# Patient Record
Sex: FEMALE | Race: WHITE | NOT HISPANIC OR LATINO | Employment: FULL TIME | URBAN - METROPOLITAN AREA
[De-identification: names, ages, dates, MRNs, and addresses within clinical notes are randomized per-mention and may not be internally consistent; named-entity substitution may affect disease eponyms.]

---

## 2023-08-23 PROBLEM — B18.2 CHRONIC VIRAL HEPATITIS C (MULTI): Status: ACTIVE | Noted: 2019-08-28

## 2023-08-23 PROBLEM — I10 PRIMARY HYPERTENSION: Status: ACTIVE | Noted: 2018-06-11

## 2023-08-23 PROBLEM — E66.9 OBESITY: Status: ACTIVE | Noted: 2023-08-23

## 2023-08-23 PROBLEM — F32.A DEPRESSION: Status: ACTIVE | Noted: 2018-08-22

## 2023-08-23 PROBLEM — E03.9 HYPOTHYROIDISM, UNSPECIFIED: Status: ACTIVE | Noted: 2018-06-12

## 2023-08-23 PROBLEM — E11.65 TYPE 2 DIABETES MELLITUS WITH HYPERGLYCEMIA (MULTI): Status: ACTIVE | Noted: 2023-08-23

## 2023-08-23 PROBLEM — K13.70 ORAL LESION: Status: ACTIVE | Noted: 2020-07-17

## 2023-08-23 PROBLEM — F98.8 ADD (ATTENTION DEFICIT DISORDER): Status: ACTIVE | Noted: 2022-01-26

## 2023-08-23 PROBLEM — E78.2 MIXED HYPERLIPIDEMIA: Status: ACTIVE | Noted: 2018-06-12

## 2023-08-23 RX ORDER — BUPROPION HYDROCHLORIDE 200 MG/1
TABLET, EXTENDED RELEASE ORAL
COMMUNITY
Start: 2022-01-10 | End: 2023-11-30 | Stop reason: SDUPTHER

## 2023-08-23 RX ORDER — FENOFIBRATE 160 MG/1
TABLET ORAL
COMMUNITY
Start: 2023-04-10 | End: 2023-11-30 | Stop reason: ALTCHOICE

## 2023-08-23 RX ORDER — ATOMOXETINE 40 MG/1
CAPSULE ORAL
COMMUNITY
Start: 2023-03-27 | End: 2023-10-13 | Stop reason: WASHOUT

## 2023-08-23 RX ORDER — METFORMIN HYDROCHLORIDE 750 MG/1
TABLET, EXTENDED RELEASE ORAL
COMMUNITY
Start: 2023-06-29 | End: 2023-10-08

## 2023-08-23 RX ORDER — ATORVASTATIN CALCIUM 40 MG/1
TABLET, FILM COATED ORAL
COMMUNITY
Start: 2023-04-10

## 2023-08-23 RX ORDER — CARVEDILOL 25 MG/1
TABLET ORAL
COMMUNITY
Start: 2018-08-22 | End: 2023-10-13 | Stop reason: WASHOUT

## 2023-08-23 RX ORDER — LEVOTHYROXINE SODIUM 125 UG/1
TABLET ORAL
COMMUNITY
Start: 2023-05-30 | End: 2023-11-30 | Stop reason: SDUPTHER

## 2023-08-23 RX ORDER — ASPIRIN 81 MG/1
TABLET ORAL
COMMUNITY
Start: 2018-08-22

## 2023-08-23 RX ORDER — LOSARTAN POTASSIUM 100 MG/1
TABLET ORAL
COMMUNITY
Start: 2018-08-22

## 2023-08-23 RX ORDER — TIRZEPATIDE 5 MG/.5ML
INJECTION, SOLUTION SUBCUTANEOUS
COMMUNITY
Start: 2023-07-03 | End: 2023-10-13 | Stop reason: WASHOUT

## 2023-10-07 DIAGNOSIS — E11.65 TYPE 2 DIABETES MELLITUS WITH HYPERGLYCEMIA, WITHOUT LONG-TERM CURRENT USE OF INSULIN (MULTI): Primary | ICD-10-CM

## 2023-10-08 RX ORDER — METFORMIN HYDROCHLORIDE 750 MG/1
1500 TABLET, EXTENDED RELEASE ORAL DAILY
Qty: 180 TABLET | Refills: 0 | Status: SHIPPED | OUTPATIENT
Start: 2023-10-08 | End: 2024-01-12 | Stop reason: SDUPTHER

## 2023-10-11 NOTE — PROGRESS NOTES
Subjective   Patient ID: Simona Betancourt is a 68 y.o. female who presents for Type 1 diabetes follow up and education   Last visit7/14/2023 with TROY Juarez CNP.  Pt with Dm2 (dx in her mid/late 40's), htn, dyslipidemia, hypothyroid, hep C (s/p treatment), CAD with stent (2014 Dr. Srinivasan). A1c 6.8% was 8.5% Pt checking blood sugars daily, did not bring glucometer with her. Recalls waking bs 110-120, not checking other times of the day. BS targets reviewed.  Pt taking metformin 750mg twice a day and Mounjaro 5 mg, c/o nausea/diarrhea day after injection, has improve since starting    Diet: 3 meals daily with snacks, limiting carbs at meals/snacks, looking at labels and aiming for 45 gram/meal 15 gram snack  -carbohydrate foods, portions and meal planning reviewed  -advised protein with meals and starting with protein for mixed macronutrient meals   -reinforced carb limits and protein snacks   Exercise: none currently  -benefits reviewed, advised working up to 30 minutes daily    Taking levothyroxine 125mcg daily. Pt euthyroid without obstructive symptoms.  Taking atorvastatin 40mg for lipids and tolerating.  Taking losartan 100mg, coreg 25mg bid  Creatinine 1.6 GFR 62 Urine creatine/ratio 3100.      Current Outpatient Medications:     aspirin 81 mg EC tablet, = 1 tab(s) ( 81 mg ), PO, Daily, # 90 tab(s), 0 Refill(s), Type: Maintenance, Disp: , Rfl:     atorvastatin (Lipitor) 40 mg tablet, = 1 tab(s) ( 40 mg ), Oral, daily, # 90 tab(s), 0 Refill(s), Type: Maintenance, Disp: , Rfl:     carvedilol (Coreg) 25 mg tablet, Take 1 tablet (25 mg) by mouth 2 times a day with meals., Disp: , Rfl:     levothyroxine (Synthroid, Levoxyl) 125 mcg tablet, See Instructions, Instructions: TAKE 1 TABLET DAILY, # 90 tab(s), 3 Refill(s), Type: Maintenance, Pharmacy: [facility], TAKE 1 TABLET DAILY, 61.5, in, 05/22/23 13:57:00 EDT, Height Measured, 170, lb, 05/22/23 13:57:00 EDT, Weight Measured, Disp: , Rfl:      losartan (Cozaar) 100 mg tablet, = 1 tab(s) ( 100 mg ), PO, Daily, # 90 tab(s), 0 Refill(s), Type: Maintenance, Disp: , Rfl:     metFORMIN XR (Glucophage-XR) 750 mg 24 hr tablet, TAKE TWO TABLETS BY MOUTH DAILY, Disp: 180 tablet, Rfl: 0    tirzepatide 5 mg/0.5 mL pen injector, INJECT 1 PEN-INJECTOR UNDER THE SKIN ONCE EVERY WEEK, Disp: 2 mL, Rfl: 3    buPROPion SR (Wellbutrin SR) 200 mg 12 hr tablet, = 1 tab(s), Oral, bid, # 180 tab(s), 3 Refill(s), Type: Soft Stop, Pharmacy: [facility], 1 tab(s) Oral bid, 61.5, in, 04/14/21 16:34:00 EDT, Height Measured, 183, lb, 03/12/21 11:02:00 EST, Weight Measured, Disp: , Rfl:     fenofibrate (Triglide) 160 mg tablet, = 1 tab(s) ( 160 mg ), Oral, daily, # 90 tab(s), 0 Refill(s), Type: Maintenance, Disp: , Rfl:     multivitamin (MULTIPLE VITAMINS ORAL), 1 cap(s), PO, Daily, # 90 cap(s), 0 Refill(s), Type: Maintenance, Disp: , Rfl:     /84   Pulse 63   Wt 72 kg (158 lb 12.8 oz)   BMI 29.52 kg/m²     Assessment/Plan     1. Type 2 diabetes mellitus with hyperglycemia, without long-term current use of insulin (CMS/[facility])  -A1c decreased 1.7% since last visit, at target, no increase in mounjaro today due to mild side effects currently  -continue focus carb limits (0-30 grams) and starting exercise  -check bs daily at different times, learn which meals/snacks cause hyperglycemia  -consider sglt2 at follow up    2. Primary hypertension  -above target today, advised obtaining home bp cuff to monitor  -next move would be low dose diuretic or calcium channel blocker    3. Mixed hyperlipidemia  -on statin and tolerating    4. Hypothyroidism, unspecified type  -euthyroid on current therapy     Treatment and plan discussed with Dr. Louie. Saum[facility] VALENZUELA Cert[facility] and     I, Dr Louie, have reviewed this progress note, medication list, vital signs, and any pertinent lab values, and any CGM data if present with the Certified Diabetes  Care and . This note reflects the treatment plan that was made with my input on the data that was presented above. I saw the patient face to face while reviewing the above data and formulating the treatment plan with the [facility] and . Dr. Kwan Louie.

## 2023-10-13 ENCOUNTER — CLINICAL SUPPORT (OUTPATIENT)
Dept: ENDOCRINOLOGY | Facility: CLINIC | Age: 68
End: 2023-10-13
Payer: COMMERCIAL

## 2023-10-13 VITALS
WEIGHT: 158.8 LBS | DIASTOLIC BLOOD PRESSURE: 84 MMHG | HEART RATE: 63 BPM | BODY MASS INDEX: 29.52 KG/M2 | SYSTOLIC BLOOD PRESSURE: 144 MMHG

## 2023-10-13 DIAGNOSIS — E03.9 HYPOTHYROIDISM, UNSPECIFIED TYPE: ICD-10-CM

## 2023-10-13 DIAGNOSIS — E78.2 MIXED HYPERLIPIDEMIA: ICD-10-CM

## 2023-10-13 DIAGNOSIS — I10 PRIMARY HYPERTENSION: ICD-10-CM

## 2023-10-13 DIAGNOSIS — E11.65 TYPE 2 DIABETES MELLITUS WITH HYPERGLYCEMIA, WITHOUT LONG-TERM CURRENT USE OF INSULIN (MULTI): Primary | ICD-10-CM

## 2023-10-13 LAB — POC HEMOGLOBIN A1C: 6.8 % (ref 4.2–6.5)

## 2023-10-13 PROCEDURE — 99213 OFFICE O/P EST LOW 20 MIN: CPT | Performed by: INTERNAL MEDICINE

## 2023-10-13 PROCEDURE — 83036 HEMOGLOBIN GLYCOSYLATED A1C: CPT | Performed by: INTERNAL MEDICINE

## 2023-10-13 RX ORDER — CARVEDILOL 25 MG/1
25 TABLET ORAL
COMMUNITY

## 2023-10-13 ASSESSMENT — PAIN SCALES - GENERAL: PAINLEVEL: 0-NO PAIN

## 2023-10-13 NOTE — PATIENT INSTRUCTIONS
Continue Mounjaro 5 mg weekly    Check blood sugar daily at different times  Check 1-2 hours after some meals, goal is less than 180    Check your home blood pressures, bring log to next visit    Limit carbohydrates at meals, no more than 45 grams for meals, 0-30 grams best

## 2023-10-20 DIAGNOSIS — E11.65 TYPE 2 DIABETES MELLITUS WITH HYPERGLYCEMIA, WITHOUT LONG-TERM CURRENT USE OF INSULIN (MULTI): Primary | ICD-10-CM

## 2023-10-23 ENCOUNTER — PHARMACY VISIT (OUTPATIENT)
Dept: PHARMACY | Facility: CLINIC | Age: 68
End: 2023-10-23
Payer: COMMERCIAL

## 2023-10-23 PROCEDURE — RXMED WILLOW AMBULATORY MEDICATION CHARGE

## 2023-10-26 ENCOUNTER — PHARMACY VISIT (OUTPATIENT)
Dept: PHARMACY | Facility: CLINIC | Age: 68
End: 2023-10-26
Payer: COMMERCIAL

## 2023-11-08 ENCOUNTER — TELEPHONE (OUTPATIENT)
Dept: PRIMARY CARE | Facility: CLINIC | Age: 68
End: 2023-11-08
Payer: COMMERCIAL

## 2023-11-08 DIAGNOSIS — E78.2 MIXED HYPERLIPIDEMIA: ICD-10-CM

## 2023-11-08 DIAGNOSIS — E11.65 TYPE 2 DIABETES MELLITUS WITH HYPERGLYCEMIA, WITHOUT LONG-TERM CURRENT USE OF INSULIN (MULTI): ICD-10-CM

## 2023-11-08 DIAGNOSIS — E66.09 OBESITY DUE TO EXCESS CALORIES WITHOUT SERIOUS COMORBIDITY, UNSPECIFIED CLASSIFICATION: ICD-10-CM

## 2023-11-08 DIAGNOSIS — I10 PRIMARY HYPERTENSION: ICD-10-CM

## 2023-11-08 DIAGNOSIS — E03.9 HYPOTHYROIDISM, UNSPECIFIED TYPE: ICD-10-CM

## 2023-11-22 ENCOUNTER — PHARMACY VISIT (OUTPATIENT)
Dept: PHARMACY | Facility: CLINIC | Age: 68
End: 2023-11-22
Payer: COMMERCIAL

## 2023-11-22 ENCOUNTER — DOCUMENTATION (OUTPATIENT)
Dept: PRIMARY CARE | Facility: CLINIC | Age: 68
End: 2023-11-22
Payer: COMMERCIAL

## 2023-11-22 PROCEDURE — RXMED WILLOW AMBULATORY MEDICATION CHARGE

## 2023-11-27 ENCOUNTER — LAB (OUTPATIENT)
Dept: LAB | Facility: LAB | Age: 68
End: 2023-11-27
Payer: COMMERCIAL

## 2023-11-27 DIAGNOSIS — E78.2 MIXED HYPERLIPIDEMIA: ICD-10-CM

## 2023-11-27 DIAGNOSIS — E66.09 OBESITY DUE TO EXCESS CALORIES WITHOUT SERIOUS COMORBIDITY, UNSPECIFIED CLASSIFICATION: ICD-10-CM

## 2023-11-27 DIAGNOSIS — E11.65 TYPE 2 DIABETES MELLITUS WITH HYPERGLYCEMIA, WITHOUT LONG-TERM CURRENT USE OF INSULIN (MULTI): ICD-10-CM

## 2023-11-27 DIAGNOSIS — E03.9 HYPOTHYROIDISM, UNSPECIFIED TYPE: ICD-10-CM

## 2023-11-27 DIAGNOSIS — I10 PRIMARY HYPERTENSION: ICD-10-CM

## 2023-11-27 LAB
ALBUMIN SERPL-MCNC: 4.8 G/DL (ref 3.5–5)
ALP BLD-CCNC: 86 U/L (ref 35–125)
ALT SERPL-CCNC: 18 U/L (ref 5–40)
ANION GAP SERPL CALC-SCNC: 17 MMOL/L
APPEARANCE UR: CLEAR
AST SERPL-CCNC: 21 U/L (ref 5–40)
BASOPHILS # BLD AUTO: 0.08 X10*3/UL (ref 0–0.1)
BASOPHILS NFR BLD AUTO: 0.8 %
BILIRUB SERPL-MCNC: 0.6 MG/DL (ref 0.1–1.2)
BILIRUB UR STRIP.AUTO-MCNC: NEGATIVE MG/DL
BUN SERPL-MCNC: 19 MG/DL (ref 8–25)
CALCIUM SERPL-MCNC: 10.7 MG/DL (ref 8.5–10.4)
CHLORIDE SERPL-SCNC: 104 MMOL/L (ref 97–107)
CHOLEST SERPL-MCNC: 143 MG/DL (ref 133–200)
CHOLEST/HDLC SERPL: 3.3 {RATIO}
CO2 SERPL-SCNC: 24 MMOL/L (ref 24–31)
COLOR UR: YELLOW
CREAT SERPL-MCNC: 1.1 MG/DL (ref 0.4–1.6)
CREAT UR-MCNC: 149.5 MG/DL
EOSINOPHIL # BLD AUTO: 0.26 X10*3/UL (ref 0–0.7)
EOSINOPHIL NFR BLD AUTO: 2.5 %
ERYTHROCYTE [DISTWIDTH] IN BLOOD BY AUTOMATED COUNT: 12.2 % (ref 11.5–14.5)
EST. AVERAGE GLUCOSE BLD GHB EST-MCNC: 128 MG/DL
GFR SERPL CREATININE-BSD FRML MDRD: 56 ML/MIN/1.73M*2
GLUCOSE SERPL-MCNC: 104 MG/DL (ref 65–99)
GLUCOSE UR STRIP.AUTO-MCNC: NORMAL MG/DL
HBA1C MFR BLD: 6.1 %
HCT VFR BLD AUTO: 48.3 % (ref 36–46)
HDLC SERPL-MCNC: 43 MG/DL
HGB BLD-MCNC: 16.1 G/DL (ref 12–16)
HYALINE CASTS #/AREA URNS AUTO: ABNORMAL /LPF
IMM GRANULOCYTES # BLD AUTO: 0.03 X10*3/UL (ref 0–0.7)
IMM GRANULOCYTES NFR BLD AUTO: 0.3 % (ref 0–0.9)
KETONES UR STRIP.AUTO-MCNC: NEGATIVE MG/DL
LDLC SERPL CALC-MCNC: 68 MG/DL (ref 65–130)
LEUKOCYTE ESTERASE UR QL STRIP.AUTO: ABNORMAL
LYMPHOCYTES # BLD AUTO: 2.83 X10*3/UL (ref 1.2–4.8)
LYMPHOCYTES NFR BLD AUTO: 27.7 %
MCH RBC QN AUTO: 31.2 PG (ref 26–34)
MCHC RBC AUTO-ENTMCNC: 33.3 G/DL (ref 32–36)
MCV RBC AUTO: 94 FL (ref 80–100)
MICROALBUMIN UR-MCNC: 76 MG/L (ref 0–23)
MICROALBUMIN/CREAT UR: 50.8 UG/MG CREAT
MONOCYTES # BLD AUTO: 0.8 X10*3/UL (ref 0.1–1)
MONOCYTES NFR BLD AUTO: 7.8 %
NEUTROPHILS # BLD AUTO: 6.2 X10*3/UL (ref 1.2–7.7)
NEUTROPHILS NFR BLD AUTO: 60.9 %
NITRITE UR QL STRIP.AUTO: NEGATIVE
NRBC BLD-RTO: 0 /100 WBCS (ref 0–0)
PH UR STRIP.AUTO: 5.5 [PH]
PLATELET # BLD AUTO: 291 X10*3/UL (ref 150–450)
POTASSIUM SERPL-SCNC: 4.8 MMOL/L (ref 3.4–5.1)
PROT SERPL-MCNC: 7.3 G/DL (ref 5.9–7.9)
PROT UR STRIP.AUTO-MCNC: ABNORMAL MG/DL
RBC # BLD AUTO: 5.16 X10*6/UL (ref 4–5.2)
RBC # UR STRIP.AUTO: NEGATIVE /UL
RBC #/AREA URNS AUTO: ABNORMAL /HPF
SODIUM SERPL-SCNC: 145 MMOL/L (ref 133–145)
SP GR UR STRIP.AUTO: 1.02
SQUAMOUS #/AREA URNS AUTO: ABNORMAL /HPF
TRIGL SERPL-MCNC: 161 MG/DL (ref 40–150)
TSH SERPL DL<=0.05 MIU/L-ACNC: 0.86 MIU/L (ref 0.27–4.2)
UROBILINOGEN UR STRIP.AUTO-MCNC: NORMAL MG/DL
WBC # BLD AUTO: 10.2 X10*3/UL (ref 4.4–11.3)
WBC #/AREA URNS AUTO: ABNORMAL /HPF

## 2023-11-27 PROCEDURE — 81001 URINALYSIS AUTO W/SCOPE: CPT

## 2023-11-27 PROCEDURE — 84443 ASSAY THYROID STIM HORMONE: CPT

## 2023-11-27 PROCEDURE — 80053 COMPREHEN METABOLIC PANEL: CPT

## 2023-11-27 PROCEDURE — 83036 HEMOGLOBIN GLYCOSYLATED A1C: CPT

## 2023-11-27 PROCEDURE — 82043 UR ALBUMIN QUANTITATIVE: CPT

## 2023-11-27 PROCEDURE — 85025 COMPLETE CBC W/AUTO DIFF WBC: CPT

## 2023-11-27 PROCEDURE — 36415 COLL VENOUS BLD VENIPUNCTURE: CPT

## 2023-11-27 PROCEDURE — 82570 ASSAY OF URINE CREATININE: CPT

## 2023-11-27 PROCEDURE — 80061 LIPID PANEL: CPT

## 2023-11-30 ENCOUNTER — OFFICE VISIT (OUTPATIENT)
Dept: PRIMARY CARE | Facility: CLINIC | Age: 68
End: 2023-11-30
Payer: COMMERCIAL

## 2023-11-30 VITALS
SYSTOLIC BLOOD PRESSURE: 118 MMHG | HEIGHT: 60 IN | OXYGEN SATURATION: 99 % | HEART RATE: 70 BPM | DIASTOLIC BLOOD PRESSURE: 66 MMHG | WEIGHT: 155 LBS | BODY MASS INDEX: 30.43 KG/M2

## 2023-11-30 DIAGNOSIS — Z00.00 WELL ADULT EXAM: Primary | ICD-10-CM

## 2023-11-30 DIAGNOSIS — Z78.0 POST-MENOPAUSAL: ICD-10-CM

## 2023-11-30 DIAGNOSIS — E78.2 MIXED HYPERLIPIDEMIA: ICD-10-CM

## 2023-11-30 DIAGNOSIS — I10 PRIMARY HYPERTENSION: ICD-10-CM

## 2023-11-30 DIAGNOSIS — F17.210 HEAVY SMOKER (MORE THAN 20 CIGARETTES PER DAY): ICD-10-CM

## 2023-11-30 DIAGNOSIS — E11.65 TYPE 2 DIABETES MELLITUS WITH HYPERGLYCEMIA, WITHOUT LONG-TERM CURRENT USE OF INSULIN (MULTI): ICD-10-CM

## 2023-11-30 DIAGNOSIS — E03.9 HYPOTHYROIDISM, UNSPECIFIED TYPE: ICD-10-CM

## 2023-11-30 DIAGNOSIS — Z12.31 ENCOUNTER FOR SCREENING MAMMOGRAM FOR BREAST CANCER: ICD-10-CM

## 2023-11-30 DIAGNOSIS — B18.2 CHRONIC HEPATITIS C WITHOUT HEPATIC COMA (MULTI): ICD-10-CM

## 2023-11-30 DIAGNOSIS — F32.A DEPRESSION, UNSPECIFIED DEPRESSION TYPE: ICD-10-CM

## 2023-11-30 PROBLEM — K13.70 ORAL LESION: Status: RESOLVED | Noted: 2020-07-17 | Resolved: 2023-11-30

## 2023-11-30 PROCEDURE — 4010F ACE/ARB THERAPY RXD/TAKEN: CPT | Performed by: FAMILY MEDICINE

## 2023-11-30 PROCEDURE — 3044F HG A1C LEVEL LT 7.0%: CPT | Performed by: FAMILY MEDICINE

## 2023-11-30 PROCEDURE — G0438 PPPS, INITIAL VISIT: HCPCS | Performed by: FAMILY MEDICINE

## 2023-11-30 PROCEDURE — 3060F POS MICROALBUMINURIA REV: CPT | Performed by: FAMILY MEDICINE

## 2023-11-30 PROCEDURE — 1159F MED LIST DOCD IN RCRD: CPT | Performed by: FAMILY MEDICINE

## 2023-11-30 PROCEDURE — 1126F AMNT PAIN NOTED NONE PRSNT: CPT | Performed by: FAMILY MEDICINE

## 2023-11-30 PROCEDURE — 90677 PCV20 VACCINE IM: CPT | Performed by: FAMILY MEDICINE

## 2023-11-30 PROCEDURE — 3074F SYST BP LT 130 MM HG: CPT | Performed by: FAMILY MEDICINE

## 2023-11-30 PROCEDURE — 4004F PT TOBACCO SCREEN RCVD TLK: CPT | Performed by: FAMILY MEDICINE

## 2023-11-30 PROCEDURE — 90471 IMMUNIZATION ADMIN: CPT | Performed by: FAMILY MEDICINE

## 2023-11-30 PROCEDURE — 3078F DIAST BP <80 MM HG: CPT | Performed by: FAMILY MEDICINE

## 2023-11-30 PROCEDURE — 1160F RVW MEDS BY RX/DR IN RCRD: CPT | Performed by: FAMILY MEDICINE

## 2023-11-30 PROCEDURE — 3048F LDL-C <100 MG/DL: CPT | Performed by: FAMILY MEDICINE

## 2023-11-30 PROCEDURE — 1170F FXNL STATUS ASSESSED: CPT | Performed by: FAMILY MEDICINE

## 2023-11-30 RX ORDER — BUPROPION HYDROCHLORIDE 200 MG/1
TABLET, EXTENDED RELEASE ORAL
Qty: 90 TABLET | Refills: 4 | Status: SHIPPED | OUTPATIENT
Start: 2023-11-30

## 2023-11-30 RX ORDER — LEVOTHYROXINE SODIUM 125 UG/1
TABLET ORAL
Qty: 90 TABLET | Refills: 4 | Status: SHIPPED | OUTPATIENT
Start: 2023-11-30 | End: 2024-05-06

## 2023-11-30 ASSESSMENT — PATIENT HEALTH QUESTIONNAIRE - PHQ9
SUM OF ALL RESPONSES TO PHQ9 QUESTIONS 1 AND 2: 0
1. LITTLE INTEREST OR PLEASURE IN DOING THINGS: NOT AT ALL
2. FEELING DOWN, DEPRESSED OR HOPELESS: NOT AT ALL

## 2023-11-30 ASSESSMENT — PROMIS GLOBAL HEALTH SCALE
RATE_MENTAL_HEALTH: FAIR
RATE_GENERAL_HEALTH: FAIR
CARRYOUT_SOCIAL_ACTIVITIES: POOR
RATE_AVERAGE_PAIN: 2
RATE_PHYSICAL_HEALTH: FAIR
RATE_AVERAGE_FATIGUE: SEVERE
EMOTIONAL_PROBLEMS: OFTEN
CARRYOUT_PHYSICAL_ACTIVITIES: COMPLETELY
RATE_QUALITY_OF_LIFE: FAIR
RATE_SOCIAL_SATISFACTION: POOR

## 2023-11-30 ASSESSMENT — PAIN SCALES - GENERAL: PAINLEVEL: 0-NO PAIN

## 2023-11-30 ASSESSMENT — ACTIVITIES OF DAILY LIVING (ADL)
BATHING: INDEPENDENT
MANAGING_FINANCES: INDEPENDENT
DOING_HOUSEWORK: INDEPENDENT
GROCERY_SHOPPING: INDEPENDENT
DRESSING: INDEPENDENT
TAKING_MEDICATION: INDEPENDENT

## 2023-12-02 ASSESSMENT — ENCOUNTER SYMPTOMS
NUMBNESS: 0
UNEXPECTED WEIGHT CHANGE: 0
COUGH: 0
BLOOD IN STOOL: 0
DIZZINESS: 0
TROUBLE SWALLOWING: 0
FATIGUE: 0
FEVER: 0
ACTIVITY CHANGE: 0
NERVOUS/ANXIOUS: 0
SORE THROAT: 0
DYSPHORIC MOOD: 0
MYALGIAS: 0
RHINORRHEA: 0
HEMATURIA: 0
DIFFICULTY URINATING: 0
ARTHRALGIAS: 0
PALPITATIONS: 0
SHORTNESS OF BREATH: 0
ABDOMINAL PAIN: 0

## 2023-12-02 NOTE — ASSESSMENT & PLAN NOTE
>>ASSESSMENT AND PLAN FOR TYPE 2 DIABETES MELLITUS WITH HYPERGLYCEMIA (MULTI) WRITTEN ON 12/2/2023  4:09 PM BY LINDSAY HAGER MD    Controlled. Continue to follow up with endocrinology

## 2023-12-19 ENCOUNTER — PHARMACY VISIT (OUTPATIENT)
Dept: PHARMACY | Facility: CLINIC | Age: 68
End: 2023-12-19
Payer: COMMERCIAL

## 2023-12-19 PROCEDURE — RXMED WILLOW AMBULATORY MEDICATION CHARGE

## 2024-01-12 ENCOUNTER — OFFICE VISIT (OUTPATIENT)
Dept: ENDOCRINOLOGY | Facility: CLINIC | Age: 69
End: 2024-01-12
Payer: COMMERCIAL

## 2024-01-12 VITALS
WEIGHT: 148 LBS | DIASTOLIC BLOOD PRESSURE: 78 MMHG | BODY MASS INDEX: 28.9 KG/M2 | HEART RATE: 66 BPM | SYSTOLIC BLOOD PRESSURE: 140 MMHG

## 2024-01-12 DIAGNOSIS — I10 PRIMARY HYPERTENSION: ICD-10-CM

## 2024-01-12 DIAGNOSIS — E11.65 TYPE 2 DIABETES MELLITUS WITH HYPERGLYCEMIA, WITHOUT LONG-TERM CURRENT USE OF INSULIN (MULTI): ICD-10-CM

## 2024-01-12 DIAGNOSIS — E78.2 MIXED HYPERLIPIDEMIA: ICD-10-CM

## 2024-01-12 DIAGNOSIS — E11.65 TYPE 2 DIABETES MELLITUS WITH HYPERGLYCEMIA, UNSPECIFIED WHETHER LONG TERM INSULIN USE (MULTI): Primary | ICD-10-CM

## 2024-01-12 LAB — POC HEMOGLOBIN A1C: 6.3 % (ref 4.2–6.5)

## 2024-01-12 PROCEDURE — 4010F ACE/ARB THERAPY RXD/TAKEN: CPT | Performed by: NURSE PRACTITIONER

## 2024-01-12 PROCEDURE — 3078F DIAST BP <80 MM HG: CPT | Performed by: NURSE PRACTITIONER

## 2024-01-12 PROCEDURE — RXMED WILLOW AMBULATORY MEDICATION CHARGE

## 2024-01-12 PROCEDURE — 83036 HEMOGLOBIN GLYCOSYLATED A1C: CPT | Performed by: NURSE PRACTITIONER

## 2024-01-12 PROCEDURE — 1126F AMNT PAIN NOTED NONE PRSNT: CPT | Performed by: NURSE PRACTITIONER

## 2024-01-12 PROCEDURE — 99213 OFFICE O/P EST LOW 20 MIN: CPT | Performed by: NURSE PRACTITIONER

## 2024-01-12 PROCEDURE — 3077F SYST BP >= 140 MM HG: CPT | Performed by: NURSE PRACTITIONER

## 2024-01-12 RX ORDER — METFORMIN HYDROCHLORIDE 750 MG/1
1500 TABLET, EXTENDED RELEASE ORAL DAILY
Qty: 180 TABLET | Refills: 3 | Status: SHIPPED | OUTPATIENT
Start: 2024-01-12 | End: 2024-02-05

## 2024-01-12 ASSESSMENT — PAIN SCALES - GENERAL: PAINLEVEL: 0-NO PAIN

## 2024-01-12 NOTE — PROGRESS NOTES
Subjective   Patient ID: Isabel Betancourt is a 68 y.o. female who presents for Type 2 diabetes follow up and education  Pt with Dm2 (dx in her mid/late 40's), htn, dyslipidemia, hypothyroid, hep C (s/p treatment), CAD with stent (2014 Dr. Srinivasan). A1c  6.3% was 6.8%. Pt checking blood sugars daily, did not bring glucometer with her. Recalls waking bs 110-120, not checking other times of the day. BS targets reviewed.  Pt taking metformin 750mg twice a day and Mounjaro 5 mg, c/o nausea/diarrhea day after injection, has improve since starting  Taking levothyroxine 125mcg daily. Pt euthyroid without obstructive symptoms.  Taking atorvastatin 40mg for lipids and tolerating.  Taking losartan 100mg, coreg 25mg bid  Creatinine 1.6 GFR 62 Urine creatine/ratio 3100.      Current Outpatient Medications:     aspirin 81 mg EC tablet, = 1 tab(s) ( 81 mg ), PO, Daily, # 90 tab(s), 0 Refill(s), Type: Maintenance, Disp: , Rfl:     atorvastatin (Lipitor) 40 mg tablet, = 1 tab(s) ( 40 mg ), Oral, daily, # 90 tab(s), 0 Refill(s), Type: Maintenance, Disp: , Rfl:     buPROPion SR (Wellbutrin SR) 200 mg 12 hr tablet,  1 tab(s), Oral, bid, Disp: 90 tablet, Rfl: 4    carvedilol (Coreg) 25 mg tablet, Take 1 tablet (25 mg) by mouth 2 times a day with meals., Disp: , Rfl:     levothyroxine (Synthroid, Levoxyl) 125 mcg tablet, See Instructions, Instructions: TAKE 1 TABLET DAILY, Disp: 90 tablet, Rfl: 4    losartan (Cozaar) 100 mg tablet, = 1 tab(s) ( 100 mg ), PO, Daily, # 90 tab(s), 0 Refill(s), Type: Maintenance, Disp: , Rfl:     multivitamin (MULTIPLE VITAMINS ORAL), 1 cap(s), PO, Daily, # 90 cap(s), 0 Refill(s), Type: Maintenance, Disp: , Rfl:     metFORMIN XR (Glucophage-XR) 750 mg 24 hr tablet, Take 2 tablets (1,500 mg) by mouth once daily., Disp: 180 tablet, Rfl: 3    tirzepatide 5 mg/0.5 mL pen injector, INJECT 1 PEN-INJECTOR UNDER THE SKIN ONCE EVERY WEEK, Disp: 2 mL, Rfl: 3    /78 (BP Location: Left arm, Patient Position: Sitting)    Pulse 66   Wt 67.1 kg (148 lb)   LMP 01/01/2011 (Approximate)   BMI 28.90 kg/m²     Assessment/Plan     1. Type 2 diabetes mellitus with hyperglycemia, without long-term current use of insulin (CMS/[facility])  -excellent A1c control  -check bs daily at different times, learn which meals/snacks cause hyperglycemia  -consider sglt2 if affordable    2. Primary hypertension  -above target but improved with manual BP  -consider low dose diuretic or calcium channel blocker at follow up if above target    3. Mixed hyperlipidemia  -on statin and tolerating    4. Hypothyroidism, unspecified type  -euthyroid on current therapy    FOLLOW UP CNP 4 MONTHS

## 2024-01-17 ENCOUNTER — PHARMACY VISIT (OUTPATIENT)
Dept: PHARMACY | Facility: CLINIC | Age: 69
End: 2024-01-17
Payer: COMMERCIAL

## 2024-02-03 DIAGNOSIS — E11.65 TYPE 2 DIABETES MELLITUS WITH HYPERGLYCEMIA, WITHOUT LONG-TERM CURRENT USE OF INSULIN (MULTI): ICD-10-CM

## 2024-02-05 RX ORDER — METFORMIN HYDROCHLORIDE 750 MG/1
1500 TABLET, EXTENDED RELEASE ORAL DAILY
Qty: 180 TABLET | Refills: 0 | Status: SHIPPED | OUTPATIENT
Start: 2024-02-05

## 2024-02-14 ENCOUNTER — PHARMACY VISIT (OUTPATIENT)
Dept: PHARMACY | Facility: CLINIC | Age: 69
End: 2024-02-14
Payer: COMMERCIAL

## 2024-02-14 PROCEDURE — RXMED WILLOW AMBULATORY MEDICATION CHARGE

## 2024-03-14 ENCOUNTER — PHARMACY VISIT (OUTPATIENT)
Dept: PHARMACY | Facility: CLINIC | Age: 69
End: 2024-03-14
Payer: COMMERCIAL

## 2024-03-14 PROCEDURE — RXMED WILLOW AMBULATORY MEDICATION CHARGE

## 2024-04-10 PROCEDURE — RXMED WILLOW AMBULATORY MEDICATION CHARGE

## 2024-04-12 ENCOUNTER — PHARMACY VISIT (OUTPATIENT)
Dept: PHARMACY | Facility: CLINIC | Age: 69
End: 2024-04-12
Payer: COMMERCIAL

## 2024-05-06 DIAGNOSIS — E03.9 HYPOTHYROIDISM, UNSPECIFIED TYPE: ICD-10-CM

## 2024-05-06 RX ORDER — LEVOTHYROXINE SODIUM 125 UG/1
TABLET ORAL
Qty: 90 TABLET | Refills: 3 | Status: SHIPPED | OUTPATIENT
Start: 2024-05-06

## 2024-05-16 DIAGNOSIS — E11.65 TYPE 2 DIABETES MELLITUS WITH HYPERGLYCEMIA, WITHOUT LONG-TERM CURRENT USE OF INSULIN (MULTI): ICD-10-CM

## 2024-05-16 RX ORDER — TIRZEPATIDE 5 MG/.5ML
INJECTION, SOLUTION SUBCUTANEOUS
Qty: 2 ML | Refills: 3 | Status: SHIPPED | OUTPATIENT
Start: 2024-05-16 | End: 2025-05-16

## 2024-05-17 ENCOUNTER — TELEPHONE (OUTPATIENT)
Dept: ENDOCRINOLOGY | Facility: CLINIC | Age: 69
End: 2024-05-17

## 2024-05-17 ENCOUNTER — PHARMACY VISIT (OUTPATIENT)
Dept: PHARMACY | Facility: CLINIC | Age: 69
End: 2024-05-17
Payer: COMMERCIAL

## 2024-05-17 ENCOUNTER — APPOINTMENT (OUTPATIENT)
Dept: ENDOCRINOLOGY | Facility: CLINIC | Age: 69
End: 2024-05-17
Payer: COMMERCIAL

## 2024-05-17 DIAGNOSIS — E11.65 TYPE 2 DIABETES MELLITUS WITH HYPERGLYCEMIA, UNSPECIFIED WHETHER LONG TERM INSULIN USE (MULTI): Primary | ICD-10-CM

## 2024-05-17 PROCEDURE — RXMED WILLOW AMBULATORY MEDICATION CHARGE

## 2024-05-17 RX ORDER — TIRZEPATIDE 7.5 MG/.5ML
7.5 INJECTION, SOLUTION SUBCUTANEOUS
Qty: 2 ML | Refills: 5 | Status: SHIPPED | OUTPATIENT
Start: 2024-05-19

## 2024-05-17 NOTE — TELEPHONE ENCOUNTER
[facility] has 7.5mg mounjaro and patient is requesting to bump up to that dose. Currently on 5mg dose since September 2023

## 2024-06-14 ENCOUNTER — APPOINTMENT (OUTPATIENT)
Dept: ENDOCRINOLOGY | Facility: CLINIC | Age: 69
End: 2024-06-14
Payer: COMMERCIAL

## 2024-06-14 VITALS
HEART RATE: 61 BPM | WEIGHT: 140 LBS | BODY MASS INDEX: 27.34 KG/M2 | SYSTOLIC BLOOD PRESSURE: 136 MMHG | DIASTOLIC BLOOD PRESSURE: 80 MMHG

## 2024-06-14 DIAGNOSIS — E11.65 TYPE 2 DIABETES MELLITUS WITH HYPERGLYCEMIA, UNSPECIFIED WHETHER LONG TERM INSULIN USE (MULTI): Primary | ICD-10-CM

## 2024-06-14 DIAGNOSIS — E03.9 HYPOTHYROIDISM, UNSPECIFIED TYPE: ICD-10-CM

## 2024-06-14 DIAGNOSIS — I10 PRIMARY HYPERTENSION: ICD-10-CM

## 2024-06-14 DIAGNOSIS — E78.2 MIXED HYPERLIPIDEMIA: ICD-10-CM

## 2024-06-14 LAB — POC HEMOGLOBIN A1C: 6.3 % (ref 4.2–6.5)

## 2024-06-14 PROCEDURE — 3075F SYST BP GE 130 - 139MM HG: CPT | Performed by: NURSE PRACTITIONER

## 2024-06-14 PROCEDURE — 99213 OFFICE O/P EST LOW 20 MIN: CPT | Performed by: NURSE PRACTITIONER

## 2024-06-14 PROCEDURE — 1126F AMNT PAIN NOTED NONE PRSNT: CPT | Performed by: NURSE PRACTITIONER

## 2024-06-14 PROCEDURE — 1159F MED LIST DOCD IN RCRD: CPT | Performed by: NURSE PRACTITIONER

## 2024-06-14 PROCEDURE — 1160F RVW MEDS BY RX/DR IN RCRD: CPT | Performed by: NURSE PRACTITIONER

## 2024-06-14 PROCEDURE — 4010F ACE/ARB THERAPY RXD/TAKEN: CPT | Performed by: NURSE PRACTITIONER

## 2024-06-14 PROCEDURE — 83036 HEMOGLOBIN GLYCOSYLATED A1C: CPT | Performed by: NURSE PRACTITIONER

## 2024-06-14 PROCEDURE — RXMED WILLOW AMBULATORY MEDICATION CHARGE

## 2024-06-14 PROCEDURE — 3079F DIAST BP 80-89 MM HG: CPT | Performed by: NURSE PRACTITIONER

## 2024-06-14 ASSESSMENT — LIFESTYLE VARIABLES
HOW OFTEN DO YOU HAVE A DRINK CONTAINING ALCOHOL: NEVER
HOW MANY STANDARD DRINKS CONTAINING ALCOHOL DO YOU HAVE ON A TYPICAL DAY: PATIENT DOES NOT DRINK
AUDIT-C TOTAL SCORE: 0
SKIP TO QUESTIONS 9-10: 1
HOW OFTEN DO YOU HAVE SIX OR MORE DRINKS ON ONE OCCASION: NEVER

## 2024-06-14 ASSESSMENT — PAIN SCALES - GENERAL: PAINLEVEL: 0-NO PAIN

## 2024-06-14 ASSESSMENT — ENCOUNTER SYMPTOMS
LOSS OF SENSATION IN FEET: 0
OCCASIONAL FEELINGS OF UNSTEADINESS: 0
DEPRESSION: 0

## 2024-06-14 ASSESSMENT — PATIENT HEALTH QUESTIONNAIRE - PHQ9
SUM OF ALL RESPONSES TO PHQ9 QUESTIONS 1 & 2: 0
1. LITTLE INTEREST OR PLEASURE IN DOING THINGS: NOT AT ALL
2. FEELING DOWN, DEPRESSED OR HOPELESS: NOT AT ALL

## 2024-06-14 NOTE — PROGRESS NOTES
HPI   Presents for follow up. 68 y.o. female who presents for Type 2 diabetes follow up. Diagnosed with DM 2 in mid/late 40's. History htn, dyslipidemia, hypothyroid, hep C (s/p treatment), CAD with stent (2014 Dr. Srinivasan). A1c 6.3% was 6.8%. Pt checking blood sugars daily, did not bring glucometer with her. Recalls waking bs 110-120, not checking other times of the day. BS targets reviewed.  Pt taking metformin 750mg twice a day and Mounjaro 7.5 mg, c/o nausea intermit   Taking levothyroxine 125mcg daily. Pt euthyroid without obstructive symptoms.  Taking atorvastatin 40mg for lipids and tolerating.  Taking losartan 100mg, coreg 25mg bid        Current Outpatient Medications:     aspirin 81 mg EC tablet, = 1 tab(s) ( 81 mg ), PO, Daily, # 90 tab(s), 0 Refill(s), Type: Maintenance, Disp: , Rfl:     atorvastatin (Lipitor) 40 mg tablet, = 1 tab(s) ( 40 mg ), Oral, daily, # 90 tab(s), 0 Refill(s), Type: Maintenance, Disp: , Rfl:     buPROPion SR (Wellbutrin SR) 200 mg 12 hr tablet,  1 tab(s), Oral, bid, Disp: 90 tablet, Rfl: 4    carvedilol (Coreg) 25 mg tablet, Take 1 tablet (25 mg) by mouth 2 times daily (morning and late afternoon)., Disp: , Rfl:     levothyroxine (Synthroid, Levoxyl) 125 mcg tablet, TAKE 1 TABLET DAILY, Disp: 90 tablet, Rfl: 3    losartan (Cozaar) 100 mg tablet, = 1 tab(s) ( 100 mg ), PO, Daily, # 90 tab(s), 0 Refill(s), Type: Maintenance, Disp: , Rfl:     metFORMIN XR (Glucophage-XR) 750 mg 24 hr tablet, TAKE TWO TABLETS BY MOUTH EVERY DAY, Disp: 180 tablet, Rfl: 0    multivitamin (MULTIPLE VITAMINS ORAL), 1 cap(s), PO, Daily, # 90 cap(s), 0 Refill(s), Type: Maintenance, Disp: , Rfl:     tirzepatide (Mounjaro) 7.5 mg/0.5 mL pen injector, Inject 7.5 mg under the skin 1 (one) time per week., Disp: 2 mL, Rfl: 5      Allergies as of 06/14/2024 - Reviewed 06/14/2024   Allergen Reaction Noted    Ace inhibitors Cough 08/23/2023         Review of Systems   Cardiology: Lightheadedness-denies.  Chest  pain-denies.  Leg edema-denies.  Palpitations-denies.  Respiratory: Cough-denies. Shortness of breath-denies.  Wheezing-denies.  Gastroenterology: Constipation-denies.  Diarrhea-denies.  Heartburn-denies.  Endocrinology: Cold intolerance-denies.  Heat intolerance-denies.  Sweats-denies.  Neurology: Headache-denies.  Tremor-denies.  Neuropathy in extremities-denies.  Psychology: Low energy-denies.  Irritability-denies.  Sleep disturbances-denies.      /90   Pulse 61   Wt 63.5 kg (140 lb)   LMP 01/01/2011 (Approximate)   BMI 27.34 kg/m²       Labs:  Lab Results   Component Value Date    WBC 10.2 11/27/2023    NRBC 0.0 11/27/2023    RBC 5.16 11/27/2023    HGB 16.1 (H) 11/27/2023    HCT 48.3 (H) 11/27/2023     11/27/2023     Lab Results   Component Value Date    CALCIUM 10.7 (H) 11/27/2023    AST 21 11/27/2023    ALKPHOS 86 11/27/2023    BILITOT 0.6 11/27/2023    PROT 7.3 11/27/2023    ALBUMIN 4.8 11/27/2023    GLOB 2.7 05/19/2023    AGR 1.6 05/19/2023     11/27/2023    K 4.8 11/27/2023     11/27/2023    CO2 24 11/27/2023    ANIONGAP 17 11/27/2023    BUN 19 11/27/2023    CREATININE 1.10 11/27/2023    UREACREAUR 22.0 (H) 05/19/2023    GLUCOSE 104 (H) 11/27/2023    ALT 18 11/27/2023    EGFR 56 (L) 11/27/2023     Lab Results   Component Value Date    CHOL 143 11/27/2023    TRIG 161 (H) 11/27/2023    HDL 43.0 (L) 11/27/2023    LDLCALC 68 11/27/2023     Lab Results   Component Value Date    MICROALBCREA 50.8 11/27/2023     Lab Results   Component Value Date    TSH 0.86 11/27/2023       Lab Results   Component Value Date    HGBA1C 6.3 06/14/2024         Physical Exam   General Appearance: pleasant, cooperative, no acute distress  HEENT: no chemosis, no proptosis, no lid lag, no lid retraction  Neck: no lymphadenopathy, no thyromegaly, no dominant thyroid nodules  Heart: no murmurs, regular rate and rhythm, S1 and S2  Lungs: no wheezes, no rhonci, no rales  Extremities: no lower extremity  swelling      Assessment/Plan   1. Type 2 diabetes mellitus with hyperglycemia, unspecified whether long term insulin use (Multi)  -excellent A1c control  -can decrease dose Mounjaro if having constant nausea    - POCT glycosylated hemoglobin (Hb A1C) manually resulted    2. Mixed hyperlipidemia  -tolerates statin  -due for labs before next visit (PCP well visit 11/2024)    3. Primary hypertension  -stable    4. Hypothyroidism, unspecified type  -euthyroid  -no compressive/obstructive neck complaints     Follow Up:    -labs/tests/notes reviewed  -reviewed and counseled patient on medication monitoring and side effects

## 2024-06-19 ENCOUNTER — PHARMACY VISIT (OUTPATIENT)
Dept: PHARMACY | Facility: CLINIC | Age: 69
End: 2024-06-19
Payer: COMMERCIAL

## 2024-07-30 PROCEDURE — RXMED WILLOW AMBULATORY MEDICATION CHARGE

## 2024-08-01 ENCOUNTER — PHARMACY VISIT (OUTPATIENT)
Dept: PHARMACY | Facility: CLINIC | Age: 69
End: 2024-08-01
Payer: COMMERCIAL

## 2024-09-12 PROCEDURE — RXMED WILLOW AMBULATORY MEDICATION CHARGE

## 2024-09-13 ENCOUNTER — PHARMACY VISIT (OUTPATIENT)
Dept: PHARMACY | Facility: CLINIC | Age: 69
End: 2024-09-13
Payer: COMMERCIAL

## 2024-10-10 ENCOUNTER — TELEPHONE (OUTPATIENT)
Dept: ENDOCRINOLOGY | Facility: CLINIC | Age: 69
End: 2024-10-10
Payer: COMMERCIAL

## 2024-10-10 NOTE — TELEPHONE ENCOUNTER
Simona Betancourt   1955   [id number]   [phone]       Called and spoke to patient in regards to canceling 12/20/24 appt with CNP and needs to be rescheduled with MD due to provider is leaving the practice.

## 2024-10-21 PROCEDURE — RXMED WILLOW AMBULATORY MEDICATION CHARGE

## 2024-10-23 ENCOUNTER — PHARMACY VISIT (OUTPATIENT)
Dept: PHARMACY | Facility: CLINIC | Age: 69
End: 2024-10-23
Payer: COMMERCIAL

## 2024-11-06 ENCOUNTER — TELEPHONE (OUTPATIENT)
Dept: ENDOCRINOLOGY | Facility: CLINIC | Age: 69
End: 2024-11-06
Payer: COMMERCIAL

## 2024-12-03 ENCOUNTER — TELEPHONE (OUTPATIENT)
Dept: PRIMARY CARE | Facility: CLINIC | Age: 69
End: 2024-12-03
Payer: COMMERCIAL

## 2024-12-03 DIAGNOSIS — E11.65 TYPE 2 DIABETES MELLITUS WITH HYPERGLYCEMIA, WITHOUT LONG-TERM CURRENT USE OF INSULIN: ICD-10-CM

## 2024-12-03 DIAGNOSIS — I10 PRIMARY HYPERTENSION: ICD-10-CM

## 2024-12-03 DIAGNOSIS — E03.9 HYPOTHYROIDISM, UNSPECIFIED TYPE: ICD-10-CM

## 2024-12-03 DIAGNOSIS — E78.2 MIXED HYPERLIPIDEMIA: ICD-10-CM

## 2024-12-03 PROCEDURE — RXMED WILLOW AMBULATORY MEDICATION CHARGE

## 2024-12-09 ENCOUNTER — PHARMACY VISIT (OUTPATIENT)
Dept: PHARMACY | Facility: CLINIC | Age: 69
End: 2024-12-09
Payer: COMMERCIAL

## 2024-12-20 ENCOUNTER — APPOINTMENT (OUTPATIENT)
Dept: ENDOCRINOLOGY | Facility: CLINIC | Age: 69
End: 2024-12-20
Payer: COMMERCIAL

## 2024-12-26 ENCOUNTER — APPOINTMENT (OUTPATIENT)
Dept: PRIMARY CARE | Facility: CLINIC | Age: 69
End: 2024-12-26
Payer: COMMERCIAL

## 2024-12-26 VITALS
HEIGHT: 60 IN | OXYGEN SATURATION: 98 % | BODY MASS INDEX: 26.5 KG/M2 | HEART RATE: 60 BPM | WEIGHT: 135 LBS | SYSTOLIC BLOOD PRESSURE: 140 MMHG | DIASTOLIC BLOOD PRESSURE: 80 MMHG

## 2024-12-26 DIAGNOSIS — M25.511 ACUTE PAIN OF RIGHT SHOULDER: Primary | ICD-10-CM

## 2024-12-26 PROBLEM — R11.2 NAUSEA AND VOMITING: Status: RESOLVED | Noted: 2023-04-10 | Resolved: 2024-12-26

## 2024-12-26 PROBLEM — E78.00 PURE HYPERCHOLESTEROLEMIA: Status: ACTIVE | Noted: 2021-03-12

## 2024-12-26 PROBLEM — E11.9 TYPE 2 DIABETES MELLITUS: Status: ACTIVE | Noted: 2020-07-17

## 2024-12-26 PROCEDURE — 4010F ACE/ARB THERAPY RXD/TAKEN: CPT

## 2024-12-26 PROCEDURE — 3077F SYST BP >= 140 MM HG: CPT

## 2024-12-26 PROCEDURE — 3079F DIAST BP 80-89 MM HG: CPT

## 2024-12-26 PROCEDURE — 1125F AMNT PAIN NOTED PAIN PRSNT: CPT

## 2024-12-26 PROCEDURE — 99213 OFFICE O/P EST LOW 20 MIN: CPT

## 2024-12-26 PROCEDURE — 1160F RVW MEDS BY RX/DR IN RCRD: CPT

## 2024-12-26 PROCEDURE — 3008F BODY MASS INDEX DOCD: CPT

## 2024-12-26 PROCEDURE — 1159F MED LIST DOCD IN RCRD: CPT

## 2024-12-26 RX ORDER — NAPROXEN 500 MG/1
500 TABLET ORAL 2 TIMES DAILY
Qty: 20 TABLET | Refills: 0 | Status: SHIPPED | OUTPATIENT
Start: 2024-12-26 | End: 2025-01-05

## 2024-12-26 ASSESSMENT — COLUMBIA-SUICIDE SEVERITY RATING SCALE - C-SSRS: 1. IN THE PAST MONTH, HAVE YOU WISHED YOU WERE DEAD OR WISHED YOU COULD GO TO SLEEP AND NOT WAKE UP?: NO

## 2024-12-26 ASSESSMENT — PATIENT HEALTH QUESTIONNAIRE - PHQ9
2. FEELING DOWN, DEPRESSED OR HOPELESS: SEVERAL DAYS
SUM OF ALL RESPONSES TO PHQ9 QUESTIONS 1 AND 2: 0
1. LITTLE INTEREST OR PLEASURE IN DOING THINGS: NOT AT ALL
2. FEELING DOWN, DEPRESSED OR HOPELESS: NOT AT ALL
1. LITTLE INTEREST OR PLEASURE IN DOING THINGS: NOT AT ALL
10. IF YOU CHECKED OFF ANY PROBLEMS, HOW DIFFICULT HAVE THESE PROBLEMS MADE IT FOR YOU TO DO YOUR WORK, TAKE CARE OF THINGS AT HOME, OR GET ALONG WITH OTHER PEOPLE: SOMEWHAT DIFFICULT
SUM OF ALL RESPONSES TO PHQ9 QUESTIONS 1 AND 2: 1

## 2024-12-26 ASSESSMENT — PAIN SCALES - GENERAL: PAINLEVEL_OUTOF10: 5

## 2024-12-26 NOTE — PROGRESS NOTES
"Subjective     Patient ID: Simona Betancourt \"Isabel\" is a 69 y.o. female who presents for Shoulder Pain (Patient states she has constat pain in her left shoulder for the past 6 weeks and no injury .).      VIDAL Jarvis presents with concerns of right shoulder pain which began about 6 weeks ago. Pain does radiate down her right arm on occasion.   She admits the pain has begun limiting her mobility. She denies any past injuries of the right.  She denies any known injury, no heavy lifting. She has tried taking tylenol, motrin, aleve with no improvement.       Patient's recent visit notes, medication and allergy lists, past medical surgical social hx, immunization, vitals, problem list, recent tests were reviewed by me for pertinence to this visit.    Current Outpatient Medications:     aspirin 81 mg EC tablet, = 1 tab(s) ( 81 mg ), PO, Daily, # 90 tab(s), 0 Refill(s), Type: Maintenance, Disp: , Rfl:     atorvastatin (Lipitor) 40 mg tablet, = 1 tab(s) ( 40 mg ), Oral, daily, # 90 tab(s), 0 Refill(s), Type: Maintenance, Disp: , Rfl:     buPROPion SR (Wellbutrin SR) 200 mg 12 hr tablet,  1 tab(s), Oral, bid, Disp: 90 tablet, Rfl: 4    carvedilol (Coreg) 25 mg tablet, Take 1 tablet (25 mg) by mouth 2 times daily (morning and late afternoon)., Disp: , Rfl:     levothyroxine (Synthroid, Levoxyl) 125 mcg tablet, TAKE 1 TABLET DAILY, Disp: 90 tablet, Rfl: 3    losartan (Cozaar) 100 mg tablet, = 1 tab(s) ( 100 mg ), PO, Daily, # 90 tab(s), 0 Refill(s), Type: Maintenance, Disp: , Rfl:     metFORMIN XR (Glucophage-XR) 750 mg 24 hr tablet, TAKE TWO TABLETS BY MOUTH EVERY DAY, Disp: 180 tablet, Rfl: 0    multivitamin (MULTIPLE VITAMINS ORAL), 1 cap(s), PO, Daily, # 90 cap(s), 0 Refill(s), Type: Maintenance, Disp: , Rfl:     tirzepatide (Mounjaro) 7.5 mg/0.5 mL pen injector, Inject 7.5 mg under the skin 1 (one) time per week., Disp: 2 mL, Rfl: 5      Review of Systems  All other systems have been reviewed and are negative except " as noted in the HPI.         Objective   /80   Pulse 60   Ht 1.524 m (5')   Wt 61.2 kg (135 lb)   LMP 01/01/2011 (Approximate)   SpO2 98%   BMI 26.37 kg/m²       Physical Exam  Vitals and nursing note reviewed.   Constitutional:       General: She is not in acute distress.     Appearance: Normal appearance.   Musculoskeletal:      Right shoulder: Tenderness present. No swelling, deformity or bony tenderness. Decreased range of motion. Decreased strength. Normal pulse.      Left shoulder: Normal.      Comments: Shoulder exam: Decreased ROM in flexion, extension, abduction and internal and external rotation.   No clavicle or AC joint TTP   No visible deformity, step off or sulcus sign.  Decreased strength in the supraspinatus (negative empty can test)   Decreased strength with external rotation, abduction and flexion.  No tenderness over the biceps tendon.   Normal scapulothoracic motion without crepitus.   No tenderness of the scapula. No tender points in the trapezius or medial to the scapula.     Neurological:      Mental Status: She is alert.   Psychiatric:         Behavior: Behavior is cooperative.             Assessment & Plan  Acute pain of right shoulder  Acute  Begin naproxen 500 mg twice daily x 7 days for inflammation  Explained intended effects, potential side effects, and schedule of dosages of the medication.  Discussed nonpharmacological interventions for pain relief including massage, heat, and stretching.  May use topical analgesics such as Salonpas patches, Blue emu, IcyHot, or capsaicin cream.  Referral to orthopedics for further evaluation.       Orders:    naproxen (Naprosyn) 500 mg tablet; Take 1 tablet (500 mg) by mouth 2 times a day for 10 days.    Referral to Orthopaedic Surgery; Future          Patient understands and agrees with treatment plan.    Dia Kasper, APRN-CNP

## 2025-01-28 ENCOUNTER — APPOINTMENT (OUTPATIENT)
Dept: PRIMARY CARE | Facility: CLINIC | Age: 70
End: 2025-01-28
Payer: COMMERCIAL

## 2025-01-30 DIAGNOSIS — E11.65 TYPE 2 DIABETES MELLITUS WITH HYPERGLYCEMIA, WITHOUT LONG-TERM CURRENT USE OF INSULIN: ICD-10-CM

## 2025-01-30 LAB
ALBUMIN SERPL-MCNC: 4.2 G/DL (ref 3.6–5.1)
ALBUMIN/CREAT UR: 48 MG/G CREAT
ALP SERPL-CCNC: 80 U/L (ref 37–153)
ALT SERPL-CCNC: 10 U/L (ref 6–29)
ANION GAP SERPL CALCULATED.4IONS-SCNC: 10 MMOL/L (CALC) (ref 7–17)
APPEARANCE UR: CLEAR
AST SERPL-CCNC: 14 U/L (ref 10–35)
BACTERIA #/AREA URNS HPF: ABNORMAL /HPF
BASOPHILS # BLD AUTO: 35 CELLS/UL (ref 0–200)
BASOPHILS NFR BLD AUTO: 0.3 %
BILIRUB SERPL-MCNC: 0.6 MG/DL (ref 0.2–1.2)
BILIRUB UR QL STRIP: NEGATIVE
BUN SERPL-MCNC: 20 MG/DL (ref 7–25)
CALCIUM SERPL-MCNC: 9.4 MG/DL (ref 8.6–10.4)
CHLORIDE SERPL-SCNC: 106 MMOL/L (ref 98–110)
CHOLEST SERPL-MCNC: 159 MG/DL
CHOLEST/HDLC SERPL: 3.5 (CALC)
CO2 SERPL-SCNC: 27 MMOL/L (ref 20–32)
COLOR UR: YELLOW
CREAT SERPL-MCNC: 0.91 MG/DL (ref 0.5–1.05)
CREAT UR-MCNC: 105 MG/DL (ref 20–275)
EGFRCR SERPLBLD CKD-EPI 2021: 69 ML/MIN/1.73M2
EOSINOPHIL # BLD AUTO: 118 CELLS/UL (ref 15–500)
EOSINOPHIL NFR BLD AUTO: 1 %
ERYTHROCYTE [DISTWIDTH] IN BLOOD BY AUTOMATED COUNT: 13 % (ref 11–15)
EST. AVERAGE GLUCOSE BLD GHB EST-MCNC: 131 MG/DL
EST. AVERAGE GLUCOSE BLD GHB EST-SCNC: 7.3 MMOL/L
GLUCOSE SERPL-MCNC: 110 MG/DL (ref 65–99)
GLUCOSE UR QL STRIP: NEGATIVE
HBA1C MFR BLD: 6.2 % OF TOTAL HGB
HCT VFR BLD AUTO: 44.1 % (ref 35–45)
HDLC SERPL-MCNC: 46 MG/DL
HGB BLD-MCNC: 14.2 G/DL (ref 11.7–15.5)
HGB UR QL STRIP: NEGATIVE
HYALINE CASTS #/AREA URNS LPF: ABNORMAL /LPF
KETONES UR QL STRIP: NEGATIVE
LDLC SERPL CALC-MCNC: 89 MG/DL (CALC)
LEUKOCYTE ESTERASE UR QL STRIP: NEGATIVE
LYMPHOCYTES # BLD AUTO: 1782 CELLS/UL (ref 850–3900)
LYMPHOCYTES NFR BLD AUTO: 15.1 %
MCH RBC QN AUTO: 31.5 PG (ref 27–33)
MCHC RBC AUTO-ENTMCNC: 32.2 G/DL (ref 32–36)
MCV RBC AUTO: 97.8 FL (ref 80–100)
MICROALBUMIN UR-MCNC: 5 MG/DL
MONOCYTES # BLD AUTO: 684 CELLS/UL (ref 200–950)
MONOCYTES NFR BLD AUTO: 5.8 %
NEUTROPHILS # BLD AUTO: 9180 CELLS/UL (ref 1500–7800)
NEUTROPHILS NFR BLD AUTO: 77.8 %
NITRITE UR QL STRIP: NEGATIVE
NONHDLC SERPL-MCNC: 113 MG/DL (CALC)
PH UR STRIP: 6 [PH] (ref 5–8)
PLATELET # BLD AUTO: 253 THOUSAND/UL (ref 140–400)
PMV BLD REES-ECKER: 10.8 FL (ref 7.5–12.5)
POTASSIUM SERPL-SCNC: 4.5 MMOL/L (ref 3.5–5.3)
PROT SERPL-MCNC: 6.6 G/DL (ref 6.1–8.1)
PROT UR QL STRIP: ABNORMAL
RBC # BLD AUTO: 4.51 MILLION/UL (ref 3.8–5.1)
RBC #/AREA URNS HPF: ABNORMAL /HPF
SERVICE CMNT-IMP: ABNORMAL
SODIUM SERPL-SCNC: 143 MMOL/L (ref 135–146)
SP GR UR STRIP: 1.02 (ref 1–1.03)
SQUAMOUS #/AREA URNS HPF: ABNORMAL /HPF
TRIGL SERPL-MCNC: 137 MG/DL
TSH SERPL-ACNC: 1.78 MIU/L (ref 0.4–4.5)
WBC # BLD AUTO: 11.8 THOUSAND/UL (ref 3.8–10.8)
WBC #/AREA URNS HPF: ABNORMAL /HPF

## 2025-01-30 RX ORDER — METFORMIN HYDROCHLORIDE 750 MG/1
1500 TABLET, EXTENDED RELEASE ORAL DAILY
Qty: 180 TABLET | Refills: 0 | Status: SHIPPED | OUTPATIENT
Start: 2025-01-30

## 2025-01-31 ENCOUNTER — TELEPHONE (OUTPATIENT)
Dept: PRIMARY CARE | Facility: CLINIC | Age: 70
End: 2025-01-31

## 2025-01-31 ENCOUNTER — APPOINTMENT (OUTPATIENT)
Dept: PRIMARY CARE | Facility: CLINIC | Age: 70
End: 2025-01-31
Payer: COMMERCIAL

## 2025-01-31 VITALS
BODY MASS INDEX: 26.43 KG/M2 | RESPIRATION RATE: 16 BRPM | SYSTOLIC BLOOD PRESSURE: 118 MMHG | OXYGEN SATURATION: 97 % | WEIGHT: 140 LBS | HEART RATE: 67 BPM | DIASTOLIC BLOOD PRESSURE: 82 MMHG | HEIGHT: 61 IN

## 2025-01-31 DIAGNOSIS — Z12.2 SCREENING FOR LUNG CANCER: ICD-10-CM

## 2025-01-31 DIAGNOSIS — E11.65 TYPE 2 DIABETES MELLITUS WITH HYPERGLYCEMIA, UNSPECIFIED WHETHER LONG TERM INSULIN USE (MULTI): ICD-10-CM

## 2025-01-31 DIAGNOSIS — Z13.5 SCREENING FOR DIABETIC RETINOPATHY: ICD-10-CM

## 2025-01-31 DIAGNOSIS — B18.2 CHRONIC HEPATITIS C WITHOUT HEPATIC COMA (MULTI): ICD-10-CM

## 2025-01-31 DIAGNOSIS — F33.41 RECURRENT MAJOR DEPRESSIVE DISORDER, IN PARTIAL REMISSION (CMS-HCC): ICD-10-CM

## 2025-01-31 DIAGNOSIS — I10 PRIMARY HYPERTENSION: ICD-10-CM

## 2025-01-31 DIAGNOSIS — Z12.11 SCREEN FOR COLON CANCER: ICD-10-CM

## 2025-01-31 DIAGNOSIS — E03.9 ACQUIRED HYPOTHYROIDISM: ICD-10-CM

## 2025-01-31 DIAGNOSIS — Z00.00 WELL ADULT EXAM: Primary | ICD-10-CM

## 2025-01-31 DIAGNOSIS — F17.210 CIGARETTE SMOKER: ICD-10-CM

## 2025-01-31 DIAGNOSIS — Z12.31 ENCOUNTER FOR SCREENING MAMMOGRAM FOR BREAST CANCER: ICD-10-CM

## 2025-01-31 DIAGNOSIS — E78.2 MIXED HYPERLIPIDEMIA: ICD-10-CM

## 2025-01-31 DIAGNOSIS — E11.9 TYPE 2 DIABETES MELLITUS WITHOUT COMPLICATION, WITHOUT LONG-TERM CURRENT USE OF INSULIN (MULTI): ICD-10-CM

## 2025-01-31 PROBLEM — F98.8 ADD (ATTENTION DEFICIT DISORDER): Status: RESOLVED | Noted: 2022-01-26 | Resolved: 2025-01-31

## 2025-01-31 RX ORDER — ESCITALOPRAM OXALATE 10 MG/1
10 TABLET ORAL DAILY
Qty: 90 TABLET | Refills: 1 | Status: SHIPPED | OUTPATIENT
Start: 2025-01-31 | End: 2025-07-30

## 2025-01-31 RX ORDER — TIRZEPATIDE 7.5 MG/.5ML
7.5 INJECTION, SOLUTION SUBCUTANEOUS
Qty: 2 ML | Refills: 5 | Status: SHIPPED | OUTPATIENT
Start: 2025-02-02

## 2025-01-31 ASSESSMENT — ENCOUNTER SYMPTOMS
CHILLS: 0
NUMBNESS: 0
ARTHRALGIAS: 0
LOSS OF SENSATION IN FEET: 0
HEMATURIA: 0
DEPRESSION: 0
NERVOUS/ANXIOUS: 0
UNEXPECTED WEIGHT CHANGE: 0
MYALGIAS: 0
RHINORRHEA: 0
COUGH: 0
TROUBLE SWALLOWING: 0
SORE THROAT: 0
OCCASIONAL FEELINGS OF UNSTEADINESS: 0
SHORTNESS OF BREATH: 0
ABDOMINAL PAIN: 0
BLOOD IN STOOL: 0
VOMITING: 0
DYSURIA: 0
WEAKNESS: 0
DYSPHORIC MOOD: 0
FEVER: 0
DIZZINESS: 0
NAUSEA: 0

## 2025-01-31 ASSESSMENT — ACTIVITIES OF DAILY LIVING (ADL)
MANAGING_FINANCES: INDEPENDENT
DOING_HOUSEWORK: INDEPENDENT
TAKING_MEDICATION: INDEPENDENT
BATHING: INDEPENDENT
GROCERY_SHOPPING: INDEPENDENT
DRESSING: INDEPENDENT

## 2025-01-31 ASSESSMENT — PAIN SCALES - GENERAL: PAINLEVEL_OUTOF10: 0-NO PAIN

## 2025-01-31 NOTE — ASSESSMENT & PLAN NOTE
Controlled.  Continue current carvedilol and losartan as prescribed.  DASH diet. Exercise at least 4 times per week.

## 2025-01-31 NOTE — ASSESSMENT & PLAN NOTE
Lab Results   Component Value Date    LDLCALC 89 01/29/2025    LDLCALC 68 11/27/2023    LDLCALC 71 05/19/2023   Well-controlled on atorvastatin 40 mg nightly.  Diet and exercise discussed.

## 2025-01-31 NOTE — ASSESSMENT & PLAN NOTE
Continue current Wellbutrin as prescribed.  Journaling and focus on positive attributes discussed.   Orders:    escitalopram (Lexapro) 10 mg tablet; Take 1 tablet (10 mg) by mouth once daily.

## 2025-01-31 NOTE — ASSESSMENT & PLAN NOTE
Lab Results   Component Value Date    HGBA1C 6.2 (H) 01/29/2025    HGBA1C 6.3 06/14/2024    HGBA1C 6.3 01/12/2024   Controlled using Mounjaro 7.5 mg weekly and metformin XR 1500 mg daily

## 2025-01-31 NOTE — PROGRESS NOTES
"Subjective   Patient ID: Simona Betancourt \"Mian" is a 69 y.o. female who presents for Medicare Annual Wellness Visit Subsequent (Patient is here for her medicare annual wellness exam, patient refused the flu shot).    HPI  Patient Care Team:  Antonette Pereira MD as PCP - General (Family Medicine)  Kwan Louie MD as Consulting Physician (Endocrinology)  Mejia Way MD as Surgeon (Gastroenterology)  Craig Watts MD as Consulting Physician (Cardiology)    Isabel Betancourt is seen for comprehensive physical exam.  PMH, PSH, family history and social history were reviewed and updated.  GYN - Pap 2018 negative with negative HPV  DM - seeing Dr. Louie on regular basis, monitoring diet,  HTN - no chest pain or palpitations, taking medication without side effects  Hypercholesterolemia - taking statin without side effects  Depression - taking medication as directed.  living with it but doing ok,      Review of Systems   Constitutional:  Negative for chills, fever and unexpected weight change.   HENT:  Negative for congestion, ear pain, hearing loss, rhinorrhea, sore throat and trouble swallowing.    Eyes:  Negative for visual disturbance.   Respiratory:  Negative for cough and shortness of breath.    Cardiovascular:  Negative for chest pain and leg swelling.   Gastrointestinal:  Negative for abdominal pain, blood in stool, nausea and vomiting.   Genitourinary:  Negative for dysuria, hematuria, vaginal bleeding and vaginal discharge.   Musculoskeletal:  Negative for arthralgias and myalgias.   Skin:  Negative for rash.   Neurological:  Negative for dizziness, weakness and numbness.   Psychiatric/Behavioral:  Negative for dysphoric mood. The patient is not nervous/anxious.        Objective   /82 (BP Location: Left arm, Patient Position: Sitting, BP Cuff Size: Large adult)   Pulse 67   Resp 16   Ht 1.549 m (5' 1\")   Wt 63.5 kg (140 lb)   LMP 01/01/2011 (Approximate)   SpO2 97%   BMI 26.45 " kg/m²   Wt Readings from Last 3 Encounters:   02/07/25 63.5 kg (140 lb)   01/31/25 63.5 kg (140 lb)   12/26/24 61.2 kg (135 lb)       Physical Exam  Vitals and nursing note reviewed.   Constitutional:       General: She is not in acute distress.  HENT:      Right Ear: Tympanic membrane and ear canal normal.      Left Ear: Tympanic membrane and ear canal normal.      Nose: Nose normal.      Mouth/Throat:      Mouth: Mucous membranes are moist.   Eyes:      Extraocular Movements: Extraocular movements intact.      Pupils: Pupils are equal, round, and reactive to light.   Neck:      Vascular: No carotid bruit.   Cardiovascular:      Rate and Rhythm: Normal rate and regular rhythm.   Pulmonary:      Effort: Pulmonary effort is normal.      Breath sounds: Normal breath sounds.   Abdominal:      General: Abdomen is flat.      Palpations: Abdomen is soft.      Tenderness: There is no abdominal tenderness.   Musculoskeletal:         General: Normal range of motion.   Lymphadenopathy:      Cervical: No cervical adenopathy.   Skin:     General: Skin is warm.      Findings: No rash.   Neurological:      General: No focal deficit present.      Mental Status: She is alert.   Psychiatric:         Mood and Affect: Mood normal.       Assessment/Plan   Assessment & Plan  Well adult exam  Preventative measures discussed in detail. Immunizations reviewed and discussed.  Reviewed labs with patient.         Encounter for screening mammogram for breast cancer    Orders:  •  BI mammo bilateral screening tomosynthesis; Future    Primary hypertension  Controlled.  Continue current carvedilol and losartan as prescribed.  DASH diet. Exercise at least 4 times per week.          Mixed hyperlipidemia  Lab Results   Component Value Date    LDLCALC 89 01/29/2025    LDLCALC 68 11/27/2023    LDLCALC 71 05/19/2023   Well-controlled on atorvastatin 40 mg nightly.  Diet and exercise discussed.       Acquired hypothyroidism  Lab Results   Component  Value Date    TSH 1.78 01/29/2025    TSH 0.86 11/27/2023   Euthyroid on current dose of levothyroxine. Continue to take on empty stomach.          Type 2 diabetes mellitus without complication, without long-term current use of insulin (Multi)  Lab Results   Component Value Date    HGBA1C 6.2 (H) 01/29/2025    HGBA1C 6.3 06/14/2024    HGBA1C 6.3 01/12/2024   Controlled using Mounjaro 7.5 mg weekly and metformin XR 1500 mg daily         Chronic hepatitis C without hepatic coma (Multi)  LFTs normal so will monitor       Recurrent major depressive disorder, in partial remission (CMS-HCC)  Continue current Wellbutrin as prescribed.  Journaling and focus on positive attributes discussed.   Orders:  •  escitalopram (Lexapro) 10 mg tablet; Take 1 tablet (10 mg) by mouth once daily.    Cigarette smoker  Discussed current recommendations, NLST trial, indications, benefits, and potential risks of screening for lung cancer with low dose lung CT.     Orders:  •  CT lung screening low dose; Future    Screen for colon cancer    Orders:  •  Colonoscopy Screening; High Risk Patient; adenoma 2018; Future    Screening for lung cancer    Orders:  •  CT lung screening low dose; Future    Screening for diabetic retinopathy    Orders:  •  Diabetic Retinopathy Luminetics        Follow up 6 Months, sooner with any problems or concerns.

## 2025-01-31 NOTE — ASSESSMENT & PLAN NOTE
Lab Results   Component Value Date    TSH 1.78 01/29/2025    TSH 0.86 11/27/2023   Euthyroid on current dose of levothyroxine. Continue to take on empty stomach.

## 2025-02-07 ENCOUNTER — HOSPITAL ENCOUNTER (OUTPATIENT)
Dept: RADIOLOGY | Facility: CLINIC | Age: 70
Discharge: HOME | End: 2025-02-07
Payer: COMMERCIAL

## 2025-02-07 VITALS — WEIGHT: 140 LBS | BODY MASS INDEX: 26.45 KG/M2

## 2025-02-07 DIAGNOSIS — Z12.31 ENCOUNTER FOR SCREENING MAMMOGRAM FOR BREAST CANCER: ICD-10-CM

## 2025-02-07 PROCEDURE — 77067 SCR MAMMO BI INCL CAD: CPT

## 2025-03-07 PROCEDURE — RXMED WILLOW AMBULATORY MEDICATION CHARGE

## 2025-03-08 ENCOUNTER — PHARMACY VISIT (OUTPATIENT)
Dept: PHARMACY | Facility: CLINIC | Age: 70
End: 2025-03-08
Payer: COMMERCIAL

## 2025-03-13 NOTE — PROGRESS NOTES
HPI   69 y.o. female who presents for Type 2 diabetes follow up. Diagnosed with DM 2 in mid/late 40's. History htn, dyslipidemia, hypothyroid, hep C (s/p treatment), CAD with stent (2014 Dr. Srinivasan), highest wt 210 lbs.   A1c was 6.3%, today 6.2%.    Pt checking blood sugars <1 day. Does not recall recent blood sugars.  No low sugars.  Pt watching carbs in diet, trying to stay active.    Pt taking metformin 750mg twice a day and Mounjaro 7.5 mg, c/o nausea intermit     Taking levothyroxine 125mcg daily. Pt euthyroid without obstructive symptoms.    Taking atorvastatin 40mg for lipids and tolerating.    Taking losartan 100mg, coreg 25mg bid         Current Outpatient Medications:     aspirin 81 mg EC tablet, = 1 tab(s) ( 81 mg ), PO, Daily, # 90 tab(s), 0 Refill(s), Type: Maintenance, Disp: , Rfl:     atorvastatin (Lipitor) 40 mg tablet, = 1 tab(s) ( 40 mg ), Oral, daily, # 90 tab(s), 0 Refill(s), Type: Maintenance, Disp: , Rfl:     buPROPion SR (Wellbutrin SR) 200 mg 12 hr tablet,  1 tab(s), Oral, bid, Disp: 90 tablet, Rfl: 4    carvedilol (Coreg) 25 mg tablet, Take 1 tablet (25 mg) by mouth 2 times daily (morning and late afternoon)., Disp: , Rfl:     escitalopram (Lexapro) 10 mg tablet, Take 1 tablet (10 mg) by mouth once daily., Disp: 90 tablet, Rfl: 1    levothyroxine (Synthroid, Levoxyl) 125 mcg tablet, TAKE 1 TABLET DAILY, Disp: 90 tablet, Rfl: 3    losartan (Cozaar) 100 mg tablet, = 1 tab(s) ( 100 mg ), PO, Daily, # 90 tab(s), 0 Refill(s), Type: Maintenance, Disp: , Rfl:     metFORMIN XR (Glucophage-XR) 750 mg 24 hr tablet, Take 2 tablets (1,500 mg) by mouth once daily., Disp: 180 tablet, Rfl: 0    multivitamin (MULTIPLE VITAMINS ORAL), 1 cap(s), PO, Daily, # 90 cap(s), 0 Refill(s), Type: Maintenance, Disp: , Rfl:     tirzepatide (Mounjaro) 7.5 mg/0.5 mL pen injector, Inject 7.5 mg under the skin 1 (one) time per week., Disp: 2 mL, Rfl: 5      Allergies as of 03/14/2025 - Reviewed 03/14/2025   Allergen Reaction  "Noted    Ace inhibitors Cough 08/23/2023         Review of Systems   Cardiology: Lightheadedness-denies.  Chest pain-denies.  Leg edema-denies.  Palpitations-denies.  Respiratory: Cough-denies. Shortness of breath-denies.  Wheezing-denies.  Gastroenterology: Constipation-denies.  Diarrhea-denies.  Heartburn-denies.  Endocrinology: Cold intolerance-denies.  Heat intolerance-denies.  Sweats-denies.  Neurology: Headache-denies.  Tremor-denies.  Neuropathy in extremities-denies.  Psychology: Low energy-denies.  Irritability-denies.  Sleep disturbances-denies.      /76 (BP Location: Left arm, Patient Position: Sitting)   Pulse 57   Ht 1.549 m (5' 0.98\")   Wt 62.1 kg (137 lb)   LMP 01/01/2011 (Approximate)   BMI 25.90 kg/m²       Labs:  Lab Results   Component Value Date    WBC 11.8 (H) 01/29/2025    NRBC 0.0 11/27/2023    RBC 4.51 01/29/2025    HGB 14.2 01/29/2025    HCT 44.1 01/29/2025     01/29/2025     Lab Results   Component Value Date    CALCIUM 9.4 01/29/2025    AST 14 01/29/2025    ALKPHOS 80 01/29/2025    BILITOT 0.6 01/29/2025    PROT 6.6 01/29/2025    ALBUMIN 4.2 01/29/2025    GLOB 2.7 05/19/2023    AGR 1.6 05/19/2023     01/29/2025    K 4.5 01/29/2025     01/29/2025    CO2 27 01/29/2025    ANIONGAP 10 01/29/2025    BUN 20 01/29/2025    CREATININE 0.91 01/29/2025    UREACREAUR 22.0 (H) 05/19/2023    GLUCOSE 110 (H) 01/29/2025    ALT 10 01/29/2025    EGFR 69 01/29/2025     Lab Results   Component Value Date    CHOL 159 01/29/2025    TRIG 137 01/29/2025    HDL 46 (L) 01/29/2025    LDLCALC 89 01/29/2025     Lab Results   Component Value Date    MICROALBCREA 48 (H) 01/29/2025     Lab Results   Component Value Date    TSH 1.78 01/29/2025     No results found for: \"VSBVTBHS22\"  Lab Results   Component Value Date    HGBA1C 6.2 (H) 01/29/2025         Physical Exam   General Appearance: pleasant, cooperative, no acute distress  HEENT: no chemosis, no proptosis, no lid lag, no lid " retraction  Neck: no lymphadenopathy, no thyromegaly, no dominant thyroid nodules  Heart: no murmurs, regular rate and rhythm, S1 and S2  Lungs: no wheezes, no rhonci, no rales  Extremities: no lower extremity swelling      Assessment/Plan   1. Type 2 diabetes mellitus with hyperglycemia, unspecified whether long term insulin use (Multi) (Primary)  -A1c ordered and reviewed  -labs reviewed    -overall doing well, wt stable/A1c good-no change in mounjaro dosage for now  -please test sugars weekly at different times of day    2. Mixed hyperlipidemia  -on statin, please work on compliance  -labs reviewed    3. Primary hypertension  -at target on therapy, will follow    4. Hypothyroidism, unspecified type  -euthyroid on therapy, labs reviewed         Follow Up:  pharmD 6 months    -labs/tests/notes reviewed  -reviewed and counseled patient on medication monitoring and side effects

## 2025-03-14 ENCOUNTER — APPOINTMENT (OUTPATIENT)
Dept: ENDOCRINOLOGY | Facility: CLINIC | Age: 70
End: 2025-03-14
Payer: COMMERCIAL

## 2025-03-14 VITALS
BODY MASS INDEX: 25.86 KG/M2 | HEIGHT: 61 IN | HEART RATE: 57 BPM | DIASTOLIC BLOOD PRESSURE: 76 MMHG | WEIGHT: 137 LBS | SYSTOLIC BLOOD PRESSURE: 134 MMHG

## 2025-03-14 DIAGNOSIS — E78.2 MIXED HYPERLIPIDEMIA: ICD-10-CM

## 2025-03-14 DIAGNOSIS — E11.65 TYPE 2 DIABETES MELLITUS WITH HYPERGLYCEMIA, UNSPECIFIED WHETHER LONG TERM INSULIN USE (MULTI): Primary | ICD-10-CM

## 2025-03-14 DIAGNOSIS — E03.9 HYPOTHYROIDISM, UNSPECIFIED TYPE: ICD-10-CM

## 2025-03-14 DIAGNOSIS — I10 PRIMARY HYPERTENSION: ICD-10-CM

## 2025-03-14 PROCEDURE — 3075F SYST BP GE 130 - 139MM HG: CPT | Performed by: INTERNAL MEDICINE

## 2025-03-14 PROCEDURE — 99214 OFFICE O/P EST MOD 30 MIN: CPT | Performed by: INTERNAL MEDICINE

## 2025-03-14 PROCEDURE — 1126F AMNT PAIN NOTED NONE PRSNT: CPT | Performed by: INTERNAL MEDICINE

## 2025-03-14 PROCEDURE — 3008F BODY MASS INDEX DOCD: CPT | Performed by: INTERNAL MEDICINE

## 2025-03-14 PROCEDURE — 1123F ACP DISCUSS/DSCN MKR DOCD: CPT | Performed by: INTERNAL MEDICINE

## 2025-03-14 PROCEDURE — 4010F ACE/ARB THERAPY RXD/TAKEN: CPT | Performed by: INTERNAL MEDICINE

## 2025-03-14 PROCEDURE — 4004F PT TOBACCO SCREEN RCVD TLK: CPT | Performed by: INTERNAL MEDICINE

## 2025-03-14 PROCEDURE — 3078F DIAST BP <80 MM HG: CPT | Performed by: INTERNAL MEDICINE

## 2025-03-14 ASSESSMENT — PATIENT HEALTH QUESTIONNAIRE - PHQ9
2. FEELING DOWN, DEPRESSED OR HOPELESS: NOT AT ALL
2. FEELING DOWN, DEPRESSED OR HOPELESS: SEVERAL DAYS
SUM OF ALL RESPONSES TO PHQ9 QUESTIONS 1 & 2: 0
1. LITTLE INTEREST OR PLEASURE IN DOING THINGS: NOT AT ALL

## 2025-03-14 ASSESSMENT — PAIN SCALES - GENERAL: PAINLEVEL_OUTOF10: 0-NO PAIN

## 2025-04-21 PROCEDURE — RXMED WILLOW AMBULATORY MEDICATION CHARGE

## 2025-04-22 ENCOUNTER — PHARMACY VISIT (OUTPATIENT)
Dept: PHARMACY | Facility: CLINIC | Age: 70
End: 2025-04-22
Payer: COMMERCIAL

## 2025-04-30 DIAGNOSIS — E11.65 TYPE 2 DIABETES MELLITUS WITH HYPERGLYCEMIA, WITHOUT LONG-TERM CURRENT USE OF INSULIN: ICD-10-CM

## 2025-04-30 DIAGNOSIS — E03.9 HYPOTHYROIDISM, UNSPECIFIED TYPE: ICD-10-CM

## 2025-04-30 RX ORDER — LEVOTHYROXINE SODIUM 125 UG/1
125 TABLET ORAL DAILY
Qty: 90 TABLET | Refills: 2 | Status: SHIPPED | OUTPATIENT
Start: 2025-04-30

## 2025-04-30 RX ORDER — METFORMIN HYDROCHLORIDE 750 MG/1
1500 TABLET, EXTENDED RELEASE ORAL DAILY
Qty: 180 TABLET | Refills: 3 | Status: SHIPPED | OUTPATIENT
Start: 2025-04-30

## 2025-05-11 ENCOUNTER — APPOINTMENT (OUTPATIENT)
Dept: CARDIOLOGY | Facility: HOSPITAL | Age: 70
DRG: 281 | End: 2025-05-11
Payer: COMMERCIAL

## 2025-05-11 ENCOUNTER — APPOINTMENT (OUTPATIENT)
Dept: RADIOLOGY | Facility: HOSPITAL | Age: 70
DRG: 281 | End: 2025-05-11
Payer: COMMERCIAL

## 2025-05-11 ENCOUNTER — HOSPITAL ENCOUNTER (INPATIENT)
Facility: HOSPITAL | Age: 70
DRG: 281 | End: 2025-05-11
Attending: STUDENT IN AN ORGANIZED HEALTH CARE EDUCATION/TRAINING PROGRAM | Admitting: INTERNAL MEDICINE
Payer: COMMERCIAL

## 2025-05-11 VITALS
WEIGHT: 137.6 LBS | HEIGHT: 60 IN | HEART RATE: 60 BPM | OXYGEN SATURATION: 95 % | BODY MASS INDEX: 27.01 KG/M2 | RESPIRATION RATE: 18 BRPM | SYSTOLIC BLOOD PRESSURE: 109 MMHG | DIASTOLIC BLOOD PRESSURE: 51 MMHG | TEMPERATURE: 97.9 F

## 2025-05-11 DIAGNOSIS — E78.2 MIXED HYPERLIPIDEMIA: ICD-10-CM

## 2025-05-11 DIAGNOSIS — I21.4 NSTEMI (NON-ST ELEVATED MYOCARDIAL INFARCTION) (MULTI): Primary | ICD-10-CM

## 2025-05-11 DIAGNOSIS — I10 PRIMARY HYPERTENSION: ICD-10-CM

## 2025-05-11 DIAGNOSIS — E11.9 TYPE 2 DIABETES MELLITUS WITHOUT COMPLICATION, WITHOUT LONG-TERM CURRENT USE OF INSULIN: ICD-10-CM

## 2025-05-11 PROBLEM — E86.0 DEHYDRATION: Status: ACTIVE | Noted: 2025-05-11

## 2025-05-11 PROBLEM — R19.7 NAUSEA VOMITING AND DIARRHEA: Status: ACTIVE | Noted: 2023-04-10

## 2025-05-11 PROBLEM — R23.2 FACIAL FLUSHING: Status: ACTIVE | Noted: 2025-05-11

## 2025-05-11 PROBLEM — N20.0 NEPHROLITHIASIS: Status: ACTIVE | Noted: 2025-05-11

## 2025-05-11 PROBLEM — R10.10 PAIN OF UPPER ABDOMEN: Status: ACTIVE | Noted: 2025-05-11

## 2025-05-11 PROBLEM — K80.20 CALCULUS OF GALLBLADDER WITHOUT CHOLECYSTITIS WITHOUT OBSTRUCTION: Status: ACTIVE | Noted: 2025-05-11

## 2025-05-11 PROBLEM — R11.2 NAUSEA VOMITING AND DIARRHEA: Status: ACTIVE | Noted: 2023-04-10

## 2025-05-11 LAB
ABO GROUP (TYPE) IN BLOOD: NORMAL
ALBUMIN SERPL BCP-MCNC: 4.8 G/DL (ref 3.4–5)
ALP SERPL-CCNC: 78 U/L (ref 33–136)
ALT SERPL W P-5'-P-CCNC: 10 U/L (ref 7–45)
ANION GAP SERPL CALCULATED.3IONS-SCNC: 18 MMOL/L (ref 10–20)
ANTIBODY SCREEN: NORMAL
APPEARANCE UR: CLEAR
APTT PPP: 27.5 SECONDS (ref 22–32.5)
AST SERPL W P-5'-P-CCNC: 19 U/L (ref 9–39)
BASOPHILS # BLD MANUAL: 0 X10*3/UL (ref 0–0.1)
BASOPHILS NFR BLD MANUAL: 0 %
BILIRUB SERPL-MCNC: 1 MG/DL (ref 0–1.2)
BILIRUB UR STRIP.AUTO-MCNC: NEGATIVE MG/DL
BUN SERPL-MCNC: 18 MG/DL (ref 6–23)
CALCIUM SERPL-MCNC: 9.8 MG/DL (ref 8.6–10.3)
CARDIAC TROPONIN I PNL SERPL HS: 388 NG/L (ref 0–13)
CARDIAC TROPONIN I PNL SERPL HS: 494 NG/L (ref 0–13)
CHLORIDE SERPL-SCNC: 100 MMOL/L (ref 98–107)
CHOLEST SERPL-MCNC: 209 MG/DL (ref 0–199)
CHOLESTEROL/HDL RATIO: 4.2
CO2 SERPL-SCNC: 25 MMOL/L (ref 21–32)
COLOR UR: ABNORMAL
CREAT SERPL-MCNC: 0.93 MG/DL (ref 0.5–1.05)
EGFRCR SERPLBLD CKD-EPI 2021: 68 ML/MIN/1.73M*2
EOSINOPHIL # BLD MANUAL: 0 X10*3/UL (ref 0–0.7)
EOSINOPHIL NFR BLD MANUAL: 0 %
ERYTHROCYTE [DISTWIDTH] IN BLOOD BY AUTOMATED COUNT: 12.1 % (ref 11.5–14.5)
ERYTHROCYTE [DISTWIDTH] IN BLOOD BY AUTOMATED COUNT: 12.3 % (ref 11.5–14.5)
FLUAV RNA RESP QL NAA+PROBE: NOT DETECTED
FLUBV RNA RESP QL NAA+PROBE: NOT DETECTED
GLUCOSE BLD MANUAL STRIP-MCNC: 148 MG/DL (ref 74–99)
GLUCOSE SERPL-MCNC: 150 MG/DL (ref 74–99)
GLUCOSE UR STRIP.AUTO-MCNC: NORMAL MG/DL
HCT VFR BLD AUTO: 47.7 % (ref 36–46)
HCT VFR BLD AUTO: 48.4 % (ref 36–46)
HDLC SERPL-MCNC: 49.2 MG/DL
HGB BLD-MCNC: 16.4 G/DL (ref 12–16)
HGB BLD-MCNC: 16.7 G/DL (ref 12–16)
HYALINE CASTS #/AREA URNS AUTO: ABNORMAL /LPF
IMM GRANULOCYTES # BLD AUTO: 0.07 X10*3/UL (ref 0–0.7)
IMM GRANULOCYTES NFR BLD AUTO: 0.4 % (ref 0–0.9)
INR PPP: 1.1 (ref 0.9–1.2)
KETONES UR STRIP.AUTO-MCNC: ABNORMAL MG/DL
LDLC SERPL CALC-MCNC: 146 MG/DL
LEUKOCYTE ESTERASE UR QL STRIP.AUTO: NEGATIVE
LIPASE SERPL-CCNC: 82 U/L (ref 9–82)
LYMPHOCYTES # BLD MANUAL: 1.63 X10*3/UL (ref 1.2–4.8)
LYMPHOCYTES NFR BLD MANUAL: 10 %
MAGNESIUM SERPL-MCNC: 1.81 MG/DL (ref 1.6–2.4)
MAGNESIUM SERPL-MCNC: 1.85 MG/DL (ref 1.6–2.4)
MCH RBC QN AUTO: 30.9 PG (ref 26–34)
MCH RBC QN AUTO: 30.9 PG (ref 26–34)
MCHC RBC AUTO-ENTMCNC: 34.4 G/DL (ref 32–36)
MCHC RBC AUTO-ENTMCNC: 34.5 G/DL (ref 32–36)
MCV RBC AUTO: 90 FL (ref 80–100)
MCV RBC AUTO: 90 FL (ref 80–100)
MONOCYTES # BLD MANUAL: 1.14 X10*3/UL (ref 0.1–1)
MONOCYTES NFR BLD MANUAL: 7 %
MUCOUS THREADS #/AREA URNS AUTO: ABNORMAL /LPF
NEUTS SEG # BLD MANUAL: 13.37 X10*3/UL (ref 1.2–7)
NEUTS SEG NFR BLD MANUAL: 82 %
NITRITE UR QL STRIP.AUTO: NEGATIVE
NON HDL CHOLESTEROL: 160 MG/DL (ref 0–149)
NRBC BLD-RTO: 0 /100 WBCS (ref 0–0)
NRBC BLD-RTO: 0 /100 WBCS (ref 0–0)
PH UR STRIP.AUTO: 6.5 [PH]
PLATELET # BLD AUTO: 254 X10*3/UL (ref 150–450)
PLATELET # BLD AUTO: 274 X10*3/UL (ref 150–450)
POTASSIUM SERPL-SCNC: 3.6 MMOL/L (ref 3.5–5.3)
PROT SERPL-MCNC: 7.8 G/DL (ref 6.4–8.2)
PROT UR STRIP.AUTO-MCNC: ABNORMAL MG/DL
PROTHROMBIN TIME: 11.7 SECONDS (ref 9.3–12.7)
RBC # BLD AUTO: 5.31 X10*6/UL (ref 4–5.2)
RBC # BLD AUTO: 5.4 X10*6/UL (ref 4–5.2)
RBC # UR STRIP.AUTO: ABNORMAL MG/DL
RBC #/AREA URNS AUTO: >20 /HPF
RBC MORPH BLD: ABNORMAL
RH FACTOR (ANTIGEN D): NORMAL
SARS-COV-2 RNA RESP QL NAA+PROBE: NOT DETECTED
SODIUM SERPL-SCNC: 139 MMOL/L (ref 136–145)
SP GR UR STRIP.AUTO: 1.03
SQUAMOUS #/AREA URNS AUTO: ABNORMAL /HPF
TOTAL CELLS COUNTED BLD: 100
TRIGL SERPL-MCNC: 68 MG/DL (ref 0–149)
TSH SERPL-ACNC: 1.88 MIU/L (ref 0.44–3.98)
UROBILINOGEN UR STRIP.AUTO-MCNC: NORMAL MG/DL
VARIANT LYMPHS # BLD MANUAL: 0.16 X10*3/UL (ref 0–0.5)
VARIANT LYMPHS NFR BLD: 1 %
VLDL: 14 MG/DL (ref 0–40)
WBC # BLD AUTO: 14.8 X10*3/UL (ref 4.4–11.3)
WBC # BLD AUTO: 16.3 X10*3/UL (ref 4.4–11.3)
WBC #/AREA URNS AUTO: ABNORMAL /HPF

## 2025-05-11 PROCEDURE — 85610 PROTHROMBIN TIME: CPT | Performed by: INTERNAL MEDICINE

## 2025-05-11 PROCEDURE — 87636 SARSCOV2 & INF A&B AMP PRB: CPT

## 2025-05-11 PROCEDURE — 99291 CRITICAL CARE FIRST HOUR: CPT

## 2025-05-11 PROCEDURE — 87040 BLOOD CULTURE FOR BACTERIA: CPT | Mod: TRILAB | Performed by: INTERNAL MEDICINE

## 2025-05-11 PROCEDURE — 85730 THROMBOPLASTIN TIME PARTIAL: CPT

## 2025-05-11 PROCEDURE — 2500000004 HC RX 250 GENERAL PHARMACY W/ HCPCS (ALT 636 FOR OP/ED): Mod: JZ

## 2025-05-11 PROCEDURE — 93010 ELECTROCARDIOGRAM REPORT: CPT | Performed by: INTERNAL MEDICINE

## 2025-05-11 PROCEDURE — 80053 COMPREHEN METABOLIC PANEL: CPT

## 2025-05-11 PROCEDURE — 2500000001 HC RX 250 WO HCPCS SELF ADMINISTERED DRUGS (ALT 637 FOR MEDICARE OP): Performed by: INTERNAL MEDICINE

## 2025-05-11 PROCEDURE — 2550000001 HC RX 255 CONTRASTS

## 2025-05-11 PROCEDURE — 2500000002 HC RX 250 W HCPCS SELF ADMINISTERED DRUGS (ALT 637 FOR MEDICARE OP, ALT 636 FOR OP/ED): Performed by: INTERNAL MEDICINE

## 2025-05-11 PROCEDURE — 84484 ASSAY OF TROPONIN QUANT: CPT

## 2025-05-11 PROCEDURE — 96375 TX/PRO/DX INJ NEW DRUG ADDON: CPT

## 2025-05-11 PROCEDURE — 84443 ASSAY THYROID STIM HORMONE: CPT | Performed by: INTERNAL MEDICINE

## 2025-05-11 PROCEDURE — 80061 LIPID PANEL: CPT | Performed by: INTERNAL MEDICINE

## 2025-05-11 PROCEDURE — 85730 THROMBOPLASTIN TIME PARTIAL: CPT | Performed by: INTERNAL MEDICINE

## 2025-05-11 PROCEDURE — 71260 CT THORAX DX C+: CPT | Mod: FOREIGN READ | Performed by: RADIOLOGY

## 2025-05-11 PROCEDURE — 82947 ASSAY GLUCOSE BLOOD QUANT: CPT

## 2025-05-11 PROCEDURE — 99223 1ST HOSP IP/OBS HIGH 75: CPT | Performed by: INTERNAL MEDICINE

## 2025-05-11 PROCEDURE — 93005 ELECTROCARDIOGRAM TRACING: CPT

## 2025-05-11 PROCEDURE — 83690 ASSAY OF LIPASE: CPT

## 2025-05-11 PROCEDURE — 36415 COLL VENOUS BLD VENIPUNCTURE: CPT

## 2025-05-11 PROCEDURE — 2500000004 HC RX 250 GENERAL PHARMACY W/ HCPCS (ALT 636 FOR OP/ED): Performed by: INTERNAL MEDICINE

## 2025-05-11 PROCEDURE — 86901 BLOOD TYPING SEROLOGIC RH(D): CPT | Performed by: INTERNAL MEDICINE

## 2025-05-11 PROCEDURE — 96374 THER/PROPH/DIAG INJ IV PUSH: CPT

## 2025-05-11 PROCEDURE — 76705 ECHO EXAM OF ABDOMEN: CPT | Performed by: RADIOLOGY

## 2025-05-11 PROCEDURE — 83036 HEMOGLOBIN GLYCOSYLATED A1C: CPT | Mod: TRILAB | Performed by: INTERNAL MEDICINE

## 2025-05-11 PROCEDURE — 71260 CT THORAX DX C+: CPT

## 2025-05-11 PROCEDURE — 85027 COMPLETE CBC AUTOMATED: CPT

## 2025-05-11 PROCEDURE — 85007 BL SMEAR W/DIFF WBC COUNT: CPT

## 2025-05-11 PROCEDURE — 83735 ASSAY OF MAGNESIUM: CPT | Performed by: INTERNAL MEDICINE

## 2025-05-11 PROCEDURE — 2500000001 HC RX 250 WO HCPCS SELF ADMINISTERED DRUGS (ALT 637 FOR MEDICARE OP)

## 2025-05-11 PROCEDURE — 96361 HYDRATE IV INFUSION ADD-ON: CPT

## 2025-05-11 PROCEDURE — 76705 ECHO EXAM OF ABDOMEN: CPT

## 2025-05-11 PROCEDURE — 83735 ASSAY OF MAGNESIUM: CPT

## 2025-05-11 PROCEDURE — 2060000001 HC INTERMEDIATE ICU ROOM DAILY

## 2025-05-11 PROCEDURE — 74177 CT ABD & PELVIS W/CONTRAST: CPT | Mod: FOREIGN READ | Performed by: RADIOLOGY

## 2025-05-11 PROCEDURE — 81001 URINALYSIS AUTO W/SCOPE: CPT

## 2025-05-11 RX ORDER — LEVOTHYROXINE SODIUM 125 UG/1
125 TABLET ORAL DAILY
Status: DISCONTINUED | OUTPATIENT
Start: 2025-05-11 | End: 2025-05-13 | Stop reason: HOSPADM

## 2025-05-11 RX ORDER — HYDRALAZINE HYDROCHLORIDE 20 MG/ML
10 INJECTION INTRAMUSCULAR; INTRAVENOUS ONCE
Status: COMPLETED | OUTPATIENT
Start: 2025-05-11 | End: 2025-05-11

## 2025-05-11 RX ORDER — INSULIN LISPRO 100 [IU]/ML
0-10 INJECTION, SOLUTION INTRAVENOUS; SUBCUTANEOUS
Status: DISCONTINUED | OUTPATIENT
Start: 2025-05-12 | End: 2025-05-13 | Stop reason: HOSPADM

## 2025-05-11 RX ORDER — ESCITALOPRAM OXALATE 10 MG/1
10 TABLET ORAL DAILY
Status: DISCONTINUED | OUTPATIENT
Start: 2025-05-11 | End: 2025-05-13 | Stop reason: HOSPADM

## 2025-05-11 RX ORDER — PANTOPRAZOLE SODIUM 40 MG/10ML
40 INJECTION, POWDER, LYOPHILIZED, FOR SOLUTION INTRAVENOUS EVERY 12 HOURS
Status: DISCONTINUED | OUTPATIENT
Start: 2025-05-11 | End: 2025-05-13 | Stop reason: HOSPADM

## 2025-05-11 RX ORDER — PROCHLORPERAZINE EDISYLATE 5 MG/ML
5 INJECTION INTRAMUSCULAR; INTRAVENOUS ONCE AS NEEDED
Status: COMPLETED | OUTPATIENT
Start: 2025-05-11 | End: 2025-05-11

## 2025-05-11 RX ORDER — ACETAMINOPHEN 325 MG/1
650 TABLET ORAL EVERY 4 HOURS PRN
Status: DISCONTINUED | OUTPATIENT
Start: 2025-05-11 | End: 2025-05-13 | Stop reason: HOSPADM

## 2025-05-11 RX ORDER — SODIUM CHLORIDE, SODIUM LACTATE, POTASSIUM CHLORIDE, CALCIUM CHLORIDE 600; 310; 30; 20 MG/100ML; MG/100ML; MG/100ML; MG/100ML
100 INJECTION, SOLUTION INTRAVENOUS CONTINUOUS
Status: DISCONTINUED | OUTPATIENT
Start: 2025-05-11 | End: 2025-05-12

## 2025-05-11 RX ORDER — ASPIRIN 81 MG/1
81 TABLET ORAL ONCE
Status: DISCONTINUED | OUTPATIENT
Start: 2025-05-12 | End: 2025-05-12

## 2025-05-11 RX ORDER — ONDANSETRON HYDROCHLORIDE 2 MG/ML
4 INJECTION, SOLUTION INTRAVENOUS ONCE
Status: COMPLETED | OUTPATIENT
Start: 2025-05-11 | End: 2025-05-11

## 2025-05-11 RX ORDER — PANTOPRAZOLE SODIUM 40 MG/1
40 TABLET, DELAYED RELEASE ORAL EVERY 12 HOURS
Status: DISCONTINUED | OUTPATIENT
Start: 2025-05-11 | End: 2025-05-13 | Stop reason: HOSPADM

## 2025-05-11 RX ORDER — ACETAMINOPHEN 650 MG/1
650 SUPPOSITORY RECTAL EVERY 4 HOURS PRN
Status: DISCONTINUED | OUTPATIENT
Start: 2025-05-11 | End: 2025-05-13 | Stop reason: HOSPADM

## 2025-05-11 RX ORDER — CARVEDILOL 25 MG/1
25 TABLET ORAL
Status: DISCONTINUED | OUTPATIENT
Start: 2025-05-12 | End: 2025-05-13 | Stop reason: HOSPADM

## 2025-05-11 RX ORDER — ACETAMINOPHEN 160 MG/5ML
650 SOLUTION ORAL EVERY 4 HOURS PRN
Status: DISCONTINUED | OUTPATIENT
Start: 2025-05-11 | End: 2025-05-13 | Stop reason: HOSPADM

## 2025-05-11 RX ORDER — HEPARIN SODIUM 5000 [USP'U]/ML
INJECTION, SOLUTION INTRAVENOUS; SUBCUTANEOUS AS NEEDED
Status: DISCONTINUED | OUTPATIENT
Start: 2025-05-11 | End: 2025-05-13 | Stop reason: HOSPADM

## 2025-05-11 RX ORDER — ATORVASTATIN CALCIUM 40 MG/1
40 TABLET, FILM COATED ORAL NIGHTLY
Status: DISCONTINUED | OUTPATIENT
Start: 2025-05-11 | End: 2025-05-12

## 2025-05-11 RX ORDER — ONDANSETRON HYDROCHLORIDE 2 MG/ML
4 INJECTION, SOLUTION INTRAVENOUS EVERY 6 HOURS PRN
Status: DISCONTINUED | OUTPATIENT
Start: 2025-05-11 | End: 2025-05-13 | Stop reason: HOSPADM

## 2025-05-11 RX ORDER — HEPARIN SODIUM 5000 [USP'U]/ML
60 INJECTION, SOLUTION INTRAVENOUS; SUBCUTANEOUS ONCE
Status: COMPLETED | OUTPATIENT
Start: 2025-05-11 | End: 2025-05-11

## 2025-05-11 RX ORDER — BUPROPION HYDROCHLORIDE 100 MG/1
200 TABLET, EXTENDED RELEASE ORAL DAILY
Status: DISCONTINUED | OUTPATIENT
Start: 2025-05-11 | End: 2025-05-13 | Stop reason: HOSPADM

## 2025-05-11 RX ORDER — ASPIRIN 325 MG
325 TABLET ORAL ONCE
Status: COMPLETED | OUTPATIENT
Start: 2025-05-11 | End: 2025-05-11

## 2025-05-11 RX ORDER — HEPARIN SODIUM 10000 [USP'U]/100ML
0-4000 INJECTION, SOLUTION INTRAVENOUS CONTINUOUS
Status: DISCONTINUED | OUTPATIENT
Start: 2025-05-11 | End: 2025-05-13 | Stop reason: HOSPADM

## 2025-05-11 RX ORDER — LOSARTAN POTASSIUM 100 MG/1
100 TABLET ORAL DAILY
Status: DISCONTINUED | OUTPATIENT
Start: 2025-05-11 | End: 2025-05-13 | Stop reason: HOSPADM

## 2025-05-11 RX ADMIN — ONDANSETRON 4 MG: 2 INJECTION, SOLUTION INTRAMUSCULAR; INTRAVENOUS at 12:15

## 2025-05-11 RX ADMIN — ATORVASTATIN CALCIUM 40 MG: 40 TABLET, FILM COATED ORAL at 20:55

## 2025-05-11 RX ADMIN — DEXAMETHASONE SODIUM PHOSPHATE 4 MG: 4 INJECTION, SOLUTION INTRAMUSCULAR; INTRAVENOUS at 12:14

## 2025-05-11 RX ADMIN — PROCHLORPERAZINE EDISYLATE 5 MG: 5 INJECTION INTRAMUSCULAR; INTRAVENOUS at 12:14

## 2025-05-11 RX ADMIN — SODIUM CHLORIDE, SODIUM LACTATE, POTASSIUM CHLORIDE, AND CALCIUM CHLORIDE 100 ML/HR: 600; 310; 30; 20 INJECTION, SOLUTION INTRAVENOUS at 22:20

## 2025-05-11 RX ADMIN — HYDRALAZINE HYDROCHLORIDE 10 MG: 20 INJECTION INTRAMUSCULAR; INTRAVENOUS at 12:14

## 2025-05-11 RX ADMIN — HEPARIN SODIUM 3750 UNITS: 5000 INJECTION, SOLUTION INTRAVENOUS; SUBCUTANEOUS at 15:51

## 2025-05-11 RX ADMIN — LEVOTHYROXINE SODIUM 125 MCG: 0.12 TABLET ORAL at 19:13

## 2025-05-11 RX ADMIN — LOSARTAN POTASSIUM 100 MG: 100 TABLET, FILM COATED ORAL at 19:12

## 2025-05-11 RX ADMIN — ASPIRIN 325 MG: 325 TABLET ORAL at 14:20

## 2025-05-11 RX ADMIN — SODIUM CHLORIDE 1000 ML: 9 INJECTION, SOLUTION INTRAVENOUS at 12:14

## 2025-05-11 RX ADMIN — ONDANSETRON 4 MG: 2 INJECTION, SOLUTION INTRAMUSCULAR; INTRAVENOUS at 19:46

## 2025-05-11 RX ADMIN — IOHEXOL 75 ML: 350 INJECTION, SOLUTION INTRAVENOUS at 13:12

## 2025-05-11 RX ADMIN — PANTOPRAZOLE SODIUM 40 MG: 40 INJECTION, POWDER, FOR SOLUTION INTRAVENOUS at 19:13

## 2025-05-11 RX ADMIN — HEPARIN SODIUM 700 UNITS/HR: 10000 INJECTION, SOLUTION INTRAVENOUS at 15:52

## 2025-05-11 RX ADMIN — ESCITALOPRAM OXALATE 10 MG: 10 TABLET ORAL at 19:12

## 2025-05-11 RX ADMIN — BUPROPION HYDROCHLORIDE 200 MG: 100 TABLET, EXTENDED RELEASE ORAL at 19:14

## 2025-05-11 RX ADMIN — SODIUM CHLORIDE 1000 ML: 900 INJECTION, SOLUTION INTRAVENOUS at 13:06

## 2025-05-11 SDOH — SOCIAL STABILITY: SOCIAL INSECURITY: WITHIN THE LAST YEAR, HAVE YOU BEEN AFRAID OF YOUR PARTNER OR EX-PARTNER?: NO

## 2025-05-11 SDOH — ECONOMIC STABILITY: FOOD INSECURITY: WITHIN THE PAST 12 MONTHS, YOU WORRIED THAT YOUR FOOD WOULD RUN OUT BEFORE YOU GOT THE MONEY TO BUY MORE.: NEVER TRUE

## 2025-05-11 SDOH — ECONOMIC STABILITY: FOOD INSECURITY: HOW HARD IS IT FOR YOU TO PAY FOR THE VERY BASICS LIKE FOOD, HOUSING, MEDICAL CARE, AND HEATING?: NOT VERY HARD

## 2025-05-11 SDOH — SOCIAL STABILITY: SOCIAL INSECURITY: WITHIN THE LAST YEAR, HAVE YOU BEEN HUMILIATED OR EMOTIONALLY ABUSED IN OTHER WAYS BY YOUR PARTNER OR EX-PARTNER?: NO

## 2025-05-11 SDOH — ECONOMIC STABILITY: HOUSING INSECURITY: IN THE PAST 12 MONTHS, HOW MANY TIMES HAVE YOU MOVED WHERE YOU WERE LIVING?: 1

## 2025-05-11 SDOH — SOCIAL STABILITY: SOCIAL INSECURITY: DOES ANYONE TRY TO KEEP YOU FROM HAVING/CONTACTING OTHER FRIENDS OR DOING THINGS OUTSIDE YOUR HOME?: NO

## 2025-05-11 SDOH — ECONOMIC STABILITY: INCOME INSECURITY: IN THE PAST 12 MONTHS HAS THE ELECTRIC, GAS, OIL, OR WATER COMPANY THREATENED TO SHUT OFF SERVICES IN YOUR HOME?: NO

## 2025-05-11 SDOH — ECONOMIC STABILITY: FOOD INSECURITY: WITHIN THE PAST 12 MONTHS, THE FOOD YOU BOUGHT JUST DIDN'T LAST AND YOU DIDN'T HAVE MONEY TO GET MORE.: NEVER TRUE

## 2025-05-11 SDOH — SOCIAL STABILITY: SOCIAL INSECURITY
WITHIN THE LAST YEAR, HAVE YOU BEEN RAPED OR FORCED TO HAVE ANY KIND OF SEXUAL ACTIVITY BY YOUR PARTNER OR EX-PARTNER?: NO

## 2025-05-11 SDOH — SOCIAL STABILITY: SOCIAL INSECURITY
WITHIN THE LAST YEAR, HAVE YOU BEEN KICKED, HIT, SLAPPED, OR OTHERWISE PHYSICALLY HURT BY YOUR PARTNER OR EX-PARTNER?: NO

## 2025-05-11 SDOH — ECONOMIC STABILITY: HOUSING INSECURITY: AT ANY TIME IN THE PAST 12 MONTHS, WERE YOU HOMELESS OR LIVING IN A SHELTER (INCLUDING NOW)?: NO

## 2025-05-11 SDOH — ECONOMIC STABILITY: HOUSING INSECURITY: IN THE LAST 12 MONTHS, WAS THERE A TIME WHEN YOU WERE NOT ABLE TO PAY THE MORTGAGE OR RENT ON TIME?: NO

## 2025-05-11 SDOH — SOCIAL STABILITY: SOCIAL INSECURITY: HAVE YOU HAD ANY THOUGHTS OF HARMING ANYONE ELSE?: NO

## 2025-05-11 SDOH — SOCIAL STABILITY: SOCIAL INSECURITY: HAVE YOU HAD THOUGHTS OF HARMING ANYONE ELSE?: NO

## 2025-05-11 SDOH — SOCIAL STABILITY: SOCIAL INSECURITY: ARE THERE ANY APPARENT SIGNS OF INJURIES/BEHAVIORS THAT COULD BE RELATED TO ABUSE/NEGLECT?: NO

## 2025-05-11 SDOH — SOCIAL STABILITY: SOCIAL INSECURITY: HAS ANYONE EVER THREATENED TO HURT YOUR FAMILY OR YOUR PETS?: NO

## 2025-05-11 SDOH — SOCIAL STABILITY: SOCIAL INSECURITY: ARE YOU OR HAVE YOU BEEN THREATENED OR ABUSED PHYSICALLY, EMOTIONALLY, OR SEXUALLY BY ANYONE?: NO

## 2025-05-11 SDOH — SOCIAL STABILITY: SOCIAL INSECURITY: ABUSE: ADULT

## 2025-05-11 SDOH — SOCIAL STABILITY: SOCIAL INSECURITY: DO YOU FEEL UNSAFE GOING BACK TO THE PLACE WHERE YOU ARE LIVING?: NO

## 2025-05-11 SDOH — SOCIAL STABILITY: SOCIAL INSECURITY: DO YOU FEEL ANYONE HAS EXPLOITED OR TAKEN ADVANTAGE OF YOU FINANCIALLY OR OF YOUR PERSONAL PROPERTY?: NO

## 2025-05-11 SDOH — SOCIAL STABILITY: SOCIAL INSECURITY: WERE YOU ABLE TO COMPLETE ALL THE BEHAVIORAL HEALTH SCREENINGS?: YES

## 2025-05-11 SDOH — ECONOMIC STABILITY: TRANSPORTATION INSECURITY: IN THE PAST 12 MONTHS, HAS LACK OF TRANSPORTATION KEPT YOU FROM MEDICAL APPOINTMENTS OR FROM GETTING MEDICATIONS?: NO

## 2025-05-11 ASSESSMENT — LIFESTYLE VARIABLES
HOW MANY STANDARD DRINKS CONTAINING ALCOHOL DO YOU HAVE ON A TYPICAL DAY: PATIENT DOES NOT DRINK
HOW OFTEN DO YOU HAVE A DRINK CONTAINING ALCOHOL: NEVER
AUDIT-C TOTAL SCORE: 0
HOW OFTEN DO YOU HAVE 6 OR MORE DRINKS ON ONE OCCASION: NEVER
SKIP TO QUESTIONS 9-10: 1
AUDIT-C TOTAL SCORE: 0

## 2025-05-11 ASSESSMENT — COGNITIVE AND FUNCTIONAL STATUS - GENERAL
MOBILITY SCORE: 24
DAILY ACTIVITIY SCORE: 24
MOBILITY SCORE: 24
DAILY ACTIVITIY SCORE: 24
PATIENT BASELINE BEDBOUND: NO

## 2025-05-11 ASSESSMENT — ACTIVITIES OF DAILY LIVING (ADL)
JUDGMENT_ADEQUATE_SAFELY_COMPLETE_DAILY_ACTIVITIES: YES
GROOMING: INDEPENDENT
FEEDING YOURSELF: INDEPENDENT
PATIENT'S MEMORY ADEQUATE TO SAFELY COMPLETE DAILY ACTIVITIES?: YES
HEARING - LEFT EAR: FUNCTIONAL
HEARING - RIGHT EAR: FUNCTIONAL
WALKS IN HOME: INDEPENDENT
LACK_OF_TRANSPORTATION: NO
LACK_OF_TRANSPORTATION: NO
BATHING: INDEPENDENT
ADEQUATE_TO_COMPLETE_ADL: YES
DRESSING YOURSELF: INDEPENDENT
LACK_OF_TRANSPORTATION: NO
TOILETING: INDEPENDENT

## 2025-05-11 ASSESSMENT — PATIENT HEALTH QUESTIONNAIRE - PHQ9
1. LITTLE INTEREST OR PLEASURE IN DOING THINGS: SEVERAL DAYS
SUM OF ALL RESPONSES TO PHQ9 QUESTIONS 1 & 2: 2
2. FEELING DOWN, DEPRESSED OR HOPELESS: SEVERAL DAYS

## 2025-05-11 ASSESSMENT — ENCOUNTER SYMPTOMS
NEUROLOGICAL NEGATIVE: 1
CARDIOVASCULAR NEGATIVE: 1
VOMITING: 1
RESPIRATORY NEGATIVE: 1
ENDOCRINE NEGATIVE: 1
ABDOMINAL PAIN: 1
MUSCULOSKELETAL NEGATIVE: 1
CONSTITUTIONAL NEGATIVE: 1
EYES NEGATIVE: 1
PSYCHIATRIC NEGATIVE: 1
ALLERGIC/IMMUNOLOGIC NEGATIVE: 1
HEMATOLOGIC/LYMPHATIC NEGATIVE: 1
DIARRHEA: 1
PALPITATIONS: 0

## 2025-05-11 ASSESSMENT — PAIN SCALES - GENERAL
PAINLEVEL_OUTOF10: 7
PAINLEVEL_OUTOF10: 0 - NO PAIN

## 2025-05-11 ASSESSMENT — PAIN - FUNCTIONAL ASSESSMENT: PAIN_FUNCTIONAL_ASSESSMENT: 0-10

## 2025-05-11 NOTE — ED PROVIDER NOTES
Supervisory note:  Patient seen in conjunction with NIECY Bruner.    Patient presents with nausea, vomiting, and abdominal pain.  Symptoms started yesterday.  The pain is located in the right upper quadrant.  She denies any shortness of breath or pain in the chest.  Patient does have a history of CAD requiring stents, smoking, type 2 diabetes, hypertension, and hyperlipidemia.  Whenever she tries to eat or drink anything, she has vomiting.  On examination, the abdomen is soft and nontender to palpation.  Cardiac and respiratory rates are unremarkable.  There is no pretibial edema.    Laboratory studies reveal significantly elevated troponin which is climbing on recheck.  EKG however is without acute ST or T wave abnormalities.  Liver function studies are unremarkable.  There is some blood in the urine.  Abdominal CT is without acute findings.  Right upper quadrant ultrasound does reveal cholelithiasis.  While biliary colic is felt to be possible as etiology of symptoms, rising troponin is also possible etiology of symptoms.  Patient treated for NSTEMI with aspirin, heparin, and cardiology was consulted.  Blood pressure was significantly elevated on arrival and patient was given IV hydralazine with improvement in blood pressure.  Patient accepted to medicine service for further management.    I personally saw the patient and made/approved the management plan and take responsiblity for the patient management.  Parts of this chart were completed with dictation software, please excuse any errors in transcription.     Juna Antonio Carr MD  05/11/25 [id number]

## 2025-05-11 NOTE — ASSESSMENT & PLAN NOTE
Symptoms seem to have improved.  Etiology remains not entirely clear and I wonder if this is mostly related to a viral gastrointestinal illness versus food poisoning versus other although I feel this is less likely related to cholelithiasis and complications from it.  At this point will order stool pathogen clear liquid diet and will monitor.  Have a very low suspicion for early DKA with normal labs

## 2025-05-11 NOTE — CARE PLAN
Problem: Diabetes  Goal: Achieve decreasing blood glucose levels by end of shift  Outcome: Progressing  Flowsheets (Taken 5/11/2025 1853)  Achieve decreasing blood glucose levels by end of shift: Med administration/monitoring of effect  Goal: Increase stability of blood glucose readings by end of shift  Outcome: Progressing  Flowsheets (Taken 5/11/2025 1853)  Increase stability of blood glucose readings by end of shift: Med administration/monitoring of effect  Goal: Decrease in ketones present in urine by end of shift  Outcome: Progressing  Flowsheets (Taken 5/11/2025 1853)  Decrease in ketones present in urine by end of shift: Med administration/monitoring of effect  Goal: Maintain electrolyte levels within acceptable range throughout shift  Outcome: Progressing  Flowsheets (Taken 5/11/2025 1853)  Maintain electrolyte levels within acceptable range throughout shift: Med administration/monitoring of effect  Goal: Maintain glucose levels >70mg/dl to <250mg/dl throughout shift  Outcome: Progressing  Flowsheets (Taken 5/11/2025 1853)  Maintain glucose levels >70mg/dl to <250mg/dl throughout shift: Med administration/monitoring of effect  Goal: No changes in neurological exam by end of shift  Outcome: Progressing  Flowsheets (Taken 5/11/2025 1853)  No changes in neurological exam by end of shift: Complete frequent neurological assessments  Goal: Learn about and adhere to nutrition recommendations by end of shift  Outcome: Progressing  Flowsheets (Taken 5/11/2025 1853)  Learn about and adhere to nutrition recommendations by end of shift: Ensure/encourage compliance with appropriate diet  Goal: Vital signs within normal range for age by end of shift  Outcome: Progressing  Flowsheets (Taken 5/11/2025 1853)  Vital signs within normal range for age by end of shift: Med administration/monitoring of effect  Goal: Increase self care and/or family involovement by end of shift  Outcome: Progressing  Flowsheets (Taken 5/11/2025  1853)  Increase self care and/or family involovement by end of shift: Self monitor blood glucose with staff oversight  Goal: Receive DSME education by end of shift  Outcome: Progressing  Flowsheets (Taken 5/11/2025 1853)  Receive DSME education by end of shift: Provide patient centered education on Diabetic Self Management Education     Problem: Pain - Adult  Goal: Verbalizes/displays adequate comfort level or baseline comfort level  Outcome: Progressing  Flowsheets (Taken 5/11/2025 1853)  Verbalizes/displays adequate comfort level or baseline comfort level:   Encourage patient to monitor pain and request assistance   Assess pain using appropriate pain scale   Administer analgesics based on type and severity of pain and evaluate response   Implement non-pharmacological measures as appropriate and evaluate response     Problem: Safety - Adult  Goal: Free from fall injury  Outcome: Progressing  Flowsheets (Taken 5/11/2025 1853)  Free from fall injury: Instruct family/caregiver on patient safety     Problem: Discharge Planning  Goal: Discharge to home or other facility with appropriate resources  Outcome: Progressing  Flowsheets (Taken 5/11/2025 1853)  Discharge to home or other facility with appropriate resources:   Identify barriers to discharge with patient and caregiver   Arrange for needed discharge resources and transportation as appropriate   Identify discharge learning needs (meds, wound care, etc)   Arrange for interpreters to assist at discharge as needed     Problem: Chronic Conditions and Co-morbidities  Goal: Patient's chronic conditions and co-morbidity symptoms are monitored and maintained or improved  Outcome: Progressing  Flowsheets (Taken 5/11/2025 1853)  Care Plan - Patient's Chronic Conditions and Co-Morbidity Symptoms are Monitored and Maintained or Improved:   Monitor and assess patient's chronic conditions and comorbid symptoms for stability, deterioration, or improvement   Collaborate with  multidisciplinary team to address chronic and comorbid conditions and prevent exacerbation or deterioration     Problem: Nutrition  Goal: Nutrient intake appropriate for maintaining nutritional needs  Outcome: Progressing     Problem: Pain  Goal: Takes deep breaths with improved pain control throughout the shift  Outcome: Progressing  Goal: Turns in bed with improved pain control throughout the shift  Outcome: Progressing  Goal: Walks with improved pain control throughout the shift  Outcome: Progressing  Goal: Performs ADL's with improved pain control throughout shift  Outcome: Progressing  Goal: Participates in PT with improved pain control throughout the shift  Outcome: Progressing  Goal: Free from opioid side effects throughout the shift  Outcome: Progressing  Goal: Free from acute confusion related to pain meds throughout the shift  Outcome: Progressing   The patient's goals for the shift include reduce nausea     The clinical goals for the shift include Get ptt into therapeutic range    Over the shift, the patient did not make progress toward the following goals. Barriers to progression include nausea. Recommendations to address these barriers include zofran.

## 2025-05-11 NOTE — ASSESSMENT & PLAN NOTE
See above, she did have evidence for some red blood cells in her urine which may have been related to the stone and eventually may need urology referral for microscopic hematuria

## 2025-05-11 NOTE — ASSESSMENT & PLAN NOTE
She had no chest pain and EKG is nonacute.  Cardiology recommended heparin drip for now and to trend troponins.  I agree with that.  Will attempt to continue her Coreg and aspirin and Lipitor

## 2025-05-11 NOTE — H&P
"History Of Present Illness  Simona Betancourt \"Isabel\" is a 69 y.o. female presenting with abdominal pain and vomiting.  This patient became ill yesterday after she ate at a [employer] restaurant.  Her  ate the same meal he had no symptoms she however felt that he had mostly upper abdominal pain with nausea and vomiting multiple times she also had some diarrhea.  Symptoms were somewhat better today but she was not getting better overall she felt weak and there was some right upper quadrant pain.  For that reason she came emergency room for evaluation.  CT chest abdomen as well as ultrasound right upper quadrant did not demonstrate an acute finding they did find some right kidney stone nonobstructing and some gallbladder stones.  Although patient had no chest pain troponins were checked by ER provider found to be elevated and uptrending from 400-500.  This was discussed with cardiology covering Dr. Barrientos who recommended heparin drip and okay for patient stay here.  Of note, patient did not have any shortness of breath chest pain palpitations any point of time.  Past Medical History  She has a past medical history of ADHD (attention deficit hyperactivity disorder), Anxiety, Chronic viral hepatitis C (Multi), Depression, Diabetes mellitus with hyperglycemia, Heart disease, Hypercholesterolemia, Hypertension, Hypothyroidism, Myocardial infarction (Multi), Nausea and vomiting (04/10/2023), Obesity, and Visual impairment.  Reviewed  Surgical History  She has a past surgical history that includes Coronary angioplasty with stent () and  section, low transverse.  Reviewed  Social History  She reports that she has been smoking cigarettes. She started smoking about 54 years ago. She has a 27.2 pack-year smoking history. She has been exposed to tobacco smoke. She uses smokeless tobacco. She reports that she does not currently use alcohol. She reports current drug use. Frequency: 4.00 times per week. Drug: " Marijuana.  Reviewed  Family History  Family History[1]     Allergies  Ace inhibitors    ROS  Review of Systems   Constitutional: Negative.    HENT: Negative.     Eyes: Negative.    Respiratory: Negative.     Cardiovascular: Negative.  Negative for chest pain and palpitations.   Gastrointestinal:  Positive for abdominal pain, diarrhea and vomiting.   Endocrine: Negative.    Genitourinary: Negative.    Musculoskeletal: Negative.    Skin: Negative.    Allergic/Immunologic: Negative.    Neurological: Negative.    Hematological: Negative.    Psychiatric/Behavioral: Negative.     All other systems reviewed and are negative.       Last Recorded Vitals  /74   Pulse 76   Temp 36.8 °C (98.3 °F)   Resp 12   Wt 62.4 kg (137 lb 9.6 oz)   SpO2 98%     Physical Exam  Assessed patient in emergency room bed 5 in the presence of her .  The patient is a  female who is alert oriented x 3 cooperative  She is normocephalic atraumatic EOMI PERRLA.  Her face is flushed and her oral mucosa is dry  Neck supple  Chest clear to auscultation  Heart regular rate rhythm S1-S2 clear  Abdomen is soft and nontender somewhat decreased bowel sounds but there is no rebound tenderness no guarding no CVA tenderness very benign exam nonsurgical  Extremities nonperforating good pedal pulses  Neurologic examination gross motor sensor nonfocal  Skin there is some flushing in her face but no other rash  Psych no psychosis    Relevant Results  Reviewed EKG troponins urinalysis basic electrolytes    ASSESSMENT/PLAN  Assessment/Plan   Assessment & Plan  NSTEMI (non-ST elevated myocardial infarction) (Multi)  She had no chest pain and EKG is nonacute.  Cardiology recommended heparin drip for now and to trend troponins.  I agree with that.  Will attempt to continue her Coreg and aspirin and Lipitor  Primary hypertension  Continue medications with hold parameters  Nausea vomiting and diarrhea  Symptoms seem to have improved.  Etiology  remains not entirely clear and I wonder if this is mostly related to a viral gastrointestinal illness versus food poisoning versus other although I feel this is less likely related to cholelithiasis and complications from it.  At this point will order stool pathogen clear liquid diet and will monitor.  Have a very low suspicion for early DKA with normal labs  Type 2 diabetes mellitus without complication, without long-term current use of insulin  See above sliding scale for now  Dehydration  She has received 2 L of normal saline and will continue hydration with LR  Pain of upper abdomen  This has improved as she was really nontender for me  Calculus of gallbladder without cholecystitis without obstruction  See above  Nephrolithiasis  See above, she did have evidence for some red blood cells in her urine which may have been related to the stone and eventually may need urology referral for microscopic hematuria  Facial flushing  Will monitor  5/11, see above       Jacinda Richey MD         [1]   Family History  Problem Relation Name Age of Onset    Hypertension Mother Mom     Stroke Mother Mom     Hypertension Father Dad     Cancer Father Dad     Alcohol abuse Father Dad     Drug abuse Father Dad

## 2025-05-11 NOTE — ED PROVIDER NOTES
HPI   Chief Complaint   Patient presents with    Abdominal Pain     Pt complaining of abd pain and vomiting x2 days.       HPI  Patient is a 69-year-old female who presents to ED for chief complaint of right upper quadrant pain and nausea and vomiting for 2 days.  Patient is a history of coronary artery disease, stent placed in 2014, dyslipidemia, hypertension, heavy smoker.  Currently denying any sternal chest pain or shortness of breath.  She is not diaphoretic or in distress.  She denies any recent illness, fever chills, recent travel or hospitalizations.  She is not on blood thinners.  Denies history of PE or DVT.  No other acute complaints today.      Patient History   Medical History[1]  Surgical History[2]  Family History[3]  Social History[4]    Physical Exam   ED Triage Vitals [05/11/25 1156]   Temperature Heart Rate Respirations BP   36.8 °C (98.3 °F) 75 15 (!) 216/102      Pulse Ox Temp src Heart Rate Source Patient Position   98 % -- -- --      BP Location FiO2 (%)     -- --       Physical Exam  Vitals reviewed.   Constitutional:       General: She is not in acute distress.     Appearance: Normal appearance. She is not ill-appearing.   HENT:      Head: Normocephalic and atraumatic.   Eyes:      Extraocular Movements: Extraocular movements intact.   Cardiovascular:      Rate and Rhythm: Normal rate and regular rhythm.      Heart sounds: Normal heart sounds.   Pulmonary:      Effort: Pulmonary effort is normal.      Breath sounds: Normal breath sounds.   Abdominal:      Palpations: Abdomen is soft.      Tenderness: There is no abdominal tenderness.   Musculoskeletal:         General: Normal range of motion.      Cervical back: Normal range of motion and neck supple.   Skin:     General: Skin is warm and dry.   Neurological:      General: No focal deficit present.      Mental Status: She is alert and oriented to person, place, and time.   Psychiatric:         Mood and Affect: Mood normal.         Behavior:  Behavior normal.    ED Course & MDM   ED Course as of 05/11/25 1652   Sun May 11, 2025   1203 EKG on my independent interpretation: Normal sinus rhythm 69 bpm, left axis deviation, normal intervals, voltage criteria for LVH, nonspecific repolarization abnormality no clear acute ST or T wave abnormality, no prior EKG for comparison [LEATHA]      ED Course User Index  [LEATHA] Carmen Daniels MD         Diagnoses as of 05/11/25 1652   NSTEMI (non-ST elevated myocardial infarction) (Multi)                 No data recorded     [place] Coma Scale Score: 15 (05/11/25 1156 : Yesica Walton RN)                           Medical Decision Making  Parts of this chart have been completed using voice recognition software. Please excuse any errors of transcription.  My thought process and reason for plan has been formulated from the time that I saw the patient until the time of disposition and is not specific to one specific moment during their visit and furthermore my MDM encompasses this entire chart and not only this text box.    HPI:   A medically appropriate HPI was obtained, outlined above.    Simona Betancourt is a  69 y.o. female    Chief Complaint   Patient presents with    Abdominal Pain     Pt complaining of abd pain and vomiting x2 days.       Medical History[5]    Surgical History[6]    Social History[7]    Family History[8]    Allergies[9]    Current Outpatient Medications   Medication Instructions    aspirin 81 mg EC tablet = 1 tab(s) ( 81 mg ), PO, Daily, # 90 tab(s), 0 Refill(s), Type: Maintenance    atorvastatin (Lipitor) 40 mg tablet = 1 tab(s) ( 40 mg ), Oral, daily, # 90 tab(s), 0 Refill(s), Type: Maintenance    buPROPion SR (Wellbutrin SR) 200 mg 12 hr tablet = 1 tab(s), Oral, bid    carvedilol (COREG) 25 mg, 2 times daily (morning and late afternoon)    escitalopram (LEXAPRO) 10 mg, oral, Daily    levothyroxine (SYNTHROID, LEVOXYL) 125 mcg, oral, Daily    losartan (Cozaar) 100 mg tablet = 1 tab(s) ( 100 mg ),  PO, Daily, # 90 tab(s), 0 Refill(s), Type: Maintenance    metFORMIN XR (GLUCOPHAGE-XR) 1,500 mg, oral, Daily    Mounjaro 7.5 mg, subcutaneous, Once Weekly    multivitamin (MULTIPLE VITAMINS ORAL) 1 cap(s), PO, Daily, # 90 cap(s), 0 Refill(s), Type: Maintenance   for details    Exam:   Patient Vitals for the past 24 hrs:   BP Temp Pulse Resp SpO2 Height Weight   05/11/25 1630 (!) 183/79 -- 81 19 97 % -- --   05/11/25 1615 (!) 190/81 -- 70 16 99 % -- --   05/11/25 1600 (!) 191/86 -- 75 17 98 % -- --   05/11/25 1545 178/90 -- 71 16 -- -- --   05/11/25 1530 132/80 -- 82 18 98 % -- --   05/11/25 1515 124/77 -- 76 17 98 % -- --   05/11/25 1500 172/79 -- 80 19 98 % -- --   05/11/25 1445 180/81 -- 80 19 99 % -- --   05/11/25 1430 (!) 190/81 -- 82 17 98 % -- --   05/11/25 1415 172/90 -- 89 18 -- -- --   05/11/25 1400 (!) 209/171 -- 97 (!) 21 -- -- --   05/11/25 1345 -- -- (!) 101 16 -- -- --   05/11/25 1330 -- -- 95 20 -- -- --   05/11/25 1300 102/57 -- 64 17 97 % -- --   05/11/25 1245 105/54 -- 66 13 97 % -- --   05/11/25 1230 172/70 -- 60 19 97 % -- --   05/11/25 1215 (!) 193/117 -- 62 13 96 % -- --   05/11/25 1200 (!) 210/85 -- 71 14 98 % -- --   05/11/25 1156 (!) 216/102 36.8 °C (98.3 °F) 75 15 98 % 1.524 m (5') 62.4 kg (137 lb 9.6 oz)       A medically appropriate exam performed, outlined above, given the known history and presentation.    EKG/Cardiac monitor:   If EKG was done and, it was interpreted by attending physician, see their note for ED course for more detail.    Medications given during visit:  Medications   heparin 25,000 Units in dextrose 5% 250 mL (100 Units/mL) infusion (premix) (700 Units/hr intravenous New Bag 5/11/25 9802)   heparin (porcine) injection 1,500-3,000 Units (has no administration in time range)   ondansetron (Zofran) injection 4 mg (4 mg intravenous Given 5/11/25 1215)   sodium chloride 0.9 % bolus 1,000 mL (0 mL intravenous Stopped 5/11/25 1415)   prochlorperazine (Compazine) injection 5  mg (5 mg intravenous Given 5/11/25 1214)   dexAMETHasone (Decadron) injection 4 mg (4 mg intravenous Given 5/11/25 1214)   hydrALAZINE (Apresoline) injection 10 mg (10 mg intravenous Given 5/11/25 1214)   sodium chloride 0.9 % bolus 1,000 mL (0 mL intravenous Stopped 5/11/25 1403)   iohexol (OMNIPaque) 350 mg iodine/mL solution 75 mL (75 mL intravenous Given 5/11/25 1312)   aspirin tablet 325 mg (325 mg oral Given 5/11/25 1420)   heparin (porcine) injection 3,750 Units (3,750 Units intravenous Given 5/11/25 1551)        Diagnostic/tests:  Labs Reviewed   CBC WITH AUTO DIFFERENTIAL - Abnormal       Result Value    WBC 16.3 (*)     nRBC 0.0      RBC 5.40 (*)     Hemoglobin 16.7 (*)     Hematocrit 48.4 (*)     MCV 90      MCH 30.9      MCHC 34.5      RDW 12.3      Platelets 274      Immature Granulocytes %, Automated 0.4      Immature Granulocytes Absolute, Automated 0.07      Narrative:     The previously reported component Neutrophils % is no longer being reported.  The previously reported component Lymphocytes % is no longer being reported.  The previously reported component Monocytes % is no longer being reported.  The previously   reported component Eosinophils % is no longer being reported.  The previously reported component Basophils % is no longer being reported.  The previously reported component Absolute Neutrophils is no longer being reported.  The previously reported   component Absolute Lymphocytes is no longer being reported.  The previously reported component Absolute Monocytes is no longer being reported.  The previously reported component Absolute Eosinophils is no longer being reported.  The previously reported   component Absolute Basophils is no longer being reported.   COMPREHENSIVE METABOLIC PANEL - Abnormal    Glucose 150 (*)     Sodium 139      Potassium 3.6      Chloride 100      Bicarbonate 25      Anion Gap 18      Urea Nitrogen 18      Creatinine 0.93      eGFR 68      Calcium 9.8       Albumin 4.8      Alkaline Phosphatase 78      Total Protein 7.8      AST 19      Bilirubin, Total 1.0      ALT 10     URINALYSIS WITH REFLEX CULTURE AND MICROSCOPIC - Abnormal    Color, Urine Light-Yellow      Appearance, Urine Clear      Specific Gravity, Urine 1.028      pH, Urine 6.5      Protein, Urine 70 (1+) (*)     Glucose, Urine Normal      Blood, Urine 1.0 (3+) (*)     Ketones, Urine TRACE (*)     Bilirubin, Urine NEGATIVE      Urobilinogen, Urine Normal      Nitrite, Urine NEGATIVE      Leukocyte Esterase, Urine NEGATIVE     SERIAL TROPONIN-INITIAL - Abnormal    Troponin I, High Sensitivity 388 (*)     Narrative:     Less than 99th percentile of normal range cutoff-  Female and children under 18 years old <14 ng/L; Male <21 ng/L: Negative  Repeat testing should be performed if clinically indicated.     Female and children under 18 years old 14-50 ng/L; Male 21-50 ng/L:  Consistent with possible cardiac damage and possible increased clinical   risk. Serial measurements may help to assess extent of myocardial damage.     >50 ng/L: Consistent with cardiac damage, increased clinical risk and  myocardial infarction. Serial measurements may help assess extent of   myocardial damage.      NOTE: Children less than 1 year old may have higher baseline troponin   levels and results should be interpreted in conjunction with the overall   clinical context.     NOTE: Troponin I testing is performed using a different   testing methodology at [facility] than at other   [facility] hospitals. Direct result comparisons should only   be made within the same method.   SERIAL TROPONIN, 1 HOUR - Abnormal    Troponin I, High Sensitivity 494 (*)     Narrative:     Less than 99th percentile of normal range cutoff-  Female and children under 18 years old <14 ng/L; Male <21 ng/L: Negative  Repeat testing should be performed if clinically indicated.     Female and children under 18 years old 14-50 ng/L; Male 21-50  ng/L:  Consistent with possible cardiac damage and possible increased clinical   risk. Serial measurements may help to assess extent of myocardial damage.     >50 ng/L: Consistent with cardiac damage, increased clinical risk and  myocardial infarction. Serial measurements may help assess extent of   myocardial damage.      NOTE: Children less than 1 year old may have higher baseline troponin   levels and results should be interpreted in conjunction with the overall   clinical context.     NOTE: Troponin I testing is performed using a different   testing methodology at [facility] than at other   [facility]. Direct result comparisons should only   be made within the same method.   CBC - Abnormal    WBC 14.8 (*)     nRBC 0.0      RBC 5.31 (*)     Hemoglobin 16.4 (*)     Hematocrit 47.7 (*)     MCV 90      MCH 30.9      MCHC 34.4      RDW 12.1      Platelets 254     MANUAL DIFFERENTIAL - Abnormal    Neutrophils %, Manual 82.0      Lymphocytes %, Manual 10.0      Monocytes %, Manual 7.0      Eosinophils %, Manual 0.0      Basophils %, Manual 0.0      Atypical Lymphocytes %, Manual 1.0      Seg Neutrophils Absolute, Manual 13.37 (*)     Lymphocytes Absolute, Manual 1.63      Monocytes Absolute, Manual 1.14 (*)     Eosinophils Absolute, Manual 0.00      Basophils Absolute, Manual 0.00      Atypical Lymphs Absolute, Manual 0.16      Total Cells Counted 100      RBC Morphology No significant RBC morphology present     URINALYSIS MICROSCOPIC WITH REFLEX CULTURE - Abnormal    WBC, Urine 1-5      RBC, Urine >20 (*)     Squamous Epithelial Cells, Urine 1-9 (SPARSE)      Mucus, Urine FEW      Hyaline Casts, Urine 1+ (*)    MAGNESIUM - Normal    Magnesium 1.81     LIPASE - Normal    Lipase 82      Narrative:     Venipuncture immediately after or during the administration of Metamizole may lead to falsely low results. Testing should be performed immediately prior to Metamizole dosing.   SARS-COV-2 AND INFLUENZA  A/B PCR - Normal    Flu A Result Not Detected      Flu B Result Not Detected      Coronavirus 2019, PCR Not Detected      Narrative:     This assay is an FDA-cleared, in vitro diagnostic nucleic acid amplification test for the qualitative detection and differentiation of SARS CoV-2/ Influenza A/B from nasopharyngeal specimens collected from individuals with signs and symptoms of respiratory tract infections, and has been validated for use at [facility]. Negative results do not preclude COVID-19/ Influenza A/B infections and should not be used as the sole basis for diagnosis, treatment, or other management decisions. Testing for SARS CoV-2 is recommended only for patients who meet current clinical and/or epidemiological criteria defined by federal, state, or local public health directives.   APTT - Normal    aPTT 27.5     TROPONIN SERIES- (INITIAL, 1 HR)    Narrative:     The following orders were created for panel order Troponin I Series, High Sensitivity (0, 1 HR).  Procedure                               Abnormality         Status                     ---------                               -----------         ------                     Troponin I, High Sensiti...[[id number]]  Abnormal            Final result               Troponin, High Sensitivi...[[id number]]  Abnormal            Final result                 Please view results for these tests on the individual orders.   URINALYSIS WITH REFLEX CULTURE AND MICROSCOPIC    Narrative:     The following orders were created for panel order Urinalysis with Reflex Culture and Microscopic.  Procedure                               Abnormality         Status                     ---------                               -----------         ------                     Urinalysis with Reflex C...[[id number]]  Abnormal            Final result               Extra Urine Gray Tube[[id number]]                                                         Please view  results for these tests on the individual orders.   EXTRA URINE GRAY TUBE        US right upper quadrant   Final Result   Cholelithiasis.        Echogenic structure in the right kidney suggestive of a   nonobstructive calculus.        Signed by: Rochelle Barba 5/11/2025 3:18 PM   Dictation workstation:   [id number]      CT chest abdomen pelvis w IV contrast   Final Result   No acute cardiopulmonary abnormality. No acute abdominal or pelvic   abnormality. Bilateral nephrolithiasis.   Signed by Nima Martinez MD             [facility] Summary:  Serial troponins are elevated, positive delta.  Heparin protocol initiated.  Imaging is unremarkable.  Patient admitted to internal medicine for further management.      Procedure  Critical Care    Performed by: Clinton Christensen PA-C  Authorized by: Juan Antonio Carr MD    Critical care provider statement:     Critical care time (minutes):  45    Critical care time was exclusive of:  Separately billable procedures and treating other patients    Critical care was necessary to treat or prevent imminent or life-threatening deterioration of the following conditions:  Cardiac failure    Critical care was time spent personally by me on the following activities:  Development of treatment plan with patient or surrogate, discussions with consultants, evaluation of patient's response to treatment, examination of patient, obtaining history from patient or surrogate, ordering and performing treatments and interventions, ordering and review of laboratory studies, ordering and review of radiographic studies, re-evaluation of patient's condition, review of old charts and pulse oximetry    Care discussed with: admitting provider             [1]   Past Medical History:  Diagnosis Date    ADHD (attention deficit hyperactivity disorder)     Anxiety     Chronic viral hepatitis C (Multi)     Depression     Diabetes mellitus with hyperglycemia     Heart disease     Hypercholesterolemia     Hypertension      Hypothyroidism     Myocardial infarction (Multi)     Nausea and vomiting 04/10/2023    Obesity     Visual impairment    [2]   Past Surgical History:  Procedure Laterality Date     SECTION, LOW TRANSVERSE      CORONARY ANGIOPLASTY WITH STENT PLACEMENT     [3]   Family History  Problem Relation Name Age of Onset    Hypertension Mother Mom     Stroke Mother Mom     Hypertension Father Dad     Cancer Father Dad     Alcohol abuse Father Dad     Drug abuse Father Dad    [4]   Social History  Tobacco Use    Smoking status: Some Days     Current packs/day: 0.50     Average packs/day: 0.5 packs/day for 54.4 years (27.2 ttl pk-yrs)     Types: Cigarettes     Start date: 1971     Passive exposure: Current    Smokeless tobacco: Current   Vaping Use    Vaping status: Never Used   Substance Use Topics    Alcohol use: Not Currently    Drug use: Yes     Frequency: 4.0 times per week     Types: Marijuana   [5]   Past Medical History:  Diagnosis Date    ADHD (attention deficit hyperactivity disorder)     Anxiety     Chronic viral hepatitis C (Multi)     Depression     Diabetes mellitus with hyperglycemia     Heart disease     Hypercholesterolemia     Hypertension     Hypothyroidism     Myocardial infarction (Multi)     Nausea and vomiting 04/10/2023    Obesity     Visual impairment    [6]   Past Surgical History:  Procedure Laterality Date     SECTION, LOW TRANSVERSE      CORONARY ANGIOPLASTY WITH STENT PLACEMENT     [7]   Social History  Tobacco Use    Smoking status: Some Days     Current packs/day: 0.50     Average packs/day: 0.5 packs/day for 54.4 years (27.2 ttl pk-yrs)     Types: Cigarettes     Start date: 1971     Passive exposure: Current    Smokeless tobacco: Current   Vaping Use    Vaping status: Never Used   Substance Use Topics    Alcohol use: Not Currently    Drug use: Yes     Frequency: 4.0 times per week     Types: Marijuana   [8]   Family History  Problem Relation Name Age of Onset     Hypertension Mother Mom     Stroke Mother Mom     Hypertension Father Dad     Cancer Father Dad     Alcohol abuse Father Dad     Drug abuse Father Dad    [9]   Allergies  Allergen Reactions    Ace Inhibitors Cough        Clinton Christensen PA-C  05/11/25 2045

## 2025-05-12 ENCOUNTER — APPOINTMENT (OUTPATIENT)
Dept: CARDIOLOGY | Facility: HOSPITAL | Age: 70
DRG: 281 | End: 2025-05-12
Payer: COMMERCIAL

## 2025-05-12 PROBLEM — N17.9 ACUTE RENAL FAILURE: Status: ACTIVE | Noted: 2025-05-12

## 2025-05-12 LAB
ABO GROUP (TYPE) IN BLOOD: NORMAL
ALBUMIN SERPL BCP-MCNC: 4.4 G/DL (ref 3.4–5)
ALP SERPL-CCNC: 74 U/L (ref 33–136)
ALT SERPL W P-5'-P-CCNC: 9 U/L (ref 7–45)
ANION GAP SERPL CALCULATED.3IONS-SCNC: 14 MMOL/L (ref 10–20)
APTT PPP: 41.8 SECONDS (ref 22–32.5)
AST SERPL W P-5'-P-CCNC: 20 U/L (ref 9–39)
ATRIAL RATE: 69 BPM
BASOPHILS # BLD AUTO: 0.03 X10*3/UL (ref 0–0.1)
BASOPHILS NFR BLD AUTO: 0.2 %
BILIRUB SERPL-MCNC: 1.1 MG/DL (ref 0–1.2)
BUN SERPL-MCNC: 28 MG/DL (ref 6–23)
CALCIUM SERPL-MCNC: 9.5 MG/DL (ref 8.6–10.3)
CARDIAC TROPONIN I PNL SERPL HS: 638 NG/L (ref 0–13)
CARDIAC TROPONIN I PNL SERPL HS: 647 NG/L (ref 0–13)
CHLORIDE SERPL-SCNC: 100 MMOL/L (ref 98–107)
CO2 SERPL-SCNC: 27 MMOL/L (ref 21–32)
CREAT SERPL-MCNC: 1.51 MG/DL (ref 0.5–1.05)
EGFRCR SERPLBLD CKD-EPI 2021: 38 ML/MIN/1.73M*2
EOSINOPHIL # BLD AUTO: 0.01 X10*3/UL (ref 0–0.7)
EOSINOPHIL NFR BLD AUTO: 0.1 %
ERYTHROCYTE [DISTWIDTH] IN BLOOD BY AUTOMATED COUNT: 12.4 % (ref 11.5–14.5)
EST. AVERAGE GLUCOSE BLD GHB EST-MCNC: 111 MG/DL
GLUCOSE BLD MANUAL STRIP-MCNC: 106 MG/DL (ref 74–99)
GLUCOSE BLD MANUAL STRIP-MCNC: 132 MG/DL (ref 74–99)
GLUCOSE BLD MANUAL STRIP-MCNC: 142 MG/DL (ref 74–99)
GLUCOSE BLD MANUAL STRIP-MCNC: 159 MG/DL (ref 74–99)
GLUCOSE SERPL-MCNC: 83 MG/DL (ref 74–99)
HBA1C MFR BLD: 5.5 % (ref ?–5.7)
HCT VFR BLD AUTO: 44.9 % (ref 36–46)
HGB BLD-MCNC: 15.2 G/DL (ref 12–16)
IMM GRANULOCYTES # BLD AUTO: 0.11 X10*3/UL (ref 0–0.7)
IMM GRANULOCYTES NFR BLD AUTO: 0.7 % (ref 0–0.9)
LYMPHOCYTES # BLD AUTO: 2.44 X10*3/UL (ref 1.2–4.8)
LYMPHOCYTES NFR BLD AUTO: 15.6 %
MCH RBC QN AUTO: 31 PG (ref 26–34)
MCHC RBC AUTO-ENTMCNC: 33.9 G/DL (ref 32–36)
MCV RBC AUTO: 92 FL (ref 80–100)
MONOCYTES # BLD AUTO: 1.53 X10*3/UL (ref 0.1–1)
MONOCYTES NFR BLD AUTO: 9.8 %
NEUTROPHILS # BLD AUTO: 11.53 X10*3/UL (ref 1.2–7.7)
NEUTROPHILS NFR BLD AUTO: 73.6 %
NRBC BLD-RTO: 0 /100 WBCS (ref 0–0)
P AXIS: 55 DEGREES
P OFFSET: 208 MS
P ONSET: 149 MS
PLATELET # BLD AUTO: 272 X10*3/UL (ref 150–450)
POTASSIUM SERPL-SCNC: 3.1 MMOL/L (ref 3.5–5.3)
PR INTERVAL: 134 MS
PROT SERPL-MCNC: 6.9 G/DL (ref 6.4–8.2)
Q ONSET: 216 MS
QRS COUNT: 11 BEATS
QRS DURATION: 82 MS
QT INTERVAL: 432 MS
QTC CALCULATION(BAZETT): 462 MS
QTC FREDERICIA: 452 MS
R AXIS: -34 DEGREES
RBC # BLD AUTO: 4.9 X10*6/UL (ref 4–5.2)
RH FACTOR (ANTIGEN D): NORMAL
SODIUM SERPL-SCNC: 138 MMOL/L (ref 136–145)
T AXIS: 7 DEGREES
T OFFSET: 432 MS
VENTRICULAR RATE: 69 BPM
WBC # BLD AUTO: 15.7 X10*3/UL (ref 4.4–11.3)

## 2025-05-12 PROCEDURE — 2500000002 HC RX 250 W HCPCS SELF ADMINISTERED DRUGS (ALT 637 FOR MEDICARE OP, ALT 636 FOR OP/ED): Performed by: INTERNAL MEDICINE

## 2025-05-12 PROCEDURE — 99232 SBSQ HOSP IP/OBS MODERATE 35: CPT | Performed by: INTERNAL MEDICINE

## 2025-05-12 PROCEDURE — 36415 COLL VENOUS BLD VENIPUNCTURE: CPT | Performed by: INTERNAL MEDICINE

## 2025-05-12 PROCEDURE — 99407 BEHAV CHNG SMOKING > 10 MIN: CPT | Performed by: INTERNAL MEDICINE

## 2025-05-12 PROCEDURE — 84484 ASSAY OF TROPONIN QUANT: CPT | Performed by: INTERNAL MEDICINE

## 2025-05-12 PROCEDURE — 2500000001 HC RX 250 WO HCPCS SELF ADMINISTERED DRUGS (ALT 637 FOR MEDICARE OP): Performed by: INTERNAL MEDICINE

## 2025-05-12 PROCEDURE — 85025 COMPLETE CBC W/AUTO DIFF WBC: CPT | Performed by: INTERNAL MEDICINE

## 2025-05-12 PROCEDURE — 99222 1ST HOSP IP/OBS MODERATE 55: CPT | Performed by: INTERNAL MEDICINE

## 2025-05-12 PROCEDURE — 82947 ASSAY GLUCOSE BLOOD QUANT: CPT

## 2025-05-12 PROCEDURE — 93005 ELECTROCARDIOGRAM TRACING: CPT

## 2025-05-12 PROCEDURE — 2060000001 HC INTERMEDIATE ICU ROOM DAILY

## 2025-05-12 PROCEDURE — 80053 COMPREHEN METABOLIC PANEL: CPT | Performed by: INTERNAL MEDICINE

## 2025-05-12 PROCEDURE — 2500000004 HC RX 250 GENERAL PHARMACY W/ HCPCS (ALT 636 FOR OP/ED): Mod: JZ | Performed by: INTERNAL MEDICINE

## 2025-05-12 PROCEDURE — 93010 ELECTROCARDIOGRAM REPORT: CPT | Performed by: INTERNAL MEDICINE

## 2025-05-12 RX ORDER — ATORVASTATIN CALCIUM 80 MG/1
80 TABLET, FILM COATED ORAL NIGHTLY
Status: DISCONTINUED | OUTPATIENT
Start: 2025-05-12 | End: 2025-05-13 | Stop reason: HOSPADM

## 2025-05-12 RX ORDER — NAPROXEN SODIUM 220 MG/1
81 TABLET, FILM COATED ORAL DAILY
Status: DISCONTINUED | OUTPATIENT
Start: 2025-05-13 | End: 2025-05-13 | Stop reason: HOSPADM

## 2025-05-12 RX ORDER — POTASSIUM CHLORIDE 20 MEQ/1
40 TABLET, EXTENDED RELEASE ORAL ONCE
Status: COMPLETED | OUTPATIENT
Start: 2025-05-12 | End: 2025-05-12

## 2025-05-12 RX ORDER — NITROGLYCERIN 20 MG/1
1 PATCH TRANSDERMAL DAILY
Status: DISCONTINUED | OUTPATIENT
Start: 2025-05-12 | End: 2025-05-13 | Stop reason: HOSPADM

## 2025-05-12 RX ORDER — WATER
500 LIQUID (ML) MISCELLANEOUS
Status: DISCONTINUED | OUTPATIENT
Start: 2025-05-12 | End: 2025-05-12

## 2025-05-12 RX ORDER — SODIUM CHLORIDE, SODIUM LACTATE, POTASSIUM CHLORIDE, CALCIUM CHLORIDE 600; 310; 30; 20 MG/100ML; MG/100ML; MG/100ML; MG/100ML
100 INJECTION, SOLUTION INTRAVENOUS CONTINUOUS
Status: DISCONTINUED | OUTPATIENT
Start: 2025-05-12 | End: 2025-05-13 | Stop reason: HOSPADM

## 2025-05-12 RX ADMIN — CARVEDILOL 25 MG: 25 TABLET, FILM COATED ORAL at 08:03

## 2025-05-12 RX ADMIN — BUPROPION HYDROCHLORIDE 200 MG: 100 TABLET, EXTENDED RELEASE ORAL at 08:36

## 2025-05-12 RX ADMIN — POTASSIUM CHLORIDE 40 MEQ: 1500 TABLET, EXTENDED RELEASE ORAL at 07:51

## 2025-05-12 RX ADMIN — PANTOPRAZOLE SODIUM 40 MG: 40 TABLET, DELAYED RELEASE ORAL at 06:08

## 2025-05-12 RX ADMIN — PANTOPRAZOLE SODIUM 40 MG: 40 TABLET, DELAYED RELEASE ORAL at 21:57

## 2025-05-12 RX ADMIN — NITROGLYCERIN 1 PATCH: 0.1 PATCH TRANSDERMAL at 10:54

## 2025-05-12 RX ADMIN — ATORVASTATIN CALCIUM 80 MG: 80 TABLET, FILM COATED ORAL at 21:57

## 2025-05-12 RX ADMIN — LEVOTHYROXINE SODIUM 125 MCG: 0.12 TABLET ORAL at 08:03

## 2025-05-12 RX ADMIN — SODIUM CHLORIDE, SODIUM LACTATE, POTASSIUM CHLORIDE, AND CALCIUM CHLORIDE 100 ML/HR: 600; 310; 30; 20 INJECTION, SOLUTION INTRAVENOUS at 18:07

## 2025-05-12 RX ADMIN — SODIUM CHLORIDE, SODIUM LACTATE, POTASSIUM CHLORIDE, AND CALCIUM CHLORIDE 100 ML/HR: 600; 310; 30; 20 INJECTION, SOLUTION INTRAVENOUS at 08:30

## 2025-05-12 RX ADMIN — ESCITALOPRAM OXALATE 10 MG: 10 TABLET ORAL at 08:03

## 2025-05-12 ASSESSMENT — ENCOUNTER SYMPTOMS
FREQUENCY: 0
VOMITING: 1
NECK PAIN: 0
CONSTIPATION: 0
COUGH: 0
HEADACHES: 0
ARTHRALGIAS: 0
NAUSEA: 1
BLOOD IN STOOL: 0
SHORTNESS OF BREATH: 0
APNEA: 0
FEVER: 0
POLYPHAGIA: 0
HEMATURIA: 0
NUMBNESS: 0
HALLUCINATIONS: 0
RECTAL PAIN: 0
TREMORS: 0
BRUISES/BLEEDS EASILY: 0
JOINT SWELLING: 0
WEAKNESS: 1
ADENOPATHY: 0
DYSURIA: 0
SORE THROAT: 0
ABDOMINAL PAIN: 0
PHOTOPHOBIA: 0
FATIGUE: 1
MYALGIAS: 0
BACK PAIN: 0
WOUND: 0
CONFUSION: 0
SPEECH DIFFICULTY: 0
SEIZURES: 0
SINUS PRESSURE: 0
CHILLS: 0
DIARRHEA: 1
SLEEP DISTURBANCE: 0
POLYDIPSIA: 0
COLOR CHANGE: 0
WHEEZING: 0
LIGHT-HEADEDNESS: 0
DIZZINESS: 0
PALPITATIONS: 0

## 2025-05-12 ASSESSMENT — COGNITIVE AND FUNCTIONAL STATUS - GENERAL
DAILY ACTIVITIY SCORE: 24
MOBILITY SCORE: 24

## 2025-05-12 ASSESSMENT — PAIN SCALES - GENERAL: PAINLEVEL_OUTOF10: 0 - NO PAIN

## 2025-05-12 NOTE — CONSULTS
".Reason For Consult  Acute kidney injury    History Of Present Illness  Simona Betancourt \"Mian" is a 69 y.o. female who is known to have a history of diabetes mellitus type 2, hyperlipidemia, hypertension, hypothyroidism, history of coronary disease with remote myocardial infarction who basically was doing okay until Saturday she went with her  to [place] and she had some [place] food after that she started feeling extremely weak and tired she had nausea vomiting and diarrhea she laid down however because of her symptoms her  brought her to the emergency room for further evaluation and treatment patient was found to have extremely high troponins and she ruled in for non-STEMI cardiology was consulted and she started on anticoagulant therapy with heparin and apparently they are planning heart catheterization tomorrow morning, I was asked to see the patient in consultation because of a new onset of acute kidney injury with elevated serum creatinine level the patient throughout this illness did not have any chest pain did not have any shortness of breath no abdominal pain today her diarrhea and nausea vomiting subsided.  Imaging studies were reviewed including CAT scan of the abdomen pelvis with IV contrast did not show any acute process except for nonobstructing nephrolithiasis right upper quadrant ultrasound showed cholelithiasis.  Review of Systems  Review of Systems   Constitutional:  Positive for fatigue. Negative for chills and fever.   HENT:  Negative for sinus pressure, sore throat and tinnitus.    Eyes:  Negative for photophobia and visual disturbance.   Respiratory:  Negative for apnea, cough, shortness of breath and wheezing.    Cardiovascular:  Negative for chest pain, palpitations and leg swelling.   Gastrointestinal:  Positive for diarrhea, nausea and vomiting. Negative for abdominal pain, blood in stool, constipation and rectal pain.   Endocrine: Negative for cold intolerance, " heat intolerance, polydipsia, polyphagia and polyuria.   Genitourinary:  Negative for decreased urine volume, dysuria, frequency, hematuria and urgency.   Musculoskeletal:  Negative for arthralgias, back pain, joint swelling, myalgias and neck pain.   Skin:  Negative for color change, pallor, rash and wound.   Neurological:  Positive for weakness. Negative for dizziness, tremors, seizures, syncope, speech difficulty, light-headedness, numbness and headaches.   Hematological:  Negative for adenopathy. Does not bruise/bleed easily.   Psychiatric/Behavioral:  Negative for confusion, hallucinations, sleep disturbance and suicidal ideas.         Past Medical History  She has a past medical history of ADHD (attention deficit hyperactivity disorder), Anxiety, Chronic viral hepatitis C (Multi), Depression, Diabetes mellitus with hyperglycemia, Heart disease, Hypercholesterolemia, Hypertension, Hypothyroidism, Myocardial infarction (Multi), Nausea and vomiting (04/10/2023), Obesity, and Visual impairment.    Surgical History  She has a past surgical history that includes Coronary angioplasty with stent () and  section, low transverse.     Social History  She reports that she has been smoking cigarettes. She started smoking about 54 years ago. She has a 27.2 pack-year smoking history. She has been exposed to tobacco smoke. She uses smokeless tobacco. She reports that she does not currently use alcohol. She reports current drug use. Frequency: 4.00 times per week. Drug: Marijuana.    Family History  Family History[1]   Current Medications[2]   Allergies  Ace inhibitors         Physical Exam  Physical Exam  Constitutional:       General: She is not in acute distress.     Appearance: She is not toxic-appearing.   HENT:      Head: Normocephalic and atraumatic.   Eyes:      Extraocular Movements: Extraocular movements intact.      Pupils: Pupils are equal, round, and reactive to light.   Neck:      Vascular: No carotid  bruit.   Cardiovascular:      Rate and Rhythm: Normal rate and regular rhythm.   Pulmonary:      Effort: No respiratory distress.      Breath sounds: No stridor. No wheezing, rhonchi or rales.   Chest:      Chest wall: No tenderness.   Abdominal:      General: There is no distension.      Palpations: There is no mass.      Tenderness: There is no abdominal tenderness. There is no right CVA tenderness, left CVA tenderness or guarding.      Hernia: No hernia is present.   Musculoskeletal:         General: No swelling or tenderness.      Cervical back: No rigidity.      Right lower leg: No edema.      Left lower leg: No edema.   Lymphadenopathy:      Cervical: No cervical adenopathy.   Skin:     General: Skin is warm and dry.      Coloration: Skin is not jaundiced or pale.      Findings: No bruising or erythema.   Neurological:      General: No focal deficit present.      Mental Status: She is alert and oriented to person, place, and time.   Psychiatric:         Mood and Affect: Mood normal.         Behavior: Behavior normal.              I&O 24HR    Intake/Output Summary (Last 24 hours) at 5/12/2025 1107  Last data filed at 5/12/2025 1011  Gross per 24 hour   Intake 3206.35 ml   Output --   Net 3206.35 ml       Vitals 24HR  Heart Rate:  []   Temp:  [36.5 °C (97.7 °F)-36.9 °C (98.4 °F)]   Resp:  [10-21]   BP: (102-216)/()   Height:  [152.4 cm (5')]   Weight:  [60.3 kg (132 lb 15 oz)-62.4 kg (137 lb 9.6 oz)]   SpO2:  [95 %-99 %]     Relevant Results        Results for orders placed or performed during the hospital encounter of 05/11/25 (from the past 96 hours)   ECG 12 lead   Result Value Ref Range    Ventricular Rate 69 BPM    Atrial Rate 69 BPM    ID Interval 134 ms    QRS Duration 82 ms    QT Interval 432 ms    QTC Calculation(Bazett) 462 ms    P Axis 55 degrees    R Axis -34 degrees    T Axis 7 degrees    QRS Count 11 beats    Q Onset 216 ms    P Onset 149 ms    P Offset 208 ms    T Offset 432 ms    [facility]   CBC and Auto Differential   Result Value Ref Range    WBC 16.3 (H) 4.4 - 11.3 x10*3/uL    nRBC 0.0 0.0 - 0.0 /100 WBCs    RBC 5.40 (H) 4.00 - 5.20 x10*6/uL    Hemoglobin 16.7 (H) 12.0 - 16.0 g/dL    Hematocrit 48.4 (H) 36.0 - 46.0 %    MCV 90 80 - 100 fL    MCH 30.9 26.0 - 34.0 pg    MCHC 34.5 32.0 - 36.0 g/dL    RDW 12.3 11.5 - 14.5 %    Platelets 274 150 - 450 x10*3/uL    Immature Granulocytes %, Automated 0.4 0.0 - 0.9 %    Immature Granulocytes Absolute, Automated 0.07 0.00 - 0.70 x10*3/uL   Comprehensive metabolic panel   Result Value Ref Range    Glucose 150 (H) 74 - 99 mg/dL    Sodium 139 136 - 145 mmol/L    Potassium 3.6 3.5 - 5.3 mmol/L    Chloride 100 98 - 107 mmol/L    Bicarbonate 25 21 - 32 mmol/L    Anion Gap 18 10 - 20 mmol/L    Urea Nitrogen 18 6 - 23 mg/dL    Creatinine 0.93 0.50 - 1.05 mg/dL    eGFR 68 >60 mL/min/1.73m*2    Calcium 9.8 8.6 - 10.3 mg/dL    Albumin 4.8 3.4 - 5.0 g/dL    Alkaline Phosphatase 78 33 - 136 U/L    Total Protein 7.8 6.4 - 8.2 g/dL    AST 19 9 - 39 U/L    Bilirubin, Total 1.0 0.0 - 1.2 mg/dL    ALT 10 7 - 45 U/L   Magnesium   Result Value Ref Range    Magnesium 1.81 1.60 - 2.40 mg/dL   Lipase   Result Value Ref Range    Lipase 82 9 - 82 U/L   Sars-CoV-2 and Influenza A/B PCR   Result Value Ref Range    Flu A Result Not Detected Not Detected    Flu B Result Not Detected Not Detected    Coronavirus 2019, PCR Not Detected Not Detected   Troponin I, High Sensitivity, Initial   Result Value Ref Range    Troponin I, High Sensitivity 388 (HH) 0 - 13 ng/L   Manual Differential   Result Value Ref Range    Neutrophils %, Manual 82.0 40.0 - 80.0 %    Lymphocytes %, Manual 10.0 13.0 - 44.0 %    Monocytes %, Manual 7.0 2.0 - 10.0 %    Eosinophils %, Manual 0.0 0.0 - 6.0 %    Basophils %, Manual 0.0 0.0 - 2.0 %    Atypical Lymphocytes %, Manual 1.0 0.0 - 2.0 %    Seg Neutrophils Absolute, Manual 13.37 (H) 1.20 - 7.00 x10*3/uL    Lymphocytes Absolute, Manual 1.63 1.20 -  4.80 x10*3/uL    Monocytes Absolute, Manual 1.14 (H) 0.10 - 1.00 x10*3/uL    Eosinophils Absolute, Manual 0.00 0.00 - 0.70 x10*3/uL    Basophils Absolute, Manual 0.00 0.00 - 0.10 x10*3/uL    Atypical Lymphs Absolute, Manual 0.16 0.00 - 0.50 x10*3/uL    Total Cells Counted 100     RBC Morphology No significant RBC morphology present    Troponin, High Sensitivity, 1 Hour   Result Value Ref Range    Troponin I, High Sensitivity 494 (HH) 0 - 13 ng/L   Magnesium   Result Value Ref Range    Magnesium 1.85 1.60 - 2.40 mg/dL   Urinalysis with Reflex Culture and Microscopic   Result Value Ref Range    Color, Urine Light-Yellow Light-Yellow, Yellow, Dark-Yellow    Appearance, Urine Clear Clear    Specific Gravity, Urine 1.028 1.005 - 1.035    pH, Urine 6.5 5.0, 5.5, 6.0, 6.5, 7.0, 7.5, 8.0    Protein, Urine 70 (1+) (A) NEGATIVE, 10 (TRACE), 20 (TRACE) mg/dL    Glucose, Urine Normal Normal mg/dL    Blood, Urine 1.0 (3+) (A) NEGATIVE mg/dL    Ketones, Urine TRACE (A) NEGATIVE mg/dL    Bilirubin, Urine NEGATIVE NEGATIVE mg/dL    Urobilinogen, Urine Normal Normal mg/dL    Nitrite, Urine NEGATIVE NEGATIVE    Leukocyte Esterase, Urine NEGATIVE NEGATIVE   Urinalysis Microscopic   Result Value Ref Range    WBC, Urine 1-5 1-5, NONE /HPF    RBC, Urine >20 (A) NONE, 1-2, 3-5 /HPF    Squamous Epithelial Cells, Urine 1-9 (SPARSE) Reference range not established. /HPF    Mucus, Urine FEW Reference range not established. /LPF    Hyaline Casts, Urine 1+ (A) NONE /LPF   aPTT   Result Value Ref Range    aPTT 27.5 22.0 - 32.5 seconds   CBC   Result Value Ref Range    WBC 14.8 (H) 4.4 - 11.3 x10*3/uL    nRBC 0.0 0.0 - 0.0 /100 WBCs    RBC 5.31 (H) 4.00 - 5.20 x10*6/uL    Hemoglobin 16.4 (H) 12.0 - 16.0 g/dL    Hematocrit 47.7 (H) 36.0 - 46.0 %    MCV 90 80 - 100 fL    MCH 30.9 26.0 - 34.0 pg    MCHC 34.4 32.0 - 36.0 g/dL    RDW 12.1 11.5 - 14.5 %    Platelets 254 150 - 450 x10*3/uL   POCT GLUCOSE   Result Value Ref Range    POCT Glucose 148 (H)  74 - 99 mg/dL   Blood Culture    Specimen: Peripheral Venipuncture; Blood culture   Result Value Ref Range    Blood Culture Loaded on Instrument - Culture in progress    Blood Culture    Specimen: Peripheral Venipuncture; Blood culture   Result Value Ref Range    Blood Culture Loaded on Instrument - Culture in progress    Type And Screen   Result Value Ref Range    ABO TYPE O     Rh TYPE POS     ANTIBODY SCREEN NEG    Protime-INR   Result Value Ref Range    Protime 11.7 9.3 - 12.7 seconds    INR 1.1 0.9 - 1.2   TSH with reflex to Free T4 if abnormal   Result Value Ref Range    Thyroid Stimulating Hormone 1.88 0.44 - 3.98 mIU/L   Lipid Panel   Result Value Ref Range    Cholesterol 209 (H) 0 - 199 mg/dL    HDL-Cholesterol 49.2 mg/dL    Cholesterol/HDL Ratio 4.2     LDL Calculated 146 (H) <=99 mg/dL    VLDL 14 0 - 40 mg/dL    Triglycerides 68 0 - 149 mg/dL    Non HDL Cholesterol 160 (H) 0 - 149 mg/dL   APTT   Result Value Ref Range    aPTT 41.8 (H) 22.0 - 32.5 seconds   VERIFY ABO/Rh Group Test   Result Value Ref Range    ABO TYPE O     Rh TYPE POS    Comprehensive metabolic panel   Result Value Ref Range    Glucose 83 74 - 99 mg/dL    Sodium 138 136 - 145 mmol/L    Potassium 3.1 (L) 3.5 - 5.3 mmol/L    Chloride 100 98 - 107 mmol/L    Bicarbonate 27 21 - 32 mmol/L    Anion Gap 14 10 - 20 mmol/L    Urea Nitrogen 28 (H) 6 - 23 mg/dL    Creatinine 1.51 (H) 0.50 - 1.05 mg/dL    eGFR 38 (L) >60 mL/min/1.73m*2    Calcium 9.5 8.6 - 10.3 mg/dL    Albumin 4.4 3.4 - 5.0 g/dL    Alkaline Phosphatase 74 33 - 136 U/L    Total Protein 6.9 6.4 - 8.2 g/dL    AST 20 9 - 39 U/L    Bilirubin, Total 1.1 0.0 - 1.2 mg/dL    ALT 9 7 - 45 U/L   CBC and Auto Differential   Result Value Ref Range    WBC 15.7 (H) 4.4 - 11.3 x10*3/uL    nRBC 0.0 0.0 - 0.0 /100 WBCs    RBC 4.90 4.00 - 5.20 x10*6/uL    Hemoglobin 15.2 12.0 - 16.0 g/dL    Hematocrit 44.9 36.0 - 46.0 %    MCV 92 80 - 100 fL    MCH 31.0 26.0 - 34.0 pg    MCHC 33.9 32.0 - 36.0 g/dL     RDW 12.4 11.5 - 14.5 %    Platelets 272 150 - 450 x10*3/uL    Neutrophils % 73.6 40.0 - 80.0 %    Immature Granulocytes %, Automated 0.7 0.0 - 0.9 %    Lymphocytes % 15.6 13.0 - 44.0 %    Monocytes % 9.8 2.0 - 10.0 %    Eosinophils % 0.1 0.0 - 6.0 %    Basophils % 0.2 0.0 - 2.0 %    Neutrophils Absolute 11.53 (H) 1.20 - 7.70 x10*3/uL    Immature Granulocytes Absolute, Automated 0.11 0.00 - 0.70 x10*3/uL    Lymphocytes Absolute 2.44 1.20 - 4.80 x10*3/uL    Monocytes Absolute 1.53 (H) 0.10 - 1.00 x10*3/uL    Eosinophils Absolute 0.01 0.00 - 0.70 x10*3/uL    Basophils Absolute 0.03 0.00 - 0.10 x10*3/uL   Troponin I, High Sensitivity, Initial   Result Value Ref Range    Troponin I, High Sensitivity 638 (HH) 0 - 13 ng/L   POCT GLUCOSE   Result Value Ref Range    POCT Glucose 106 (H) 74 - 99 mg/dL   Troponin, High Sensitivity, 1 Hour   Result Value Ref Range    Troponin I, High Sensitivity 647 (HH) 0 - 13 ng/L          Assessment/Plan     Imaging  CT chest abdomen pelvis w IV contrast  Result Date: 5/11/2025  No acute cardiopulmonary abnormality. No acute abdominal or pelvic abnormality. Bilateral nephrolithiasis. Signed by Nima Martinez MD     right upper quadrant  Result Date: 5/11/2025  Cholelithiasis.   Echogenic structure in the right kidney suggestive of a nonobstructive calculus.   Signed by: Rochelle Barba 5/11/2025 3:18 PM Dictation workstation:   [id number]      Assessment:  Acute kidney injury this is most likely secondary to prerenal etiology with dehydration secondary to GI loss however being diabetic and dehydrated patient may very well have IV contrast induced ANSELMO  Non-STEMI  Nausea vomiting diarrhea resolved  Diabetes mellitus type 2  Nephrolithiasis  Hyperlipidemia  Coronary artery disease with remote myocardial infarction    Recommendations :  Gentle hydration with normal saline at 100 mL/h  Hold Cozaar and metformin  Monitor renal function very closely if creatinine level continue to increase by  tomorrow morning I would strongly suggest holding the cardiac cath until renal function is better  Monitor renal function electrolytes very closely    Thank you very much for consultation    Eric Sherman MDInpatient consult to Renal Care  Consult performed by: Eric Sherman MD  Consult ordered by: Jacinda Richey MD             [1]   Family History  Problem Relation Name Age of Onset    Hypertension Mother Mom     Stroke Mother Mom     Hypertension Father Dad     Cancer Father Dad     Alcohol abuse Father Dad     Drug abuse Father Dad    [2]   Current Facility-Administered Medications:     acetaminophen (Tylenol) tablet 650 mg, 650 mg, oral, q4h PRN **OR** acetaminophen (Tylenol) oral liquid 650 mg, 650 mg, oral, q4h PRN **OR** acetaminophen (Tylenol) suppository 650 mg, 650 mg, rectal, q4h PRN, Jacinda Richey MD    [START ON 5/13/2025] aspirin chewable tablet 81 mg, 81 mg, oral, Daily, Jacinda Richey MD    atorvastatin (Lipitor) tablet 80 mg, 80 mg, oral, Nightly, Sudheer Barrientos MD    buPROPion SR (Wellbutrin SR) 12 hr tablet 200 mg, 200 mg, oral, Daily, Jacinda Richey MD, 200 mg at 05/12/25 0836    carvedilol (Coreg) tablet 25 mg, 25 mg, oral, BID, Jacinda Richey MD, 25 mg at 05/12/25 0803    escitalopram (Lexapro) tablet 10 mg, 10 mg, oral, Daily, Jacinda Richey MD, 10 mg at 05/12/25 0803    heparin (porcine) injection 1,500-3,000 Units, 1,500-3,000 Units, intravenous, PRN, Jacinda Richey MD    heparin 25,000 Units in dextrose 5% 250 mL (100 Units/mL) infusion (premix), 0-4,000 Units/hr, intravenous, Continuous, Jacinda Richey MD, Last Rate: 7 mL/hr at 05/12/25 0215, 700 Units/hr at 05/12/25 0215    insulin lispro injection 0-10 Units, 0-10 Units, subcutaneous, TID AC, Jacinda Richey MD    lactated Ringer's infusion, 100 mL/hr, intravenous, Continuous, Jacinda Richey MD, Last Rate: 100 mL/hr at 05/12/25 1011, 100 mL/hr at 05/12/25  1011    levothyroxine (Synthroid, Levoxyl) tablet 125 mcg, 125 mcg, oral, Daily, Jacinda Richey MD, 125 mcg at 05/12/25 0803    [Held by provider] losartan (Cozaar) tablet 100 mg, 100 mg, oral, Daily, Jacinda Richey MD, 100 mg at 05/11/25 1912    nitroglycerin (Nitrodur) 0.1 mg/hr patch 1 patch, 1 patch, transdermal, Daily, Sudheer Barrientos MD, 1 patch at 05/12/25 1054    ondansetron (Zofran) injection 4 mg, 4 mg, intravenous, q6h PRN, Jacinda Richey MD, 4 mg at 05/11/25 1946    pantoprazole (ProtoNix) EC tablet 40 mg, 40 mg, oral, q12h, 40 mg at 05/12/25 0608 **OR** pantoprazole (Protonix) injection 40 mg, 40 mg, intravenous, q12h, Jacinda Richey MD, 40 mg at 05/11/25 1913

## 2025-05-12 NOTE — PROGRESS NOTES
"Simona Betancourt \"Mian" is a 69 y.o. female on day 1 of admission presenting with NSTEMI (non-ST elevated myocardial infarction) (Multi).      Subjective   No chest pain no diarrhea no abdominal pain  She wants to have normal food   She overall feels much improved    Objective     Last Recorded Vitals  /60 (BP Location: Right arm)   Pulse 54   Temp 36.5 °C (97.7 °F) (Temporal)   Resp 20   Wt 60.3 kg (132 lb 15 oz)   SpO2 97%   Intake/Output last 3 Shifts:    Intake/Output Summary (Last 24 hours) at 5/12/2025 1115  Last data filed at 5/12/2025 1011  Gross per 24 hour   Intake 3206.35 ml   Output --   Net 3206.35 ml       Physical Exam  Alert oriented x 3 cooperative  Normocephalic/atraumatic EOMI PERRLA  Neck supple chest clear  Heart regular S1-S2 distant  Abdomen soft nontender very benign exam  Extremities no edema  Neurologic examination gross motor sensor nonfocal    Relevant Results  Reviewed  Assessment/Plan     Assessment & Plan  NSTEMI (non-ST elevated myocardial infarction) (Multi)  She had no chest pain and EKG is nonacute.  Cardiology recommended heparin drip for now and to trend troponins.  I agree with that.  Will attempt to continue her Coreg and aspirin and Lipitor  Primary hypertension  Continue medications with hold parameters  Nausea vomiting and diarrhea  Symptoms seem to have improved.  Etiology remains not entirely clear and I wonder if this is mostly related to a viral gastrointestinal illness versus food poisoning versus other although I feel this is less likely related to cholelithiasis and complications from it.  At this point will order stool pathogen clear liquid diet and will monitor.  Have a very low suspicion for early DKA with normal labs  Type 2 diabetes mellitus without complication, without long-term current use of insulin  See above sliding scale for now  Dehydration  She has received 2 L of normal saline and will continue hydration with LR  Pain of upper abdomen  This " has improved as she was really nontender for me  Calculus of gallbladder without cholecystitis without obstruction  See above  Nephrolithiasis  See above, she did have evidence for some red blood cells in her urine which may have been related to the stone and eventually may need urology referral for microscopic hematuria  Facial flushing  Will monitor  Acute renal failure  See below    5/12,     Much improved acute renal failure most likely result of contrast administration in association with dehydration and diabetes.  Cardiology with plans for heart cath if kidney function permits.  Will advance that we will continue hydration continue to monitor.  She has no cramping and she has not had any stools for over 48 hours so we will cancel stool pathogen for now             Jacinda Richey MD

## 2025-05-12 NOTE — CONSULTS
"  Inpatient consult to Cardiology  Consult performed by: Sudheer Barrientos MD  Consult ordered by: Jacinda Richey MD  Reason for consult: Consulted for NSTEMI as well as right upper quadrant pain  Assessment/Recommendations: 69-year patient with history of smoking, history of diabetes, hyperlipidemia as well as a multiple PCI in the past.  Patient had 3 stents placed in the past.  Now came with right upper quadrant pain with nausea and shortness of breath.  Patient found having troponin elevated around 600.  Now pain-free on the GDMT.  Upcoming diagnostic catheterization.  Creatinine is 1.5.  P.o. hydration.        History Of Present Illness:    Simona Betancourt \"Isabel\" is a 69 y.o. female patient with history of abdominal pain as well as of vomiting diarrhea recently.  Patient also history of smoking, hypertension, diabetes as well as multiple stents placed in the past.  Came to [facility] emergency room with episode of substernal short of breath.  Patient felt nausea and now with elevated troponin to almost 600.  Patient on parenteral IV heparin protocol as well as GDMT.  Appears to be comfortable going.  Patient does work as a desk job.  Somewhat active.  No active chest pain tightness moderate activity.  Last Recorded Vitals:  Vitals:    05/11/25 2300 05/12/25 0300 05/12/25 0857 05/12/25 1233   BP: 109/51 137/60 136/60 116/62   BP Location: Left arm Left arm Right arm Left arm   Patient Position: Lying Lying  Lying   Pulse: 60 53 54 53   Resp: 18 18 20 18   Temp: 36.6 °C (97.9 °F) 36.9 °C (98.4 °F) 36.5 °C (97.7 °F) 36.7 °C (98.1 °F)   TempSrc: Temporal Temporal Temporal Temporal   SpO2: 95% 96% 97% 95%   Weight:  60.3 kg (132 lb 15 oz)     Height:           Past Medical History:  She has a past medical history of ADHD (attention deficit hyperactivity disorder), Anxiety, Chronic viral hepatitis C (Multi), Depression, Diabetes mellitus with hyperglycemia, Heart disease, Hypercholesterolemia, Hypertension, " Hypothyroidism, Myocardial infarction (Multi), Nausea and vomiting (04/10/2023), Obesity, and Visual impairment.    Past Surgical History:  She has a past surgical history that includes Coronary angioplasty with stent () and  section, low transverse.      Social History:  She reports that she has been smoking cigarettes. She started smoking about 54 years ago. She has a 27.2 pack-year smoking history. She has been exposed to tobacco smoke. She uses smokeless tobacco. She reports that she does not currently use alcohol. She reports current drug use. Frequency: 4.00 times per week. Drug: Marijuana.    Family History:  Family History[1]     Allergies:  Ace inhibitors    ROS: See HPI  CONSTITUTIONAL: Chills- none. Fever- none. Weight change appropriate for age.  HEENT: Headache- Negative.  Change in vision- none.  Ear pain- none. Nasal congestion- none. Post-nasal drip-none.  Sore throat-none.  CARDIOLOGY: Chest pain-episodes midsternally.  Leg edema- none.  Murmurs-none.  Palpitation- none.  RESPIRATORY: Denies any shortness of breath.  GI: Abdominal pain-right upper quadrant.  Change in bowel habits- none.  Constipation- none.  Diarrhea- none.  Nausea-few episodes.  Vomiting-few episodes.  MUSCULOSKELETAL: Joint pain- none.  Muscle aches- none.  DERMATOLOGY: Rash- none.  NEUROLOGY: Dizziness- none.   Headache- none.  PSYCHIATRY: Denies any depression or anxiety.    Inpatient Medications:  Scheduled Medications[2]  Outpatient Medications:  Current Outpatient Medications   Medication Instructions    aspirin 81 mg EC tablet = 1 tab(s) ( 81 mg ), PO, Daily, # 90 tab(s), 0 Refill(s), Type: Maintenance    atorvastatin (Lipitor) 40 mg tablet = 1 tab(s) ( 40 mg ), Oral, daily, # 90 tab(s), 0 Refill(s), Type: Maintenance    buPROPion SR (Wellbutrin SR) 200 mg 12 hr tablet = 1 tab(s), Oral, bid    carvedilol (COREG) 25 mg, 2 times daily (morning and late afternoon)    escitalopram (LEXAPRO) 10 mg, oral, Daily     levothyroxine (SYNTHROID, LEVOXYL) 125 mcg, oral, Daily    losartan (Cozaar) 100 mg tablet = 1 tab(s) ( 100 mg ), PO, Daily, # 90 tab(s), 0 Refill(s), Type: Maintenance    metFORMIN XR (GLUCOPHAGE-XR) 1,500 mg, oral, Daily    Mounjaro 7.5 mg, subcutaneous, Once Weekly    multivitamin (MULTIPLE VITAMINS ORAL) 1 cap(s), PO, Daily, # 90 cap(s), 0 Refill(s), Type: Maintenance       Last Recorded Vitals  /62 (BP Location: Left arm, Patient Position: Lying)   Pulse 53   Temp 36.7 °C (98.1 °F) (Temporal)   Resp 18   Wt 60.3 kg (132 lb 15 oz)   SpO2 95%     Intake/Output Summary (Last 24 hours) at 5/12/2025 1303  Last data filed at 5/12/2025 1247  Gross per 24 hour   Intake 3466.35 ml   Output --   Net 3466.35 ml       Physical Exam:  HEENT: Normocephalic/atraumatic pupils equal react light  Neck exam mild JVD, no bruit  Lung exam clear to auscultation  Cardiac exam is regular rhythm S1-S2, soft systolic murmur heard.   Abdomen soft nontender, nondistended  Extremities no clubbing, cyanosis but trace edema  Neuro exam grossly intact.  Last Labs:  CBC - 5/12/2025:  4:13 AM  15.7 15.2 272    44.9      CMP - 5/12/2025:  4:12 AM  9.5 6.9 20 --- 1.1   _ 4.4 9 74      PTT - 5/11/2025: 10:15 PM  1.1   11.7 41.8     Troponin I, High Sensitivity   Date/Time Value Ref Range Status   05/12/2025 08:34  (HH) 0 - 13 ng/L Final     Comment:     Previous result verified on 5/11/2025 1301 on specimen/case [id number] called with component [facility] for procedure Troponin I, High Sensitivity, Initial with value 388 ng/L.   05/12/2025 07:41  (HH) 0 - 13 ng/L Final     Comment:     Previous result verified on 5/11/2025 1301 on specimen/case [id number] called with component [facility] for procedure Troponin I, High Sensitivity, Initial with value 388 ng/L.   05/11/2025 01:05  (HH) 0 - 13 ng/L Final     Comment:     Previous result verified on 5/11/2025 1301 on specimen/case [id number] called with component  [facility] for procedure Troponin I, High Sensitivity, Initial with value 388 ng/L.     LDL-CHOLESTEROL   Date/Time Value Ref Range Status   01/29/2025 09:25 AM 89 mg/dL (calc) Final     Comment:     Reference range: <100     Desirable range <100 mg/dL for primary prevention;    <70 mg/dL for patients with CHD or diabetic patients   with > or = 2 CHD risk factors.     LDL-C is now calculated using the Gregorio   calculation, which is a validated novel method providing   better accuracy than the Friedewald equation in the   estimation of LDL-C.   Buzz GARRIDO et al. LILLIAN. 2013;310(19): 1195-5393   (http://SeMeAntoja.com.Coupon Wallet/faq/ZUS034)       LDL Calculated   Date/Time Value Ref Range Status   05/11/2025 06:49  (H) <=99 mg/dL Final     Comment:                                 Near   Borderline      AGE      Desirable  Optimal    High     High     Very High     0-19 Y     0 - 109     ---    110-129   >/= 130     ----    20-24 Y     0 - 119     ---    120-159   >/= 160     ----      >24 Y     0 -  99   100-129  130-159   160-189     >/=190     11/27/2023 05:17 PM 70 65 - 130 mg/dL Final   05/19/2023 11:04 AM 71 65 - 130 MG/DL Final   01/08/2022 09:49 AM 81 65 - 130 MG/DL Final   09/02/2020 08:13 AM 69 65 - 130 MG/DL Final     VLDL   Date/Time Value Ref Range Status   05/11/2025 06:49 PM 14 0 - 40 mg/dL Final          ECG 12 lead   Poor data quality, interpretation may be adversely affected  Normal sinus rhythm  Left axis deviation  Voltage criteria for left ventricular hypertrophy  Cannot rule out Septal infarct , age undetermined  Abnormal ECG  No previous ECGs available  Confirmed by Robles Villaseñor ([id number]) on 5/12/2025 8:49:59 AM      Assessment & Plan  NSTEMI (non-ST elevated myocardial infarction) (Multi)  Patient with underlying NSTEMI continue GDMT including nitroglycerin, aspirin, statin, beta-blocker as well as IV heparin protocol.  Primary hypertension  Optimize blood pressures and plan with a  nitroglycerin patch.  Blood pressure remained 116/62  Nausea vomiting and diarrhea  Ongoing GI workup.  Small gallstones with right upper quadrant ultrasound.  Optimal glycemic control.  Type 2 diabetes mellitus without complication, without long-term current use of insulin  Optimize glycemic control.  Dehydration  P.o. hydration  Pain of upper abdomen    Calculus of gallbladder without cholecystitis without obstruction    Nephrolithiasis    Facial flushing    Acute renal failure  P.o. hydration, ongoing diagnostic catheterization  Assessment/Plan   69-year-old female patient with a multiple risk factors including hypertension, hyperlipidemia, diabetes, multiple stents placed in the past patient had 3 stents placed in the past.  Also history of smoking.  Patient with right upper quadrant pain.  Now with elevated troponin almost 5-600.  Would likely NSTEMI.  Continue current GDMT.  Pending catheterization tomorrow.    Smoking cessation counseling was performed.  The patient was counseled on the harms of smoking, including increased risk for lung disease, cardiovascular disease and cancer. In  the context of the disease, smoking is associated with a greater pain, worse joint damage, and worse response to biological therapy.  About 11 to 15 minute on smoking cessation and counseling to patient and family.    Code Status:  Full Code  I spent 60 minutes in the professional and overall care of this patient.  Sudheer Barrientos MD         [1]   Family History  Problem Relation Name Age of Onset    Hypertension Mother Mom     Stroke Mother Mom     Hypertension Father Dad     Cancer Father Dad     Alcohol abuse Father Dad     Drug abuse Father Dad    [2]   Scheduled medications   Medication Dose Route Frequency    [START ON 5/13/2025] aspirin  81 mg oral Daily    atorvastatin  80 mg oral Nightly    buPROPion SR  200 mg oral Daily    carvedilol  25 mg oral BID    escitalopram  10 mg oral Daily    insulin lispro  0-10 Units  subcutaneous TID AC    levothyroxine  125 mcg oral Daily    [Held by provider] losartan  100 mg oral Daily    nitroglycerin  1 patch transdermal Daily    oral hydration  500 mL oral q1h while awake    pantoprazole  40 mg oral q12h    Or    pantoprazole  40 mg intravenous q12h

## 2025-05-12 NOTE — ASSESSMENT & PLAN NOTE
Ongoing GI workup.  Small gallstones with right upper quadrant ultrasound.  Optimal glycemic control.

## 2025-05-12 NOTE — CARE PLAN
The patient's goals for the shift include  rest    The clinical goals for the shift include monitor PTT, and treat patient symptoms, heart cath tuesday

## 2025-05-12 NOTE — CARE PLAN
The patient's goals for the shift include  rest and feeling better    The clinical goals for the shift include monitor PTT, and treat patient symptoms

## 2025-05-12 NOTE — PROGRESS NOTES
05/12/25 1442   Discharge Planning   Living Arrangements Spouse/significant other   Support Systems Spouse/significant other   Assistance Needed None   Type of Residence Private residence   Number of Stairs Within Residence   (Multi-level, patient has main floor set up.)   Home or Post Acute Services None  (Patient denies needs at d/c.)   Expected Discharge Disposition Home   Does the patient need discharge transport arranged? No

## 2025-05-12 NOTE — ASSESSMENT & PLAN NOTE
Patient with underlying NSTEMI continue GDMT including nitroglycerin, aspirin, statin, beta-blocker as well as IV heparin protocol.

## 2025-05-13 ENCOUNTER — APPOINTMENT (OUTPATIENT)
Dept: RADIOLOGY | Facility: HOSPITAL | Age: 70
DRG: 281 | End: 2025-05-13
Payer: COMMERCIAL

## 2025-05-13 ENCOUNTER — APPOINTMENT (OUTPATIENT)
Dept: CARDIOLOGY | Facility: HOSPITAL | Age: 70
DRG: 281 | End: 2025-05-13
Payer: COMMERCIAL

## 2025-05-13 ENCOUNTER — HOSPITAL ENCOUNTER (INPATIENT)
Facility: HOSPITAL | Age: 70
LOS: 1 days | Discharge: HOME | DRG: 281 | End: 2025-05-14
Attending: INTERNAL MEDICINE | Admitting: INTERNAL MEDICINE
Payer: COMMERCIAL

## 2025-05-13 VITALS
HEIGHT: 60 IN | DIASTOLIC BLOOD PRESSURE: 72 MMHG | BODY MASS INDEX: 26.1 KG/M2 | SYSTOLIC BLOOD PRESSURE: 159 MMHG | TEMPERATURE: 97.5 F | RESPIRATION RATE: 17 BRPM | WEIGHT: 132.94 LBS | HEART RATE: 52 BPM | OXYGEN SATURATION: 97 %

## 2025-05-13 DIAGNOSIS — R09.89 OTHER SPECIFIED SYMPTOMS AND SIGNS INVOLVING THE CIRCULATORY AND RESPIRATORY SYSTEMS: ICD-10-CM

## 2025-05-13 DIAGNOSIS — E11.9 TYPE 2 DIABETES MELLITUS WITHOUT COMPLICATION, WITHOUT LONG-TERM CURRENT USE OF INSULIN: ICD-10-CM

## 2025-05-13 DIAGNOSIS — I21.4 NSTEMI (NON-ST ELEVATED MYOCARDIAL INFARCTION) (MULTI): Primary | ICD-10-CM

## 2025-05-13 DIAGNOSIS — E78.2 MIXED HYPERLIPIDEMIA: ICD-10-CM

## 2025-05-13 DIAGNOSIS — I10 PRIMARY HYPERTENSION: ICD-10-CM

## 2025-05-13 LAB
ALBUMIN SERPL BCP-MCNC: 3.6 G/DL (ref 3.4–5)
ALP SERPL-CCNC: 57 U/L (ref 33–136)
ALT SERPL W P-5'-P-CCNC: 10 U/L (ref 7–45)
ANION GAP SERPL CALCULATED.3IONS-SCNC: 13 MMOL/L (ref 10–20)
AORTIC VALVE PEAK VELOCITY: 1.12 M/S
APTT PPP: 25.4 SECONDS (ref 22–32.5)
APTT PPP: 50.2 SECONDS (ref 22–32.5)
AST SERPL W P-5'-P-CCNC: 19 U/L (ref 9–39)
AV PEAK GRADIENT: 5 MMHG
AVA (PEAK VEL): 2.23 CM2
BASOPHILS # BLD AUTO: 0.04 X10*3/UL (ref 0–0.1)
BASOPHILS NFR BLD AUTO: 0.4 %
BILIRUB SERPL-MCNC: 0.8 MG/DL (ref 0–1.2)
BUN SERPL-MCNC: 28 MG/DL (ref 6–23)
CALCIUM SERPL-MCNC: 8.7 MG/DL (ref 8.6–10.3)
CHLORIDE SERPL-SCNC: 106 MMOL/L (ref 98–107)
CO2 SERPL-SCNC: 25 MMOL/L (ref 21–32)
CREAT SERPL-MCNC: 1.07 MG/DL (ref 0.5–1.05)
EGFRCR SERPLBLD CKD-EPI 2021: 57 ML/MIN/1.73M*2
EJECTION FRACTION APICAL 4 CHAMBER: 37.9
EJECTION FRACTION: 43 %
EOSINOPHIL # BLD AUTO: 0.04 X10*3/UL (ref 0–0.7)
EOSINOPHIL NFR BLD AUTO: 0.4 %
ERYTHROCYTE [DISTWIDTH] IN BLOOD BY AUTOMATED COUNT: 12.2 % (ref 11.5–14.5)
ERYTHROCYTE [DISTWIDTH] IN BLOOD BY AUTOMATED COUNT: 12.3 % (ref 11.5–14.5)
GLUCOSE BLD MANUAL STRIP-MCNC: 118 MG/DL (ref 74–99)
GLUCOSE BLD MANUAL STRIP-MCNC: 85 MG/DL (ref 74–99)
GLUCOSE BLD MANUAL STRIP-MCNC: 98 MG/DL (ref 74–99)
GLUCOSE SERPL-MCNC: 86 MG/DL (ref 74–99)
HCT VFR BLD AUTO: 40.8 % (ref 36–46)
HCT VFR BLD AUTO: 40.9 % (ref 36–46)
HGB BLD-MCNC: 13.7 G/DL (ref 12–16)
HGB BLD-MCNC: 13.7 G/DL (ref 12–16)
IMM GRANULOCYTES # BLD AUTO: 0.02 X10*3/UL (ref 0–0.7)
IMM GRANULOCYTES NFR BLD AUTO: 0.2 % (ref 0–0.9)
LEFT ATRIUM VOLUME AREA LENGTH INDEX BSA: 32.8 ML/M2
LEFT VENTRICLE INTERNAL DIMENSION DIASTOLE: 4.83 CM (ref 3.5–6)
LEFT VENTRICULAR OUTFLOW TRACT DIAMETER: 1.88 CM
LV EJECTION FRACTION BIPLANE: 46 %
LYMPHOCYTES # BLD AUTO: 2.37 X10*3/UL (ref 1.2–4.8)
LYMPHOCYTES NFR BLD AUTO: 25.4 %
MCH RBC QN AUTO: 30.7 PG (ref 26–34)
MCH RBC QN AUTO: 30.8 PG (ref 26–34)
MCHC RBC AUTO-ENTMCNC: 33.5 G/DL (ref 32–36)
MCHC RBC AUTO-ENTMCNC: 33.6 G/DL (ref 32–36)
MCV RBC AUTO: 92 FL (ref 80–100)
MCV RBC AUTO: 92 FL (ref 80–100)
MITRAL VALVE E/A RATIO: 1.18
MITRAL VALVE E/E' RATIO: 11.24
MONOCYTES # BLD AUTO: 0.99 X10*3/UL (ref 0.1–1)
MONOCYTES NFR BLD AUTO: 10.6 %
NEUTROPHILS # BLD AUTO: 5.87 X10*3/UL (ref 1.2–7.7)
NEUTROPHILS NFR BLD AUTO: 63 %
NRBC BLD-RTO: 0 /100 WBCS (ref 0–0)
NRBC BLD-RTO: 0 /100 WBCS (ref 0–0)
PLATELET # BLD AUTO: 205 X10*3/UL (ref 150–450)
PLATELET # BLD AUTO: 211 X10*3/UL (ref 150–450)
POTASSIUM SERPL-SCNC: 3.7 MMOL/L (ref 3.5–5.3)
PROT SERPL-MCNC: 5.7 G/DL (ref 6.4–8.2)
RBC # BLD AUTO: 4.45 X10*6/UL (ref 4–5.2)
RBC # BLD AUTO: 4.46 X10*6/UL (ref 4–5.2)
RIGHT VENTRICLE FREE WALL PEAK S': 12.17 CM/S
RIGHT VENTRICLE PEAK SYSTOLIC PRESSURE: 32.6 MMHG
SODIUM SERPL-SCNC: 140 MMOL/L (ref 136–145)
TRICUSPID ANNULAR PLANE SYSTOLIC EXCURSION: 2 CM
WBC # BLD AUTO: 9.2 X10*3/UL (ref 4.4–11.3)
WBC # BLD AUTO: 9.3 X10*3/UL (ref 4.4–11.3)

## 2025-05-13 PROCEDURE — 85027 COMPLETE CBC AUTOMATED: CPT | Performed by: INTERNAL MEDICINE

## 2025-05-13 PROCEDURE — 99152 MOD SED SAME PHYS/QHP 5/>YRS: CPT | Performed by: INTERNAL MEDICINE

## 2025-05-13 PROCEDURE — 82947 ASSAY GLUCOSE BLOOD QUANT: CPT

## 2025-05-13 PROCEDURE — 99153 MOD SED SAME PHYS/QHP EA: CPT | Performed by: INTERNAL MEDICINE

## 2025-05-13 PROCEDURE — 2060000001 HC INTERMEDIATE ICU ROOM DAILY

## 2025-05-13 PROCEDURE — 71250 CT THORAX DX C-: CPT | Performed by: RADIOLOGY

## 2025-05-13 PROCEDURE — 93458 L HRT ARTERY/VENTRICLE ANGIO: CPT | Performed by: INTERNAL MEDICINE

## 2025-05-13 PROCEDURE — 71250 CT THORAX DX C-: CPT

## 2025-05-13 PROCEDURE — 7100000010 HC PHASE TWO TIME - EACH INCREMENTAL 1 MINUTE: Performed by: INTERNAL MEDICINE

## 2025-05-13 PROCEDURE — 2780000003 HC OR 278 NO HCPCS: Performed by: INTERNAL MEDICINE

## 2025-05-13 PROCEDURE — 85730 THROMBOPLASTIN TIME PARTIAL: CPT | Performed by: INTERNAL MEDICINE

## 2025-05-13 PROCEDURE — 2500000001 HC RX 250 WO HCPCS SELF ADMINISTERED DRUGS (ALT 637 FOR MEDICARE OP): Performed by: NURSE PRACTITIONER

## 2025-05-13 PROCEDURE — 93306 TTE W/DOPPLER COMPLETE: CPT

## 2025-05-13 PROCEDURE — 80053 COMPREHEN METABOLIC PANEL: CPT | Performed by: INTERNAL MEDICINE

## 2025-05-13 PROCEDURE — 2500000004 HC RX 250 GENERAL PHARMACY W/ HCPCS (ALT 636 FOR OP/ED): Mod: JZ | Performed by: INTERNAL MEDICINE

## 2025-05-13 PROCEDURE — G0269 OCCLUSIVE DEVICE IN VEIN ART: HCPCS | Performed by: INTERNAL MEDICINE

## 2025-05-13 PROCEDURE — B2111ZZ FLUOROSCOPY OF MULTIPLE CORONARY ARTERIES USING LOW OSMOLAR CONTRAST: ICD-10-PCS | Performed by: INTERNAL MEDICINE

## 2025-05-13 PROCEDURE — 2500000005 HC RX 250 GENERAL PHARMACY W/O HCPCS: Performed by: INTERNAL MEDICINE

## 2025-05-13 PROCEDURE — 83036 HEMOGLOBIN GLYCOSYLATED A1C: CPT | Mod: WESLAB | Performed by: INTERNAL MEDICINE

## 2025-05-13 PROCEDURE — 36415 COLL VENOUS BLD VENIPUNCTURE: CPT | Performed by: INTERNAL MEDICINE

## 2025-05-13 PROCEDURE — C1894 INTRO/SHEATH, NON-LASER: HCPCS | Performed by: INTERNAL MEDICINE

## 2025-05-13 PROCEDURE — 7100000009 HC PHASE TWO TIME - INITIAL BASE CHARGE: Performed by: INTERNAL MEDICINE

## 2025-05-13 PROCEDURE — 85025 COMPLETE CBC W/AUTO DIFF WBC: CPT | Performed by: INTERNAL MEDICINE

## 2025-05-13 PROCEDURE — 93306 TTE W/DOPPLER COMPLETE: CPT | Performed by: INTERNAL MEDICINE

## 2025-05-13 PROCEDURE — 99223 1ST HOSP IP/OBS HIGH 75: CPT | Performed by: INTERNAL MEDICINE

## 2025-05-13 PROCEDURE — C1760 CLOSURE DEV, VASC: HCPCS | Performed by: INTERNAL MEDICINE

## 2025-05-13 PROCEDURE — 2720000007 HC OR 272 NO HCPCS: Performed by: INTERNAL MEDICINE

## 2025-05-13 PROCEDURE — 2500000001 HC RX 250 WO HCPCS SELF ADMINISTERED DRUGS (ALT 637 FOR MEDICARE OP): Performed by: INTERNAL MEDICINE

## 2025-05-13 PROCEDURE — 2550000001 HC RX 255 CONTRASTS: Performed by: INTERNAL MEDICINE

## 2025-05-13 PROCEDURE — 4A023N7 MEASUREMENT OF CARDIAC SAMPLING AND PRESSURE, LEFT HEART, PERCUTANEOUS APPROACH: ICD-10-PCS | Performed by: INTERNAL MEDICINE

## 2025-05-13 PROCEDURE — 2500000002 HC RX 250 W HCPCS SELF ADMINISTERED DRUGS (ALT 637 FOR MEDICARE OP, ALT 636 FOR OP/ED): Performed by: INTERNAL MEDICINE

## 2025-05-13 RX ORDER — ONDANSETRON HYDROCHLORIDE 2 MG/ML
4 INJECTION, SOLUTION INTRAVENOUS EVERY 8 HOURS PRN
Status: DISCONTINUED | OUTPATIENT
Start: 2025-05-13 | End: 2025-05-14 | Stop reason: HOSPADM

## 2025-05-13 RX ORDER — DEXTROSE 50 % IN WATER (D50W) INTRAVENOUS SYRINGE
25
Status: DISCONTINUED | OUTPATIENT
Start: 2025-05-13 | End: 2025-05-14 | Stop reason: HOSPADM

## 2025-05-13 RX ORDER — ESCITALOPRAM OXALATE 10 MG/1
10 TABLET ORAL DAILY
Status: DISCONTINUED | OUTPATIENT
Start: 2025-05-14 | End: 2025-05-14 | Stop reason: HOSPADM

## 2025-05-13 RX ORDER — POLYETHYLENE GLYCOL 3350 17 G/17G
17 POWDER, FOR SOLUTION ORAL DAILY
Status: DISCONTINUED | OUTPATIENT
Start: 2025-05-13 | End: 2025-05-14 | Stop reason: HOSPADM

## 2025-05-13 RX ORDER — METFORMIN HYDROCHLORIDE 750 MG/1
750 TABLET, EXTENDED RELEASE ORAL 2 TIMES DAILY
COMMUNITY

## 2025-05-13 RX ORDER — ACETAMINOPHEN 325 MG/1
650 TABLET ORAL EVERY 4 HOURS PRN
Status: DISCONTINUED | OUTPATIENT
Start: 2025-05-13 | End: 2025-05-14 | Stop reason: HOSPADM

## 2025-05-13 RX ORDER — TALC
3 POWDER (GRAM) TOPICAL NIGHTLY PRN
Status: DISCONTINUED | OUTPATIENT
Start: 2025-05-13 | End: 2025-05-14 | Stop reason: HOSPADM

## 2025-05-13 RX ORDER — NITROGLYCERIN 20 MG/1
1 PATCH TRANSDERMAL DAILY
Status: DISCONTINUED | OUTPATIENT
Start: 2025-05-13 | End: 2025-05-14 | Stop reason: HOSPADM

## 2025-05-13 RX ORDER — FAMOTIDINE 20 MG/1
20 TABLET, FILM COATED ORAL DAILY
Status: DISCONTINUED | OUTPATIENT
Start: 2025-05-13 | End: 2025-05-14 | Stop reason: HOSPADM

## 2025-05-13 RX ORDER — ATORVASTATIN CALCIUM 80 MG/1
80 TABLET, FILM COATED ORAL NIGHTLY
Status: DISCONTINUED | OUTPATIENT
Start: 2025-05-13 | End: 2025-05-14 | Stop reason: HOSPADM

## 2025-05-13 RX ORDER — LEVOTHYROXINE SODIUM 125 UG/1
125 TABLET ORAL DAILY
Status: DISCONTINUED | OUTPATIENT
Start: 2025-05-14 | End: 2025-05-14 | Stop reason: HOSPADM

## 2025-05-13 RX ORDER — ACETAMINOPHEN 160 MG/5ML
650 SOLUTION ORAL EVERY 4 HOURS PRN
Status: DISCONTINUED | OUTPATIENT
Start: 2025-05-13 | End: 2025-05-14 | Stop reason: HOSPADM

## 2025-05-13 RX ORDER — NAPROXEN SODIUM 220 MG/1
81 TABLET, FILM COATED ORAL ONCE
Status: COMPLETED | OUTPATIENT
Start: 2025-05-13 | End: 2025-05-13

## 2025-05-13 RX ORDER — ACETAMINOPHEN 325 MG/1
650 TABLET ORAL EVERY 6 HOURS PRN
Status: DISCONTINUED | OUTPATIENT
Start: 2025-05-13 | End: 2025-05-13 | Stop reason: SDUPTHER

## 2025-05-13 RX ORDER — ATORVASTATIN CALCIUM 40 MG/1
40 TABLET, FILM COATED ORAL NIGHTLY
Status: DISCONTINUED | OUTPATIENT
Start: 2025-05-13 | End: 2025-05-13

## 2025-05-13 RX ORDER — HEPARIN SODIUM 5000 [USP'U]/ML
INJECTION, SOLUTION INTRAVENOUS; SUBCUTANEOUS AS NEEDED
Status: DISCONTINUED | OUTPATIENT
Start: 2025-05-13 | End: 2025-05-14 | Stop reason: HOSPADM

## 2025-05-13 RX ORDER — HEPARIN SODIUM 10000 [USP'U]/100ML
0-4000 INJECTION, SOLUTION INTRAVENOUS CONTINUOUS
Status: DISCONTINUED | OUTPATIENT
Start: 2025-05-13 | End: 2025-05-14 | Stop reason: HOSPADM

## 2025-05-13 RX ORDER — DEXTROSE 50 % IN WATER (D50W) INTRAVENOUS SYRINGE
12.5
Status: DISCONTINUED | OUTPATIENT
Start: 2025-05-13 | End: 2025-05-14 | Stop reason: HOSPADM

## 2025-05-13 RX ORDER — ASPIRIN 81 MG/1
81 TABLET ORAL DAILY
Status: DISCONTINUED | OUTPATIENT
Start: 2025-05-13 | End: 2025-05-14 | Stop reason: HOSPADM

## 2025-05-13 RX ORDER — HEPARIN SODIUM 5000 [USP'U]/ML
60 INJECTION, SOLUTION INTRAVENOUS; SUBCUTANEOUS ONCE
Status: COMPLETED | OUTPATIENT
Start: 2025-05-13 | End: 2025-05-13

## 2025-05-13 RX ORDER — FAMOTIDINE 10 MG/ML
20 INJECTION, SOLUTION INTRAVENOUS DAILY
Status: DISCONTINUED | OUTPATIENT
Start: 2025-05-13 | End: 2025-05-14 | Stop reason: HOSPADM

## 2025-05-13 RX ORDER — ACETAMINOPHEN 650 MG/1
650 SUPPOSITORY RECTAL EVERY 4 HOURS PRN
Status: DISCONTINUED | OUTPATIENT
Start: 2025-05-13 | End: 2025-05-14 | Stop reason: HOSPADM

## 2025-05-13 RX ORDER — LOSARTAN POTASSIUM 100 MG/1
100 TABLET ORAL DAILY
Status: DISCONTINUED | OUTPATIENT
Start: 2025-05-13 | End: 2025-05-13

## 2025-05-13 RX ORDER — LIDOCAINE HYDROCHLORIDE 10 MG/ML
INJECTION, SOLUTION EPIDURAL; INFILTRATION; INTRACAUDAL; PERINEURAL AS NEEDED
Status: DISCONTINUED | OUTPATIENT
Start: 2025-05-13 | End: 2025-05-13 | Stop reason: HOSPADM

## 2025-05-13 RX ORDER — ONDANSETRON 4 MG/1
4 TABLET, FILM COATED ORAL EVERY 8 HOURS PRN
Status: DISCONTINUED | OUTPATIENT
Start: 2025-05-13 | End: 2025-05-14 | Stop reason: HOSPADM

## 2025-05-13 RX ORDER — ASPIRIN 81 MG/1
81 TABLET ORAL DAILY
Status: CANCELLED | OUTPATIENT
Start: 2025-05-14

## 2025-05-13 RX ORDER — FENTANYL CITRATE 50 UG/ML
INJECTION, SOLUTION INTRAMUSCULAR; INTRAVENOUS AS NEEDED
Status: DISCONTINUED | OUTPATIENT
Start: 2025-05-13 | End: 2025-05-13 | Stop reason: HOSPADM

## 2025-05-13 RX ORDER — CARVEDILOL 25 MG/1
25 TABLET ORAL
Status: DISCONTINUED | OUTPATIENT
Start: 2025-05-13 | End: 2025-05-13

## 2025-05-13 RX ORDER — BUPROPION HYDROCHLORIDE 100 MG/1
200 TABLET, EXTENDED RELEASE ORAL DAILY
Status: DISCONTINUED | OUTPATIENT
Start: 2025-05-13 | End: 2025-05-14 | Stop reason: HOSPADM

## 2025-05-13 RX ORDER — MIDAZOLAM HYDROCHLORIDE 1 MG/ML
INJECTION, SOLUTION INTRAMUSCULAR; INTRAVENOUS AS NEEDED
Status: DISCONTINUED | OUTPATIENT
Start: 2025-05-13 | End: 2025-05-13 | Stop reason: HOSPADM

## 2025-05-13 RX ORDER — INSULIN LISPRO 100 [IU]/ML
0-5 INJECTION, SOLUTION INTRAVENOUS; SUBCUTANEOUS
Status: DISCONTINUED | OUTPATIENT
Start: 2025-05-13 | End: 2025-05-14 | Stop reason: HOSPADM

## 2025-05-13 RX ADMIN — HEPARIN SODIUM 700 UNITS/HR: 10000 INJECTION, SOLUTION INTRAVENOUS at 02:06

## 2025-05-13 RX ADMIN — LEVOTHYROXINE SODIUM 125 MCG: 0.12 TABLET ORAL at 08:22

## 2025-05-13 RX ADMIN — Medication 21 PERCENT: at 16:05

## 2025-05-13 RX ADMIN — BUPROPION HYDROCHLORIDE 200 MG: 100 TABLET, EXTENDED RELEASE ORAL at 08:23

## 2025-05-13 RX ADMIN — ATORVASTATIN CALCIUM 80 MG: 80 TABLET, FILM COATED ORAL at 21:11

## 2025-05-13 RX ADMIN — NITROGLYCERIN 1 PATCH: 0.1 PATCH TRANSDERMAL at 08:23

## 2025-05-13 RX ADMIN — ESCITALOPRAM OXALATE 10 MG: 10 TABLET ORAL at 08:22

## 2025-05-13 RX ADMIN — CARVEDILOL 25 MG: 25 TABLET, FILM COATED ORAL at 08:22

## 2025-05-13 RX ADMIN — ASPIRIN 81 MG: 81 TABLET, CHEWABLE ORAL at 09:54

## 2025-05-13 RX ADMIN — HEPARIN SODIUM 3600 UNITS: 5000 INJECTION INTRAVENOUS; SUBCUTANEOUS at 18:11

## 2025-05-13 RX ADMIN — HEPARIN SODIUM 700 UNITS/HR: 10000 INJECTION, SOLUTION INTRAVENOUS at 18:13

## 2025-05-13 SDOH — SOCIAL STABILITY: SOCIAL INSECURITY: WITHIN THE LAST YEAR, HAVE YOU BEEN HUMILIATED OR EMOTIONALLY ABUSED IN OTHER WAYS BY YOUR PARTNER OR EX-PARTNER?: NO

## 2025-05-13 SDOH — ECONOMIC STABILITY: FOOD INSECURITY: WITHIN THE PAST 12 MONTHS, THE FOOD YOU BOUGHT JUST DIDN'T LAST AND YOU DIDN'T HAVE MONEY TO GET MORE.: NEVER TRUE

## 2025-05-13 SDOH — SOCIAL STABILITY: SOCIAL INSECURITY: WITHIN THE LAST YEAR, HAVE YOU BEEN AFRAID OF YOUR PARTNER OR EX-PARTNER?: NO

## 2025-05-13 SDOH — SOCIAL STABILITY: SOCIAL INSECURITY: DO YOU FEEL UNSAFE GOING BACK TO THE PLACE WHERE YOU ARE LIVING?: NO

## 2025-05-13 SDOH — ECONOMIC STABILITY: FOOD INSECURITY: WITHIN THE PAST 12 MONTHS, YOU WORRIED THAT YOUR FOOD WOULD RUN OUT BEFORE YOU GOT THE MONEY TO BUY MORE.: NEVER TRUE

## 2025-05-13 SDOH — ECONOMIC STABILITY: INCOME INSECURITY: IN THE PAST 12 MONTHS HAS THE ELECTRIC, GAS, OIL, OR WATER COMPANY THREATENED TO SHUT OFF SERVICES IN YOUR HOME?: NO

## 2025-05-13 SDOH — SOCIAL STABILITY: SOCIAL INSECURITY: ARE THERE ANY APPARENT SIGNS OF INJURIES/BEHAVIORS THAT COULD BE RELATED TO ABUSE/NEGLECT?: NO

## 2025-05-13 SDOH — SOCIAL STABILITY: SOCIAL INSECURITY: WERE YOU ABLE TO COMPLETE ALL THE BEHAVIORAL HEALTH SCREENINGS?: YES

## 2025-05-13 SDOH — SOCIAL STABILITY: SOCIAL INSECURITY: ARE YOU OR HAVE YOU BEEN THREATENED OR ABUSED PHYSICALLY, EMOTIONALLY, OR SEXUALLY BY ANYONE?: NO

## 2025-05-13 SDOH — SOCIAL STABILITY: SOCIAL INSECURITY: DO YOU FEEL ANYONE HAS EXPLOITED OR TAKEN ADVANTAGE OF YOU FINANCIALLY OR OF YOUR PERSONAL PROPERTY?: NO

## 2025-05-13 SDOH — SOCIAL STABILITY: SOCIAL INSECURITY: HAVE YOU HAD ANY THOUGHTS OF HARMING ANYONE ELSE?: NO

## 2025-05-13 SDOH — SOCIAL STABILITY: SOCIAL INSECURITY: HAS ANYONE EVER THREATENED TO HURT YOUR FAMILY OR YOUR PETS?: NO

## 2025-05-13 SDOH — SOCIAL STABILITY: SOCIAL INSECURITY: ABUSE: ADULT

## 2025-05-13 SDOH — SOCIAL STABILITY: SOCIAL INSECURITY: HAVE YOU HAD THOUGHTS OF HARMING ANYONE ELSE?: NO

## 2025-05-13 SDOH — SOCIAL STABILITY: SOCIAL INSECURITY: DOES ANYONE TRY TO KEEP YOU FROM HAVING/CONTACTING OTHER FRIENDS OR DOING THINGS OUTSIDE YOUR HOME?: NO

## 2025-05-13 ASSESSMENT — COGNITIVE AND FUNCTIONAL STATUS - GENERAL
MOBILITY SCORE: 24
DAILY ACTIVITIY SCORE: 24
PATIENT BASELINE BEDBOUND: NO
MOBILITY SCORE: 24
DAILY ACTIVITIY SCORE: 24

## 2025-05-13 ASSESSMENT — ACTIVITIES OF DAILY LIVING (ADL)
LACK_OF_TRANSPORTATION: NO
TOILETING: INDEPENDENT
HEARING - RIGHT EAR: FUNCTIONAL
LACK_OF_TRANSPORTATION: NO
GROOMING: INDEPENDENT
FEEDING YOURSELF: INDEPENDENT
BATHING: INDEPENDENT
ADEQUATE_TO_COMPLETE_ADL: YES
WALKS IN HOME: INDEPENDENT
DRESSING YOURSELF: INDEPENDENT
JUDGMENT_ADEQUATE_SAFELY_COMPLETE_DAILY_ACTIVITIES: YES
HEARING - LEFT EAR: FUNCTIONAL
PATIENT'S MEMORY ADEQUATE TO SAFELY COMPLETE DAILY ACTIVITIES?: YES

## 2025-05-13 ASSESSMENT — PATIENT HEALTH QUESTIONNAIRE - PHQ9
1. LITTLE INTEREST OR PLEASURE IN DOING THINGS: NOT AT ALL
SUM OF ALL RESPONSES TO PHQ9 QUESTIONS 1 & 2: 0
2. FEELING DOWN, DEPRESSED OR HOPELESS: NOT AT ALL

## 2025-05-13 ASSESSMENT — COLUMBIA-SUICIDE SEVERITY RATING SCALE - C-SSRS
6. HAVE YOU EVER DONE ANYTHING, STARTED TO DO ANYTHING, OR PREPARED TO DO ANYTHING TO END YOUR LIFE?: NO
2. HAVE YOU ACTUALLY HAD ANY THOUGHTS OF KILLING YOURSELF?: NO
1. IN THE PAST MONTH, HAVE YOU WISHED YOU WERE DEAD OR WISHED YOU COULD GO TO SLEEP AND NOT WAKE UP?: NO

## 2025-05-13 ASSESSMENT — PAIN SCALES - GENERAL
PAINLEVEL_OUTOF10: 0 - NO PAIN

## 2025-05-13 ASSESSMENT — PAIN - FUNCTIONAL ASSESSMENT: PAIN_FUNCTIONAL_ASSESSMENT: 0-10

## 2025-05-13 ASSESSMENT — LIFESTYLE VARIABLES
HOW MANY STANDARD DRINKS CONTAINING ALCOHOL DO YOU HAVE ON A TYPICAL DAY: PATIENT DOES NOT DRINK
AUDIT-C TOTAL SCORE: 0
SKIP TO QUESTIONS 9-10: 1
AUDIT-C TOTAL SCORE: 0
HOW OFTEN DO YOU HAVE 6 OR MORE DRINKS ON ONE OCCASION: NEVER
HOW OFTEN DO YOU HAVE A DRINK CONTAINING ALCOHOL: NEVER

## 2025-05-13 NOTE — DISCHARGE INSTRUCTIONS
CARDIAC CATHETERIZATION DISCHARGE INSTRUCTIONS     FOR SUDDEN AND SEVERE CHEST PAIN, SHORTNESS OF BREATH, EXCESSIVE BLEEDING, SIGNS OF STROKE, OR CHANGES IN MENTAL STATUS YOU SHOULD CALL 911 IMMEDIATELY.     If your provider has prescribed aspirin and/or clopidogrel (Plavix), or prasugrel (Effient), or ticagrelor (Brilinta), DO NOT STOP THESE MEDICATIONS for any reason without talking to your cardiologist first. If any of these were prescribed, you must take them every day without missing a single dose. If you are getting low on these medications, contact your provider immediately for a refill.     You received sedation today, please follow these guidelines:  FOR NEXT 24 HOURS  - Upon discharge, you should return home and rest for the remainder of the day and evening. You do not have to stay on bed rest but should not be very active.  It is recommended a responsible adult be with you for the first 24 hours after the procedure.    - No driving for 24 hours after procedure. Please arrange for someone to drive you home from the hospital today.     - Do not drive, operate machinery, or use power tools for 24 hours after your procedure.     - Do not make any legal decisions for 24 hours after your procedure.     - Do not drink alcoholic beverages for 24 hours after your procedure.    WOUND CARE   *FOR FEMORAL (LEG) ACCESS*  ·      Avoid heavy lifting (over 10 pounds) for 7 days, squatting or excessive bending for 2 days, and strenuous exercise for 7 days.  ·      No submerged bathing, swimming, or hot tubs for the next 7 days, or until fully healed.  ·      Avoid sexual activity for 3-4 days until any groin discomfort has ceased.    - The transparent dressing should be removed from the site 24 hours after the procedure.  Wash the site gently with soap and water. Rinse well and pat dry. Keep the area clean and dry. You may apply a Band-Aid to the site. Avoid lotions, ointments, or powders until fully healed.     -  You may shower the day after your procedure.      - It is normal to notice a small bruise around the puncture site and/or a small grape sized or smaller lump. Any large bruising or large lump warrants a call to the office.     - If bleeding should occur, lay down and apply pressure to the affected area for 10 minutes.  If the bleeding stops notify your physician.  If there is a large amount of bleeding or spurting of blood CALL 911 immediately.  DO NOT drive yourself to the hospital.    - You may experience some tenderness, bruising or minimal inflammation.  If you have any concerns, you may contact the Cath Lab or if any of these symptoms become excessive, contact your cardiologist or go to the emergency room.     OTHER INSTRUCTIONS  - You may take acetaminophen (Tylenol) as directed for discomfort.  If pain is not relieved with acetaminophen (Tylenol), contact your doctor.    - If you notice or experience any of the following, you should notify your doctor or seek medical attention  Chest pain or discomfort  Change in mental status or weakness in extremities.  Dizziness, light headedness, or feeling faint.  Change in the site where the procedure was performed, such as bleeding or an increased area of bruising or swelling.  Tingling, numbness, pain, or coolness in the leg/arm beyond the site where the procedure was performed.  Signs of infection (i.e. shaking chills, temperature > 100 degrees Fahrenheit, warmth, redness) in the leg/arm area where the procedure was performed.  Changes in urination   Bloody or black stools  Vomiting blood  Severe nose bleeds  Any excessive bleeding    - If you DO NOT have an appointment with your cardiologist within 2-3 weeks following your procedure, please contact their office.

## 2025-05-13 NOTE — CARE PLAN
The patient's goals for the shift include  catheterization this morning at [facility]    The clinical goals for the shift include Maintain hemodynamic stability/ remain pain free monitor labs, vitals and any changes    Over the shift, the patient did not make progress toward the following goals. Barriers to progression include complete diagnostic heart cath. Recommendations to address these barriers include continue with plan of care.

## 2025-05-13 NOTE — DOCUMENTATION CLARIFICATION NOTE
PATIENT:               ESTELLA JAIN  ACCT #:                  [id number]  MRN:                       [id number]  :                       1955  ADMIT DATE:       2025 11:53 AM  DISCH DATE:  RESPONDING PROVIDER #:        [id number]          PROVIDER RESPONSE TEXT:    Hypertensive Crisis    CDI QUERY TEXT:    Clarification    Instruction:    Based on your assessment of the patient and the clinical information, please provide the requested documentation by clicking on the appropriate radio button and enter any additional information if prompted.    Question: Based on the clinical criteria, can the patient diagnosis of HTN be further specified as    When answering this query, please exercise your independent professional judgment. The fact that a question is being asked, does not imply that any particular answer is desired or expected.    The patient's clinical indicators include:  Clinical Information: 69y.o. F admitted with N/V/D, dehydration, NSTEMI, Calculus of gallbladder, Nephrolithiasis & facial flushing.     H&P: NSTEMI, she had no chest pain and EKG is nonacute. Cardiology recommended heparin drip for now and to trend troponins. Primary hypertension, Nausea vomiting and diarrhea, Dehydration. Etiology remains not entirely clear and I wonder if this is mostly related to a viral gastrointestinal illness versus food poisoning versus other although I feel this is less likely related to cholelithiasis. Calculus of gallbladder without cholecystitis or obstruction, Nephrolithiasis, facial flushing.     Nephro: Acute kidney injury this is most likely secondary to prerenal etiology with dehydration secondary to GI loss however being diabetic and dehydrated patient may very well have IV contrast induced ANSELMO. Non-STEMI     Cards: Patient with underlying NSTEMI continue GDMT including nitroglycerin, aspirin, statin, beta-blocker as well as IV heparin protocol. Optimize blood pressures and plan  with a nitroglycerin patch.    Clinical Indicators:  5/11 BP: 216/102, 210/85, 193/117, 172/70, 209/171, 190/81, 191/86    5/11 at 1213 Troponin I: 388  5/11 at 1305 Troponin I: 494  5/12 at 0741 Troponin I: 638  5/12 at 0834 Troponin I: 647    5/11 RBC 5.40, WBC 16.3, Plts 274, aPTT 27.5  5/12 RBC 4.90, WBC 15.7, Plts 272, aPTT 41.8  5/13 RBC 4.45, WBC   9.3, Plts 205, aPTT 50.2    Treatment:  5/11-5/13 Heparin gtt  5/11 Aspirin 325mg po x1 dose  5/11 Hydralazine 10mg IV x1 dose  5/11 Losartan 100mg po x1 dose  5/12-5/13 Carvedilol 25mg po x2 doses  5/12-5/13 Nitroglycerin patch applied x2    Risk Factors: HTN, CAD s/p stent, Anxiety, old MI, smoker, BMI: 25.9  Options provided:  -- Hypertensive Crisis  -- Hypertensive Urgency  -- Hypertensive Emergency  -- Other - I will add my own diagnosis  -- Refer to Clinical Documentation Reviewer    Query created by: Deyanira Watts on 5/13/2025 11:19 AM      Electronically signed by:  CAITLYN SANON MD 5/13/2025 11:35 AM

## 2025-05-13 NOTE — Clinical Note
Closure device placed in the right femoral artery. Site closed by Angio-Seal. Failed to deploy because of body habitus

## 2025-05-13 NOTE — DISCHARGE SUMMARY
Discharge Diagnosis  Problem List[1]  Gastroenteritis  Dehydration  Acute renal failure likely contrast related improved  Coronary artery disease  NSTEMI    Issues Requiring Follow-Up  To be determined    Discharge Meds     Your medication list        ASK your doctor about these medications        Instructions Last Dose Given Next Dose Due   aspirin 81 mg EC tablet           atorvastatin 40 mg tablet  Commonly known as: Lipitor           buPROPion  mg 12 hr tablet  Commonly known as: Wellbutrin SR      1 tab(s), Oral, bid       carvedilol 25 mg tablet  Commonly known as: Coreg           escitalopram 10 mg tablet  Commonly known as: Lexapro      Take 1 tablet (10 mg) by mouth once daily.       levothyroxine 125 mcg tablet  Commonly known as: Synthroid, Levoxyl      TAKE 1 TABLET DAILY       losartan 100 mg tablet  Commonly known as: Cozaar           metFORMIN  mg 24 hr tablet  Commonly known as: Glucophage-XR      TAKE 2 TABLETS ONCE DAILY       Mounjaro 7.5 mg/0.5 mL pen injector  Generic drug: tirzepatide      Inject 7.5 mg under the skin 1 (one) time per week.       MULTIPLE VITAMINS ORAL                    Test Results Pending At Discharge  Pending Labs       Order Current Status    Extra Urine Gray Tube Collected (05/11/25 1404)    Urinalysis with Reflex Culture and Microscopic In process    Blood Culture Preliminary result    Blood Culture Preliminary result            Hospital Course  Patient mated with gastroenteritis type presentation with multiple bouts of diarrhea and vomiting and dehydration.  CT angio chest and abdomen were fairly unremarkable however troponins were detectable with somewhat upward fashion from 300-600.  Despite lack of chest pain cardiology recommended heparin drip and eventually sent patient for heart cath results of which recommended consideration of CABG.  Patient will stay at [facility] for that    Pertinent Physical Exam At Time of Discharge  Not applicable  patient not seen today    Outpatient Follow-Up  To be determined    Time spent on discharge arrangement:  10 minutes    Jacinda Richey MD         [1]   Patient Active Problem List  Diagnosis    Chronic viral hepatitis C (Multi)    Depression    Hypothyroidism, unspecified    Mixed hyperlipidemia    Obesity    Primary hypertension    Nausea vomiting and diarrhea    Type 2 diabetes mellitus without complication, without long-term current use of insulin    NSTEMI (non-ST elevated myocardial infarction) (Multi)    Dehydration    Pain of upper abdomen    Calculus of gallbladder without cholecystitis without obstruction    Nephrolithiasis    Facial flushing    Acute renal failure

## 2025-05-13 NOTE — PROGRESS NOTES
Patient was seen yesterday for ANSELMO admitted with GI loss nausea vomiting diarrhea she was diagnosed with non-STEMI IV fluids her renal function is better today creatinine down to 1.07 she was taken to Cath Lab for heart catheterization this morning continue to monitor renal function very closely

## 2025-05-13 NOTE — CARE PLAN
Problem: Pain - Adult  Goal: Verbalizes/displays adequate comfort level or baseline comfort level  Outcome: Met

## 2025-05-13 NOTE — POST-PROCEDURE NOTE
Physician Transition of Care Summary  Invasive Cardiovascular Lab    Procedure Date: 5/13/2025  Attending:    * Jordi Simpson - Primary  Resident/Fellow/Other Assistant: Surgeons and Role:  * No surgeons found with a matching role *    Indications:   Pre-op Diagnosis      * NSTEMI (non-ST elevated myocardial infarction) (Multi) [I21.4]     * Type 2 diabetes mellitus without complication, without long-term current use of insulin [E11.9]     * Primary hypertension [I10]     * Mixed hyperlipidemia [E78.2]    Post-procedure diagnosis:   Post-op Diagnosis     * NSTEMI (non-ST elevated myocardial infarction) (Multi) [I21.4]     * Type 2 diabetes mellitus without complication, without long-term current use of insulin [E11.9]     * Primary hypertension [I10]     * Mixed hyperlipidemia [E78.2]    Procedure(s):   [facility], With LV  [address] - TN CATH PLMT L HRT & ARTS W/NJX & ANGIO IMG S&I        Procedure Findings:   See the dictated cardiac catheterization report    Description of the Procedure:   The patient was brought to the catheterization lab and informed consent for the procedure was obtained and both groins were prepped and draped in a sterile fashion.  Remainder procedure performed under sterile technique.  10 cc 1% lidocaine use of right groin for local anesthesia and Versed 2 mg and fentanyl 100 mcg used for sedation during the procedure we used a 6 Wolof JR4 JL 4 catheters and crossed the aortic valve with the right coronary catheter, and left ventriculogram was deferred because she had recent recovery from contrast nephropathy and our goal is to limit contrast volume.  Echocardiography is pending    At the end of the procedure Angio-Seal device was attempted but I experience resistance to inserting the Angio-Seal closure device and changed to a manual groin compression for 20 minutes without hematoma formation and she was returned to the heart and vascular Center in stable condition, angina free.    Coronary  angiography:  1.  The left main is a large vessel that bifurcates into the LAD and circumflex and had only mild disease.  2.  The LAD was a moderate-sized vessel that extends to the apex.  The very proximal LAD hasa smooth  60% stenosis followed by the patent stent in the mid LAD.  Beyond this the mid LAD had an eccentric 80% stenosis followed by a long 90% stenosis in a small caliber portion of the mid vessel to modest diagonal branch has 60% stenosis proximally  3.  Circumflex is a moderate nondominant vessel that has moderate proximal/mid stenosis, with a moderate-sized first posterolateral branch that has 50% proximal narrowing, and then a second posterolateral branch has a 60% bifurcation stenosis in its proximal segment and a patent stent in its mid segment.  4.  The right coronary is a large and dominant vessel with widely patent stents in the proximal right coronary artery, diffuse moderate mid right coronary disease a Denovo lesion in the mid right coronary artery that is a 50% stenosis.  Prior to the PDA bifurcation there is a mid vessel right coronary stent that has moderate in-stent restenosis before a moderate-sized PDA and distal right coronary artery, which  have nonobstructive disease.    Left ventriculogram was not performed to conserve contrast and reduce the risk of contrast nephropathy with her history of acute kidney injury.    Echocardiography has been ordered to assess left ventricular systolic function and exclude any other structural heart disease.    Complications:   None    Stents/Implants:   Implants       No implant documentation for this case.            Anticoagulation/Antiplatelet Plan:   Patient remains on low-dose aspirin 81 mg daily and subcutaneous heparin    Estimated Blood Loss:   5 mL    Anesthesia: Moderate Sedation Anesthesia Staff: No anesthesia staff entered.    Any Specimen(s) Removed:   No specimens collected during this procedure.    Disposition:   Will remain in [facility] and  transfer to [facility] for cardiac surgical evaluation      Electronically signed by: Jordi Simpson DO, 5/13/2025 2:04 PM

## 2025-05-13 NOTE — H&P
"History Of Present Illness  Simona Betancourt \"Isabel\" is a 69 y.o. female presenting with nausea abdominal pain    Patient is a 69 years old  female with past medical history of hypothyroidism, diabetes type 2, depression.  Patient initially was admitted at [facility] for abdominal pain nausea and vomiting.  Patient was found to have acute kidney injury.  Patient was found to have elevated troponin.  Initially high-sensitivity troponin was 594, second set at 647.Patient did not have chest pain.  At [facility] patient was evaluated by Dr. Barrientos from cardiology services.  Patient was transferred to [facility] for cardiac catheterization.  Catheterization was done by Dr. Simpson.  Patient underwent cardiac cath today at [facility].  Patient was found to have triple-vessel disease.  Patient will stay at [facility].  I was called to admit the patient.  Patient was evaluated after cardiac catheterization.  Patient denies chest pain or shortness of breath.  She denied pain in the right inguinal area.  Patient denied abdominal pain, nausea or vomiting.  She was complaining of feeling cold.  She denied numbness or tingling upper or lower extremity.  She denied to have nausea or vomiting.  Patient was admitted at [facility] to be evaluated by cardiothoracic surgeon for possible CABG.  Past Medical History  Medical History[1]    Surgical History  Surgical History[2]     Social History  She reports that she has been smoking cigarettes. She started smoking about 54 years ago. She has a 27.2 pack-year smoking history. She has been exposed to tobacco smoke. She uses smokeless tobacco. She reports that she does not currently use alcohol. She reports current drug use. Frequency: 4.00 times per week. Drug: Marijuana.    Family History  Family History[3]     Allergies  Ace inhibitors    Review of Systems  General: Negative for fever,  chills, positive for fatigue.    HEENT: Negative " for headache, blurring of vision or double vision.    Cardiovascular: Negative for chest pain, palpitations or orthopnea.    Respiratory: Negative for cough, shortness of breath or wheezing.    Gastrointestinal: Negative for nausea, vomiting, hematemesis, abdominal pain or diarrhea.   Genitourinary: Negative for dysuria, hematuria, frequency or nocturia.   Musculoskeletal: Negative for joint pain, joint swelling or deformity.   Skin: Negative for rash, itching, or jaundice.   Hematologic: Negative for bleeding or bruising.   Neurologic: Negative for headache, loss of consciousness. syncope or seizures   Psychological: Negative for anxiety, hallucinations or depression.      Physical Exam     General: Pleasant, cooperating during physical exam.  HEENT: Pupils are equal and reactive to light and commendation , oral mucosa moist, no JVD.  Cardiovascular:  PMI nondisplaced, no MRG.  Lungs: Clear to auscultation bilaterally, no wheezing, no crackles, no dullness to percussion.  Abdomen: No hepatosplenomegaly appreciated, soft , not tender, positive bowel sounds, positive bowel movement.  Right inguinal area dressing in place, no hematoma.  Neuro: Alert and oriented x3, strength in upper and lower extremities , sensation intact.  Psych: Patient had great insight was going on  Musculoskeletal: No swelling in lower extremities, no limitation in range of motion.  Vascular: Pulses are intact in upper and lower extremities  Skin: No petechiae, ecchymosis or other stigmata for dermatology disease.   Last Recorded Vitals  Blood pressure 152/72, pulse (!) 44, resp. rate 12, last menstrual period 01/01/2011, SpO2 96%.    Relevant Results    Assessment and plan    NSTEMI  Patient had elevated troponin.  She was evaluated by Dr. Barrientos at [facility]  Patient was transfer to [facility] to get cardiac catheterization  Cardiac cath revealed triple-vessel disease.  Consult cardiothoracic surgeon to evaluate the patient for  possible CABG  2D echo today  Discussed with Dr. Simpson  Consult cardiology  Plan to start heparin drip at 6 PM  Continue with aspirin, atorvastatin    Coronary artery disease  Cardiac cath revealed triple-vessel disease  Waiting for cardiothoracic surgeon to evaluate her.    Acute kidney injury  Creatinine improved.  Monitor RFP in AM.  Patient had cardiac cath today   consult Dr. Sherman, nephrology  Hold losartan, metformin  Avoid nephrotoxic drug    Bradycardia  Hold Coreg    Diabetes mellitus type 2  Cover with insulin sliding scale  Hemoglobin A1c    Hypertension  Fairly controlled  Monitor close    Hypothyroidism  Resume her home medication.  Check TSH    Depression  Patient is on bupropion and escitalopram.    Check CBC and RFP in a.m.    I spent 61 minutes in the professional and overall care of this patient.      Barbara Brambila MD         [1]   Past Medical History:  Diagnosis Date    ADHD (attention deficit hyperactivity disorder)     Anxiety     Chronic viral hepatitis C (Multi)     Depression     Diabetes mellitus with hyperglycemia     Heart disease     Hypercholesterolemia     Hypertension     Hypothyroidism     Myocardial infarction (Multi)     Nausea and vomiting 04/10/2023    Obesity     Visual impairment    [2]   Past Surgical History:  Procedure Laterality Date     SECTION, LOW TRANSVERSE      CORONARY ANGIOPLASTY WITH STENT PLACEMENT     [3]   Family History  Problem Relation Name Age of Onset    Hypertension Mother Mom     Stroke Mother Mom     Hypertension Father Dad     Cancer Father Dad     Alcohol abuse Father Dad     Drug abuse Father Dad

## 2025-05-13 NOTE — PROGRESS NOTES
05/13/25 0748   Discharge Planning   Home or Post Acute Services None   Expected Discharge Disposition Home   Does the patient need discharge transport arranged? No

## 2025-05-14 ENCOUNTER — DOCUMENTATION (OUTPATIENT)
Dept: CARDIAC REHAB | Facility: HOSPITAL | Age: 70
End: 2025-05-14
Payer: COMMERCIAL

## 2025-05-14 ENCOUNTER — APPOINTMENT (OUTPATIENT)
Dept: CARDIOLOGY | Facility: HOSPITAL | Age: 70
DRG: 281 | End: 2025-05-14
Payer: COMMERCIAL

## 2025-05-14 ENCOUNTER — PHARMACY VISIT (OUTPATIENT)
Dept: PHARMACY | Facility: CLINIC | Age: 70
End: 2025-05-14
Payer: COMMERCIAL

## 2025-05-14 VITALS
RESPIRATION RATE: 17 BRPM | OXYGEN SATURATION: 96 % | HEIGHT: 60 IN | BODY MASS INDEX: 27.22 KG/M2 | HEART RATE: 56 BPM | WEIGHT: 138.67 LBS | TEMPERATURE: 97.2 F | SYSTOLIC BLOOD PRESSURE: 121 MMHG | DIASTOLIC BLOOD PRESSURE: 82 MMHG

## 2025-05-14 LAB
ALBUMIN SERPL BCP-MCNC: 3.7 G/DL (ref 3.4–5)
ANION GAP SERPL CALCULATED.3IONS-SCNC: 9 MMOL/L (ref 10–20)
APTT PPP: 47.4 SECONDS (ref 22–32.5)
APTT PPP: 73.8 SECONDS (ref 22–32.5)
BUN SERPL-MCNC: 22 MG/DL (ref 6–23)
CALCIUM SERPL-MCNC: 9 MG/DL (ref 8.6–10.3)
CHLORIDE SERPL-SCNC: 107 MMOL/L (ref 98–107)
CO2 SERPL-SCNC: 30 MMOL/L (ref 21–32)
CREAT SERPL-MCNC: 0.89 MG/DL (ref 0.5–1.05)
EGFRCR SERPLBLD CKD-EPI 2021: 71 ML/MIN/1.73M*2
ERYTHROCYTE [DISTWIDTH] IN BLOOD BY AUTOMATED COUNT: 12.3 % (ref 11.5–14.5)
EST. AVERAGE GLUCOSE BLD GHB EST-MCNC: 111 MG/DL
GLUCOSE BLD MANUAL STRIP-MCNC: 110 MG/DL (ref 74–99)
GLUCOSE BLD MANUAL STRIP-MCNC: 164 MG/DL (ref 74–99)
GLUCOSE SERPL-MCNC: 87 MG/DL (ref 74–99)
HBA1C MFR BLD: 5.5 % (ref ?–5.7)
HCT VFR BLD AUTO: 40.4 % (ref 36–46)
HGB BLD-MCNC: 13.9 G/DL (ref 12–16)
MCH RBC QN AUTO: 30.8 PG (ref 26–34)
MCHC RBC AUTO-ENTMCNC: 34.4 G/DL (ref 32–36)
MCV RBC AUTO: 89 FL (ref 80–100)
NRBC BLD-RTO: 0 /100 WBCS (ref 0–0)
PHOSPHATE SERPL-MCNC: 2.6 MG/DL (ref 2.5–4.9)
PLATELET # BLD AUTO: 212 X10*3/UL (ref 150–450)
POTASSIUM SERPL-SCNC: 3.5 MMOL/L (ref 3.5–5.3)
RBC # BLD AUTO: 4.52 X10*6/UL (ref 4–5.2)
SODIUM SERPL-SCNC: 142 MMOL/L (ref 136–145)
TSH SERPL-ACNC: 1.67 MIU/L (ref 0.44–3.98)
WBC # BLD AUTO: 11.5 X10*3/UL (ref 4.4–11.3)

## 2025-05-14 PROCEDURE — 36415 COLL VENOUS BLD VENIPUNCTURE: CPT | Performed by: INTERNAL MEDICINE

## 2025-05-14 PROCEDURE — 2500000002 HC RX 250 W HCPCS SELF ADMINISTERED DRUGS (ALT 637 FOR MEDICARE OP, ALT 636 FOR OP/ED): Performed by: INTERNAL MEDICINE

## 2025-05-14 PROCEDURE — 85730 THROMBOPLASTIN TIME PARTIAL: CPT | Performed by: INTERNAL MEDICINE

## 2025-05-14 PROCEDURE — RXMED WILLOW AMBULATORY MEDICATION CHARGE

## 2025-05-14 PROCEDURE — 2500000001 HC RX 250 WO HCPCS SELF ADMINISTERED DRUGS (ALT 637 FOR MEDICARE OP): Performed by: INTERNAL MEDICINE

## 2025-05-14 PROCEDURE — 93880 EXTRACRANIAL BILAT STUDY: CPT | Performed by: SURGERY

## 2025-05-14 PROCEDURE — 99239 HOSP IP/OBS DSCHRG MGMT >30: CPT | Performed by: INTERNAL MEDICINE

## 2025-05-14 PROCEDURE — 85027 COMPLETE CBC AUTOMATED: CPT | Performed by: INTERNAL MEDICINE

## 2025-05-14 PROCEDURE — 82947 ASSAY GLUCOSE BLOOD QUANT: CPT

## 2025-05-14 PROCEDURE — 84443 ASSAY THYROID STIM HORMONE: CPT | Performed by: INTERNAL MEDICINE

## 2025-05-14 PROCEDURE — 93880 EXTRACRANIAL BILAT STUDY: CPT

## 2025-05-14 PROCEDURE — 99232 SBSQ HOSP IP/OBS MODERATE 35: CPT | Performed by: INTERNAL MEDICINE

## 2025-05-14 PROCEDURE — 80069 RENAL FUNCTION PANEL: CPT | Performed by: INTERNAL MEDICINE

## 2025-05-14 RX ORDER — ATORVASTATIN CALCIUM 80 MG/1
80 TABLET, FILM COATED ORAL NIGHTLY
Qty: 30 TABLET | Refills: 0 | Status: SHIPPED | OUTPATIENT
Start: 2025-05-14 | End: 2025-06-13

## 2025-05-14 RX ORDER — LOSARTAN POTASSIUM 50 MG/1
50 TABLET ORAL DAILY
Qty: 30 TABLET | Refills: 0 | Status: SHIPPED | OUTPATIENT
Start: 2025-05-14 | End: 2025-06-13

## 2025-05-14 RX ORDER — NITROGLYCERIN 0.4 MG/1
0.4 TABLET SUBLINGUAL EVERY 5 MIN PRN
Qty: 90 TABLET | Refills: 12 | Status: SHIPPED | OUTPATIENT
Start: 2025-05-14 | End: 2025-06-13

## 2025-05-14 RX ORDER — LOSARTAN POTASSIUM 50 MG/1
50 TABLET ORAL DAILY
Status: DISCONTINUED | OUTPATIENT
Start: 2025-05-14 | End: 2025-05-14 | Stop reason: HOSPADM

## 2025-05-14 RX ORDER — LOSARTAN POTASSIUM 25 MG/1
25 TABLET ORAL DAILY
Qty: 30 TABLET | Refills: 0 | Status: SHIPPED | OUTPATIENT
Start: 2025-05-14 | End: 2025-05-14 | Stop reason: HOSPADM

## 2025-05-14 RX ADMIN — LEVOTHYROXINE SODIUM 125 MCG: 0.12 TABLET ORAL at 06:29

## 2025-05-14 RX ADMIN — NITROGLYCERIN 1 PATCH: 0.1 PATCH TRANSDERMAL at 09:31

## 2025-05-14 RX ADMIN — LOSARTAN POTASSIUM 50 MG: 50 TABLET, FILM COATED ORAL at 11:50

## 2025-05-14 RX ADMIN — BUPROPION HYDROCHLORIDE 200 MG: 100 TABLET, FILM COATED, EXTENDED RELEASE ORAL at 09:31

## 2025-05-14 RX ADMIN — ESCITALOPRAM OXALATE 10 MG: 10 TABLET ORAL at 09:31

## 2025-05-14 RX ADMIN — ASPIRIN 81 MG: 81 TABLET ORAL at 09:31

## 2025-05-14 RX ADMIN — FAMOTIDINE 20 MG: 20 TABLET, FILM COATED ORAL at 09:31

## 2025-05-14 ASSESSMENT — COGNITIVE AND FUNCTIONAL STATUS - GENERAL
MOBILITY SCORE: 24
DAILY ACTIVITIY SCORE: 24

## 2025-05-14 ASSESSMENT — PAIN SCALES - GENERAL: PAINLEVEL_OUTOF10: 0 - NO PAIN

## 2025-05-14 ASSESSMENT — PAIN - FUNCTIONAL ASSESSMENT: PAIN_FUNCTIONAL_ASSESSMENT: 0-10

## 2025-05-14 NOTE — PROGRESS NOTES
05/14/25 1123   Discharge Planning   Living Arrangements Spouse/significant other   Support Systems Spouse/significant other   Expected Discharge Disposition Home   Does the patient need discharge transport arranged? No     S/P cardiac cath.  Cardiothoracic surgery consulted. Will discharge home today and follow up with Dr. Beltran Javed outpatient. No home going skilled needs identified.

## 2025-05-14 NOTE — PROGRESS NOTES
NSTEMI education/handout given to patient/. Discussed importance of taking medication as prescribed/following up with physician appointments.  Discussed benefits of heart healthy diet/importance of maintaining a healthy weight. Smoking cessation education/handout given to patient. Pre op cardiac surgery education reviewed with patient. Discussed cardiac rehab.

## 2025-05-14 NOTE — PROGRESS NOTES
Patient was seen for acute kidney injury which is secondary to dehydration she was admitted with non-STEMI she underwent cardiac catheterization apparently she will be needing a CABG her renal function has recovered very nicely creatinine down to 0.89 I will sign off please call me if needed

## 2025-05-14 NOTE — PROGRESS NOTES
Subjective Data:  Stable post cath without angina    Overnight Events:    none     Objective Data:  Last Recorded Vitals:  Vitals:    05/14/25 0400 05/14/25 0412 05/14/25 0747 05/14/25 0800   BP: 128/66  (!) 153/91 121/82   BP Location:  Right arm Right arm    Patient Position:  Lying Sitting    Pulse: (!) 44 (!) 45  56   Resp: 11 10 16 17   Temp:  36.2 °C (97.2 °F) 36.2 °C (97.2 °F)    TempSrc:  Temporal Temporal    SpO2: 95% 94% 97% 96%   Weight:  62.9 kg (138 lb 10.7 oz)     Height:           Last Labs:  CBC - 5/14/2025:  4:51 AM  11.5 13.9 212    40.4      CMP - 5/14/2025:  4:51 AM  9.0 5.7 19 --- 0.8   2.6 3.7 10 57      PTT - 5/14/2025:  7:32 AM  1.1   11.7 47.4     TROPHS   Date/Time Value Ref Range Status   05/12/2025 08:34  0 - 13 ng/L Final     Comment:     Previous result verified on 5/11/2025 1301 on specimen/case [id number] called with component riskmethodsHS for procedure Troponin I, High Sensitivity, Initial with value 388 ng/L.   05/12/2025 07:41  0 - 13 ng/L Final     Comment:     Previous result verified on 5/11/2025 1301 on specimen/case [id number] called with component riskmethodsHS for procedure Troponin I, High Sensitivity, Initial with value 388 ng/L.   05/11/2025 01:05  0 - 13 ng/L Final     Comment:     Previous result verified on 5/11/2025 1301 on specimen/case [id number] called with component riskmethodsHS for procedure Troponin I, High Sensitivity, Initial with value 388 ng/L.     HGBA1C   Date/Time Value Ref Range Status   05/11/2025 06:48 PM 5.5 See comment % Final   01/29/2025 09:25 AM 6.2 <5.7 % of total Hgb Final     Comment:     For someone without known diabetes, a hemoglobin   A1c value between 5.7% and 6.4% is consistent with  prediabetes and should be confirmed with a   follow-up test.     For someone with known diabetes, a value <7%  indicates that their diabetes is well controlled. A1c  targets should be individualized based on duration of  diabetes, age, comorbid  conditions, and other  considerations.     This assay result is consistent with an increased risk  of diabetes.     Currently, no consensus exists regarding use of  hemoglobin A1c for diagnosis of diabetes for children.        06/14/2024 02:09 PM 6.3 4.2 - 6.5 % Final   01/12/2024 09:12 AM 6.3 4.2 - 6.5 % Final     LDLCALC   Date/Time Value Ref Range Status   05/11/2025 06:49  <=99 mg/dL Final     Comment:                                 Near   Borderline      AGE      Desirable  Optimal    High     High     Very High     0-19 Y     0 - 109     ---    110-129   >/= 130     ----    20-24 Y     0 - 119     ---    120-159   >/= 160     ----      >24 Y     0 -  99   100-129  130-159   160-189     >/=190     01/29/2025 09:25 AM 89 mg/dL (calc) Final     Comment:     Reference range: <100     Desirable range <100 mg/dL for primary prevention;    <70 mg/dL for patients with CHD or diabetic patients   with > or = 2 CHD risk factors.     LDL-C is now calculated using the Buzz-Magdalena   calculation, which is a validated novel method providing   better accuracy than the Friedewald equation in the   estimation of LDL-C.   Buzz SS et al. LILLIAN. 2013;310(19): 6147-2461   (http://education.[employer].Gewara/faq/LCU451)     11/27/2023 05:17 PM 68 65 - 130 mg/dL Final   05/19/2023 11:04 AM 71 65 - 130 MG/DL Final   01/08/2022 09:49 AM 81 65 - 130 MG/DL Final   09/02/2020 08:13 AM 69 65 - 130 MG/DL Final     VLDL   Date/Time Value Ref Range Status   05/11/2025 06:49 PM 14 0 - 40 mg/dL Final      Last I/O:  I/O last 3 completed shifts:  In: - (0 mL/kg)   Out: 605 (9.6 mL/kg) [Urine:600 (0.3 mL/kg/hr); Blood:5]  Weight: 62.9 kg     Past Cardiology Tests (Last 3 Years):  EKG:  Electrocardiogram, 12-lead PRN ACS symptoms 05/12/2025 (Preliminary)      ECG 12 lead 05/11/2025    Echo:  Transthoracic Echo Complete 05/13/2025    Ejection Fractions:  EF   Date/Time Value Ref Range Status   05/13/2025 03:12 PM 43 %       Cath:  Cardiac Catheterization Procedure 05/13/2025    Stress Test:  No results found for this or any previous visit from the past 1095 days.    Cardiac Imaging:  No results found for this or any previous visit from the past 1095 days.      Inpatient Medications:  Scheduled Medications[1]  PRN Medications[2]  Continuous Medications[3]    Physical Exam:  Heart: Regular rate rhythm without murmurs or gallops  Lungs: Clear to auscultation with no wheeze  Abdomen: Soft nontender good bowel sounds  Extremities: Right groin well-healed at her cath site with mild ecchymosis.     Assessment/Plan   NSTEMI (non-ST elevated myocardial infarction) (Multi)  Patient with underlying NSTEMI continue GDMT including nitroglycerin, aspirin, statin, beta-blocker as well as IV heparin protocol.  Primary hypertension  Optimize blood pressures and plan with a nitroglycerin patch.  Blood pressure remained 116/62  Nausea vomiting and diarrhea  Ongoing GI workup.  Small gallstones with right upper quadrant ultrasound.  Optimal glycemic control.  Type 2 diabetes mellitus without complication, without long-term current use of insulin  Optimize glycemic control.  Dehydration  P.o. hydration  Pain of upper abdomen     Calculus of gallbladder without cholecystitis without obstruction     Nephrolithiasis     Facial flushing     Acute renal failure  P.o. hydration, ongoing diagnostic catheterization    69-year-old female patient with a multiple risk factors including hypertension, hyperlipidemia, diabetes, multiple stents placed in the past patient had 3 stents placed in the past.  Also history of smoking.  Patient with right upper quadrant pain.  Now with elevated troponin almost 5-600.  Would likely NSTEMI.  Continue current GDMT.  Pending catheterization tomorrow.     Smoking cessation counseling was performed.  The patient was counseled on the harms of smoking, including increased risk for lung disease, cardiovascular disease and cancer. In   the context of the disease, smoking is associated with a greater pain, worse joint damage, and worse response to biological therapy.  About 11 to 15 minute on smoking cessation and counseling to patient and family.    5/14: She is resting comfortably, has been angina free since her admission, consistent with her stable angina pattern.  Blood pressure is 121/82, pulse is 56, respirations 17, O2 sat 96% on room air.  She has a resting bradycardia and sinus bradycardia is asymptomatic but we have avoided beta-blockers in the setting    I spoke with her about option of discharge and then her cardiac surgical team with Dr. Sherley Javed will review films and decide the best course of revascularization for her and the cardiac surgical team is comfortable with discharge home to be brought in for elective surgery in the near future.  She will remain on low-dose aspirin therapy and high-dose statin therapy.  She will follow-up with me in 3 to 4 weeks and with her cardiac surgeon in the interim.      Code Status:  Full Code          Jordi Simpson DO       [1]   Scheduled medications   Medication Dose Route Frequency    aspirin  81 mg oral Daily    atorvastatin  80 mg oral Nightly    buPROPion SR  200 mg oral Daily    escitalopram  10 mg oral Daily    famotidine  20 mg oral Daily    Or    famotidine  20 mg intravenous Daily    insulin lispro  0-5 Units subcutaneous TID AC    levothyroxine  125 mcg oral Daily    nitroglycerin  1 patch transdermal Daily    oxygen   inhalation Continuous - 02/gases    polyethylene glycol  17 g oral Daily   [2]   PRN medications   Medication    acetaminophen    Or    acetaminophen    Or    acetaminophen    acetaminophen    Or    acetaminophen    Or    acetaminophen    dextrose    dextrose    glucagon    glucagon    heparin (porcine)    melatonin    ondansetron    Or    ondansetron   [3]   Continuous Medications   Medication Dose Last Rate    heparin  0-4,000 Units/hr 600 Units/hr (05/14/25 0113)

## 2025-05-14 NOTE — DISCHARGE SUMMARY
Discharge Diagnosis  NSTEMI (non-ST elevated myocardial infarction) (Multi)       Issues Requiring Follow-Up  Coronary artery disease  Hypertension  Diabetes mellitus type 2      Discharge Meds     Medication List      START taking these medications     nitroglycerin 0.4 mg SL tablet; Commonly known as: Nitrostat; Place 1   tablet (0.4 mg) under the tongue every 5 minutes if needed for chest pain.     CHANGE how you take these medications     atorvastatin 80 mg tablet; Commonly known as: Lipitor; Take 1 tablet (80   mg) by mouth once daily at bedtime.; What changed: medication strength,   See the new instructions.   losartan 25 mg tablet; Commonly known as: Cozaar; Take 1 tablet (25 mg)   by mouth once daily.; What changed: medication strength, See the new   instructions.     CONTINUE taking these medications     aspirin 81 mg EC tablet   buPROPion  mg 12 hr tablet; Commonly known as: Wellbutrin SR; 1   tab(s), Oral, bid   escitalopram 10 mg tablet; Commonly known as: Lexapro; Take 1 tablet (10   mg) by mouth once daily.   levothyroxine 125 mcg tablet; Commonly known as: Synthroid, Levoxyl;   TAKE 1 TABLET DAILY   Mounjaro 7.5 mg/0.5 mL pen injector; Generic drug: tirzepatide; Inject   7.5 mg under the skin 1 (one) time per week.   MULTIPLE VITAMINS ORAL     STOP taking these medications     carvedilol 25 mg tablet; Commonly known as: Coreg   metFORMIN  mg 24 hr tablet; Commonly known as: Glucophage-XR       Test Results Pending At Discharge  Pending Labs       No current pending labs.            Hospital Course     Patient is a 69 years old  female with past medical history hypothyroidism, depression, hypertension diabetes mellitus type 2.  Patient presented to [facility] ER with nausea, abdominal pain  Patient was found to have elevated troponin.  Patient was evaluated by Dr. Barrientos at [facility].  She was found to have acute kidney injury.  Patient has been on losartan and metformin.   Both medication her discontinued.  Patient had hemoglobin A1c 5.5.  She has been on Mounjaro as well.  Patient was transfer to [facility] for cardiac catheterization.  Cardiac catheterization done on 5/13/2025 at [facility] revealed triple-vessel disease.  Patient was evaluated by Dr. Simpson from cardiology services.  Regarding ANSELMO she was evaluated by Dr. Sherman.  Patient is on aspirin, dose of statin increased.  She was evaluated by Dr. Beltran Javed, cardiothoracic surgery on the case.  Patient had workup for possible CABG.  CT chest no infiltrate in the chest dilation of ascending thoracic aorta 4.1 cm.  Patient will have ultrasound carotid arteries.  Discussed in detail with cardiology.  Patient was advised to follow-up with cardiothoracic surgeon in 1 week.  Patient will be discharged on aspirin, statins,, as needed nitroglycerin   patient is bradycardic no beta-blocker.  Kidney function improved.  I advised her to start Cozaar with low-dose.  Discussed with Dr. Sherman kidney specialist.  Okay to start metformin on 5/16/2025.   Patient is clinically hemodynamic stable to get discharged today.  Patient was advised to be compliant with appointment.  Pertinent Physical Exam At Time of Discharge  Physical Exam  General: In non acute distress, cooperating during physical exam.  HEENT: Pupils are equal and reactive to light and commendation , oral mucosa moist, no JVD oral mucosa is moist.  Cardiovascular: Normal sinus rhythm, no MRG.  Lungs: Clear to auscultation bilaterally, no wheezing, no crackles, no dullness to percussion.  Abdomen: No hepatosplenomegaly appreciated, soft , not tender, positive bowel sounds, positive bowel movement.  Neuro: Alert and oriented x3, strength in upper and lower extremities , sensation intact.  Psych: Patient had great insight was going on  Musculoskeletal: No swelling in lower extremities, no limitation in range of motion.  Vascular: Pulses are intact in upper and lower  extremities  Skin: No petechiae, ecchymosis or other stigmata for dermatology disease.   Outpatient Follow-Up  Future Appointments   Date Time Provider Department Center   5/20/2025 12:30 PM Oj Javed MD [id number] [facility]   8/15/2025  7:30 AM Antonette Pereira MD [id number] [facility]   9/15/2025  8:15 AM Jesusita Ulloa, PharmD [id number] [facility]     Follow-up with Dr. Beltran Javed in 1 week  Follow-up with cardiology in 4 weeks  Follow-up with PCP in 2 weeks    Time spent discharging patient 39-minutes    Barbara Brambila MD

## 2025-05-14 NOTE — CONSULTS
"Reason For Consult  CAD for CABG consideration.    History Of Present Illness  Isabel Betancourt is a 69 y.o. female presenting with N/V and abd pain. Patient states she developed N?V after eating  the day before admission. She states she woke up on  and felt \"bad\" denies chest pain or sob. Went to the ER and was found to have Troponins 388 rising to 647 with out EKG changes. +NSTEMI. She underwent a cardiac cath which shows multi vessel disease.  She has a history of MI with stents placed 10+ years ago.She was referred for CABG.     Past Medical History  She has a past medical history of ADHD (attention deficit hyperactivity disorder), Anxiety, Chronic viral hepatitis C (Multi), Depression, Diabetes mellitus with hyperglycemia, Heart disease, Hypercholesterolemia, Hypertension, Hypothyroidism, Myocardial infarction (Multi), Nausea and vomiting (04/10/2023), Obesity, and Visual impairment.    Surgical History  She has a past surgical history that includes Coronary angioplasty with stent () and  section, low transverse. Left tib fracture with plates     Social History  Smoking [id number] yrs  ETOH denies  Marijuana 4-7 x week         Allergies  Ace inhibitors      Physical Exam  Constitutional:       Normal appearance, well-developed, well-nourished.   HENT:      Head: Normocephalic and atraumatic.   Cardiovascular:      Rate and Rhythm: RRR, no murmur, nl S1/S2.      Pulses: Normal, <3 sec cap refill.   Pulmonary:      Clear bilateral breath sounds.   Abdominal:      Bowel sounds present, soft.   Musculoskeletal:         No gross abnormalities.Well healed scar lower left tibial repair  Skin:     Warm and dry, no rashes.   Neurological:      Alert, no focal deficits.        Last Recorded Vitals  Blood pressure 121/82, pulse 56, temperature 36.2 °C (97.2 °F), temperature source Temporal, resp. rate 17, height 1.524 m (5'), weight 62.9 kg (138 lb 10.7 oz), last menstrual period 2011, SpO2 " 96%.    Relevant Results  TRANSTHORACIC ECHOCARDIOGRAM REPORT     Patient Name:       ESTELLA JAIN   Reading Physician:    [id number] Jordi Simpson DO  Study Date:         5/13/2025            Ordering Provider:    [id number] GERMAIN OUMAR                                                                 [place]SANA  MRN/PID:            [id number]             Fellow:  Accession#:         [id number]         Nurse:  Date of Birth/Age:  1955 / 69 years Sonographer:          Julián Stratton RDCS  Gender Assigned at  F                    Additional Staff:  Birth:  Height:             152.40 cm            Admit Date:  Weight:             59.87 kg             Admission Status:     Inpatient -                                                                 Routine  BSA / BMI:          1.56 m2 / 25.78      Department Location:  [facility]                      kg/m2  Blood Pressure: 127 /65 mmHg     Study Type:    TRANSTHORACIC ECHO (TTE) COMPLETE  Diagnosis/ICD: Non ST elevation (NSTEMI) myocardial infarction-I21.4  Indication:    pre-CABG, CAD  CPT Codes:     Echo Complete w Full Doppler-[id number]     Patient History:  Diabetes:          Yes  Pertinent History: CAD, HTN and Hyperlipidemia.     Study Detail: The following Echo studies were performed: 2D, M-Mode, Doppler and                color flow. Technically challenging study due to body habitus,                patient lying in supine position and POST CATH.        PHYSICIAN INTERPRETATION:  Left Ventricle: Left ventricular ejection fraction is mildly decreased, by visual estimate at 40-45%. There is mild eccentric left ventricular hypertrophy. Wall motion is abnormal. The left ventricular cavity size is normal. There is mild increased septal and mildly increased posterior left ventricular wall thickness. Spectral Doppler shows a Grade I (impaired relaxation pattern) of left ventricular diastolic filling with normal left  atrial filling pressure. Global hypokinesis with severe apical hypokinesis.  Left Atrium: The left atrial size is normal.  Right Ventricle: The right ventricle is normal in size. There is normal right ventricular global systolic function.  Right Atrium: The right atrial size is normal.  Aortic Valve: The aortic valve appears structurally normal. There is no evidence of aortic valve regurgitation. The peak instantaneous gradient of the aortic valve is 5 mmHg.  Mitral Valve: The mitral valve is normal in structure. There is no evidence of mitral valve regurgitation.  Tricuspid Valve: The tricuspid valve is structurally normal. There is mild tricuspid regurgitation. The Doppler estimated RVSP is slightly elevated right ventricular systolic pressure at 32.6 mmHg.  Pulmonic Valve: The pulmonic valve is structurally normal. There is no indication of pulmonic valve regurgitation.  Pericardium: No pericardial effusion noted.  Aorta: The aortic root is normal.        CONCLUSIONS:   1. Left ventricular ejection fraction is mildly decreased, by visual estimate at 40-45%.   2. Abnormal wall motion.   3. Spectral Doppler shows a Grade I (impaired relaxation pattern) of left ventricular diastolic filling with normal left atrial filling pressure.   4. There is normal right ventricular global systolic function.   5. Slightly elevated right ventricular systolic pressure.     QUANTITATIVE DATA SUMMARY:     2D MEASUREMENTS:             Normal Ranges:  LAs:             3.36 cm     (2.7-4.0cm)  IVSd:            1.03 cm     (0.6-1.1cm)  LVPWd:           0.97 cm     (0.6-1.1cm)  LVIDd:           4.83 cm     (3.9-5.9cm)  LVIDs:           3.54 cm  LV Mass Index:   109.4 g/m2  LVEDV Index:     75.57 ml/m2  LV % FS          26.6 %        LEFT ATRIUM:                 Normal Ranges:  LA Vol A4C:       52.2 ml    (22+/-6mL/m2)  LA Vol A2C:       50.1 ml  LA Vol BP:        51.3 ml  LA Vol Index A4C: 33.4 ml/m2  LA Vol Index A2C: 32.0 ml/m2  LA  Vol Index BP:  32.8 ml/m2  LA Volume Index:  33.0 ml/m2  LA Vol A4C:       50.2 ml  LA Vol A2C:       47.1 ml  LA Vol Index BSA: 31.1 ml/m2        RIGHT ATRIUM:          Normal Ranges:  RA Area A4C:  12.0 cm2        M-MODE MEASUREMENTS:         Normal Ranges:  LAs:                 3.27 cm (2.7-4.0cm)        LV SYSTOLIC FUNCTION:                       Normal Ranges:  EF-A4C View:    38 % (>=55%)  EF-A2C View:    51 %  EF-Biplane:     46 %  EF-Visual:      43 %  LV EF Reported: 43 %        LV DIASTOLIC FUNCTION:             Normal Ranges:  MV Peak E:             0.72 m/s    (0.7-1.2 m/s)  MV Peak A:             0.61 m/s    (0.42-0.7 m/s)  E/A Ratio:             1.18        (1.0-2.2)  MV e'                  0.064 m/s   (>8.0)  MV lateral e'          0.07 m/s  MV medial e'           0.06 m/s  MV A Dur:              117.03 msec  E/e' Ratio:            11.24       (<8.0)  PulmV Sys Damien:         42.06 cm/s  PulmV Parker Damien:        37.37 cm/s  PulmV S/D Damien:         1.13  PulmV A Revs Damien:      25.98 cm/s  PulmV A Revs Dur:      145.58 msec        MITRAL VALVE:          Normal Ranges:  MV DT:        186 msec (150-240msec)        AORTIC VALVE:           Normal Ranges:  AoV Vmax:      1.12 m/s (<=1.7m/s)  AoV Peak P.0 mmHg (<20mmHg)  LVOT Max Damien:  0.90 m/s (<=1.1m/s)  LVOT VTI:      18.20 cm  LVOT Diameter: 1.88 cm  (1.8-2.4cm)  AoV Area,Vmax: 2.23 cm2 (2.5-4.5cm2)        RIGHT VENTRICLE:  TAPSE: 19.7 mm  RV s'  0.12 m/s        TRICUSPID VALVE/RVSP:          Normal Ranges:  Peak TR Velocity:     2.72 m/s  RV Syst Pressure:     33 mmHg  (< 30mmHg)        PULMONIC VALVE:          Normal Ranges:  PV Max Damien:     0.9 m/s  (0.6-0.9m/s)  PV Max P.9 mmHg  PV Mean P.4 mmHg  PV VTI:         19.88 cm        PULMONARY VEINS:  PulmV A Revs Dur: 145.58 msec  PulmV A Revs Damien: 25.98 cm/s  PulmV Parker Damien:   37.37 cm/s  PulmV S/D Damien:    1.13  PulmV Sys Damien:    42.06 cm/s        AORTA:  Asc Ao Diam 3.73 cm        [id number]  Jordi Simpson DO  Electronically signed on 5/13/2025 at 3:20:34 PM      Cardiovascular Catheterization Report     Patient Name:     ESTELLA JAIN  Performing Physician:  Linda Bacon Calvin SALGADO  Study Date:       5/13/2025           Verifying Physician:   Linda Simpson DO  MRN/PID:          [id number]            Cardiologist/Co-Scrub:  Accession#:       [id number]        Ordering Provider:     [id number] EBONIE LITTLE  Date of           1955 / 69      Cardiologist:  Birth/Age:        years  Gender:           F                   Fellow:  Encounter#:       [id number]          Surgeon:        Study: Left Heart Cath no LV        Indications:  ESTELLA JAIN is a 70 year old female who presents with coronary artery disease, dyslipidemia, hypertension, obesity, prior percutaneous coronary intervention and a chest pain assessment of typical angina. NSTE - ACS.  Stress test performed: No. CTA performed: NoRene Fields accessed: No. LVEF  Assessed: No.  Cardiac arrest: No.  Cardiac surgical consult: Completed -.  Cardiovascular Instability: No  Frailty status of patient entering lab: 5 = Mildly frail.        Coronary Angiography:  The coronary circulation is right dominant.     Coronary Angiography Comments:  The patient was brought to the catheterization lab and informed consent for the procedure was obtained and both groins were prepped and draped in a sterile fashion. Remainder procedure performed under sterile technique. 10 cc 1% lidocaine use of right groin for local anesthesia and Versed 2 mg and fentanyl 100 mcg used for sedation during the procedure we used a 6 Turkmen JR4 JL 4 catheters and crossed the aortic valve with the right coronary catheter, and left ventriculogram was deferred because she had recent recovery from  contrast nephropathy and our goal is to limit contrast volume. Echocardiography is pending        At the end of the procedure Angio-Seal device was attempted but I experience resistance to inserting the Angio-Seal closure device and changed to a manual groin compression for 20 minutes without hematoma formation and she was returned to the heart and vascular Center in stable condition, angina free.        Coronary angiography:  1. The left main is a large vessel that bifurcates into the LAD and circumflex and had only mild disease.  2. The LAD was a moderate-sized vessel that extends to the apex. The very proximal LAD hasa smooth 60% stenosis followed by the patent stent in the mid LAD. Beyond this the mid LAD had an eccentric 80% stenosis followed by a long 90% stenosis in a small caliber portion of the mid vessel to modest diagonal branch has 60% stenosis proximally  3. Circumflex is a moderate nondominant vessel that has moderate proximal/mid stenosis, with a moderate-sized first posterolateral branch that has 50% proximal narrowing, and then a second posterolateral branch has a 60% bifurcation stenosis in its proximal segment and a patent stent in its mid segment.  4. The right coronary is a large and dominant vessel with widely patent stents in the proximal right coronary artery, diffuse moderate mid right coronary disease a Denovo lesion in the mid right coronary artery that is a 50% stenosis. Prior to the PDA bifurcation there is a mid vessel right coronary stent that has moderate in-stent restenosis before a moderate-sized PDA and distal right coronary artery, which have nonobstructive disease.        Left ventriculogram was not performed to conserve contrast and reduce the risk of contrast nephropathy with her history of acute kidney injury.        Echocardiography has been ordered to assess left ventricular systolic function and exclude any other structural heart disease.           Shamaro  Personnel:  +----------------+---------+  Name            Duty       +----------------+---------+  Jordi Simpson MD 1  +----------------+---------+        Hemodynamic Pressures:     +----+-------------------+---------+------------+-------------+------+---------+  Site     Date Time       Phase    Systolic    Diastolic    ED  Mean mmHg                           Name       mmHg        mmHg      mmHg            +----+-------------------+---------+------------+-------------+------+---------+    AO  5/13/2025 1:11:58 AIR REST         166           70            102                       PM                                                   +----+-------------------+---------+------------+-------------+------+---------+    AO  5/13/2025 1:15:42 AIR REST         165           65             98                       PM                                                   +----+-------------------+---------+------------+-------------+------+---------+    LV  5/13/2025 1:23:45 AIR REST         152           -9     7                                PM                                                   +----+-------------------+---------+------------+-------------+------+---------+    LV  5/13/2025 1:23:57 AIR REST         154           -3     8                                PM                                                   +----+-------------------+---------+------------+-------------+------+---------+   LVp  5/13/2025 1:24:04 AIR REST         145          -11     9                                PM                                                   +----+-------------------+---------+------------+-------------+------+---------+   AOp  5/13/2025 1:24:14 AIR REST         149           57             88                       PM                                                    +----+-------------------+---------+------------+-------------+------+---------+        Cardiac Cath Post Procedure Notes:  Post Procedure Diagnosis: Triple vessel disease.  Blood Loss:               Estimated blood loss during the procedure was 20ml                            mls.  Specimens Removed:        Number of specimen(s) removed: none.        ICD 10 Codes:  Non ST elevation (NSTEMI) myocardial infarction-I21.4     CPT Codes:  Left Heart Cath (visualization of coronaries) and LV-24468     [id number] Jordi Simpson DO  Performing Physician  Electronically signed by [id number] Jordi Simpson DO on 5/13/2025 at 2:39:12 PM              ** Final **     Assessment/Plan   History of DM2,HTN,CAD s/p cardiac stent,Hypothyroidism, HepC. ANSELMO  Patient admitted with NSTEMI, multi vessel disease. Referred for CABG.  Will review case with Dr Beltran Javed and see patient in clinic next week. 5/20/25 @ 2420.  Pre op work up will be completed prior to discharge.  Patient agrees with plan    I spent 30 minutes in the professional and overall care of this patient.      Natalia Jamison PA-C

## 2025-05-14 NOTE — CARE PLAN
The patient's goals for the shift include MAXIMIZE COMFORT    The clinical goals for the shift include MONITOR FOR CP, POSSIBLE DISCHARGE    Problem: Cardiac catheterization  Goal: Free from dysrhythmias  Outcome: Progressing     Problem: Cardiac catheterization  Goal: Free from pain  Outcome: Progressing     Problem: Cardiac catheterization  Goal: Promote self management  Outcome: Progressing     Problem: Diabetes  Goal: Increase stability of blood glucose readings by end of shift  5/14/2025 1029 by Jeanie Chester RN  Outcome: Progressing

## 2025-05-15 ENCOUNTER — PATIENT OUTREACH (OUTPATIENT)
Dept: PRIMARY CARE | Facility: CLINIC | Age: 70
End: 2025-05-15
Payer: COMMERCIAL

## 2025-05-15 ENCOUNTER — TELEPHONE (OUTPATIENT)
Dept: PRIMARY CARE | Facility: CLINIC | Age: 70
End: 2025-05-15
Payer: COMMERCIAL

## 2025-05-15 DIAGNOSIS — F32.A DEPRESSION, UNSPECIFIED DEPRESSION TYPE: ICD-10-CM

## 2025-05-15 RX ORDER — BUPROPION HYDROCHLORIDE 200 MG/1
TABLET, EXTENDED RELEASE ORAL
Qty: 180 TABLET | Refills: 1 | Status: SHIPPED | OUTPATIENT
Start: 2025-05-15

## 2025-05-15 RX ORDER — BUPROPION HYDROCHLORIDE 200 MG/1
TABLET, EXTENDED RELEASE ORAL
Qty: 60 TABLET | Refills: 0 | Status: SHIPPED | OUTPATIENT
Start: 2025-05-15

## 2025-05-15 ASSESSMENT — PAIN SCALES - GENERAL: PAINLEVEL_OUTOF10: 0 - NO PAIN

## 2025-05-15 NOTE — TELEPHONE ENCOUNTER
Rx request  Bupropion   [facility]   30 day supply    Then a 90 day supply [facility]

## 2025-05-15 NOTE — PROGRESS NOTES
Discharge Facility: [facility]  Discharge Diagnosis: NSTEMI (non-ST elevated myocardial infarction)   Admission Date: 05/13/2025  Discharge Date: 05/14/2025    PCP Appointment Date: Will follow up with cardiology for now and does not wish to schedule with PCP at this time   Specialist Appointment Date: Cardiac Surg 05/20/2025, Endo 09/15/2025  Hospital Encounter and Summary Linked: Yes  Admission (Discharged) with Barbara Brambila MD (05/13/2025)     Two attempts were made to reach patient within two business days after discharge. Left voicemail with contact information for patient to call back with any non-emergent questions or concerns.    Patient returned call 05/15/2025 at 02:18pm.     Wrap Up  Wrap Up Additional Comments: CM spoke to patient via phone. She states that she is doing well at home. She has all needed medication at the home. She states that she will follow up with cardiology for now and does not wish to schedule with PCP at this time. She has been given this CM's contact infomation and is encouraged to call with any questions. She has no questions at this time and was very thankful for this call. (5/15/2025  2:20 PM)    Medications  Medications reviewed with patient/caregiver?: Yes (5/15/2025  2:20 PM)  Is the patient having any side effects they believe may be caused by any medication additions or changes?: No (5/15/2025  2:20 PM)  Does the patient have all medications ordered at discharge?: Yes (5/15/2025  2:20 PM)  Prescription Comments: Script given at discharge for Nitrostat (5/15/2025  2:20 PM)  Is the patient taking all medications as directed (includes completed medication regime)?: Yes (5/15/2025  2:20 PM)  Medication Comments: patient denies any issues obtaining or affording medication (5/15/2025  2:20 PM)    Appointments  Does the patient have a primary care provider?: Yes (5/15/2025  2:20 PM)  Care Management Interventions: -- (will follow up with cardiology for now and  does not wish to schedule with PCP at this time) (5/15/2025  2:20 PM)  Has the patient kept scheduled appointments due by today?: Yes (5/15/2025  2:20 PM)    Self Management  What is the home health agency?: N/A (5/15/2025  2:20 PM)  What Durable Medical Equipment (DME) was ordered?: N/A (5/15/2025  2:20 PM)    Patient Teaching  Does the patient have access to their discharge instructions?: Yes (5/15/2025  2:20 PM)  Care Management Interventions: Reviewed instructions with patient (5/15/2025  2:20 PM)  What is the patient's perception of their health status since discharge?: Improving (5/15/2025  2:20 PM)  Is the patient/caregiver able to teach back the hierarchy of who to call/visit for symptoms/problems? PCP, Specialist, Home Health nurse, Urgent Care, ED, 911: Yes (5/15/2025  2:20 PM)

## 2025-05-16 LAB
BACTERIA BLD CULT: NORMAL
BACTERIA BLD CULT: NORMAL

## 2025-05-16 RX ORDER — BUPROPION HYDROCHLORIDE 200 MG/1
200 TABLET, EXTENDED RELEASE ORAL 2 TIMES DAILY
Qty: 90 TABLET | Refills: 7 | OUTPATIENT
Start: 2025-05-16

## 2025-05-17 LAB
ATRIAL RATE: 50 BPM
P AXIS: 40 DEGREES
P OFFSET: 212 MS
P ONSET: 154 MS
PR INTERVAL: 136 MS
Q ONSET: 222 MS
QRS COUNT: 8 BEATS
QRS DURATION: 82 MS
QT INTERVAL: 506 MS
QTC CALCULATION(BAZETT): 461 MS
QTC FREDERICIA: 476 MS
R AXIS: -20 DEGREES
T AXIS: 51 DEGREES
T OFFSET: 475 MS
VENTRICULAR RATE: 50 BPM

## 2025-05-20 ENCOUNTER — PREP FOR PROCEDURE (OUTPATIENT)
Dept: CARDIAC SURGERY | Facility: CLINIC | Age: 70
End: 2025-05-20

## 2025-05-20 ENCOUNTER — TELEPHONE (OUTPATIENT)
Facility: CLINIC | Age: 70
End: 2025-05-20

## 2025-05-20 ENCOUNTER — OFFICE VISIT (OUTPATIENT)
Dept: CARDIAC SURGERY | Facility: CLINIC | Age: 70
End: 2025-05-20
Payer: COMMERCIAL

## 2025-05-20 VITALS
HEIGHT: 60 IN | RESPIRATION RATE: 18 BRPM | SYSTOLIC BLOOD PRESSURE: 158 MMHG | WEIGHT: 138 LBS | DIASTOLIC BLOOD PRESSURE: 81 MMHG | OXYGEN SATURATION: 97 % | HEART RATE: 73 BPM | BODY MASS INDEX: 27.09 KG/M2

## 2025-05-20 DIAGNOSIS — I25.10 CAD IN NATIVE ARTERY: Primary | ICD-10-CM

## 2025-05-20 DIAGNOSIS — I25.110 ATHEROSCLEROSIS OF NATIVE CORONARY ARTERY OF NATIVE HEART WITH UNSTABLE ANGINA PECTORIS: Primary | ICD-10-CM

## 2025-05-20 PROCEDURE — 1159F MED LIST DOCD IN RCRD: CPT | Performed by: THORACIC SURGERY (CARDIOTHORACIC VASCULAR SURGERY)

## 2025-05-20 PROCEDURE — 3077F SYST BP >= 140 MM HG: CPT | Performed by: THORACIC SURGERY (CARDIOTHORACIC VASCULAR SURGERY)

## 2025-05-20 PROCEDURE — 3008F BODY MASS INDEX DOCD: CPT | Performed by: THORACIC SURGERY (CARDIOTHORACIC VASCULAR SURGERY)

## 2025-05-20 PROCEDURE — 1126F AMNT PAIN NOTED NONE PRSNT: CPT | Performed by: THORACIC SURGERY (CARDIOTHORACIC VASCULAR SURGERY)

## 2025-05-20 PROCEDURE — 99213 OFFICE O/P EST LOW 20 MIN: CPT | Performed by: THORACIC SURGERY (CARDIOTHORACIC VASCULAR SURGERY)

## 2025-05-20 PROCEDURE — 3079F DIAST BP 80-89 MM HG: CPT | Performed by: THORACIC SURGERY (CARDIOTHORACIC VASCULAR SURGERY)

## 2025-05-20 PROCEDURE — RXMED WILLOW AMBULATORY MEDICATION CHARGE

## 2025-05-20 PROCEDURE — 4010F ACE/ARB THERAPY RXD/TAKEN: CPT | Performed by: THORACIC SURGERY (CARDIOTHORACIC VASCULAR SURGERY)

## 2025-05-20 PROCEDURE — 3050F LDL-C >= 130 MG/DL: CPT | Performed by: THORACIC SURGERY (CARDIOTHORACIC VASCULAR SURGERY)

## 2025-05-20 PROCEDURE — 4004F PT TOBACCO SCREEN RCVD TLK: CPT | Performed by: THORACIC SURGERY (CARDIOTHORACIC VASCULAR SURGERY)

## 2025-05-20 PROCEDURE — 3044F HG A1C LEVEL LT 7.0%: CPT | Performed by: THORACIC SURGERY (CARDIOTHORACIC VASCULAR SURGERY)

## 2025-05-20 PROCEDURE — 1111F DSCHRG MED/CURRENT MED MERGE: CPT | Performed by: THORACIC SURGERY (CARDIOTHORACIC VASCULAR SURGERY)

## 2025-05-20 ASSESSMENT — LIFESTYLE VARIABLES
AUDIT-C TOTAL SCORE: 0
HOW MANY STANDARD DRINKS CONTAINING ALCOHOL DO YOU HAVE ON A TYPICAL DAY: PATIENT DOES NOT DRINK
HOW OFTEN DO YOU HAVE SIX OR MORE DRINKS ON ONE OCCASION: NEVER
SKIP TO QUESTIONS 9-10: 1
HOW OFTEN DO YOU HAVE A DRINK CONTAINING ALCOHOL: NEVER

## 2025-05-20 ASSESSMENT — PAIN SCALES - GENERAL: PAINLEVEL_OUTOF10: 0-NO PAIN

## 2025-05-20 ASSESSMENT — ENCOUNTER SYMPTOMS
DEPRESSION: 0
LOSS OF SENSATION IN FEET: 0
OCCASIONAL FEELINGS OF UNSTEADINESS: 0

## 2025-05-20 ASSESSMENT — PATIENT HEALTH QUESTIONNAIRE - PHQ9
SUM OF ALL RESPONSES TO PHQ9 QUESTIONS 1 AND 2: 0
2. FEELING DOWN, DEPRESSED OR HOPELESS: NOT AT ALL
1. LITTLE INTEREST OR PLEASURE IN DOING THINGS: NOT AT ALL

## 2025-05-20 NOTE — TELEPHONE ENCOUNTER
Patient called the office today stating that she had heart cath with you on 5/13.  She saw Dr. Beltran Javed today and is now scheduled for open heart on 6/3.      She is asking if it is okay for her to have dental work (crown) next week, if so should she stop her ASA?    She is also asking when she should go back to work, she would prefer to stay home until after open heart, she is nervous and would rather stay home due to 99% blockage.      Please advise, thanks  Call back [phone]

## 2025-05-20 NOTE — H&P (VIEW-ONLY)
"Subjective   Isabel Betancourt is a 69 y.o. female referred by asmita for coronary bypass grafting. She reports chest pain. She denies dyspnea and irregular heart beat.      Medical History[1]  Surgical History[2]  Family History[3]    Allergies[4]    Current Medications[5]    Review of systems negative except .       Objective   Cardiac Studies  Cardiac catheterization revealed anterior ischemia.    EF: 40-50%  Vessels: Triple vessel disease: RCA, LAD, and Circumflex    Physical Exam  General: She is a pleasant female currently in no distress.  /81   Pulse 73   Resp 18   Ht 1.53 m (5' 0.25\")   Wt 62.6 kg (138 lb)   LMP 01/01/2011 (Approximate)   SpO2 97%   BMI 26.73 kg/m²    Body mass index is 26.73 kg/m².   HEENT: Normocephalic and atraumatic. PERRLA. EOMs are full. Dentition is unremarkable.  NECK: Supple without thyromegaly, masses, or carotid bruits.  CHEST: Clear.  HEART: Regular rate and rhythm.  ABDOMEN: Soft, flat, nontender without organomegaly or masses.  NEUROLOGIC: Unremarkable.  EXTREMITIES: Unremarkable. Pedal pulses are palpable.    Data Review:       Assessment/Plan   Isabel Betancourt is a 69 y.o. female presenting with N/V and abd pain. Patient states she developed N?V after eating  the day before admission. She states she woke up on Sunday and felt \"bad\" denies chest pain or sob. Went to the ER and was found to have Troponins 388 rising to 647 with out EKG changes. +NSTEMI. She underwent a cardiac cath which shows multi vessel disease.  She has a history of MI with stents placed 10+ years ago.   I think the patient have class I indication for CABG in terms of symptom relief and prognosis.  We will order the preop test and will perform the surgery in the next coming weeks.    We extensively discussed with the patient the risk and benefit and she agreed to proceed I estimate the risk around 1%.  Problem List Items Addressed This Visit    None      No orders of the defined types were " placed in this encounter.                      [1]   Past Medical History:  Diagnosis Date    ADHD (attention deficit hyperactivity disorder)     Anxiety     Chronic viral hepatitis C (Multi)     Depression     Diabetes mellitus with hyperglycemia     Heart disease     Hypercholesterolemia     Hypertension     Hypothyroidism     Myocardial infarction (Multi)     Nausea and vomiting 04/10/2023    Obesity     Visual impairment    [2]   Past Surgical History:  Procedure Laterality Date    CARDIAC CATHETERIZATION N/A 2025    Procedure: LHC, With LV;  Surgeon: Jordi Simpson DO;  Location: [facility] Cardiac Cath Lab;  Service: Cardiovascular;  Laterality: N/A;     SECTION, LOW TRANSVERSE      CORONARY ANGIOPLASTY WITH STENT PLACEMENT     [3]   Family History  Problem Relation Name Age of Onset    Hypertension Mother Mom     Stroke Mother Mom     Hypertension Father Dad     Cancer Father Dad     Alcohol abuse Father Dad     Drug abuse Father Dad    [4]   Allergies  Allergen Reactions    Ace Inhibitors Cough   [5]   Current Outpatient Medications:     aspirin 81 mg EC tablet, = 1 tab(s) ( 81 mg ), PO, Daily, # 90 tab(s), 0 Refill(s), Type: Maintenance, Disp: , Rfl:     atorvastatin (Lipitor) 80 mg tablet, Take 1 tablet (80 mg) by mouth once daily at bedtime., Disp: 30 tablet, Rfl: 0    buPROPion SR (Wellbutrin SR) 200 mg 12 hr tablet,  1 tab(s), Oral, bid, Disp: 60 tablet, Rfl: 0    buPROPion SR (Wellbutrin SR) 200 mg 12 hr tablet,  1 tab(s), Oral, bid, Disp: 180 tablet, Rfl: 1    escitalopram (Lexapro) 10 mg tablet, Take 1 tablet (10 mg) by mouth once daily., Disp: 90 tablet, Rfl: 1    levothyroxine (Synthroid, Levoxyl) 125 mcg tablet, TAKE 1 TABLET DAILY, Disp: 90 tablet, Rfl: 2    losartan (Cozaar) 50 mg tablet, Take 1 tablet (50 mg) by mouth once daily., Disp: 30 tablet, Rfl: 0    metFORMIN XR (Glucophage-XR) 750 mg 24 hr tablet, Take 1 tablet (750 mg) by mouth 2 times a day. Do not crush, chew, or split.,  Disp: , Rfl:     multivitamin (MULTIPLE VITAMINS ORAL), 1 cap(s), PO, Daily, # 90 cap(s), 0 Refill(s), Type: Maintenance, Disp: , Rfl:     nitroglycerin (Nitrostat) 0.4 mg SL tablet, Place 1 tablet (0.4 mg) under the tongue every 5 minutes if needed for chest pain. Can take every 5 minutes for 3 total doses then call 911, Disp: 90 tablet, Rfl: 12    tirzepatide (Mounjaro) 7.5 mg/0.5 mL pen injector, Inject 7.5 mg under the skin 1 (one) time per week., Disp: 2 mL, Rfl: 5

## 2025-05-20 NOTE — PROGRESS NOTES
"Subjective   Isabel Betancourt is a 69 y.o. female referred by asmita for coronary bypass grafting. She reports chest pain. She denies dyspnea and irregular heart beat.      Medical History[1]  Surgical History[2]  Family History[3]    Allergies[4]    Current Medications[5]    Review of systems negative except .       Objective   Cardiac Studies  Cardiac catheterization revealed anterior ischemia.    EF: 40-50%  Vessels: Triple vessel disease: RCA, LAD, and Circumflex    Physical Exam  General: She is a pleasant female currently in no distress.  /81   Pulse 73   Resp 18   Ht 1.53 m (5' 0.25\")   Wt 62.6 kg (138 lb)   LMP 01/01/2011 (Approximate)   SpO2 97%   BMI 26.73 kg/m²    Body mass index is 26.73 kg/m².   HEENT: Normocephalic and atraumatic. PERRLA. EOMs are full. Dentition is unremarkable.  NECK: Supple without thyromegaly, masses, or carotid bruits.  CHEST: Clear.  HEART: Regular rate and rhythm.  ABDOMEN: Soft, flat, nontender without organomegaly or masses.  NEUROLOGIC: Unremarkable.  EXTREMITIES: Unremarkable. Pedal pulses are palpable.    Data Review:       Assessment/Plan   Isabel Betancourt is a 69 y.o. female presenting with N/V and abd pain. Patient states she developed N?V after eating  the day before admission. She states she woke up on Sunday and felt \"bad\" denies chest pain or sob. Went to the ER and was found to have Troponins 388 rising to 647 with out EKG changes. +NSTEMI. She underwent a cardiac cath which shows multi vessel disease.  She has a history of MI with stents placed 10+ years ago.   I think the patient have class I indication for CABG in terms of symptom relief and prognosis.  We will order the preop test and will perform the surgery in the next coming weeks.    We extensively discussed with the patient the risk and benefit and she agreed to proceed I estimate the risk around 1%.  Problem List Items Addressed This Visit    None      No orders of the defined types were " placed in this encounter.                      [1]   Past Medical History:  Diagnosis Date    ADHD (attention deficit hyperactivity disorder)     Anxiety     Chronic viral hepatitis C (Multi)     Depression     Diabetes mellitus with hyperglycemia     Heart disease     Hypercholesterolemia     Hypertension     Hypothyroidism     Myocardial infarction (Multi)     Nausea and vomiting 04/10/2023    Obesity     Visual impairment    [2]   Past Surgical History:  Procedure Laterality Date    CARDIAC CATHETERIZATION N/A 2025    Procedure: LHC, With LV;  Surgeon: Jordi Simpson DO;  Location: [facility];  Service: Cardiovascular;  Laterality: N/A;     SECTION, LOW TRANSVERSE      CORONARY ANGIOPLASTY WITH STENT PLACEMENT     [3]   Family History  Problem Relation Name Age of Onset    Hypertension Mother Mom     Stroke Mother Mom     Hypertension Father Dad     Cancer Father Dad     Alcohol abuse Father Dad     Drug abuse Father Dad    [4]   Allergies  Allergen Reactions    Ace Inhibitors Cough   [5]   Current Outpatient Medications:     aspirin 81 mg EC tablet, = 1 tab(s) ( 81 mg ), PO, Daily, # 90 tab(s), 0 Refill(s), Type: Maintenance, Disp: , Rfl:     atorvastatin (Lipitor) 80 mg tablet, Take 1 tablet (80 mg) by mouth once daily at bedtime., Disp: 30 tablet, Rfl: 0    buPROPion SR (Wellbutrin SR) 200 mg 12 hr tablet,  1 tab(s), Oral, bid, Disp: 60 tablet, Rfl: 0    buPROPion SR (Wellbutrin SR) 200 mg 12 hr tablet,  1 tab(s), Oral, bid, Disp: 180 tablet, Rfl: 1    escitalopram (Lexapro) 10 mg tablet, Take 1 tablet (10 mg) by mouth once daily., Disp: 90 tablet, Rfl: 1    levothyroxine (Synthroid, Levoxyl) 125 mcg tablet, TAKE 1 TABLET DAILY, Disp: 90 tablet, Rfl: 2    losartan (Cozaar) 50 mg tablet, Take 1 tablet (50 mg) by mouth once daily., Disp: 30 tablet, Rfl: 0    metFORMIN XR (Glucophage-XR) 750 mg 24 hr tablet, Take 1 tablet (750 mg) by mouth 2 times a day. Do not crush, chew, or split.,  Disp: , Rfl:     multivitamin (MULTIPLE VITAMINS ORAL), 1 cap(s), PO, Daily, # 90 cap(s), 0 Refill(s), Type: Maintenance, Disp: , Rfl:     nitroglycerin (Nitrostat) 0.4 mg SL tablet, Place 1 tablet (0.4 mg) under the tongue every 5 minutes if needed for chest pain. Can take every 5 minutes for 3 total doses then call 911, Disp: 90 tablet, Rfl: 12    tirzepatide (Mounjaro) 7.5 mg/0.5 mL pen injector, Inject 7.5 mg under the skin 1 (one) time per week., Disp: 2 mL, Rfl: 5

## 2025-05-21 ENCOUNTER — APPOINTMENT (OUTPATIENT)
Dept: PRIMARY CARE | Facility: CLINIC | Age: 70
End: 2025-05-21
Payer: COMMERCIAL

## 2025-05-22 ENCOUNTER — PHARMACY VISIT (OUTPATIENT)
Dept: PHARMACY | Facility: CLINIC | Age: 70
End: 2025-05-22
Payer: COMMERCIAL

## 2025-05-29 ENCOUNTER — LAB (OUTPATIENT)
Dept: LAB | Facility: HOSPITAL | Age: 70
End: 2025-05-29
Payer: COMMERCIAL

## 2025-05-29 ENCOUNTER — PRE-ADMISSION TESTING (OUTPATIENT)
Dept: PREADMISSION TESTING | Facility: HOSPITAL | Age: 70
End: 2025-05-29
Payer: COMMERCIAL

## 2025-05-29 VITALS
OXYGEN SATURATION: 99 % | HEART RATE: 65 BPM | TEMPERATURE: 98.6 F | DIASTOLIC BLOOD PRESSURE: 91 MMHG | BODY MASS INDEX: 26.31 KG/M2 | SYSTOLIC BLOOD PRESSURE: 156 MMHG | WEIGHT: 134 LBS | HEIGHT: 60 IN

## 2025-05-29 DIAGNOSIS — Z01.818 ENCOUNTER FOR OTHER PREPROCEDURAL EXAMINATION: Primary | ICD-10-CM

## 2025-05-29 DIAGNOSIS — Z01.818 PRE-OP EXAMINATION: Primary | ICD-10-CM

## 2025-05-29 LAB
ABO GROUP (TYPE) IN BLOOD: NORMAL
ANTIBODY SCREEN: NORMAL
APPEARANCE UR: ABNORMAL
APTT PPP: 24.8 SECONDS (ref 22–32.5)
BILIRUB UR STRIP.AUTO-MCNC: NEGATIVE MG/DL
CAOX CRY #/AREA UR COMP ASSIST: ABNORMAL /HPF
COLOR UR: YELLOW
GLUCOSE UR STRIP.AUTO-MCNC: NORMAL MG/DL
HOLD SPECIMEN: NORMAL
HYALINE CASTS #/AREA URNS AUTO: ABNORMAL /LPF
INR PPP: 1 (ref 0.9–1.2)
KETONES UR STRIP.AUTO-MCNC: NEGATIVE MG/DL
LEUKOCYTE ESTERASE UR QL STRIP.AUTO: ABNORMAL
MUCOUS THREADS #/AREA URNS AUTO: ABNORMAL /LPF
NITRITE UR QL STRIP.AUTO: NEGATIVE
PH UR STRIP.AUTO: 5.5 [PH]
PROT UR STRIP.AUTO-MCNC: ABNORMAL MG/DL
PROTHROMBIN TIME: 10.5 SECONDS (ref 9.3–12.7)
RBC # UR STRIP.AUTO: ABNORMAL MG/DL
RBC #/AREA URNS AUTO: >20 /HPF
RH FACTOR (ANTIGEN D): NORMAL
SP GR UR STRIP.AUTO: 1.02
SQUAMOUS #/AREA URNS AUTO: ABNORMAL /HPF
UROBILINOGEN UR STRIP.AUTO-MCNC: NORMAL MG/DL
WBC #/AREA URNS AUTO: ABNORMAL /HPF

## 2025-05-29 PROCEDURE — 86901 BLOOD TYPING SEROLOGIC RH(D): CPT

## 2025-05-29 PROCEDURE — 81001 URINALYSIS AUTO W/SCOPE: CPT

## 2025-05-29 PROCEDURE — 99204 OFFICE O/P NEW MOD 45 MIN: CPT

## 2025-05-29 PROCEDURE — 87081 CULTURE SCREEN ONLY: CPT | Mod: WESLAB

## 2025-05-29 PROCEDURE — 86923 COMPATIBILITY TEST ELECTRIC: CPT

## 2025-05-29 PROCEDURE — 85730 THROMBOPLASTIN TIME PARTIAL: CPT

## 2025-05-29 PROCEDURE — 85610 PROTHROMBIN TIME: CPT

## 2025-05-29 PROCEDURE — 87086 URINE CULTURE/COLONY COUNT: CPT

## 2025-05-29 PROCEDURE — 86900 BLOOD TYPING SEROLOGIC ABO: CPT

## 2025-05-29 PROCEDURE — 86850 RBC ANTIBODY SCREEN: CPT

## 2025-05-29 RX ORDER — MUPIROCIN 20 MG/G
OINTMENT TOPICAL 2 TIMES DAILY
Status: ON HOLD | COMMUNITY
Start: 2025-05-21

## 2025-05-29 RX ORDER — CHLORHEXIDINE GLUCONATE ORAL RINSE 1.2 MG/ML
SOLUTION DENTAL
Qty: 473 ML | Refills: 0 | Status: ON HOLD | OUTPATIENT
Start: 2025-05-29 | End: 2025-06-03

## 2025-05-29 ASSESSMENT — ENCOUNTER SYMPTOMS
CONSTITUTIONAL NEGATIVE: 1
CARDIOVASCULAR NEGATIVE: 1
MUSCULOSKELETAL NEGATIVE: 1
NEUROLOGICAL NEGATIVE: 1
ALLERGIC/IMMUNOLOGIC NEGATIVE: 1
EYES NEGATIVE: 1
ENDOCRINE NEGATIVE: 1
PSYCHIATRIC NEGATIVE: 1
RESPIRATORY NEGATIVE: 1
GASTROINTESTINAL NEGATIVE: 1
HEMATOLOGIC/LYMPHATIC NEGATIVE: 1

## 2025-05-29 ASSESSMENT — DUKE ACTIVITY SCORE INDEX (DASI)
CAN YOU DO LIGHT WORK AROUND THE HOUSE LIKE DUSTING OR WASHING DISHES: YES
DASI METS SCORE: 8.2
CAN YOU CLIMB A FLIGHT OF STAIRS OR WALK UP A HILL: YES
CAN YOU PARTICIPATE IN MODERATE RECREATIONAL ACTIVITIES LIKE GOLF, BOWLING, DANCING, DOUBLES TENNIS OR THROWING A BASEBALL OR FOOTBALL: NO
CAN YOU WALK A BLOCK OR TWO ON LEVEL GROUND: YES
CAN YOU PARTICIPATE IN STRENOUS SPORTS LIKE SWIMMING, SINGLES TENNIS, FOOTBALL, BASKETBALL, OR SKIING: NO
CAN YOU DO MODERATE WORK AROUND THE HOUSE LIKE VACUUMING, SWEEPING FLOORS OR CARRYING GROCERIES: YES
CAN YOU TAKE CARE OF YOURSELF (EAT, DRESS, BATHE, OR USE TOILET): YES
CAN YOU DO HEAVY WORK AROUND THE HOUSE LIKE SCRUBBING FLOORS OR LIFTING AND MOVING HEAVY FURNITURE: YES
CAN YOU WALK INDOORS, SUCH AS AROUND YOUR HOUSE: YES
CAN YOU RUN A SHORT DISTANCE: YES
TOTAL_SCORE: 44.7
CAN YOU HAVE SEXUAL RELATIONS: YES
CAN YOU DO YARD WORK LIKE RAKING LEAVES, WEEDING OR PUSHING A MOWER: YES

## 2025-05-29 ASSESSMENT — PAIN - FUNCTIONAL ASSESSMENT: PAIN_FUNCTIONAL_ASSESSMENT: 0-10

## 2025-05-29 ASSESSMENT — PAIN SCALES - GENERAL: PAINLEVEL_OUTOF10: 0 - NO PAIN

## 2025-05-29 NOTE — CPM/PAT H&P
"CPM/PAT Evaluation       Name: Simona Betancourt (Simona Betancourt \"Isabel\")  /Age: 1955/69 y.o.     In-Person       Chief Complaint: CAD    HPI: Simona Betancourt is a 69 year old female with a history of CAD. She has a history of  a MI with multiple cardiac stents. She was recently seen in the ER with nausea and vomiting and admitted for a NSTEMI. She had a cardiac catheterization that revealed:  Coronary angiography:  1.  The left main is a large vessel that bifurcates into the LAD and circumflex and had only mild disease.  2.  The LAD was a moderate-sized vessel that extends to the apex.  The very proximal LAD hasa smooth  60% stenosis followed by the patent stent in the mid LAD.  Beyond this the mid LAD had an eccentric 80% stenosis followed by a long 90% stenosis in a small caliber portion of the mid vessel to modest diagonal branch has 60% stenosis proximally  3.  Circumflex is a moderate nondominant vessel that has moderate proximal/mid stenosis, with a moderate-sized first posterolateral branch that has 50% proximal narrowing, and then a second posterolateral branch has a 60% bifurcation stenosis in its proximal segment and a patent stent in its mid segment.  4.  The right coronary is a large and dominant vessel with widely patent stents in the proximal right coronary artery, diffuse moderate mid right coronary disease a Denovo lesion in the mid right coronary artery that is a 50% stenosis.  Prior to the PDA bifurcation there is a mid vessel right coronary stent that has moderate in-stent restenosis before a moderate-sized PDA and distal right coronary artery, which  have nonobstructive disease.    She has been scheduled for a CABG. She denies fever, chills, chest pain, swelling, SOB, dizziness,and palpitations.     Medical History[1]    Surgical History[2]    Social History     Tobacco Use    Smoking status: Former     Current packs/day: 0.00     Average packs/day: 0.5 packs/day for 54.3 years " (27.2 ttl pk-yrs)     Types: Cigarettes     Start date: 1971     Quit date: 2025     Years since quittin.0     Passive exposure: Current    Smokeless tobacco: Former   Substance Use Topics    Alcohol use: Not Currently     Social History     Substance and Sexual Activity   Drug Use Yes    Frequency: 4.0 times per week    Types: Marijuana    Comment: smokes thc nightly       Family History[3]    Allergies[4]  Current Outpatient Medications   Medication Sig Dispense Refill    aspirin 81 mg EC tablet = 1 tab(s) ( 81 mg ), PO, Daily, # 90 tab(s), 0 Refill(s), Type: Maintenance      atorvastatin (Lipitor) 80 mg tablet Take 1 tablet (80 mg) by mouth once daily at bedtime. 30 tablet 0    buPROPion SR (Wellbutrin SR) 200 mg 12 hr tablet  1 tab(s), Oral, bid 180 tablet 1    escitalopram (Lexapro) 10 mg tablet Take 1 tablet (10 mg) by mouth once daily. 90 tablet 1    levothyroxine (Synthroid, Levoxyl) 125 mcg tablet TAKE 1 TABLET DAILY 90 tablet 2    losartan (Cozaar) 50 mg tablet Take 1 tablet (50 mg) by mouth once daily. 30 tablet 0    metFORMIN XR (Glucophage-XR) 750 mg 24 hr tablet Take 1 tablet (750 mg) by mouth 2 times a day. Do not crush, chew, or split.      multivitamin (MULTIPLE VITAMINS ORAL) 1 cap(s), PO, Daily, # 90 cap(s), 0 Refill(s), Type: Maintenance      mupirocin (Bactroban) 2 % ointment Apply topically 2 times a day.      tirzepatide (Mounjaro) 7.5 mg/0.5 mL pen injector Inject 7.5 mg under the skin 1 (one) time per week. (Patient taking differently: Inject 7.5 mg under the skin 1 (one) time per week. Thursday) 2 mL 5    buPROPion SR (Wellbutrin SR) 200 mg 12 hr tablet  1 tab(s), Oral, bid (Patient not taking: Reported on 2025) 60 tablet 0    chlorhexidine (Peridex) 0.12 % solution Use as directed 473 mL 0    nitroglycerin (Nitrostat) 0.4 mg SL tablet Place 1 tablet (0.4 mg) under the tongue every 5 minutes if needed for chest pain. Can take every 5 minutes for 3 total doses then call [phone]  90 tablet 12     No current facility-administered medications for this visit.          Review of Systems   Constitutional: Negative.    HENT: Negative.     Eyes: Negative.    Respiratory: Negative.     Cardiovascular: Negative.    Gastrointestinal: Negative.    Endocrine: Negative.    Genitourinary: Negative.    Musculoskeletal: Negative.    Skin: Negative.    Allergic/Immunologic: Negative.    Neurological: Negative.    Hematological: Negative.    Psychiatric/Behavioral: Negative.             Physical Exam  Vitals reviewed.   Constitutional:       Appearance: Normal appearance.   HENT:      Head: Normocephalic and atraumatic.      Nose: Nose normal.      Mouth/Throat:      Mouth: Mucous membranes are moist.      Pharynx: Oropharynx is clear.   Eyes:      Extraocular Movements: Extraocular movements intact.      Conjunctiva/sclera: Conjunctivae normal.   Cardiovascular:      Rate and Rhythm: Normal rate and regular rhythm.      Pulses: Normal pulses.      Heart sounds: Normal heart sounds.   Pulmonary:      Effort: Pulmonary effort is normal.      Breath sounds: Normal breath sounds.   Abdominal:      General: Bowel sounds are normal.      Palpations: Abdomen is soft.   Genitourinary:     Comments: Assessment deferred to physician  Musculoskeletal:         General: Normal range of motion.      Cervical back: Normal range of motion and neck supple.   Skin:     General: Skin is warm and dry.   Neurological:      General: No focal deficit present.      Mental Status: She is alert and oriented to person, place, and time.   Psychiatric:         Mood and Affect: Mood normal.         Behavior: Behavior normal.         Thought Content: Thought content normal.         Judgment: Judgment normal.          PAT AIRWAY:   Airway:     Mallampati::  II    TM distance::  >3 FB    Neck ROM::  Full  normal          Visit Vitals  BP (!) 156/91   Pulse 65   Temp 37 °C (98.6 °F) (Temporal)   Ht 1.524 m (5')   Wt 60.8 kg (134 lb)   LMP  2011 (Approximate)   SpO2 99%   BMI 26.17 kg/m²   OB Status Postmenopausal   Smoking Status Former   BSA 1.6 m²   ASA: III  CHADS2: 4.0%  RCRI: 6.6%  DASI: 44.7  METS: 8.2  STOP BAN          Assessment and Plan:     Atherosclerosis of native coronary artery of native heart with unstable angina pectoris : Aspirin, CABG, 3-4 VESSELS   Hypertension: Losartan  Diabetes: metformin, Mounjaro  Hypothyroid:Levothyroxine , Last TSH WNL 25 1.67  Hyperlipidemia: Atorvastatin  Hx of chronic viral Hep C  Depression: Wellbutrin, Lexapro  BMI: 26.17    LABS: UA, MRSA, COAG, Type and Screen ordered in PAT.  Renal, CBC, TSH collected 25, Hemoglobin A1C 5.5  25    EKG 25  TTE 25  CONCLUSIONS:   1. Left ventricular ejection fraction is mildly decreased, by visual estimate at 40-45%.   2. Abnormal wall motion.   3. Spectral Doppler shows a Grade I (impaired relaxation pattern) of left ventricular diastolic filling with normal left atrial filling pressure.   4. There is normal right ventricular global systolic function.   5. Slightly elevated right ventricular systolic pressure    US Carotid 25  CONCLUSIONS:  Right Carotid: Findings are consistent with less than 50% stenosis of the right proximal internal carotid artery. Right external carotid artery appears patent with no evidence of stenosis. The right vertebral artery is patent with antegrade flow. There is a mild delay in upstroke of the vertebral artery that may be suggestive of more proximal stenosis. No evidence of hemodynamically significant stenosis in the right subclavian artery.  Left Carotid: Findings are consistent with 50 to 69% stenosis of the left proximal internal carotid artery. Left external carotid artery appears patent with no evidence of stenosis. The left vertebral artery is patent with antegrade flow. No evidence of hemodynamically significant stenosis in the left subclavian artery.    CT of Chest 25  IMPRESSION:  1.  No infiltrate in the chest.      2. Dilatation of the ascending thoracic aorta measuring 4.1 cm. Mild  vascular calcification demonstrated.         [1]   Past Medical History:  Diagnosis Date    ADHD (attention deficit hyperactivity disorder)     Anxiety     Chronic viral hepatitis C (Multi)     Depression     Diabetes mellitus with hyperglycemia     Heart disease     Hypercholesterolemia     Hypertension     Hypothyroidism     Myocardial infarction (Multi)     Nausea and vomiting 04/10/2023    Obesity     Visual impairment    [2]   Past Surgical History:  Procedure Laterality Date    CARDIAC CATHETERIZATION N/A 2025    Procedure: LHC, With LV;  Surgeon: Jordi Simpson DO;  Location: [facility];  Service: Cardiovascular;  Laterality: N/A;     SECTION, LOW TRANSVERSE      CORONARY ANGIOPLASTY WITH STENT PLACEMENT      KNEE ARTHROPLASTY     [3]   Family History  Problem Relation Name Age of Onset    Hypertension Mother Mom     Stroke Mother Mom     Hypertension Father Dad     Cancer Father Dad     Alcohol abuse Father Dad     Drug abuse Father Dad    [4]   Allergies  Allergen Reactions    Ace Inhibitors Cough

## 2025-05-29 NOTE — PREPROCEDURE INSTRUCTIONS
Medication List            Accurate as of May 29, 2025  7:15 AM. Always use your most recent med list.                aspirin 81 mg EC tablet  Medication Adjustments for Surgery: Take on the morning of surgery     atorvastatin 80 mg tablet  Commonly known as: Lipitor  Take 1 tablet (80 mg) by mouth once daily at bedtime.  Medication Adjustments for Surgery: Take/Use as prescribed        * buPROPion  mg 12 hr tablet  Commonly known as: Wellbutrin SR  1 tab(s), Oral, bid  Medication Adjustments for Surgery: Take on the morning of surgery     escitalopram 10 mg tablet  Commonly known as: Lexapro  Take 1 tablet (10 mg) by mouth once daily.  Medication Adjustments for Surgery: Take on the morning of surgery     levothyroxine 125 mcg tablet  Commonly known as: Synthroid, Levoxyl  TAKE 1 TABLET DAILY  Medication Adjustments for Surgery: Take on the morning of surgery     losartan 50 mg tablet  Commonly known as: Cozaar  Take 1 tablet (50 mg) by mouth once daily.  Medication Adjustments for Surgery: Do Not take on the morning of surgery     metFORMIN  mg 24 hr tablet  Commonly known as: Glucophage-XR  Medication Adjustments for Surgery: Do Not take on the morning of surgery     Mounjaro 7.5 mg/0.5 mL pen injector  Generic drug: tirzepatide  Inject 7.5 mg under the skin 1 (one) time per week.  Medication Adjustments for Surgery: Do Not take on the morning of surgery     MULTIPLE VITAMINS ORAL  Additional Medication Adjustments for Surgery: Take last dose 7 days before surgery     mupirocin 2 % ointment  Commonly known as: Bactroban  Medication Adjustments for Surgery: Take/Use as prescribed     nitroglycerin 0.4 mg SL tablet  Commonly known as: Nitrostat  Place 1 tablet (0.4 mg) under the tongue every 5 minutes if needed for chest pain. Can take every 5 minutes for 3 total doses then call 911  Medication Adjustments for Surgery: Take/Use as prescribed           * This list has 2 medication(s) that are the same  as other medications prescribed for you. Read the directions carefully, and ask your doctor or other care provider to review them with you.                         Preoperative Fasting Guidelines   GLP-1 Medications  Why must I stop eating and drinking before surgery?  With anesthesia, food or liquid in your stomach can enter your lungs causing serious complications  GLP-1 medications can slow the movement of food through your stomach and intestines.  This further increases the risk of food entering your lungs with anesthesia  When do I need to stop eating and drinking before my surgery?  To help ensure food has passed out of your stomach, START a clear liquid diet 24 hours before your surgery  On the day of your surgery/procedure, STOP all clear liquids 2 hours before your arrival time to the hospital/facility   A clear liquid diet consists of clear liquids and foods that melt into a clear liquid (i.e. gelatin) and excludes solid foods and liquids you cannot see through (i.e. milk). Clears can and should contain sugar to obtain a sufficient number of calories.  A clear liquid diet includes  Clear, fat-free broth  Clear nutritional drinks  Pulp-free popsicles, vegetable and fruit juice  Gelatin  Coffee and tea without creamer or milk  Clear soda and sports drinks    Diabetic Patients  Clear liquids should not be sugar-free   Check your blood glucose levels as you normally do  If you have symptoms of low blood glucose (shakiness, sweating, dizziness, confusion) or high blood glucose (dry mouth, excessive thirst, frequent urination, blurry vision), check your blood glucose level  For low blood glucose increase your consumption of sugar-containing clear liquid   For high blood glucose, decrease your consumption of sugar-containing clears and treat as you normally would  If symptoms persist seek medical attention    Examples of GLP-1  Medications  Trulicity  Ozempic  Mounjaro  Zepbound  Swapna Guzmanza  Latanya GODFREY DISCHARGE INSTRUCTIONS    Please call the Same Day Surgery (SDS) Department of the hospital where your procedure will be performed after 2:00 PM the day before your surgery. If you are scheduled on a Monday, or a Tuesday following a Monday holiday, you will need to call on the last business day prior to your surgery.    [facility]  [address] [place] [address] [phone]  [facility]  [id number] [place] [address], [address] [phone]  [facility]  [address]  [place] [id number] [phone]    Please let your surgeon know if:      You develop any open sores, shingles, burning or painful urination as these may increase your risk of an infection.   You no longer wish to have the surgery.   Any other personal circumstances change that may lead to the need to cancel or defer this surgery-such as being sick or getting admitted to any hospital within one week of your planned procedure.    Your contact details change, such as a change of address or phone number.    Starting now:     Please DO NOT drink alcohol or smoke for 24 hours before surgery. It is well known that quitting smoking can make a huge difference to your health and recovery from surgery. The longer you abstain from smoking, the better your chances of a healthy recovery. If you need help with quitting, call 2-800-QUIT-NOW to be connected to a trained counselor who will discuss the best methods to help you quit.     Before your surgery:    Please stop all supplements 7 days prior to surgery. Or as directed by your surgeon.   Please stop taking NSAID pain medicine such as Advil and Motrin 7 days before surgery.    If you develop any fever, cough, cold, rashes, cuts, scratches, scrapes,  urinary symptoms or infection anywhere on your body (including teeth and gums) prior to surgery, please call your surgeon’s office as soon as possible. This may require treatment to reduce the chance of cancellation on the day of surgery.    The day before your surgery:   DIET- Please follow the diet instructions at the top of your packet.   Get a good night’s rest.  Use the special soap for bathing if you have been instructed to use one.    Scheduled surgery times may change and you will be notified if this occurs - please check your personal voicemail for any updates.     On the morning of surgery:   Wear comfortable, loose fitting clothes which open in the front. Please do not wear moisturizers, creams, lotions, makeup or perfume.    Please bring with you to surgery:   Photo ID and insurance card   Current list of medicines and allergies   Pacemaker/ Defibrillator/Heart stent cards   CPAP machine and mask    Slings/ splints/ crutches   A copy of your complete advanced directive/DHPOA.    Please do NOT bring with you to surgery:   All jewelry and valuables should be left at home.   Prosthetic devices such as contact lenses, hearing aids, dentures, eyelash extensions, hairpins and body piercings must be removed prior to going in to the surgical suite.    After outpatient surgery:   A responsible adult MUST accompany you at the time of discharge and stay with you for 24 hours after your surgery. You may NOT drive yourself home after surgery.    Do not drive, operate machinery, make critical decisions or do activities that require co-ordination or balance until after a night’s sleep.   Do not drink alcoholic beverages for 24 hours.   Instructions for resuming your medications will be provided by your surgeon.    CALL YOUR DOCTOR AFTER SURGERY IF YOU HAVE:     Chills and/or a fever of 101° F or higher.    Redness, swelling, pus or drainage from your surgical wound or a bad smell from the wound.    Lightheadedness,  fainting or confusion.    Persistent vomiting (throwing up) and are not able to eat or drink for 12 hours.    Three or more loose, watery bowel movements in 24 hours (diarrhea).   Difficulty or pain while urinating( after non-urological surgery)    Pain and swelling in your legs, especially if it is only on one side.    Difficulty breathing or are breathing faster than normal.    Any new concerning symptoms.      Patient Information: Pre-Operative Infection Prevention Measures     Why did I have my nose, under my arms, and groin swabbed?  The purpose of the swab is to identify Staphylococcus aureus inside your nose or on your skin.  The swab was sent to the laboratory for culture.  A positive swab/culture for Staphylococcus aureus is called colonization or carriage.      What is Staphylococcus aureus?  Staphylococcus aureus, also known as “staph”, is a germ found on the skin or in the nose of healthy people.  Sometimes Staphylococcus aureus can get into the body and cause an infection.  This can be minor (such as pimples, boils, or other skin problems).  It might also be serious (such as a blood infection, pneumonia, or a surgical site infection).    What is Staphylococcus aureus colonization or carriage?  Colonization or carriage means that a person has the germ but is not sick from it.  These bacteria can be spread on the hands or when breathing or sneezing.    How is Staphylococcus aureus spread?  It is most often spread by close contact with a person or item that carries it.    What happens if my culture is positive for Staphylococcus aureus?  Your doctor/medical team will use this information to guide any antibiotic treatment which may be necessary.  Regardless of the culture results, we will clean the inside of your nose with a betadine swab just before you have your surgery.      Will I get an infection if I have Staphylococcus aureus in my nose or on my skin?  Anyone can get an infection with Staphylococcus  aureus.  However, the best way to reduce your risk of infection is to follow the instructions provided to you for the use of your CHG soap and dental rinse.        Patient Information: Oral/Dental Rinse    What is oral/dental rinse?   It is a mouthwash. It is a way of cleaning the mouth with a germ-killing solution before your surgery.  The solution contains chlorhexidine, commonly known as CHG.   It is used inside the mouth to kill a bacteria known as Staphylococcus aureus.  Let your doctor know if you are allergic to Chlorhexidine.    Why do I need to use CHG oral/dental rinse?  The CHG oral/dental rinse helps to kill a bacteria in your mouth known as Staphylococcus aureus.     This reduces the risk of infection at the surgical site.      Using your CHG oral/dental rinse  STEPS:  Use your CHG oral/dental rinse after you brush your teeth the night before (at bedtime) and the morning of your surgery.  Follow all directions on your prescription label.    Use the cap on the container to measure 15ml   Swish (gargle if you can) the mouthwash in your mouth for at least 30 seconds, (do not swallow) and spit out  After you use your CHG rinse, do not rinse your mouth with water, drink or eat.  Please refer to the prescription label for the appropriate time to resume oral intake      What side effects might I have using the CHG oral/dental rinse?  CHG rinse will stick to plaque on the teeth.  Brush and floss just before use.  Teeth brushing will help avoid staining of plaque during use.      Patient Information: Home Preoperative Antibacterial Shower      What is a home preoperative antibacterial shower?  This shower is a way of cleaning the skin with a germ-killing solution before surgery.  The solution contains chlorhexidine, commonly known as CHG.  CHG is a skin cleanser with germ-killing ability.  Let your doctor know if you are allergic to chlorhexidine.    Why do I need to take a preoperative antibacterial  shower?  Skin is not sterile.  It is best to try to make your skin as free of germs as possible before surgery.  Proper cleansing with a germ-killing soap before surgery can lower the number of germs on your skin.  This helps to reduce the risk of infection at the surgical site.  Following the instructions listed below will help you prepare your skin for surgery.      How do I use the solution?  Steps:  Begin using your CHG soap 5 days before your scheduled surgery on ____6/3/25 -- start wash 5/30/25__.    First, wash and rinse your hair using the CHG soap. Keep CHG soap away from ear canals and eyes.  Rinse completely, do not condition.  Hair extensions should be removed.  Wash your face with your normal soap and rinse.    Apply the CHG solution to a clean wet washcloth.  Turn the water off or move away from the water spray to avoid premature rinsing of the CHG soap as you are applying.   Firmly lather your entire body from the neck down.  Do not use on your face.  Pay special attention to the area(s) where your incision(s) will be located unless they are on your face.  Avoid scrubbing your skin too hard.  The important point is to have the CHG soap sit on your skin for 3 minutes.    When the 3 minutes are up, turn on the water and rinse the CHG solution off your body completely.   DO NOT wash with regular soap after you have used the CHG soap solution  Pat yourself dry with a clean, freshly-laundered towel.  DO NOT apply powders, deodorants, or lotions.  Dress in clean, freshly laundered nightclothes.    Be sure to sleep with clean, freshly laundered sheets.  Be aware that CHG will cause stains on fabrics; if you wash them with bleach after use.  Rinse your washcloth and other linens that have contact with CHG completely.  Use only non-chlorine detergents to launder the items used.   The morning of surgery is the fifth day.  Repeat the above steps and dress in clean comfortable clothing     Whom should I contact  if I have any questions regarding the use of CHG soap?  Call the [facility] at [phone] if you have any questions.

## 2025-05-30 LAB — BACTERIA UR CULT: NO GROWTH

## 2025-05-31 LAB — STAPHYLOCOCCUS SPEC CULT: NORMAL

## 2025-06-03 ENCOUNTER — HOSPITAL ENCOUNTER (INPATIENT)
Facility: HOSPITAL | Age: 70
End: 2025-06-03
Attending: THORACIC SURGERY (CARDIOTHORACIC VASCULAR SURGERY) | Admitting: THORACIC SURGERY (CARDIOTHORACIC VASCULAR SURGERY)
Payer: COMMERCIAL

## 2025-06-03 ENCOUNTER — APPOINTMENT (OUTPATIENT)
Dept: CARDIOLOGY | Facility: HOSPITAL | Age: 70
DRG: 236 | End: 2025-06-03
Payer: COMMERCIAL

## 2025-06-03 ENCOUNTER — ANESTHESIA EVENT (OUTPATIENT)
Dept: OPERATING ROOM | Facility: HOSPITAL | Age: 70
End: 2025-06-03
Payer: COMMERCIAL

## 2025-06-03 ENCOUNTER — ANESTHESIA (OUTPATIENT)
Dept: OPERATING ROOM | Facility: HOSPITAL | Age: 70
End: 2025-06-03
Payer: COMMERCIAL

## 2025-06-03 ENCOUNTER — APPOINTMENT (OUTPATIENT)
Dept: RADIOLOGY | Facility: HOSPITAL | Age: 70
DRG: 236 | End: 2025-06-03
Payer: COMMERCIAL

## 2025-06-03 ENCOUNTER — HOSPITAL ENCOUNTER (OUTPATIENT)
Dept: OPERATING ROOM | Facility: HOSPITAL | Age: 70
Setting detail: SURGERY ADMIT
Discharge: HOME | End: 2025-06-03
Payer: COMMERCIAL

## 2025-06-03 DIAGNOSIS — I21.4 NSTEMI (NON-ST ELEVATED MYOCARDIAL INFARCTION) (MULTI): ICD-10-CM

## 2025-06-03 DIAGNOSIS — I25.110 ATHEROSCLEROSIS OF NATIVE CORONARY ARTERY OF NATIVE HEART WITH UNSTABLE ANGINA PECTORIS: Primary | ICD-10-CM

## 2025-06-03 DIAGNOSIS — G89.18 PAIN AT SURGICAL SITE: ICD-10-CM

## 2025-06-03 LAB
ABO GROUP (TYPE) IN BLOOD: NORMAL
ALBUMIN SERPL BCP-MCNC: 3.1 G/DL (ref 3.4–5)
ALBUMIN SERPL BCP-MCNC: 4 G/DL (ref 3.4–5)
ANION GAP BLDA CALCULATED.4IONS-SCNC: 10 MMO/L (ref 10–25)
ANION GAP BLDA CALCULATED.4IONS-SCNC: 11 MMO/L (ref 10–25)
ANION GAP BLDA CALCULATED.4IONS-SCNC: 11 MMO/L (ref 10–25)
ANION GAP BLDA CALCULATED.4IONS-SCNC: 13 MMO/L (ref 10–25)
ANION GAP BLDA CALCULATED.4IONS-SCNC: 7 MMO/L (ref 10–25)
ANION GAP BLDA CALCULATED.4IONS-SCNC: 7 MMO/L (ref 10–25)
ANION GAP BLDA CALCULATED.4IONS-SCNC: 9 MMO/L (ref 10–25)
ANION GAP BLDA CALCULATED.4IONS-SCNC: 9 MMO/L (ref 10–25)
ANION GAP BLDV CALCULATED.4IONS-SCNC: 9 MMOL/L (ref 10–25)
ANION GAP SERPL CALCULATED.3IONS-SCNC: 11 MMOL/L (ref 10–20)
ANION GAP SERPL CALCULATED.3IONS-SCNC: 12 MMOL/L (ref 10–20)
APPARATUS: ABNORMAL
APPARATUS: ABNORMAL
APTT PPP: 26.8 SECONDS (ref 22–32.5)
ARTERIAL PATENCY WRIST A: POSITIVE
BASE EXCESS BLDA CALC-SCNC: -0.4 MMOL/L (ref -2–3)
BASE EXCESS BLDA CALC-SCNC: -1 MMOL/L (ref -2–3)
BASE EXCESS BLDA CALC-SCNC: -3.5 MMOL/L (ref -2–3)
BASE EXCESS BLDA CALC-SCNC: 0.4 MMOL/L (ref -2–3)
BASE EXCESS BLDA CALC-SCNC: 0.8 MMOL/L (ref -2–3)
BASE EXCESS BLDA CALC-SCNC: 1.2 MMOL/L (ref -2–3)
BASE EXCESS BLDA CALC-SCNC: 1.2 MMOL/L (ref -2–3)
BASE EXCESS BLDA CALC-SCNC: 1.5 MMOL/L (ref -2–3)
BASE EXCESS BLDA CALC-SCNC: 2.5 MMOL/L (ref -2–3)
BASE EXCESS BLDA CALC-SCNC: 3.2 MMOL/L (ref -2–3)
BASE EXCESS BLDV CALC-SCNC: 3.4 MMOL/L (ref -2–3)
BODY TEMPERATURE: 37 DEGREES CELSIUS
BUN SERPL-MCNC: 34 MG/DL (ref 6–23)
BUN SERPL-MCNC: 35 MG/DL (ref 6–23)
CA-I BLD-SCNC: 1.14 MMOL/L (ref 1.1–1.33)
CA-I BLDA-SCNC: 0.97 MMOL/L (ref 1.1–1.33)
CA-I BLDA-SCNC: 1.05 MMOL/L (ref 1.1–1.33)
CA-I BLDA-SCNC: 1.07 MMOL/L (ref 1.1–1.33)
CA-I BLDA-SCNC: 1.08 MMOL/L (ref 1.1–1.33)
CA-I BLDA-SCNC: 1.13 MMOL/L (ref 1.1–1.33)
CA-I BLDA-SCNC: 1.18 MMOL/L (ref 1.1–1.33)
CA-I BLDA-SCNC: 1.21 MMOL/L (ref 1.1–1.33)
CA-I BLDA-SCNC: 1.21 MMOL/L (ref 1.1–1.33)
CA-I BLDA-SCNC: 1.24 MMOL/L (ref 1.1–1.33)
CA-I BLDA-SCNC: 1.26 MMOL/L (ref 1.1–1.33)
CA-I BLDV-SCNC: 1.05 MMOL/L (ref 1.1–1.33)
CALCIUM SERPL-MCNC: 8.6 MG/DL (ref 8.6–10.3)
CALCIUM SERPL-MCNC: 9 MG/DL (ref 8.6–10.3)
CHLORIDE BLDA-SCNC: 100 MMOL/L (ref 98–107)
CHLORIDE BLDA-SCNC: 101 MMOL/L (ref 98–107)
CHLORIDE BLDA-SCNC: 101 MMOL/L (ref 98–107)
CHLORIDE BLDA-SCNC: 102 MMOL/L (ref 98–107)
CHLORIDE BLDA-SCNC: 103 MMOL/L (ref 98–107)
CHLORIDE BLDA-SCNC: 104 MMOL/L (ref 98–107)
CHLORIDE BLDA-SCNC: 99 MMOL/L (ref 98–107)
CHLORIDE BLDV-SCNC: 101 MMOL/L (ref 98–107)
CHLORIDE SERPL-SCNC: 104 MMOL/L (ref 98–107)
CHLORIDE SERPL-SCNC: 107 MMOL/L (ref 98–107)
CO2 SERPL-SCNC: 24 MMOL/L (ref 21–32)
CO2 SERPL-SCNC: 24 MMOL/L (ref 21–32)
CREAT SERPL-MCNC: 1.26 MG/DL (ref 0.5–1.05)
CREAT SERPL-MCNC: 1.37 MG/DL (ref 0.5–1.05)
EGFRCR SERPLBLD CKD-EPI 2021: 42 ML/MIN/1.73M*2
EGFRCR SERPLBLD CKD-EPI 2021: 47 ML/MIN/1.73M*2
ERYTHROCYTE [DISTWIDTH] IN BLOOD BY AUTOMATED COUNT: 12.7 % (ref 11.5–14.5)
FIBRINOGEN PPP-MCNC: 161 MG/DL (ref 200–400)
GLUCOSE BLD MANUAL STRIP-MCNC: 110 MG/DL (ref 74–99)
GLUCOSE BLD MANUAL STRIP-MCNC: 127 MG/DL (ref 74–99)
GLUCOSE BLD MANUAL STRIP-MCNC: 135 MG/DL (ref 74–99)
GLUCOSE BLD MANUAL STRIP-MCNC: 136 MG/DL (ref 74–99)
GLUCOSE BLD MANUAL STRIP-MCNC: 161 MG/DL (ref 74–99)
GLUCOSE BLD MANUAL STRIP-MCNC: 162 MG/DL (ref 74–99)
GLUCOSE BLD MANUAL STRIP-MCNC: 79 MG/DL (ref 74–99)
GLUCOSE BLDA-MCNC: 104 MG/DL (ref 74–99)
GLUCOSE BLDA-MCNC: 175 MG/DL (ref 74–99)
GLUCOSE BLDA-MCNC: 78 MG/DL (ref 74–99)
GLUCOSE BLDA-MCNC: 80 MG/DL (ref 74–99)
GLUCOSE BLDA-MCNC: 80 MG/DL (ref 74–99)
GLUCOSE BLDA-MCNC: 86 MG/DL (ref 74–99)
GLUCOSE BLDA-MCNC: 88 MG/DL (ref 74–99)
GLUCOSE BLDA-MCNC: 93 MG/DL (ref 74–99)
GLUCOSE BLDA-MCNC: 94 MG/DL (ref 74–99)
GLUCOSE BLDA-MCNC: 94 MG/DL (ref 74–99)
GLUCOSE BLDV-MCNC: 84 MG/DL (ref 74–99)
GLUCOSE SERPL-MCNC: 133 MG/DL (ref 74–99)
GLUCOSE SERPL-MCNC: 90 MG/DL (ref 74–99)
HCO3 BLDA-SCNC: 22.7 MMOL/L (ref 22–26)
HCO3 BLDA-SCNC: 24.3 MMOL/L (ref 22–26)
HCO3 BLDA-SCNC: 24.6 MMOL/L (ref 22–26)
HCO3 BLDA-SCNC: 25.4 MMOL/L (ref 22–26)
HCO3 BLDA-SCNC: 26 MMOL/L (ref 22–26)
HCO3 BLDA-SCNC: 27.7 MMOL/L (ref 22–26)
HCO3 BLDA-SCNC: 28.4 MMOL/L (ref 22–26)
HCO3 BLDA-SCNC: 28.5 MMOL/L (ref 22–26)
HCO3 BLDV-SCNC: 28.5 MMOL/L (ref 22–26)
HCT VFR BLD AUTO: 33.1 % (ref 36–46)
HCT VFR BLD EST: 28 % (ref 36–46)
HCT VFR BLD EST: 29 % (ref 36–46)
HCT VFR BLD EST: 31 % (ref 36–46)
HCT VFR BLD EST: 38 % (ref 36–46)
HCT VFR BLD EST: 38 % (ref 36–46)
HCT VFR BLD EST: 40 % (ref 36–46)
HCT VFR BLD EST: 41 % (ref 36–46)
HGB BLD-MCNC: 11.1 G/DL (ref 12–16)
HGB BLDA-MCNC: 10.2 G/DL (ref 12–16)
HGB BLDA-MCNC: 12.7 G/DL (ref 12–16)
HGB BLDA-MCNC: 12.8 G/DL (ref 12–16)
HGB BLDA-MCNC: 13.4 G/DL (ref 12–16)
HGB BLDA-MCNC: 13.7 G/DL (ref 12–16)
HGB BLDA-MCNC: 13.7 G/DL (ref 12–16)
HGB BLDA-MCNC: 13.8 G/DL (ref 12–16)
HGB BLDA-MCNC: 9.4 G/DL (ref 12–16)
HGB BLDA-MCNC: 9.6 G/DL (ref 12–16)
HGB BLDA-MCNC: 9.6 G/DL (ref 12–16)
HGB BLDV-MCNC: 9.7 G/DL (ref 12–16)
INHALED O2 CONCENTRATION: 100 %
INHALED O2 CONCENTRATION: 30 %
INHALED O2 CONCENTRATION: 50 %
INHALED O2 CONCENTRATION: 85 %
INR PPP: 1.2 (ref 0.9–1.2)
LACTATE BLDA-SCNC: 0.8 MMOL/L (ref 0.4–2)
LACTATE BLDA-SCNC: 1.1 MMOL/L (ref 0.4–2)
LACTATE BLDA-SCNC: 1.3 MMOL/L (ref 0.4–2)
LACTATE BLDA-SCNC: 1.5 MMOL/L (ref 0.4–2)
LACTATE BLDA-SCNC: 1.6 MMOL/L (ref 0.4–2)
LACTATE BLDA-SCNC: 1.7 MMOL/L (ref 0.4–2)
LACTATE BLDA-SCNC: 1.7 MMOL/L (ref 0.4–2)
LACTATE BLDA-SCNC: 2 MMOL/L (ref 0.4–2)
LACTATE BLDV-SCNC: 1.9 MMOL/L (ref 0.4–2)
MAGNESIUM SERPL-MCNC: 2.4 MG/DL (ref 1.6–2.4)
MAGNESIUM SERPL-MCNC: 2.74 MG/DL (ref 1.6–2.4)
MCH RBC QN AUTO: 31.4 PG (ref 26–34)
MCHC RBC AUTO-ENTMCNC: 33.5 G/DL (ref 32–36)
MCV RBC AUTO: 94 FL (ref 80–100)
NRBC BLD-RTO: 0 /100 WBCS (ref 0–0)
OXYHGB MFR BLDA: 95.1 % (ref 94–98)
OXYHGB MFR BLDA: 95.4 % (ref 94–98)
OXYHGB MFR BLDA: 96.9 % (ref 94–98)
OXYHGB MFR BLDA: 97 % (ref 94–98)
OXYHGB MFR BLDA: 97 % (ref 94–98)
OXYHGB MFR BLDA: 97.1 % (ref 94–98)
OXYHGB MFR BLDA: 97.3 % (ref 94–98)
OXYHGB MFR BLDA: 97.3 % (ref 94–98)
OXYHGB MFR BLDA: 97.4 % (ref 94–98)
OXYHGB MFR BLDA: 97.6 % (ref 94–98)
OXYHGB MFR BLDV: 85.4 % (ref 45–75)
PCO2 BLDA: 37 MM HG (ref 38–42)
PCO2 BLDA: 41 MM HG (ref 38–42)
PCO2 BLDA: 41 MM HG (ref 38–42)
PCO2 BLDA: 42 MM HG (ref 38–42)
PCO2 BLDA: 43 MM HG (ref 38–42)
PCO2 BLDA: 44 MM HG (ref 38–42)
PCO2 BLDA: 45 MM HG (ref 38–42)
PCO2 BLDA: 46 MM HG (ref 38–42)
PCO2 BLDA: 48 MM HG (ref 38–42)
PCO2 BLDA: 49 MM HG (ref 38–42)
PCO2 BLDV: 45 MM HG (ref 41–51)
PEEP CMH2O: 5 CM H2O
PEEP CMH2O: 8 CM H2O
PH BLDA: 7.32 PH (ref 7.38–7.42)
PH BLDA: 7.36 PH (ref 7.38–7.42)
PH BLDA: 7.36 PH (ref 7.38–7.42)
PH BLDA: 7.37 PH (ref 7.38–7.42)
PH BLDA: 7.38 PH (ref 7.38–7.42)
PH BLDA: 7.39 PH (ref 7.38–7.42)
PH BLDA: 7.4 PH (ref 7.38–7.42)
PH BLDA: 7.41 PH (ref 7.38–7.42)
PH BLDA: 7.41 PH (ref 7.38–7.42)
PH BLDA: 7.43 PH (ref 7.38–7.42)
PH BLDV: 7.41 PH (ref 7.33–7.43)
PHOSPHATE SERPL-MCNC: 4.3 MG/DL (ref 2.5–4.9)
PHOSPHATE SERPL-MCNC: 5.5 MG/DL (ref 2.5–4.9)
PLATELET # BLD AUTO: 160 X10*3/UL (ref 150–450)
PO2 BLDA: 117 MM HG (ref 85–95)
PO2 BLDA: 167 MM HG (ref 85–95)
PO2 BLDA: 182 MM HG (ref 85–95)
PO2 BLDA: 211 MM HG (ref 85–95)
PO2 BLDA: 213 MM HG (ref 85–95)
PO2 BLDA: 417 MM HG (ref 85–95)
PO2 BLDA: 419 MM HG (ref 85–95)
PO2 BLDA: 434 MM HG (ref 85–95)
PO2 BLDA: 446 MM HG (ref 85–95)
PO2 BLDA: 533 MM HG (ref 85–95)
PO2 BLDV: 50 MM HG (ref 35–45)
POTASSIUM BLDA-SCNC: 3.7 MMOL/L (ref 3.5–5.3)
POTASSIUM BLDA-SCNC: 3.7 MMOL/L (ref 3.5–5.3)
POTASSIUM BLDA-SCNC: 3.9 MMOL/L (ref 3.5–5.3)
POTASSIUM BLDA-SCNC: 4 MMOL/L (ref 3.5–5.3)
POTASSIUM BLDA-SCNC: 4.4 MMOL/L (ref 3.5–5.3)
POTASSIUM BLDA-SCNC: 4.7 MMOL/L (ref 3.5–5.3)
POTASSIUM BLDA-SCNC: 4.7 MMOL/L (ref 3.5–5.3)
POTASSIUM BLDA-SCNC: 5 MMOL/L (ref 3.5–5.3)
POTASSIUM BLDV-SCNC: 4.5 MMOL/L (ref 3.5–5.3)
POTASSIUM SERPL-SCNC: 3.9 MMOL/L (ref 3.5–5.3)
POTASSIUM SERPL-SCNC: 4.3 MMOL/L (ref 3.5–5.3)
PRESSURE SUPPORT: 5 CM H2O
PROTHROMBIN TIME: 12.8 SECONDS (ref 9.3–12.7)
RBC # BLD AUTO: 3.53 X10*6/UL (ref 4–5.2)
RH FACTOR (ANTIGEN D): NORMAL
SAO2 % BLDA: 98 % (ref 94–100)
SAO2 % BLDA: 98 % (ref 94–100)
SAO2 % BLDA: 99 % (ref 94–100)
SAO2 % BLDV: 88 % (ref 45–75)
SODIUM BLDA-SCNC: 130 MMOL/L (ref 136–145)
SODIUM BLDA-SCNC: 131 MMOL/L (ref 136–145)
SODIUM BLDA-SCNC: 133 MMOL/L (ref 136–145)
SODIUM BLDA-SCNC: 134 MMOL/L (ref 136–145)
SODIUM BLDV-SCNC: 134 MMOL/L (ref 136–145)
SODIUM SERPL-SCNC: 136 MMOL/L (ref 136–145)
SODIUM SERPL-SCNC: 138 MMOL/L (ref 136–145)
SPECIMEN DRAWN FROM PATIENT: ABNORMAL
SPECIMEN DRAWN FROM PATIENT: ABNORMAL
TIDAL VOLUME: 360 ML
VENTILATOR MODE: ABNORMAL
VENTILATOR MODE: ABNORMAL
VENTILATOR RATE: 18 BPM
WBC # BLD AUTO: 18.5 X10*3/UL (ref 4.4–11.3)

## 2025-06-03 PROCEDURE — 3700000001 HC GENERAL ANESTHESIA TIME - INITIAL BASE CHARGE: Performed by: THORACIC SURGERY (CARDIOTHORACIC VASCULAR SURGERY)

## 2025-06-03 PROCEDURE — 2500000005 HC RX 250 GENERAL PHARMACY W/O HCPCS

## 2025-06-03 PROCEDURE — 2500000002 HC RX 250 W HCPCS SELF ADMINISTERED DRUGS (ALT 637 FOR MEDICARE OP, ALT 636 FOR OP/ED)

## 2025-06-03 PROCEDURE — 36600 WITHDRAWAL OF ARTERIAL BLOOD: CPT

## 2025-06-03 PROCEDURE — 76937 US GUIDE VASCULAR ACCESS: CPT | Performed by: ANESTHESIOLOGY

## 2025-06-03 PROCEDURE — 02100Z9 BYPASS CORONARY ARTERY, ONE ARTERY FROM LEFT INTERNAL MAMMARY, OPEN APPROACH: ICD-10-PCS | Performed by: THORACIC SURGERY (CARDIOTHORACIC VASCULAR SURGERY)

## 2025-06-03 PROCEDURE — 87081 CULTURE SCREEN ONLY: CPT | Mod: WESLAB | Performed by: PHYSICIAN ASSISTANT

## 2025-06-03 PROCEDURE — 71045 X-RAY EXAM CHEST 1 VIEW: CPT | Performed by: RADIOLOGY

## 2025-06-03 PROCEDURE — 85384 FIBRINOGEN ACTIVITY: CPT | Performed by: PHYSICIAN ASSISTANT

## 2025-06-03 PROCEDURE — 84132 ASSAY OF SERUM POTASSIUM: CPT | Performed by: PHYSICIAN ASSISTANT

## 2025-06-03 PROCEDURE — 93005 ELECTROCARDIOGRAM TRACING: CPT

## 2025-06-03 PROCEDURE — 2500000005 HC RX 250 GENERAL PHARMACY W/O HCPCS: Performed by: PHYSICIAN ASSISTANT

## 2025-06-03 PROCEDURE — A33534 PR CABG, ARTERIAL, TWO: Performed by: ANESTHESIOLOGY

## 2025-06-03 PROCEDURE — 84132 ASSAY OF SERUM POTASSIUM: CPT

## 2025-06-03 PROCEDURE — 36620 INSERTION CATHETER ARTERY: CPT | Performed by: ANESTHESIOLOGY

## 2025-06-03 PROCEDURE — 2500000005 HC RX 250 GENERAL PHARMACY W/O HCPCS: Performed by: THORACIC SURGERY (CARDIOTHORACIC VASCULAR SURGERY)

## 2025-06-03 PROCEDURE — 82805 BLOOD GASES W/O2 SATURATION: CPT

## 2025-06-03 PROCEDURE — 82947 ASSAY GLUCOSE BLOOD QUANT: CPT

## 2025-06-03 PROCEDURE — 3600000011 HC PERFUSION TIME - INITIAL BASE CHARGE: Performed by: THORACIC SURGERY (CARDIOTHORACIC VASCULAR SURGERY)

## 2025-06-03 PROCEDURE — 2500000001 HC RX 250 WO HCPCS SELF ADMINISTERED DRUGS (ALT 637 FOR MEDICARE OP): Performed by: PHYSICIAN ASSISTANT

## 2025-06-03 PROCEDURE — C1894 INTRO/SHEATH, NON-LASER: HCPCS | Performed by: THORACIC SURGERY (CARDIOTHORACIC VASCULAR SURGERY)

## 2025-06-03 PROCEDURE — A4312 CATH W/O BAG 2-WAY SILICONE: HCPCS | Performed by: THORACIC SURGERY (CARDIOTHORACIC VASCULAR SURGERY)

## 2025-06-03 PROCEDURE — 06BQ4ZZ EXCISION OF LEFT SAPHENOUS VEIN, PERCUTANEOUS ENDOSCOPIC APPROACH: ICD-10-PCS | Performed by: THORACIC SURGERY (CARDIOTHORACIC VASCULAR SURGERY)

## 2025-06-03 PROCEDURE — 85347 COAGULATION TIME ACTIVATED: CPT

## 2025-06-03 PROCEDURE — 99221 1ST HOSP IP/OBS SF/LOW 40: CPT

## 2025-06-03 PROCEDURE — 37799 UNLISTED PX VASCULAR SURGERY: CPT

## 2025-06-03 PROCEDURE — C1751 CATH, INF, PER/CENT/MIDLINE: HCPCS | Performed by: THORACIC SURGERY (CARDIOTHORACIC VASCULAR SURGERY)

## 2025-06-03 PROCEDURE — 9420000001 HC RT PATIENT EDUCATION 5 MIN

## 2025-06-03 PROCEDURE — 82330 ASSAY OF CALCIUM: CPT | Performed by: PHYSICIAN ASSISTANT

## 2025-06-03 PROCEDURE — 2500000004 HC RX 250 GENERAL PHARMACY W/ HCPCS (ALT 636 FOR OP/ED): Mod: JZ | Performed by: PHYSICIAN ASSISTANT

## 2025-06-03 PROCEDURE — 94002 VENT MGMT INPAT INIT DAY: CPT

## 2025-06-03 PROCEDURE — 2720000007 HC OR 272 NO HCPCS: Performed by: THORACIC SURGERY (CARDIOTHORACIC VASCULAR SURGERY)

## 2025-06-03 PROCEDURE — 2780000003 HC OR 278 NO HCPCS: Performed by: THORACIC SURGERY (CARDIOTHORACIC VASCULAR SURGERY)

## 2025-06-03 PROCEDURE — 94003 VENT MGMT INPAT SUBQ DAY: CPT

## 2025-06-03 PROCEDURE — 71045 X-RAY EXAM CHEST 1 VIEW: CPT

## 2025-06-03 PROCEDURE — 37799 UNLISTED PX VASCULAR SURGERY: CPT | Performed by: PHYSICIAN ASSISTANT

## 2025-06-03 PROCEDURE — 93312 ECHO TRANSESOPHAGEAL: CPT

## 2025-06-03 PROCEDURE — 82435 ASSAY OF BLOOD CHLORIDE: CPT

## 2025-06-03 PROCEDURE — 85730 THROMBOPLASTIN TIME PARTIAL: CPT | Performed by: PHYSICIAN ASSISTANT

## 2025-06-03 PROCEDURE — 83735 ASSAY OF MAGNESIUM: CPT | Performed by: PHYSICIAN ASSISTANT

## 2025-06-03 PROCEDURE — C1900 LEAD, CORONARY VENOUS: HCPCS | Performed by: THORACIC SURGERY (CARDIOTHORACIC VASCULAR SURGERY)

## 2025-06-03 PROCEDURE — 82947 ASSAY GLUCOSE BLOOD QUANT: CPT | Performed by: PHYSICIAN ASSISTANT

## 2025-06-03 PROCEDURE — 36620 INSERTION CATHETER ARTERY: CPT

## 2025-06-03 PROCEDURE — A33534 PR CABG, ARTERIAL, TWO

## 2025-06-03 PROCEDURE — 85610 PROTHROMBIN TIME: CPT | Performed by: PHYSICIAN ASSISTANT

## 2025-06-03 PROCEDURE — 85027 COMPLETE CBC AUTOMATED: CPT | Performed by: PHYSICIAN ASSISTANT

## 2025-06-03 PROCEDURE — 36415 COLL VENOUS BLD VENIPUNCTURE: CPT | Performed by: ANESTHESIOLOGY

## 2025-06-03 PROCEDURE — 36556 INSERT NON-TUNNEL CV CATH: CPT | Performed by: ANESTHESIOLOGY

## 2025-06-03 PROCEDURE — 3600000012 HC PERFUSION TIME - EACH INCREMENTAL 1 MINUTE: Performed by: THORACIC SURGERY (CARDIOTHORACIC VASCULAR SURGERY)

## 2025-06-03 PROCEDURE — 2020000001 HC ICU ROOM DAILY

## 2025-06-03 PROCEDURE — 2500000004 HC RX 250 GENERAL PHARMACY W/ HCPCS (ALT 636 FOR OP/ED)

## 2025-06-03 PROCEDURE — 2500000004 HC RX 250 GENERAL PHARMACY W/ HCPCS (ALT 636 FOR OP/ED): Performed by: THORACIC SURGERY (CARDIOTHORACIC VASCULAR SURGERY)

## 2025-06-03 PROCEDURE — 83735 ASSAY OF MAGNESIUM: CPT

## 2025-06-03 PROCEDURE — 3600000017 HC OR TIME - EACH INCREMENTAL 1 MINUTE - PROCEDURE LEVEL SIX: Performed by: THORACIC SURGERY (CARDIOTHORACIC VASCULAR SURGERY)

## 2025-06-03 PROCEDURE — 93010 ELECTROCARDIOGRAM REPORT: CPT | Performed by: INTERNAL MEDICINE

## 2025-06-03 PROCEDURE — 3600000018 HC OR TIME - INITIAL BASE CHARGE - PROCEDURE LEVEL SIX: Performed by: THORACIC SURGERY (CARDIOTHORACIC VASCULAR SURGERY)

## 2025-06-03 PROCEDURE — 021109W BYPASS CORONARY ARTERY, TWO ARTERIES FROM AORTA WITH AUTOLOGOUS VENOUS TISSUE, OPEN APPROACH: ICD-10-PCS | Performed by: THORACIC SURGERY (CARDIOTHORACIC VASCULAR SURGERY)

## 2025-06-03 PROCEDURE — 94761 N-INVAS EAR/PLS OXIMETRY MLT: CPT

## 2025-06-03 PROCEDURE — 5A1221Z PERFORMANCE OF CARDIAC OUTPUT, CONTINUOUS: ICD-10-PCS | Performed by: THORACIC SURGERY (CARDIOTHORACIC VASCULAR SURGERY)

## 2025-06-03 PROCEDURE — 3700000002 HC GENERAL ANESTHESIA TIME - EACH INCREMENTAL 1 MINUTE: Performed by: THORACIC SURGERY (CARDIOTHORACIC VASCULAR SURGERY)

## 2025-06-03 PROCEDURE — 80069 RENAL FUNCTION PANEL: CPT

## 2025-06-03 RX ORDER — OXYCODONE HYDROCHLORIDE 5 MG/1
10 TABLET ORAL EVERY 4 HOURS PRN
Status: DISPENSED | OUTPATIENT
Start: 2025-06-03

## 2025-06-03 RX ORDER — ENOXAPARIN SODIUM 100 MG/ML
40 INJECTION SUBCUTANEOUS EVERY 24 HOURS
Status: DISCONTINUED | OUTPATIENT
Start: 2025-06-04 | End: 2025-06-03

## 2025-06-03 RX ORDER — ONDANSETRON HYDROCHLORIDE 2 MG/ML
4 INJECTION, SOLUTION INTRAVENOUS EVERY 4 HOURS PRN
Status: DISCONTINUED | OUTPATIENT
Start: 2025-06-03 | End: 2025-06-04

## 2025-06-03 RX ORDER — POTASSIUM CHLORIDE 20 MEQ/1
40 TABLET, EXTENDED RELEASE ORAL EVERY 6 HOURS PRN
Status: ACTIVE | OUTPATIENT
Start: 2025-06-03

## 2025-06-03 RX ORDER — POTASSIUM CHLORIDE 20 MEQ/1
20 TABLET, EXTENDED RELEASE ORAL EVERY 6 HOURS PRN
Status: DISPENSED | OUTPATIENT
Start: 2025-06-03

## 2025-06-03 RX ORDER — GLYCOPYRROLATE 0.2 MG/ML
0.6 INJECTION INTRAMUSCULAR; INTRAVENOUS ONCE
Status: COMPLETED | OUTPATIENT
Start: 2025-06-03 | End: 2025-06-03

## 2025-06-03 RX ORDER — PAPAVERINE HYDROCHLORIDE 30 MG/ML
INJECTION INTRAMUSCULAR; INTRAVENOUS AS NEEDED
Status: DISCONTINUED | OUTPATIENT
Start: 2025-06-03 | End: 2025-06-03 | Stop reason: HOSPADM

## 2025-06-03 RX ORDER — ADHESIVE BANDAGE
30 BANDAGE TOPICAL DAILY PRN
Status: DISPENSED | OUTPATIENT
Start: 2025-06-03

## 2025-06-03 RX ORDER — PROPOFOL 10 MG/ML
0-50 INJECTION, EMULSION INTRAVENOUS CONTINUOUS
Status: DISCONTINUED | OUTPATIENT
Start: 2025-06-03 | End: 2025-06-03

## 2025-06-03 RX ORDER — MAGNESIUM SULFATE HEPTAHYDRATE 500 MG/ML
INJECTION, SOLUTION INTRAMUSCULAR; INTRAVENOUS AS NEEDED
Status: DISCONTINUED | OUTPATIENT
Start: 2025-06-03 | End: 2025-06-03

## 2025-06-03 RX ORDER — HYDROMORPHONE HYDROCHLORIDE 0.2 MG/ML
0.2 INJECTION INTRAMUSCULAR; INTRAVENOUS; SUBCUTANEOUS
Status: DISCONTINUED | OUTPATIENT
Start: 2025-06-03 | End: 2025-06-03

## 2025-06-03 RX ORDER — ACETAMINOPHEN 325 MG/1
650 TABLET ORAL EVERY 6 HOURS
Status: DISCONTINUED | OUTPATIENT
Start: 2025-06-04 | End: 2025-06-08

## 2025-06-03 RX ORDER — NITROGLYCERIN 20 MG/100ML
5-200 INJECTION INTRAVENOUS CONTINUOUS
Status: DISCONTINUED | OUTPATIENT
Start: 2025-06-03 | End: 2025-06-05

## 2025-06-03 RX ORDER — INSULIN LISPRO 100 [IU]/ML
0-5 INJECTION, SOLUTION INTRAVENOUS; SUBCUTANEOUS
Status: DISCONTINUED | OUTPATIENT
Start: 2025-06-03 | End: 2025-06-05

## 2025-06-03 RX ORDER — LIDOCAINE 560 MG/1
1 PATCH PERCUTANEOUS; TOPICAL; TRANSDERMAL EVERY 24 HOURS
Status: COMPLETED | OUTPATIENT
Start: 2025-06-03 | End: 2025-06-06

## 2025-06-03 RX ORDER — SYRING-NEEDL,DISP,INSUL,0.3 ML 29 G X1/2"
296 SYRINGE, EMPTY DISPOSABLE MISCELLANEOUS ONCE AS NEEDED
Status: DISPENSED | OUTPATIENT
Start: 2025-06-03

## 2025-06-03 RX ORDER — ENOXAPARIN SODIUM 100 MG/ML
40 INJECTION SUBCUTANEOUS DAILY
Status: DISPENSED | OUTPATIENT
Start: 2025-06-04

## 2025-06-03 RX ORDER — DOCUSATE SODIUM 100 MG/1
100 CAPSULE, LIQUID FILLED ORAL 2 TIMES DAILY
Status: DISCONTINUED | OUTPATIENT
Start: 2025-06-03 | End: 2025-06-07

## 2025-06-03 RX ORDER — ATORVASTATIN CALCIUM 80 MG/1
80 TABLET, FILM COATED ORAL NIGHTLY
Status: DISPENSED | OUTPATIENT
Start: 2025-06-04

## 2025-06-03 RX ORDER — NEOSTIGMINE METHYLSULFATE 1 MG/ML
3 INJECTION INTRAVENOUS ONCE
Status: COMPLETED | OUTPATIENT
Start: 2025-06-03 | End: 2025-06-03

## 2025-06-03 RX ORDER — ACETAMINOPHEN 325 MG/1
650 TABLET ORAL EVERY 6 HOURS
Status: DISCONTINUED | OUTPATIENT
Start: 2025-06-03 | End: 2025-06-03

## 2025-06-03 RX ORDER — NAPROXEN SODIUM 220 MG/1
81 TABLET, FILM COATED ORAL DAILY
Status: DISPENSED | OUTPATIENT
Start: 2025-06-03

## 2025-06-03 RX ORDER — HYDROMORPHONE HYDROCHLORIDE 0.2 MG/ML
0.2 INJECTION INTRAMUSCULAR; INTRAVENOUS; SUBCUTANEOUS
Status: DISCONTINUED | OUTPATIENT
Start: 2025-06-03 | End: 2025-06-06

## 2025-06-03 RX ORDER — MIDAZOLAM HYDROCHLORIDE 1 MG/ML
INJECTION, SOLUTION INTRAMUSCULAR; INTRAVENOUS AS NEEDED
Status: DISCONTINUED | OUTPATIENT
Start: 2025-06-03 | End: 2025-06-03

## 2025-06-03 RX ORDER — MUPIROCIN 20 MG/G
1 OINTMENT TOPICAL 2 TIMES DAILY
Status: COMPLETED | OUTPATIENT
Start: 2025-06-03 | End: 2025-06-07

## 2025-06-03 RX ORDER — METOCLOPRAMIDE 10 MG/1
10 TABLET ORAL EVERY 6 HOURS PRN
Status: ACTIVE | OUTPATIENT
Start: 2025-06-03

## 2025-06-03 RX ORDER — NALOXONE HYDROCHLORIDE 0.4 MG/ML
0.2 INJECTION, SOLUTION INTRAMUSCULAR; INTRAVENOUS; SUBCUTANEOUS EVERY 5 MIN PRN
Status: ACTIVE | OUTPATIENT
Start: 2025-06-03

## 2025-06-03 RX ORDER — OXYCODONE HYDROCHLORIDE 5 MG/1
5 TABLET ORAL EVERY 4 HOURS PRN
Status: DISPENSED | OUTPATIENT
Start: 2025-06-03

## 2025-06-03 RX ORDER — ONDANSETRON 4 MG/1
4 TABLET, ORALLY DISINTEGRATING ORAL EVERY 8 HOURS PRN
Status: DISCONTINUED | OUTPATIENT
Start: 2025-06-03 | End: 2025-06-04

## 2025-06-03 RX ORDER — AMINOCAPROIC ACID 250 MG/ML
INJECTION, SOLUTION INTRAVENOUS CONTINUOUS PRN
Status: DISCONTINUED | OUTPATIENT
Start: 2025-06-03 | End: 2025-06-03

## 2025-06-03 RX ORDER — CEFAZOLIN SODIUM 2 G/50ML
SOLUTION INTRAVENOUS
Status: COMPLETED
Start: 2025-06-03 | End: 2025-06-03

## 2025-06-03 RX ORDER — ACETAMINOPHEN 10 MG/ML
1000 INJECTION, SOLUTION INTRAVENOUS EVERY 6 HOURS
OUTPATIENT
Start: 2025-06-04 | End: 2025-06-04

## 2025-06-03 RX ORDER — VANCOMYCIN HYDROCHLORIDE 1 G/20ML
INJECTION, POWDER, LYOPHILIZED, FOR SOLUTION INTRAVENOUS AS NEEDED
Status: DISCONTINUED | OUTPATIENT
Start: 2025-06-03 | End: 2025-06-03 | Stop reason: HOSPADM

## 2025-06-03 RX ORDER — MAGNESIUM SULFATE HEPTAHYDRATE 40 MG/ML
2 INJECTION, SOLUTION INTRAVENOUS EVERY 6 HOURS PRN
Status: DISPENSED | OUTPATIENT
Start: 2025-06-03

## 2025-06-03 RX ORDER — SODIUM CHLORIDE, SODIUM LACTATE, POTASSIUM CHLORIDE, CALCIUM CHLORIDE 600; 310; 30; 20 MG/100ML; MG/100ML; MG/100ML; MG/100ML
5 INJECTION, SOLUTION INTRAVENOUS CONTINUOUS
Status: DISCONTINUED | OUTPATIENT
Start: 2025-06-03 | End: 2025-06-05

## 2025-06-03 RX ORDER — BUPROPION HYDROCHLORIDE 100 MG/1
200 TABLET, EXTENDED RELEASE ORAL 2 TIMES DAILY
Status: DISPENSED | OUTPATIENT
Start: 2025-06-04

## 2025-06-03 RX ORDER — ACETAMINOPHEN 10 MG/ML
1000 INJECTION, SOLUTION INTRAVENOUS EVERY 6 HOURS
Status: COMPLETED | OUTPATIENT
Start: 2025-06-04 | End: 2025-06-04

## 2025-06-03 RX ORDER — NITROGLYCERIN 20 MG/100ML
5-200 INJECTION INTRAVENOUS CONTINUOUS
Status: DISCONTINUED | OUTPATIENT
Start: 2025-06-03 | End: 2025-06-03

## 2025-06-03 RX ORDER — CEFAZOLIN SODIUM 2 G/50ML
2 SOLUTION INTRAVENOUS ONCE
Status: DISCONTINUED | OUTPATIENT
Start: 2025-06-03 | End: 2025-06-03

## 2025-06-03 RX ORDER — ESCITALOPRAM OXALATE 10 MG/1
10 TABLET ORAL DAILY
Status: DISPENSED | OUTPATIENT
Start: 2025-06-04

## 2025-06-03 RX ORDER — LIDOCAINE HYDROCHLORIDE 10 MG/ML
INJECTION, SOLUTION INFILTRATION; PERINEURAL AS NEEDED
Status: DISCONTINUED | OUTPATIENT
Start: 2025-06-03 | End: 2025-06-03

## 2025-06-03 RX ORDER — PROPOFOL 10 MG/ML
INJECTION, EMULSION INTRAVENOUS CONTINUOUS PRN
Status: DISCONTINUED | OUTPATIENT
Start: 2025-06-03 | End: 2025-06-03

## 2025-06-03 RX ORDER — PHENYLEPHRINE HCL IN 0.9% NACL 1 MG/10 ML
SYRINGE (ML) INTRAVENOUS AS NEEDED
Status: DISCONTINUED | OUTPATIENT
Start: 2025-06-03 | End: 2025-06-03

## 2025-06-03 RX ORDER — NOREPINEPHRINE BITARTRATE 0.03 MG/ML
INJECTION, SOLUTION INTRAVENOUS CONTINUOUS PRN
Status: DISCONTINUED | OUTPATIENT
Start: 2025-06-03 | End: 2025-06-03

## 2025-06-03 RX ORDER — SODIUM CHLORIDE, SODIUM LACTATE, POTASSIUM CHLORIDE, CALCIUM CHLORIDE 600; 310; 30; 20 MG/100ML; MG/100ML; MG/100ML; MG/100ML
30 INJECTION, SOLUTION INTRAVENOUS CONTINUOUS
Status: DISCONTINUED | OUTPATIENT
Start: 2025-06-03 | End: 2025-06-05

## 2025-06-03 RX ORDER — CEFAZOLIN SODIUM 2 G/50ML
SOLUTION INTRAVENOUS AS NEEDED
Status: DISCONTINUED | OUTPATIENT
Start: 2025-06-03 | End: 2025-06-03

## 2025-06-03 RX ORDER — METOCLOPRAMIDE HYDROCHLORIDE 5 MG/ML
10 INJECTION INTRAMUSCULAR; INTRAVENOUS EVERY 6 HOURS PRN
Status: DISPENSED | OUTPATIENT
Start: 2025-06-03

## 2025-06-03 RX ORDER — CALCIUM CHLORIDE INJECTION 100 MG/ML
INJECTION, SOLUTION INTRAVENOUS AS NEEDED
Status: DISCONTINUED | OUTPATIENT
Start: 2025-06-03 | End: 2025-06-03

## 2025-06-03 RX ORDER — PHENYLEPHRINE HCL IN 0.9% NACL 0.4MG/10ML
SYRINGE (ML) INTRAVENOUS AS NEEDED
Status: DISCONTINUED | OUTPATIENT
Start: 2025-06-03 | End: 2025-06-03

## 2025-06-03 RX ORDER — FENTANYL CITRATE 50 UG/ML
INJECTION, SOLUTION INTRAMUSCULAR; INTRAVENOUS AS NEEDED
Status: DISCONTINUED | OUTPATIENT
Start: 2025-06-03 | End: 2025-06-03

## 2025-06-03 RX ORDER — INSULIN LISPRO 100 [IU]/ML
0-15 INJECTION, SOLUTION INTRAVENOUS; SUBCUTANEOUS EVERY 4 HOURS
Status: DISCONTINUED | OUTPATIENT
Start: 2025-06-03 | End: 2025-06-03

## 2025-06-03 RX ORDER — HEPARIN SODIUM 1000 [USP'U]/ML
INJECTION, SOLUTION INTRAVENOUS; SUBCUTANEOUS AS NEEDED
Status: DISCONTINUED | OUTPATIENT
Start: 2025-06-03 | End: 2025-06-03

## 2025-06-03 RX ORDER — POTASSIUM CHLORIDE 1.5 G/1.58G
20 POWDER, FOR SOLUTION ORAL EVERY 6 HOURS PRN
Status: ACTIVE | OUTPATIENT
Start: 2025-06-03

## 2025-06-03 RX ORDER — LOSARTAN POTASSIUM 50 MG/1
50 TABLET ORAL DAILY
Status: DISCONTINUED | OUTPATIENT
Start: 2025-06-03 | End: 2025-06-08

## 2025-06-03 RX ORDER — POTASSIUM CHLORIDE 14.9 MG/ML
20 INJECTION INTRAVENOUS EVERY 6 HOURS PRN
Status: DISPENSED | OUTPATIENT
Start: 2025-06-03

## 2025-06-03 RX ORDER — METOPROLOL TARTRATE 1 MG/ML
INJECTION, SOLUTION INTRAVENOUS AS NEEDED
Status: DISCONTINUED | OUTPATIENT
Start: 2025-06-03 | End: 2025-06-03

## 2025-06-03 RX ORDER — LEVOTHYROXINE SODIUM 125 UG/1
125 TABLET ORAL DAILY
Status: DISPENSED | OUTPATIENT
Start: 2025-06-04

## 2025-06-03 RX ORDER — ROCURONIUM BROMIDE 10 MG/ML
INJECTION, SOLUTION INTRAVENOUS AS NEEDED
Status: DISCONTINUED | OUTPATIENT
Start: 2025-06-03 | End: 2025-06-03

## 2025-06-03 RX ORDER — POTASSIUM CHLORIDE 29.8 MG/ML
40 INJECTION INTRAVENOUS EVERY 6 HOURS PRN
Status: ACTIVE | OUTPATIENT
Start: 2025-06-03

## 2025-06-03 RX ORDER — CEFAZOLIN SODIUM 2 G/50ML
2 SOLUTION INTRAVENOUS EVERY 8 HOURS
Status: COMPLETED | OUTPATIENT
Start: 2025-06-03 | End: 2025-06-05

## 2025-06-03 RX ORDER — POTASSIUM CHLORIDE 1.5 G/1.58G
40 POWDER, FOR SOLUTION ORAL EVERY 6 HOURS PRN
Status: ACTIVE | OUTPATIENT
Start: 2025-06-03

## 2025-06-03 RX ADMIN — Medication 50 MCG: at 10:08

## 2025-06-03 RX ADMIN — Medication 100 MCG: at 08:09

## 2025-06-03 RX ADMIN — ROCURONIUM BROMIDE 100 MG: 10 INJECTION, SOLUTION INTRAVENOUS at 08:09

## 2025-06-03 RX ADMIN — ROCURONIUM BROMIDE 10 MG: 10 INJECTION, SOLUTION INTRAVENOUS at 12:30

## 2025-06-03 RX ADMIN — ROCURONIUM BROMIDE 10 MG: 10 INJECTION, SOLUTION INTRAVENOUS at 11:44

## 2025-06-03 RX ADMIN — Medication 50 MCG: at 09:13

## 2025-06-03 RX ADMIN — ROCURONIUM BROMIDE 20 MG: 10 INJECTION, SOLUTION INTRAVENOUS at 09:40

## 2025-06-03 RX ADMIN — Medication 50 MCG: at 09:34

## 2025-06-03 RX ADMIN — Medication 50 MCG: at 12:36

## 2025-06-03 RX ADMIN — SODIUM CHLORIDE, POTASSIUM CHLORIDE, SODIUM LACTATE AND CALCIUM CHLORIDE: 600; 310; 30; 20 INJECTION, SOLUTION INTRAVENOUS at 07:40

## 2025-06-03 RX ADMIN — Medication 50 MCG: at 12:11

## 2025-06-03 RX ADMIN — Medication 50 MCG: at 08:47

## 2025-06-03 RX ADMIN — HEPARIN SODIUM 25000 UNITS: 1000 INJECTION, SOLUTION INTRAVENOUS; SUBCUTANEOUS at 10:29

## 2025-06-03 RX ADMIN — Medication 50 MCG: at 09:49

## 2025-06-03 RX ADMIN — CEFAZOLIN SODIUM 2 G: 2 SOLUTION INTRAVENOUS at 12:15

## 2025-06-03 RX ADMIN — Medication 50 PERCENT: at 14:19

## 2025-06-03 RX ADMIN — Medication 25 MCG: at 09:11

## 2025-06-03 RX ADMIN — MUPIROCIN 1 APPLICATION: 20 OINTMENT TOPICAL at 16:16

## 2025-06-03 RX ADMIN — FENTANYL CITRATE 100 MCG: 50 INJECTION, SOLUTION INTRAMUSCULAR; INTRAVENOUS at 08:43

## 2025-06-03 RX ADMIN — Medication 50 MCG: at 12:41

## 2025-06-03 RX ADMIN — HYDROMORPHONE HYDROCHLORIDE 0.2 MG: 0.2 INJECTION, SOLUTION INTRAMUSCULAR; INTRAVENOUS; SUBCUTANEOUS at 14:55

## 2025-06-03 RX ADMIN — Medication 50 MCG: at 09:31

## 2025-06-03 RX ADMIN — Medication 50 MCG: at 10:00

## 2025-06-03 RX ADMIN — Medication 50 MCG: at 12:09

## 2025-06-03 RX ADMIN — Medication 50 MCG: at 09:54

## 2025-06-03 RX ADMIN — Medication 50 MCG: at 12:05

## 2025-06-03 RX ADMIN — Medication 50 MCG: at 11:49

## 2025-06-03 RX ADMIN — Medication 100 MCG: at 13:00

## 2025-06-03 RX ADMIN — ACETAMINOPHEN 650 MG: 325 TABLET ORAL at 14:17

## 2025-06-03 RX ADMIN — HYDROMORPHONE HYDROCHLORIDE 0.2 MG: 0.2 INJECTION, SOLUTION INTRAMUSCULAR; INTRAVENOUS; SUBCUTANEOUS at 21:15

## 2025-06-03 RX ADMIN — Medication 0.02 MCG/KG/MIN: at 12:09

## 2025-06-03 RX ADMIN — POTASSIUM CHLORIDE 20 MEQ: 14.9 INJECTION, SOLUTION INTRAVENOUS at 14:38

## 2025-06-03 RX ADMIN — Medication 50 MCG: at 09:21

## 2025-06-03 RX ADMIN — ROCURONIUM BROMIDE 20 MG: 10 INJECTION, SOLUTION INTRAVENOUS at 10:45

## 2025-06-03 RX ADMIN — LIDOCAINE HYDROCHLORIDE 100 MG: 10 INJECTION, SOLUTION INFILTRATION; PERINEURAL at 11:40

## 2025-06-03 RX ADMIN — Medication 100 MCG: at 10:35

## 2025-06-03 RX ADMIN — NEOSTIGMINE METHYLSULFATE 3 MG: 1 INJECTION INTRAVENOUS at 15:57

## 2025-06-03 RX ADMIN — HYDROMORPHONE HYDROCHLORIDE 0.2 MG: 0.2 INJECTION, SOLUTION INTRAMUSCULAR; INTRAVENOUS; SUBCUTANEOUS at 17:39

## 2025-06-03 RX ADMIN — SODIUM CHLORIDE, SODIUM LACTATE, POTASSIUM CHLORIDE, AND CALCIUM CHLORIDE 30 ML/HR: 600; 310; 30; 20 INJECTION, SOLUTION INTRAVENOUS at 13:40

## 2025-06-03 RX ADMIN — SODIUM CHLORIDE 1 UNITS/HR: 900 INJECTION, SOLUTION INTRAVENOUS at 08:40

## 2025-06-03 RX ADMIN — INSULIN LISPRO 1 UNITS: 100 INJECTION, SOLUTION INTRAVENOUS; SUBCUTANEOUS at 21:04

## 2025-06-03 RX ADMIN — Medication 50 MCG: at 09:19

## 2025-06-03 RX ADMIN — INSULIN LISPRO 5 UNITS: 100 INJECTION, SOLUTION INTRAVENOUS; SUBCUTANEOUS at 16:18

## 2025-06-03 RX ADMIN — Medication 100 MCG: at 08:25

## 2025-06-03 RX ADMIN — FENTANYL CITRATE 300 MCG: 50 INJECTION, SOLUTION INTRAMUSCULAR; INTRAVENOUS at 10:30

## 2025-06-03 RX ADMIN — CEFAZOLIN SODIUM 2 G: 2 SOLUTION INTRAVENOUS at 08:15

## 2025-06-03 RX ADMIN — Medication 50 MCG: at 08:38

## 2025-06-03 RX ADMIN — SODIUM CHLORIDE, SODIUM LACTATE, POTASSIUM CHLORIDE, AND CALCIUM CHLORIDE 5 ML/HR: 600; 310; 30; 20 INJECTION, SOLUTION INTRAVENOUS at 13:38

## 2025-06-03 RX ADMIN — CALCIUM CHLORIDE 0.5 G: 100 INJECTION, SOLUTION INTRAVENOUS at 12:15

## 2025-06-03 RX ADMIN — FENTANYL CITRATE 100 MCG: 50 INJECTION, SOLUTION INTRAMUSCULAR; INTRAVENOUS at 08:53

## 2025-06-03 RX ADMIN — Medication 50 MCG: at 12:39

## 2025-06-03 RX ADMIN — CEFAZOLIN SODIUM 2 G: 2 SOLUTION INTRAVENOUS at 21:00

## 2025-06-03 RX ADMIN — FENTANYL CITRATE 100 MCG: 50 INJECTION, SOLUTION INTRAMUSCULAR; INTRAVENOUS at 08:50

## 2025-06-03 RX ADMIN — Medication 50 MCG: at 09:27

## 2025-06-03 RX ADMIN — Medication 50 MCG: at 09:42

## 2025-06-03 RX ADMIN — ACETAMINOPHEN 650 MG: 325 TABLET ORAL at 21:00

## 2025-06-03 RX ADMIN — Medication 50 MCG: at 10:15

## 2025-06-03 RX ADMIN — Medication 50 MCG: at 09:25

## 2025-06-03 RX ADMIN — GLYCOPYRROLATE 0.6 MG: 0.2 INJECTION INTRAMUSCULAR; INTRAVENOUS at 15:58

## 2025-06-03 RX ADMIN — Medication 100 MCG: at 10:43

## 2025-06-03 RX ADMIN — Medication 25 MCG: at 09:09

## 2025-06-03 RX ADMIN — FENTANYL CITRATE 100 MCG: 50 INJECTION, SOLUTION INTRAMUSCULAR; INTRAVENOUS at 12:22

## 2025-06-03 RX ADMIN — Medication 30 PERCENT: at 15:57

## 2025-06-03 RX ADMIN — Medication 4 L/MIN: at 23:12

## 2025-06-03 RX ADMIN — PROPOFOL 30 MCG/KG/MIN: 10 INJECTION, EMULSION INTRAVENOUS at 12:33

## 2025-06-03 RX ADMIN — Medication 50 MCG: at 09:02

## 2025-06-03 RX ADMIN — METOPROLOL TARTRATE 2.5 MG: 5 INJECTION, SOLUTION INTRAVENOUS at 08:25

## 2025-06-03 RX ADMIN — MIDAZOLAM 5 MG: 1 INJECTION INTRAMUSCULAR; INTRAVENOUS at 07:46

## 2025-06-03 RX ADMIN — AMINOCAPROIC ACID 20 G/HR: 250 INJECTION, SOLUTION INTRAVENOUS at 08:20

## 2025-06-03 RX ADMIN — Medication 50 MCG: at 12:27

## 2025-06-03 RX ADMIN — MAGNESIUM SULFATE HEPTAHYDRATE 2 G: 500 INJECTION, SOLUTION INTRAMUSCULAR; INTRAVENOUS at 11:40

## 2025-06-03 RX ADMIN — PROPOFOL 30 MCG/KG/MIN: 10 INJECTION, EMULSION INTRAVENOUS at 14:41

## 2025-06-03 RX ADMIN — Medication 50 MCG: at 11:58

## 2025-06-03 RX ADMIN — Medication 50 MCG: at 12:45

## 2025-06-03 RX ADMIN — Medication 50 MCG: at 09:46

## 2025-06-03 RX ADMIN — HEPARIN SODIUM 2000 UNITS: 1000 INJECTION, SOLUTION INTRAVENOUS; SUBCUTANEOUS at 11:46

## 2025-06-03 RX ADMIN — MIDAZOLAM 3 MG: 1 INJECTION INTRAMUSCULAR; INTRAVENOUS at 08:08

## 2025-06-03 RX ADMIN — Medication 50 MCG: at 09:39

## 2025-06-03 RX ADMIN — METOCLOPRAMIDE 10 MG: 5 INJECTION, SOLUTION INTRAMUSCULAR; INTRAVENOUS at 18:52

## 2025-06-03 RX ADMIN — Medication 50 MCG: at 08:33

## 2025-06-03 RX ADMIN — ASPIRIN 81 MG: 81 TABLET, CHEWABLE ORAL at 14:17

## 2025-06-03 RX ADMIN — Medication 50 MCG: at 09:16

## 2025-06-03 RX ADMIN — LIDOCAINE 4% 1 PATCH: 40 PATCH TOPICAL at 14:17

## 2025-06-03 RX ADMIN — FENTANYL CITRATE 150 MCG: 50 INJECTION, SOLUTION INTRAMUSCULAR; INTRAVENOUS at 08:08

## 2025-06-03 SDOH — HEALTH STABILITY: MENTAL HEALTH: CURRENT SMOKER: 0

## 2025-06-03 ASSESSMENT — ACTIVITIES OF DAILY LIVING (ADL)
ADEQUATE_TO_COMPLETE_ADL: YES
JUDGMENT_ADEQUATE_SAFELY_COMPLETE_DAILY_ACTIVITIES: YES
BATHING: INDEPENDENT
WALKS IN HOME: INDEPENDENT
DRESSING YOURSELF: INDEPENDENT
TOILETING: INDEPENDENT
HEARING - LEFT EAR: FUNCTIONAL
PATIENT'S MEMORY ADEQUATE TO SAFELY COMPLETE DAILY ACTIVITIES?: UNABLE TO ASSESS
FEEDING YOURSELF: INDEPENDENT
HEARING - RIGHT EAR: FUNCTIONAL
GROOMING: INDEPENDENT

## 2025-06-03 ASSESSMENT — COGNITIVE AND FUNCTIONAL STATUS - GENERAL
TURNING FROM BACK TO SIDE WHILE IN FLAT BAD: TOTAL
STANDING UP FROM CHAIR USING ARMS: TOTAL
DRESSING REGULAR LOWER BODY CLOTHING: TOTAL
TOILETING: TOTAL
PERSONAL GROOMING: TOTAL
PATIENT BASELINE BEDBOUND: NO
DAILY ACTIVITIY SCORE: 6
MOBILITY SCORE: 6
HELP NEEDED FOR BATHING: TOTAL
EATING MEALS: TOTAL
WALKING IN HOSPITAL ROOM: TOTAL
CLIMB 3 TO 5 STEPS WITH RAILING: TOTAL
MOVING TO AND FROM BED TO CHAIR: TOTAL
DRESSING REGULAR UPPER BODY CLOTHING: TOTAL
MOVING FROM LYING ON BACK TO SITTING ON SIDE OF FLAT BED WITH BEDRAILS: TOTAL

## 2025-06-03 ASSESSMENT — PAIN DESCRIPTION - DESCRIPTORS
DESCRIPTORS: ACHING
DESCRIPTORS: ACHING
DESCRIPTORS: ACHING;SORE
DESCRIPTORS: ACHING;SORE

## 2025-06-03 ASSESSMENT — PAIN - FUNCTIONAL ASSESSMENT
PAIN_FUNCTIONAL_ASSESSMENT: 0-10
PAIN_FUNCTIONAL_ASSESSMENT: CPOT (CRITICAL CARE PAIN OBSERVATION TOOL)
PAIN_FUNCTIONAL_ASSESSMENT: 0-10
PAIN_FUNCTIONAL_ASSESSMENT: 0-10
PAIN_FUNCTIONAL_ASSESSMENT: CPOT (CRITICAL CARE PAIN OBSERVATION TOOL)
PAIN_FUNCTIONAL_ASSESSMENT: 0-10
PAIN_FUNCTIONAL_ASSESSMENT: 0-10
PAIN_FUNCTIONAL_ASSESSMENT: FLACC (FACE, LEGS, ACTIVITY, CRY, CONSOLABILITY)

## 2025-06-03 ASSESSMENT — PAIN SCALES - GENERAL
PAINLEVEL_OUTOF10: 7
PAINLEVEL_OUTOF10: 7
PAINLEVEL_OUTOF10: 0 - NO PAIN
PAINLEVEL_OUTOF10: 2
PAINLEVEL_OUTOF10: 0 - NO PAIN

## 2025-06-03 ASSESSMENT — COLUMBIA-SUICIDE SEVERITY RATING SCALE - C-SSRS
2. HAVE YOU ACTUALLY HAD ANY THOUGHTS OF KILLING YOURSELF?: NO
1. IN THE PAST MONTH, HAVE YOU WISHED YOU WERE DEAD OR WISHED YOU COULD GO TO SLEEP AND NOT WAKE UP?: NO
6. HAVE YOU EVER DONE ANYTHING, STARTED TO DO ANYTHING, OR PREPARED TO DO ANYTHING TO END YOUR LIFE?: NO

## 2025-06-03 ASSESSMENT — PAIN SCALES - WONG BAKER
WONGBAKER_NUMERICALRESPONSE: NO HURT
WONGBAKER_NUMERICALRESPONSE: NO HURT

## 2025-06-03 ASSESSMENT — PAIN DESCRIPTION - LOCATION: LOCATION: CHEST

## 2025-06-03 ASSESSMENT — PAIN DESCRIPTION - ORIENTATION: ORIENTATION: MID

## 2025-06-03 NOTE — ANESTHESIA POSTPROCEDURE EVALUATION
"Patient: Simona Betancourt \"Isabel\"    Procedure Summary       Date: 06/03/25 Room / Location: [facility] 09 / [facility]    Anesthesia Start: 0740 Anesthesia Stop: 1325    Procedure: CABG X3 Diagnosis:       Atherosclerosis of native coronary artery of native heart with unstable angina pectoris      (Atherosclerosis of native coronary artery of native heart with unstable angina pectoris [I25.110])    Surgeons: Oj Javed MD Responsible Provider: Mychal Busch MD    Anesthesia Type: general ASA Status: 3            Anesthesia Type: No value filed.    Vitals Value Taken Time   /70 06/03/25 14:23   Temp 36 °C (96.8 °F) 06/03/25 14:22   Pulse 63 06/03/25 14:22   Resp 0 06/03/25 14:22   SpO2 100 % 06/03/25 14:22   Vitals shown include unfiled device data.    Anesthesia Post Evaluation    Patient location during evaluation: ICU  Patient participation: complete - patient cannot participate  Level of consciousness: obtunded/minimal responses  Pain management: adequate  Airway patency: patent  Cardiovascular status: acceptable  Respiratory status: acceptable and intubated  Hydration status: acceptable  Postoperative Nausea and Vomiting: none        No notable events documented.    "

## 2025-06-03 NOTE — ANESTHESIA PROCEDURE NOTES
Central Venous Line:    Date/Time: 6/3/2025 7:45 AM    A central venous line was placed in the OR for the following indication(s): central venous access and CVP monitoring.  Staffing  Performed: attending   Authorized by: Mychal Busch MD    Performed by: Mychal Busch MD    Sterility preparation included the following: provider hand hygiene performed prior to central venous catheter insertion, all 5 sterile barriers used (gloves, gown, cap, mask, large sterile drape) during central venous catheter insertion, antiseptic used during central venous catheter insertion and skin prep agent completely dried prior to procedure.  The patient was placed in Trendelenburg position.    Right internal jugular vein was prepped.    The site was prepped with Chlorhexidine.  Size: 8.5 Fr   Length: 10  Catheter type: introducer   Number of Lumens: triple lumen    This catheter was not an oximetric catheter.    During the procedure, the following specific steps were taken: target vein identified, needle advanced into vein and blood aspirated and guidewire advanced into vein.  Seldinger technique used.  Procedure performed using ultrasound guidance.  Sterile gel and probe cover used in ultrasound-guided central venous catheter insertion.    Intravenous verification was obtained by ultrasound and venous blood return.      Post insertion care included: all ports aspirated, all ports flushed easily, guidewire removed intact, Biopatch applied, line sutured in place and dressing applied.    During the procedure the patient experienced: patient tolerated procedure well with no complications.

## 2025-06-03 NOTE — ANESTHESIA PROCEDURE NOTES
Arterial Line:    Date/Time: 6/3/2025 8:05 AM    Staffing  Performed: CRNA   Authorized by: Mychal Busch MD    Performed by: MICKEY Landrum-CRNA    An arterial line was placed. Procedure performed using ultrasound guidance and surface landmarks.in the OR for the following indication(s): continuous blood pressure monitoring and blood sampling needed.    A 20 gauge (size), 10 cm (length), Arrow (type) catheter was placed into the Right brachial artery, secured by Tegaderm,   Seldinger technique used.  Events:  patient tolerated procedure well with no complications.

## 2025-06-03 NOTE — CONSULTS
Consults  [facility] Critical Care Medicine       Date:  6/3/2025  Patient:  Simona Betancourt  YOB: 1955  MRN:  [id number]   Admit Date:  6/3/2025    Chief complaint:      History of Present Illness:  Simona Betancourt is a 69 y.o. year old female patient with past medical history of CAD, MI w/ stents 10+ years ago, HTN, HLD, heavy smoker who presented 5/11 with complaints of nausea vomiting and feeling unwell. Underwent cardiac catheterization on 5/12 which showed multi-vessel disease. Today she had CABG x 3, LIMA-> LAD, RSVG-> OM-> PDA.    Interval ICU Events:  6/3: Arrived to ICU intubated and sedated. Not on pressors. Has two chest tubes, one mediastinal, one left pleural. Ventricular pacing wires VVI backup rate 50 BPM.     Objective     Medical History:  Medical History[1]  Surgical History[2]  Prescriptions Prior to Admission[3]  Ace inhibitors  Social History[4]  Family History[5]    Hospital Medications:    Continuous Medications[6]    Current Medications[7]    Review of Systems:  14 point review of systems was completed and negative except for those specially mention in my HPI    Physical Exam:    Heart Rate:  [97]   Temp:  [36 °C (96.8 °F)]   Resp:  [18]   BP: (133)/(81)   Height:  [152.4 cm (5')]   Weight:  [60.8 kg (134 lb 0.6 oz)]   SpO2:  [98 %]     Physical Exam  Constitutional:       General: She is not in acute distress.     Appearance: She is ill-appearing.   HENT:      Mouth/Throat:      Mouth: Mucous membranes are moist.   Eyes:      Conjunctiva/sclera: Conjunctivae normal.   Cardiovascular:      Rate and Rhythm: Normal rate and regular rhythm.      Pulses: Normal pulses.      Heart sounds: Normal heart sounds.      Comments: Doppler DP pulses  Pulmonary:      Effort: No respiratory distress.      Breath sounds: Normal breath sounds. No wheezing, rhonchi or rales.   Abdominal:      General: Bowel sounds are normal.      Palpations: Abdomen is soft.      Tenderness: There is no  abdominal tenderness.   Musculoskeletal:         General: No swelling.      Left lower leg: No edema.   Skin:     General: Skin is dry.   Neurological:      Comments: Intubated and sedated   Psychiatric:      Comments: Intubated and sedated       Objective:    I have reviewed all medications, laboratory results, and imaging pertinent for today's encounter.    Vent Mode: Pressure regulated volume control/assist control  S RR:  [18] 18  S VT:  [360 mL] 360 mL  PEEP/CPAP (cm H2O):  [8 cm H20] 8 cm H20  MAP (cm H2O):  [12] 12      Intake/Output Summary (Last 24 hours) at 6/3/2025 1339  Last data filed at 6/3/2025 1325  Gross per 24 hour   Intake 2287 ml   Output 455 ml   Net 1832 ml            Intra-Op:  Procedure/Surgeon: CABG x 3. With Dr. Beltran Javed  Frontliner/Anesthesia:   Out of OR Time (document on ventilator card):      OR Course/Complications:      CPB time:   Cross clamp time:   Echo Pre/Post: Pre: ; post:   Chest Tubes/Drains: 1 left pleural, 1 mediastinal   Temporary wires location/setting: Ventricular pacing wires, backup @ 50     Fluids-   Crystalloid: LR 2L  Colloid: -  Cellsaver: 287cc  Products: -  EBL: 250cc  UOP: ~300cc     Anesthesia-  Intubation: MAC 3 ... Grade 1   Intravenous Access:  rt brachial art line, CVC- RIJ   Regional anesthesia:   Benzodiazepine dose/last administration:   Opioid dose/last administration:   NMB dose/last administration: last rocuronium total at 1230  TOF/ reversal given: not reversed   Antibiotic time: ancef 1215  Temperature on admission to ICU: 36    Assessment/Plan:    I am currently managing this critically ill patient for the following problems:    Neuro/Psych/Pain Ctrl/Sedation:  Acute post-operative pain - likely incisional pain  - Pt currently intubated and sedated on propofol   - Serial neuro and pain assessments   - Scheduled acetaminophen, lidoderm   - PRN oxycodone and dilaudid for pain  - CAM ICU qshift, sleep/wake hygiene, delirium precautions      Respiratory/ENT:  Post-operative respiratory insufficiency without respiratory failure   Arrived to ICU intubated on ventilator with appropriate oxygenation and ventilation. Left pleural and mediastinal chest tubes with no airleak and appropriate serosang output, to suction   - No underlying pulmonary disease  - Wean FiO2 to maintain SpO2 goal >92%, PaO2 >60mmHg  - 1 left pleural, 1 mediastinal chest tube, maintain to wall suction, monitor output, notify provider if > 100cc/hr  - Wean to extubate   - Continue sedation with propofol infusion, wean as tolerated, RASS goal -1 to 0  - Once NMB reversed, initiate CPAP trials, extubate when awake and CPAP trial passed  - ABG as needed  - Daily CXR while chest tubes in place   - Continuous pulse ox monitoring   - Bronchopulmonary hygiene and incentive spirometry once extubated     Cardiovascular:  Triple vessel CAD s/p CABG x 3  Patient underwent CABGx3 LIMA-> LAD, RSVG-> OM-> PDA  ; Arrived to ICU on no vasopressors; NSR on monitor ; Pre-op echo: ; Post-op echo:   - V wires in place, paced at VVI with backup rate of 50.  - Volume resuscitation postoperatively with 250cc boluses LR as needed and albumin 5%  - Wean vasoactive medications to MAP 70-90, CI > 2.2, SVO2 >65%  - Start aspirin within 6 hours post-op then daily   - Statin POD #1   - Start low dose BB POD #1  - CTS following and appreciate further management  - Continuous cardiac monitoring per ICU protocol  - Maintain MAPS >70-90  - Daily EKGs while in ICU    GI:  No acute issues  - NPO with OG tube in place  - Swallow eval 4 hours post-extubation   - BR with miralax, senna BID, PRN dulc suppositories     Renal/Volume Status (Intra & Extravascular):  Olivarez catheter in place with adequate UOP -- remove on or prior to POD2   - Goal urine output 0.5-1.0cc/kg/hr  - Monitor I/O's  - Replete electrolytes to maintain K >4.0 and Mg >2.0  - Daily BMP, Mg, Phos     Endocrine  Post-operative hyperglycemia  - No history  of DM, likely stress induced  - Strict blood glucose control post-operatively  - SSI q4hrs while NPO  - Maintain glucose <180     Infectious Disease:  No acute issues  - Laila-op Ancef x 48 hours  - MRSA PCR: +/-  - Monitor SIRS criteria    Heme/Onc:  Acute post-operative blood loss anemia   - Baseline Hgb 13-14  - DAPT POD #4 if STEMI/NSTEMI   - Monitor for s/sx of anemia such as bleeding and bruising   - Transfuse if Hgb <7.0 or massive blood loss with hemodynamic instability  - No blood thinner today as pt was anticoagulated pre-op; will discuss starting tomorrow   - Daily CBC    MSK:  No acute issues  - PT/OT consult and OOB POD #1 and to chair as tolerated   - Padded pressure points     Skin  - ICU skin protocol    Ethics/Code Status:  Full code     :  DVT Prophylaxis: SCDs; will discuss adding medical proph tmrw   GI Prophylaxis: no  Bowel Regimen: Miralax, senna, PRN dulc   Diet: NPO  CVC: RIJ  Ellie: r brachial  Olivarez: yes  Restraints: yes  Dispo: ICU    Critical Care Time:  55 minutes spent in preparing to see patient (I.e.labs,imaging, etc.), documentation, discussion plan of care with patient/family/caregiver, and/ or coordination of care with multidisciplinary team including the attending. Time does not include completion of procedure time.     Estrellita AREVALO-CNP  Pulmonology & Critical Care Medicine   [facility]           [1]   Past Medical History:  Diagnosis Date    ADHD (attention deficit hyperactivity disorder)     Anxiety     Chronic viral hepatitis C (Multi)     Depression     Diabetes mellitus with hyperglycemia     Heart disease     Hypercholesterolemia     Hypertension     Hypothyroidism     Myocardial infarction (Multi)     Nausea and vomiting 04/10/2023    Obesity     Visual impairment    [2]   Past Surgical History:  Procedure Laterality Date    CARDIAC CATHETERIZATION N/A 05/13/2025    Procedure: LHC, With LV;  Surgeon: Jordi Simpson DO;  Location: [facility]  Cardiac Cath Lab;  Service: Cardiovascular;  Laterality: N/A;     SECTION, LOW TRANSVERSE      CORONARY ANGIOPLASTY WITH STENT PLACEMENT  2014    KNEE ARTHROPLASTY     [3]   Medications Prior to Admission   Medication Sig Dispense Refill Last Dose/Taking    aspirin 81 mg EC tablet = 1 tab(s) ( 81 mg ), PO, Daily, # 90 tab(s), 0 Refill(s), Type: Maintenance   6/3/2025 at  4:30 AM    atorvastatin (Lipitor) 80 mg tablet Take 1 tablet (80 mg) by mouth once daily at bedtime. 30 tablet 0 2025 at  9:00 PM    buPROPion SR (Wellbutrin SR) 200 mg 12 hr tablet  1 tab(s), Oral, bid 60 tablet 0 6/3/2025 at  4:30 AM    chlorhexidine (Peridex) 0.12 % solution Use as directed 473 mL 0 6/3/2025 at  4:30 AM    escitalopram (Lexapro) 10 mg tablet Take 1 tablet (10 mg) by mouth once daily. 90 tablet 1 6/3/2025 at  4:30 AM    levothyroxine (Synthroid, Levoxyl) 125 mcg tablet TAKE 1 TABLET DAILY 90 tablet 2 6/3/2025 at  4:30 AM    losartan (Cozaar) 50 mg tablet Take 1 tablet (50 mg) by mouth once daily. 30 tablet 0 Past Week    metFORMIN XR (Glucophage-XR) 750 mg 24 hr tablet Take 1 tablet (750 mg) by mouth 2 times a day. Do not crush, chew, or split.   Past Week    multivitamin (MULTIPLE VITAMINS ORAL) 1 cap(s), PO, Daily, # 90 cap(s), 0 Refill(s), Type: Maintenance   Past Week    mupirocin (Bactroban) 2 % ointment Apply topically 2 times a day.   6/3/2025 at  4:30 AM    buPROPion SR (Wellbutrin SR) 200 mg 12 hr tablet  1 tab(s), Oral, bid 180 tablet 1     nitroglycerin (Nitrostat) 0.4 mg SL tablet Place 1 tablet (0.4 mg) under the tongue every 5 minutes if needed for chest pain. Can take every 5 minutes for 3 total doses then call 911 90 tablet 12     tirzepatide (Mounjaro) 7.5 mg/0.5 mL pen injector Inject 7.5 mg under the skin 1 (one) time per week. (Patient taking differently: Inject 7.5 mg under the skin 1 (one) time per week. Thursday  last dose25) 2 mL 5    [4]   Social History  Tobacco Use    Smoking status:  Former     Current packs/day: 0.00     Average packs/day: 0.5 packs/day for 54.3 years (27.2 ttl pk-yrs)     Types: Cigarettes     Start date: 1971     Quit date: 2025     Years since quittin.0     Passive exposure: Current    Smokeless tobacco: Former   Vaping Use    Vaping status: Never Used   Substance Use Topics    Alcohol use: Not Currently    Drug use: Yes     Frequency: 4.0 times per week     Types: Marijuana     Comment: smokes thc nightly   [5]   Family History  Problem Relation Name Age of Onset    Hypertension Mother Mom     Stroke Mother Mom     Hypertension Father Dad     Cancer Father Dad     Alcohol abuse Father Dad     Drug abuse Father Dad    [6] aminocaproic acid, 1 g/hr  lactated Ringer's, 30 mL/hr  lactated Ringer's, 5 mL/hr  propofol, 0-50 mcg/kg/min  [7]   Current Facility-Administered Medications:     acetaminophen (Tylenol) tablet 650 mg, 650 mg, oral, q6h, Natalia Jamison PA-C    alteplase (Cathflo Activase) injection 2 mg, 2 mg, intra-catheter, PRN, Natalia Jamison PA-C    aminocaproic acid (Amicar) 25 g in sodium chloride 0.9% 250 mL (0.1 g/mL) infusion, 1 g/hr, intravenous, Continuous, Natalia Jamison PA-C    aspirin chewable tablet 81 mg, 81 mg, oral, Daily, Natalia Jamison PA-C    [START ON 2025] atorvastatin (Lipitor) tablet 80 mg, 80 mg, oral, Nightly, Natalia Jamison PA-C    calcium chloride 0.5 g in dextrose 5% 50 mL IV, 0.5 g, intravenous, q8h PRN, Natalia Jamison PA-C    calcium chloride 1 g in dextrose 5% 50 mL IV, 1 g, intravenous, q8h PRN, Natalia Jamison PA-C    ceFAZolin (Ancef) 2 g in dextrose (iso) IV 50 mL, 2 g, intravenous, q8h, Natalia Jamison PA-C    docusate sodium (Colace) capsule 100 mg, 100 mg, oral, BID, Natalia Jamison PA-C    [START ON 2025] enoxaparin (Lovenox) syringe 40 mg, 40 mg, subcutaneous, q24h, Natalia Jamison PA-C    HYDROmorphone PF (Dilaudid) injection 0.2 mg, 0.2 mg, intravenous, q15 min PRN, Natalia Jamison PA-C    lactated Ringer's  infusion, 30 mL/hr, intravenous, Continuous, Natalia Jamison PA-C    lactated Ringer's infusion, 5 mL/hr, intravenous, Continuous, Natalia Jamison PA-C    lidocaine 4 % patch 1 patch, 1 patch, transdermal, q24h, Natalia Jamison PA-C    magnesium citrate solution 296 mL, 296 mL, oral, Once PRN, Natalia Jamison PA-C    magnesium hydroxide (Milk of Magnesia) 400 mg/5 mL suspension 30 mL, 30 mL, oral, Daily PRN, Natalia Jamison PA-C    magnesium sulfate 2 g in sterile water for injection 50 mL, 2 g, intravenous, q6h PRN, Natalia Jamison PA-C    magnesium sulfate 3 g in dextrose 5% 100 mL IV, 3 g, intravenous, q6h PRN, Natalia Jamison PA-C    metoclopramide (Reglan) tablet 10 mg, 10 mg, oral, q6h PRN **OR** metoclopramide (Reglan) injection 10 mg, 10 mg, intravenous, q6h PRN, Natalia Jamison PA-C    mupirocin (Bactroban) 2 % ointment 1 Application, 1 Application, Each Nostril, BID, Natalia Jamison PA-C    naloxone (Narcan) injection 0.2 mg, 0.2 mg, intravenous, q5 min PRN, Natalia Jamison PA-C    oxyCODONE (Roxicodone) immediate release tablet 10 mg, 10 mg, oral, q4h PRN, Natalia Jamison PA-C    oxyCODONE (Roxicodone) immediate release tablet 5 mg, 5 mg, oral, q4h PRN, Natalia Jamison PA-C    oxygen (O2) therapy, , inhalation, q4h, Oj Javed MD    potassium chloride CR (Klor-Con M20) ER tablet 20 mEq, 20 mEq, oral, q6h PRN **OR** potassium chloride (Klor-Con) packet 20 mEq, 20 mEq, oral, q6h PRN, Natalia Jamison PA-C    potassium chloride CR (Klor-Con M20) ER tablet 40 mEq, 40 mEq, oral, q6h PRN **OR** potassium chloride (Klor-Con) packet 40 mEq, 40 mEq, oral, q6h PRN, Natalia Jamison PA-C    potassium chloride 20 mEq in sterile water for injection 100 mL, 20 mEq, intravenous, q6h PRN, Natalia Jamison PA-C    potassium chloride 40 mEq in sterile water for injection 100 mL, 40 mEq, intravenous, q6h PRN, Natalia Jamison PA-C    propofol (Diprivan) infusion, 0-50 mcg/kg/min, intravenous, Continuous, Natalia Jamison PA-C

## 2025-06-03 NOTE — BRIEF OP NOTE
"Date: 6/3/2025  OR Location: [facility] OR    Name: Simona Betancourt \"Isabel\", : 1955, Age: 69 y.o., MRN: [id number], Sex: female    Diagnosis  Pre-op Diagnosis      * Atherosclerosis of native coronary artery of native heart with unstable angina pectoris [I25.110] Post-op Diagnosis     * Atherosclerosis of native coronary artery of native heart with unstable angina pectoris [I25.110]     Procedures  CABG X3  33057 - AL CABG W/ARTERIAL GRAFT TWO ARTERIAL GRAFTS    Procedure Summary  Median Sternotomy  EVH Left Upper Leg  Central Cannulation for CPB  Cardioplegic arrest, antegrade, cold blood  Coronary Artery Bypass Graft X 3;   LIMA --> LAD  (105cc/min - 1.9 PI)     RSVG -->OM-->PDA  ( 76cc/min  PI - 5.0 )  Posterior pericardial window       Chest Tubes/Drains: Chest Tubes X 2 (Mediastinal & Left Pleural)       Temporary Pacing Wires: Ventricular              -Settings: 50 Bpm VVI              -Underlying Rhythm: Sinus Bpm     Permanent pacer/ICD: No              -Preoperative settings:               -Intra-op/ Postoperative settings:     Sternotomy performed by: Festus Ryees MD     Conduit Harvested by: RYAN Chavez     Sternal Wires placed by: Festus Reyes MD     Arm/Leg/Groin Closure/Cutdown performed by: RYAN Mann     CPB/XC: perfusion record     Is patient candidate for Emergency Re-sternotomy? Yes              -If yes, POD #10 is - 25    Surgeons      * Oj Javed - Primary    Resident/Fellow/Other Assistant:  Surgeons and Role:     * Festus Reyes MD - Fellow  RYAN Chaevz RNFA  Staff:   Circulator: Elmer Salmeron Person: Aurea  Surgical Assistant: Aneudy  Surgical Assistant: Amita  Circulator: Silvana    Anesthesia Staff: Anesthesiologist: Mychal Busch MD  CRNA: MICKEY Landrum-AVERY  Perfusionist: Ebony Hutchinson    Procedure Summary  Anesthesia: Anesthesia type not filed in the log.  ASA: ASA status not filed in the log.  Estimated Blood Loss: " 250mL  Intra-op Medications:   Administrations occurring from 0730 to 1200 on 06/03/25:   Medication Name Total Dose   heparin (porcine) 5,000 Units in sodium chloride 0.9% 500 mL irrigation 5,000 Units   papaverine injection 30 mg   vancomycin (Vancocin) vial for injection 5 g   aminocaproic acid (Amicar) injection 13.17 g   ceFAZolin (Ancef) IV 2 g in 50 mL dextrose (iso) - duplex 2 g   fentaNYL (Sublimaze) injection 50 mcg/mL 750 mcg   insulin regular (HumuLIN R,NovoLIN R) 100 Units in sodium chloride 0.9% 100 mL (1 Units/mL) infusion 1.79 Units   LR bolus Cannot be calculated   lidocaine (Xylocaine) injection 1 % 100 mg   metoprolol 5 mg/5 mL 2.5 mg   midazolam (Versed) injection 1 mg/mL 8 mg   phenylephrine 100 mcg/mL syringe 10 mL (prefilled) 300 mcg   phenylephrine 40 mcg/mL syringe 10 mL 1,250 mcg   rocuronium (ZeMuron) 50 mg/5 mL injection 150 mg   ceFAZolin (Ancef) in dextrose (iso) IV 2 gram/50 mL  - Omnicell Override Pull Cannot be calculated   heparin injection 1,000 units/mL 27,000 Units   magnesium sulfate 50 % injection 2 g              Anesthesia Record               Intraprocedure I/O Totals          Intake    Norepinephrine Drip 0.00 mL    The total shown is the total volume documented since Anesthesia Start was filed.    Insulin Drip 0.00 mL    The total shown is the total volume documented since Anesthesia Start was filed.    Cell Saver 287 mL    Total Intake 287 mL       Output    Urine 195 mL    Total Output 195 mL       Net    Net Volume 92 mL          Specimen: No specimens collected               Findings: Successful CABG x3, LIMA and sequenced vein to PDA/OM1. Good graft flows, low PI. Good hemostasis after protamine, standard sternal closure.     Complications:  None; patient tolerated the procedure well.     Disposition: ICU - intubated and hemodynamically stable.  Condition: stable  Specimens Collected: No specimens collected  Attending Attestation:     Oj Javed  Phone Number:  [phone]

## 2025-06-03 NOTE — ANESTHESIA PROCEDURE NOTES
Airway  Date/Time: 6/3/2025 8:11 AM  Reason: elective    Airway not difficult    Staffing  Performed: CRNA   Authorized by: Mychal Busch MD    Performed by: MICKEY Landrum-AVERY  Patient location during procedure: OR    Patient Condition  Indications for airway management: anesthesia  Patient position: sniffing  Planned trial extubation  Sedation level: deep     Final Airway Details   Preoxygenated: yes  Final airway type: endotracheal airway  Successful airway: ETT  Cuffed: yes   Successful intubation technique: direct laryngoscopy  Adjuncts used in placement: intubating stylet  Blade: Felicita  Blade size: #3  ETT size (mm): 7.5  Cormack-Lehane Classification: grade I - full view of glottis  Placement verified by: chest auscultation and capnometry   Measured from: teeth  ETT to teeth (cm): 21  Number of attempts at approach: 1    Additional Comments  Lips and teeth in pre-anesthetic condition.

## 2025-06-03 NOTE — INTERVAL H&P NOTE
H&P reviewed. The patient was examined and there are no changes to the H&P.    No new fever, chills, cough, dyspnea, chest pain, leg swelling, syncope.     To OR for CABG x 3-4  CTICU Post Op.

## 2025-06-03 NOTE — ANESTHESIA PREPROCEDURE EVALUATION
"Patient: Simona Betancourt \"Isabel\"    Procedure Information       Date/Time: 06/03/25 0730    Procedure: CABG, 3-4 VESSELS    [facility]: [facility]    Surgeons: Oj Javed MD            Visit Vitals  /81   Pulse 97   Temp 36 °C (96.8 °F) (Temporal)   Resp 18   Ht 1.524 m (5')   Wt 60.8 kg (134 lb 0.6 oz)   LMP 01/01/2011 (Approximate)   SpO2 98%   BMI 26.18 kg/m²   OB Status Postmenopausal   Smoking Status Former   BSA 1.6 m²        Current Outpatient Medications   Medication Instructions    aspirin 81 mg EC tablet = 1 tab(s) ( 81 mg ), PO, Daily, # 90 tab(s), 0 Refill(s), Type: Maintenance    atorvastatin (LIPITOR) 80 mg, oral, Nightly    buPROPion SR (Wellbutrin SR) 200 mg 12 hr tablet = 1 tab(s), Oral, bid    buPROPion SR (Wellbutrin SR) 200 mg 12 hr tablet = 1 tab(s), Oral, bid    chlorhexidine (Peridex) 0.12 % solution Use as directed    escitalopram (LEXAPRO) 10 mg, oral, Daily    levothyroxine (SYNTHROID, LEVOXYL) 125 mcg, oral, Daily    losartan (COZAAR) 50 mg, oral, Daily    metFORMIN XR (GLUCOPHAGE-XR) 750 mg, 2 times daily    Mounjaro 7.5 mg, subcutaneous, Once Weekly    multivitamin (MULTIPLE VITAMINS ORAL) 1 cap(s), PO, Daily, # 90 cap(s), 0 Refill(s), Type: Maintenance    mupirocin (Bactroban) 2 % ointment 2 times daily    nitroglycerin (NITROSTAT) 0.4 mg, sublingual, Every 5 min PRN, Can take every 5 minutes for 3 total doses then call 911      Not on a beta-blocker  Metformin has improved glucose control    Allergies[1]     Surgical History[2]     Relevant Problems   Cardiac   (+) Atherosclerosis of native coronary artery of native heart with unstable angina pectoris   (+) Mixed hyperlipidemia   (+) NSTEMI (non-ST elevated myocardial infarction) (Multi)   (+) Primary hypertension      Neuro   (+) Depression      /Renal   (+) Nephrolithiasis      Liver   (+) Calculus of gallbladder without cholecystitis without obstruction   (+) Chronic viral hepatitis C (Multi)    "   Endocrine   (+) Hypothyroidism, unspecified   (+) Obesity   (+) Type 2 diabetes mellitus without complication, without long-term current use of insulin      ID   (+) Chronic viral hepatitis C (Multi)       Active Ambulatory Problems     Diagnosis Date Noted    Chronic viral hepatitis C (Multi) 08/28/2019    Depression 08/22/2018    Hypothyroidism, unspecified 06/12/2018    Mixed hyperlipidemia 06/12/2018    Obesity 08/23/2023    Primary hypertension 06/11/2018    Nausea vomiting and diarrhea 04/10/2023    Type 2 diabetes mellitus without complication, without long-term current use of insulin 07/17/2020    NSTEMI (non-ST elevated myocardial infarction) (Multi) 05/11/2025    Dehydration 05/11/2025    Pain of upper abdomen 05/11/2025    Calculus of gallbladder without cholecystitis without obstruction 05/11/2025    Nephrolithiasis 05/11/2025    Facial flushing 05/11/2025    Acute renal failure 05/12/2025    Atherosclerosis of native coronary artery of native heart with unstable angina pectoris 05/20/2025     Resolved Ambulatory Problems     Diagnosis Date Noted    ADD (attention deficit disorder) 01/26/2022    Oral lesion 07/17/2020     Past Medical History:   Diagnosis Date    ADHD (attention deficit hyperactivity disorder)     Anxiety     Diabetes mellitus with hyperglycemia     Heart disease     Hypercholesterolemia     Hypertension     Hypothyroidism     Myocardial infarction (Multi)     Nausea and vomiting 04/10/2023    Visual impairment        Clinical information reviewed:   Tobacco  Allergies  Meds   Med Hx  Surg Hx  OB Status  Fam Hx  Soc   Hx        NPO Detail:    NPO/Void Status  Date of Last Liquid: 06/02/25  Time of Last Liquid: 2100  Date of Last Solid: 06/01/25  Time of Last Solid: 2330  Last Intake Type: Clear fluids  Time of Last Void: 0500         Physical Exam    Airway  Mallampati: II  TM distance: >3 FB  Neck ROM: full  Mouth opening: 3 or more finger widths     Cardiovascular - normal  exam   Dental   Comments: # 8 crown     Pulmonary - normal exam   Abdominal - normal exam           Anesthesia Plan    History of general anesthesia?: yes  History of complications of general anesthesia?: no    ASA 3     general   (ETT, A-line, Intra-op JOSEPH, post op vent)  The patient is not a current smoker.    intravenous induction   Anesthetic plan and risks discussed with patient and spouse.    Plan discussed with CRNA.               [1]   Allergies  Allergen Reactions    Ace Inhibitors Cough   [2]   Past Surgical History:  Procedure Laterality Date    CARDIAC CATHETERIZATION N/A 2025    Procedure: LHC, With LV;  Surgeon: Jordi Simpson DO;  Location: [facility] Cardiac Cath Lab;  Service: Cardiovascular;  Laterality: N/A;     SECTION, LOW TRANSVERSE      CORONARY ANGIOPLASTY WITH STENT PLACEMENT      KNEE ARTHROPLASTY

## 2025-06-04 ENCOUNTER — APPOINTMENT (OUTPATIENT)
Dept: RADIOLOGY | Facility: HOSPITAL | Age: 70
DRG: 236 | End: 2025-06-04
Payer: COMMERCIAL

## 2025-06-04 LAB
ACT BLD: 101 SEC (ref 96–152)
ACT BLD: 115 SEC (ref 96–152)
ACT BLD: 475 SEC (ref 96–152)
ACT BLD: 554 SEC (ref 96–152)
ACT BLD: 572 SEC (ref 96–152)
ACT BLD: 577 SEC (ref 96–152)
ALBUMIN SERPL BCP-MCNC: 3.8 G/DL (ref 3.4–5)
ANION GAP BLDA CALCULATED.4IONS-SCNC: 11 MMO/L (ref 10–25)
ANION GAP SERPL CALCULATED.3IONS-SCNC: 14 MMOL/L (ref 10–20)
ARTERIAL PATENCY WRIST A: POSITIVE
ATRIAL RATE: 57 BPM
BASE EXCESS BLDA CALC-SCNC: -1.1 MMOL/L (ref -2–3)
BASOPHILS # BLD AUTO: 0.02 X10*3/UL (ref 0–0.1)
BASOPHILS NFR BLD AUTO: 0.1 %
BODY TEMPERATURE: 37 DEGREES CELSIUS
BUN SERPL-MCNC: 33 MG/DL (ref 6–23)
CA-I BLD-SCNC: 1.12 MMOL/L (ref 1.1–1.33)
CA-I BLDA-SCNC: 1.22 MMOL/L (ref 1.1–1.33)
CALCIUM SERPL-MCNC: 8.9 MG/DL (ref 8.6–10.3)
CHLORIDE BLDA-SCNC: 103 MMOL/L (ref 98–107)
CHLORIDE SERPL-SCNC: 102 MMOL/L (ref 98–107)
CO2 SERPL-SCNC: 23 MMOL/L (ref 21–32)
CREAT SERPL-MCNC: 1.17 MG/DL (ref 0.5–1.05)
EGFRCR SERPLBLD CKD-EPI 2021: 51 ML/MIN/1.73M*2
EOSINOPHIL # BLD AUTO: 0 X10*3/UL (ref 0–0.7)
EOSINOPHIL NFR BLD AUTO: 0 %
ERYTHROCYTE [DISTWIDTH] IN BLOOD BY AUTOMATED COUNT: 12.9 % (ref 11.5–14.5)
GLUCOSE BLD MANUAL STRIP-MCNC: 126 MG/DL (ref 74–99)
GLUCOSE BLD MANUAL STRIP-MCNC: 140 MG/DL (ref 74–99)
GLUCOSE BLD MANUAL STRIP-MCNC: 158 MG/DL (ref 74–99)
GLUCOSE BLD MANUAL STRIP-MCNC: 182 MG/DL (ref 74–99)
GLUCOSE BLDA-MCNC: 125 MG/DL (ref 74–99)
GLUCOSE SERPL-MCNC: 123 MG/DL (ref 74–99)
HCO3 BLDA-SCNC: 24.8 MMOL/L (ref 22–26)
HCT VFR BLD AUTO: 37.3 % (ref 36–46)
HCT VFR BLD EST: 38 % (ref 36–46)
HGB BLD-MCNC: 12.2 G/DL (ref 12–16)
HGB BLDA-MCNC: 12.8 G/DL (ref 12–16)
IMM GRANULOCYTES # BLD AUTO: 0.07 X10*3/UL (ref 0–0.7)
IMM GRANULOCYTES NFR BLD AUTO: 0.4 % (ref 0–0.9)
INHALED O2 CONCENTRATION: 28 %
LACTATE BLDA-SCNC: 0.9 MMOL/L (ref 0.4–2)
LYMPHOCYTES # BLD AUTO: 1 X10*3/UL (ref 1.2–4.8)
LYMPHOCYTES NFR BLD AUTO: 5.8 %
MAGNESIUM SERPL-MCNC: 2.25 MG/DL (ref 1.6–2.4)
MCH RBC QN AUTO: 31 PG (ref 26–34)
MCHC RBC AUTO-ENTMCNC: 32.7 G/DL (ref 32–36)
MCV RBC AUTO: 95 FL (ref 80–100)
MONOCYTES # BLD AUTO: 1.46 X10*3/UL (ref 0.1–1)
MONOCYTES NFR BLD AUTO: 8.5 %
NEUTROPHILS # BLD AUTO: 14.7 X10*3/UL (ref 1.2–7.7)
NEUTROPHILS NFR BLD AUTO: 85.2 %
NRBC BLD-RTO: 0 /100 WBCS (ref 0–0)
OXYHGB MFR BLDA: 97.9 % (ref 94–98)
P AXIS: 68 DEGREES
P OFFSET: 191 MS
P ONSET: 133 MS
PCO2 BLDA: 45 MM HG (ref 38–42)
PH BLDA: 7.35 PH (ref 7.38–7.42)
PHOSPHATE SERPL-MCNC: 5.4 MG/DL (ref 2.5–4.9)
PLATELET # BLD AUTO: 183 X10*3/UL (ref 150–450)
PO2 BLDA: 117 MM HG (ref 85–95)
POTASSIUM BLDA-SCNC: 4.3 MMOL/L (ref 3.5–5.3)
POTASSIUM SERPL-SCNC: 4.3 MMOL/L (ref 3.5–5.3)
PR INTERVAL: 148 MS
Q ONSET: 207 MS
QRS COUNT: 10 BEATS
QRS DURATION: 102 MS
QT INTERVAL: 522 MS
QTC CALCULATION(BAZETT): 508 MS
QTC FREDERICIA: 513 MS
R AXIS: -61 DEGREES
RBC # BLD AUTO: 3.93 X10*6/UL (ref 4–5.2)
SAO2 % BLDA: 99 % (ref 94–100)
SODIUM BLDA-SCNC: 134 MMOL/L (ref 136–145)
SODIUM SERPL-SCNC: 135 MMOL/L (ref 136–145)
SPECIMEN DRAWN FROM PATIENT: ABNORMAL
T AXIS: 28 DEGREES
T OFFSET: 468 MS
VENTRICULAR RATE: 57 BPM
WBC # BLD AUTO: 17.3 X10*3/UL (ref 4.4–11.3)

## 2025-06-04 PROCEDURE — 2500000005 HC RX 250 GENERAL PHARMACY W/O HCPCS

## 2025-06-04 PROCEDURE — 36600 WITHDRAWAL OF ARTERIAL BLOOD: CPT

## 2025-06-04 PROCEDURE — 82947 ASSAY GLUCOSE BLOOD QUANT: CPT

## 2025-06-04 PROCEDURE — 71045 X-RAY EXAM CHEST 1 VIEW: CPT | Performed by: STUDENT IN AN ORGANIZED HEALTH CARE EDUCATION/TRAINING PROGRAM

## 2025-06-04 PROCEDURE — 84132 ASSAY OF SERUM POTASSIUM: CPT

## 2025-06-04 PROCEDURE — 2020000001 HC ICU ROOM DAILY

## 2025-06-04 PROCEDURE — 2500000005 HC RX 250 GENERAL PHARMACY W/O HCPCS: Performed by: PHYSICIAN ASSISTANT

## 2025-06-04 PROCEDURE — 82330 ASSAY OF CALCIUM: CPT | Performed by: PHYSICIAN ASSISTANT

## 2025-06-04 PROCEDURE — 82435 ASSAY OF BLOOD CHLORIDE: CPT

## 2025-06-04 PROCEDURE — 97116 GAIT TRAINING THERAPY: CPT | Mod: GP

## 2025-06-04 PROCEDURE — 2500000001 HC RX 250 WO HCPCS SELF ADMINISTERED DRUGS (ALT 637 FOR MEDICARE OP)

## 2025-06-04 PROCEDURE — 71045 X-RAY EXAM CHEST 1 VIEW: CPT

## 2025-06-04 PROCEDURE — 83735 ASSAY OF MAGNESIUM: CPT | Performed by: PHYSICIAN ASSISTANT

## 2025-06-04 PROCEDURE — 2500000004 HC RX 250 GENERAL PHARMACY W/ HCPCS (ALT 636 FOR OP/ED): Performed by: PHYSICIAN ASSISTANT

## 2025-06-04 PROCEDURE — 2500000004 HC RX 250 GENERAL PHARMACY W/ HCPCS (ALT 636 FOR OP/ED): Mod: JZ

## 2025-06-04 PROCEDURE — 99232 SBSQ HOSP IP/OBS MODERATE 35: CPT | Performed by: PHYSICIAN ASSISTANT

## 2025-06-04 PROCEDURE — 82435 ASSAY OF BLOOD CHLORIDE: CPT | Performed by: PHYSICIAN ASSISTANT

## 2025-06-04 PROCEDURE — 71045 X-RAY EXAM CHEST 1 VIEW: CPT | Performed by: RADIOLOGY

## 2025-06-04 PROCEDURE — 97166 OT EVAL MOD COMPLEX 45 MIN: CPT | Mod: GO

## 2025-06-04 PROCEDURE — 2500000001 HC RX 250 WO HCPCS SELF ADMINISTERED DRUGS (ALT 637 FOR MEDICARE OP): Performed by: PHYSICIAN ASSISTANT

## 2025-06-04 PROCEDURE — 97162 PT EVAL MOD COMPLEX 30 MIN: CPT | Mod: GP

## 2025-06-04 PROCEDURE — 99233 SBSQ HOSP IP/OBS HIGH 50: CPT | Performed by: INTERNAL MEDICINE

## 2025-06-04 PROCEDURE — 85025 COMPLETE CBC W/AUTO DIFF WBC: CPT

## 2025-06-04 PROCEDURE — 99221 1ST HOSP IP/OBS SF/LOW 40: CPT

## 2025-06-04 PROCEDURE — 2500000002 HC RX 250 W HCPCS SELF ADMINISTERED DRUGS (ALT 637 FOR MEDICARE OP, ALT 636 FOR OP/ED)

## 2025-06-04 PROCEDURE — 2500000004 HC RX 250 GENERAL PHARMACY W/ HCPCS (ALT 636 FOR OP/ED)

## 2025-06-04 PROCEDURE — 97530 THERAPEUTIC ACTIVITIES: CPT | Mod: GO

## 2025-06-04 PROCEDURE — 84132 ASSAY OF SERUM POTASSIUM: CPT | Performed by: PHYSICIAN ASSISTANT

## 2025-06-04 PROCEDURE — 37799 UNLISTED PX VASCULAR SURGERY: CPT | Performed by: PHYSICIAN ASSISTANT

## 2025-06-04 RX ORDER — ONDANSETRON 4 MG/1
4 TABLET, ORALLY DISINTEGRATING ORAL EVERY 8 HOURS PRN
Status: ACTIVE | OUTPATIENT
Start: 2025-06-04

## 2025-06-04 RX ORDER — ONDANSETRON HYDROCHLORIDE 2 MG/ML
4 INJECTION, SOLUTION INTRAVENOUS EVERY 4 HOURS PRN
Status: ACTIVE | OUTPATIENT
Start: 2025-06-04

## 2025-06-04 RX ORDER — METOPROLOL TARTRATE 25 MG/1
25 TABLET, FILM COATED ORAL 2 TIMES DAILY
Status: DISCONTINUED | OUTPATIENT
Start: 2025-06-04 | End: 2025-06-06

## 2025-06-04 RX ADMIN — BUPROPION HYDROCHLORIDE 200 MG: 100 TABLET, FILM COATED, EXTENDED RELEASE ORAL at 20:54

## 2025-06-04 RX ADMIN — METOPROLOL TARTRATE 25 MG: 25 TABLET, FILM COATED ORAL at 08:41

## 2025-06-04 RX ADMIN — HYDROMORPHONE HYDROCHLORIDE 0.2 MG: 0.2 INJECTION, SOLUTION INTRAMUSCULAR; INTRAVENOUS; SUBCUTANEOUS at 06:42

## 2025-06-04 RX ADMIN — METOPROLOL TARTRATE 25 MG: 25 TABLET, FILM COATED ORAL at 20:54

## 2025-06-04 RX ADMIN — ACETAMINOPHEN 1000 MG: 10 INJECTION, SOLUTION INTRAVENOUS at 01:00

## 2025-06-04 RX ADMIN — ACETAMINOPHEN 1000 MG: 10 INJECTION, SOLUTION INTRAVENOUS at 07:47

## 2025-06-04 RX ADMIN — CEFAZOLIN SODIUM 2 G: 2 SOLUTION INTRAVENOUS at 20:57

## 2025-06-04 RX ADMIN — INSULIN LISPRO 1 UNITS: 100 INJECTION, SOLUTION INTRAVENOUS; SUBCUTANEOUS at 20:54

## 2025-06-04 RX ADMIN — INSULIN LISPRO 1 UNITS: 100 INJECTION, SOLUTION INTRAVENOUS; SUBCUTANEOUS at 15:42

## 2025-06-04 RX ADMIN — DOCUSATE SODIUM 100 MG: 100 CAPSULE, LIQUID FILLED ORAL at 08:41

## 2025-06-04 RX ADMIN — LEVOTHYROXINE SODIUM 125 MCG: 0.12 TABLET ORAL at 06:42

## 2025-06-04 RX ADMIN — ASPIRIN 81 MG: 81 TABLET, CHEWABLE ORAL at 08:41

## 2025-06-04 RX ADMIN — DOCUSATE SODIUM 100 MG: 100 CAPSULE, LIQUID FILLED ORAL at 20:54

## 2025-06-04 RX ADMIN — ESCITALOPRAM OXALATE 10 MG: 10 TABLET ORAL at 08:41

## 2025-06-04 RX ADMIN — Medication 21 PERCENT: at 07:50

## 2025-06-04 RX ADMIN — OXYCODONE HYDROCHLORIDE 10 MG: 5 TABLET ORAL at 16:09

## 2025-06-04 RX ADMIN — ATORVASTATIN CALCIUM 80 MG: 80 TABLET, FILM COATED ORAL at 20:54

## 2025-06-04 RX ADMIN — OXYCODONE HYDROCHLORIDE 10 MG: 5 TABLET ORAL at 10:13

## 2025-06-04 RX ADMIN — CEFAZOLIN SODIUM 2 G: 2 SOLUTION INTRAVENOUS at 11:49

## 2025-06-04 RX ADMIN — ACETAMINOPHEN 650 MG: 325 TABLET ORAL at 13:37

## 2025-06-04 RX ADMIN — MUPIROCIN 1 APPLICATION: 20 OINTMENT TOPICAL at 00:43

## 2025-06-04 RX ADMIN — MUPIROCIN 1 APPLICATION: 20 OINTMENT TOPICAL at 08:45

## 2025-06-04 RX ADMIN — ACETAMINOPHEN 650 MG: 325 TABLET ORAL at 19:00

## 2025-06-04 RX ADMIN — MUPIROCIN 1 APPLICATION: 20 OINTMENT TOPICAL at 20:54

## 2025-06-04 RX ADMIN — ENOXAPARIN SODIUM 40 MG: 40 INJECTION SUBCUTANEOUS at 08:41

## 2025-06-04 RX ADMIN — METOCLOPRAMIDE 10 MG: 5 INJECTION, SOLUTION INTRAMUSCULAR; INTRAVENOUS at 04:14

## 2025-06-04 RX ADMIN — INSULIN LISPRO 1 UNITS: 100 INJECTION, SOLUTION INTRAVENOUS; SUBCUTANEOUS at 12:10

## 2025-06-04 RX ADMIN — LIDOCAINE 4% 1 PATCH: 40 PATCH TOPICAL at 14:05

## 2025-06-04 RX ADMIN — BUPROPION HYDROCHLORIDE 200 MG: 100 TABLET, FILM COATED, EXTENDED RELEASE ORAL at 08:41

## 2025-06-04 RX ADMIN — CEFAZOLIN SODIUM 2 G: 2 SOLUTION INTRAVENOUS at 03:18

## 2025-06-04 SDOH — HEALTH STABILITY: MENTAL HEALTH: HOW OFTEN DO YOU HAVE A DRINK CONTAINING ALCOHOL?: NEVER

## 2025-06-04 SDOH — SOCIAL STABILITY: SOCIAL NETWORK: IN A TYPICAL WEEK, HOW MANY TIMES DO YOU TALK ON THE PHONE WITH FAMILY, FRIENDS, OR NEIGHBORS?: NEVER

## 2025-06-04 SDOH — HEALTH STABILITY: MENTAL HEALTH: HOW MANY DRINKS CONTAINING ALCOHOL DO YOU HAVE ON A TYPICAL DAY WHEN YOU ARE DRINKING?: PATIENT DOES NOT DRINK

## 2025-06-04 SDOH — HEALTH STABILITY: PHYSICAL HEALTH
HOW OFTEN DO YOU NEED TO HAVE SOMEONE HELP YOU WHEN YOU READ INSTRUCTIONS, PAMPHLETS, OR OTHER WRITTEN MATERIAL FROM YOUR DOCTOR OR PHARMACY?: SOMETIMES

## 2025-06-04 SDOH — ECONOMIC STABILITY: FOOD INSECURITY: HOW HARD IS IT FOR YOU TO PAY FOR THE VERY BASICS LIKE FOOD, HOUSING, MEDICAL CARE, AND HEATING?: NOT VERY HARD

## 2025-06-04 SDOH — SOCIAL STABILITY: SOCIAL NETWORK
DO YOU BELONG TO ANY CLUBS OR ORGANIZATIONS SUCH AS CHURCH GROUPS, UNIONS, FRATERNAL OR ATHLETIC GROUPS, OR SCHOOL GROUPS?: NO

## 2025-06-04 SDOH — SOCIAL STABILITY: SOCIAL INSECURITY: HAS ANYONE EVER THREATENED TO HURT YOUR FAMILY OR YOUR PETS?: NO

## 2025-06-04 SDOH — SOCIAL STABILITY: SOCIAL INSECURITY: WITHIN THE LAST YEAR, HAVE YOU BEEN AFRAID OF YOUR PARTNER OR EX-PARTNER?: NO

## 2025-06-04 SDOH — ECONOMIC STABILITY: HOUSING INSECURITY: IN THE PAST 12 MONTHS, HOW MANY TIMES HAVE YOU MOVED WHERE YOU WERE LIVING?: 0

## 2025-06-04 SDOH — ECONOMIC STABILITY: INCOME INSECURITY: IN THE PAST 12 MONTHS HAS THE ELECTRIC, GAS, OIL, OR WATER COMPANY THREATENED TO SHUT OFF SERVICES IN YOUR HOME?: NO

## 2025-06-04 SDOH — HEALTH STABILITY: PHYSICAL HEALTH: ON AVERAGE, HOW MANY MINUTES DO YOU ENGAGE IN EXERCISE AT THIS LEVEL?: 0 MIN

## 2025-06-04 SDOH — SOCIAL STABILITY: SOCIAL INSECURITY: HAVE YOU HAD ANY THOUGHTS OF HARMING ANYONE ELSE?: NO

## 2025-06-04 SDOH — SOCIAL STABILITY: SOCIAL INSECURITY: ARE YOU MARRIED, WIDOWED, DIVORCED, SEPARATED, NEVER MARRIED, OR LIVING WITH A PARTNER?: MARRIED

## 2025-06-04 SDOH — ECONOMIC STABILITY: FOOD INSECURITY: HOW HARD IS IT FOR YOU TO PAY FOR THE VERY BASICS LIKE FOOD, HOUSING, MEDICAL CARE, AND HEATING?: NOT HARD AT ALL

## 2025-06-04 SDOH — ECONOMIC STABILITY: FOOD INSECURITY: WITHIN THE PAST 12 MONTHS, THE FOOD YOU BOUGHT JUST DIDN'T LAST AND YOU DIDN'T HAVE MONEY TO GET MORE.: NEVER TRUE

## 2025-06-04 SDOH — HEALTH STABILITY: MENTAL HEALTH
DO YOU FEEL STRESS - TENSE, RESTLESS, NERVOUS, OR ANXIOUS, OR UNABLE TO SLEEP AT NIGHT BECAUSE YOUR MIND IS TROUBLED ALL THE TIME - THESE DAYS?: TO SOME EXTENT

## 2025-06-04 SDOH — SOCIAL STABILITY: SOCIAL NETWORK: HOW OFTEN DO YOU GET TOGETHER WITH FRIENDS OR RELATIVES?: NEVER

## 2025-06-04 SDOH — SOCIAL STABILITY: SOCIAL NETWORK: HOW OFTEN DO YOU ATTEND CHURCH OR RELIGIOUS SERVICES?: NEVER

## 2025-06-04 SDOH — SOCIAL STABILITY: SOCIAL INSECURITY: DOES ANYONE TRY TO KEEP YOU FROM HAVING/CONTACTING OTHER FRIENDS OR DOING THINGS OUTSIDE YOUR HOME?: NO

## 2025-06-04 SDOH — ECONOMIC STABILITY: HOUSING INSECURITY: AT ANY TIME IN THE PAST 12 MONTHS, WERE YOU HOMELESS OR LIVING IN A SHELTER (INCLUDING NOW)?: NO

## 2025-06-04 SDOH — ECONOMIC STABILITY: HOUSING INSECURITY: IN THE LAST 12 MONTHS, WAS THERE A TIME WHEN YOU WERE NOT ABLE TO PAY THE MORTGAGE OR RENT ON TIME?: NO

## 2025-06-04 SDOH — ECONOMIC STABILITY: FOOD INSECURITY: WITHIN THE PAST 12 MONTHS, YOU WORRIED THAT YOUR FOOD WOULD RUN OUT BEFORE YOU GOT THE MONEY TO BUY MORE.: NEVER TRUE

## 2025-06-04 SDOH — SOCIAL STABILITY: SOCIAL INSECURITY: WITHIN THE LAST YEAR, HAVE YOU BEEN HUMILIATED OR EMOTIONALLY ABUSED IN OTHER WAYS BY YOUR PARTNER OR EX-PARTNER?: NO

## 2025-06-04 SDOH — HEALTH STABILITY: PHYSICAL HEALTH: ON AVERAGE, HOW MANY DAYS PER WEEK DO YOU ENGAGE IN MODERATE TO STRENUOUS EXERCISE (LIKE A BRISK WALK)?: 0 DAYS

## 2025-06-04 SDOH — SOCIAL STABILITY: SOCIAL INSECURITY: ARE THERE ANY APPARENT SIGNS OF INJURIES/BEHAVIORS THAT COULD BE RELATED TO ABUSE/NEGLECT?: NO

## 2025-06-04 SDOH — HEALTH STABILITY: MENTAL HEALTH: HOW OFTEN DO YOU HAVE SIX OR MORE DRINKS ON ONE OCCASION?: NEVER

## 2025-06-04 SDOH — SOCIAL STABILITY: SOCIAL INSECURITY: DO YOU FEEL UNSAFE GOING BACK TO THE PLACE WHERE YOU ARE LIVING?: NO

## 2025-06-04 SDOH — SOCIAL STABILITY: SOCIAL NETWORK: HOW OFTEN DO YOU ATTEND MEETINGS OF THE CLUBS OR ORGANIZATIONS YOU BELONG TO?: NEVER

## 2025-06-04 SDOH — ECONOMIC STABILITY: TRANSPORTATION INSECURITY: IN THE PAST 12 MONTHS, HAS LACK OF TRANSPORTATION KEPT YOU FROM MEDICAL APPOINTMENTS OR FROM GETTING MEDICATIONS?: NO

## 2025-06-04 SDOH — SOCIAL STABILITY: SOCIAL INSECURITY: DO YOU FEEL ANYONE HAS EXPLOITED OR TAKEN ADVANTAGE OF YOU FINANCIALLY OR OF YOUR PERSONAL PROPERTY?: NO

## 2025-06-04 SDOH — SOCIAL STABILITY: SOCIAL INSECURITY
ASK PARENT OR GUARDIAN: ARE THERE TIMES WHEN YOU, YOUR CHILD(REN), OR ANY MEMBER OF YOUR HOUSEHOLD FEEL UNSAFE, HARMED, OR THREATENED AROUND PERSONS WITH WHOM YOU KNOW OR LIVE?: NO

## 2025-06-04 SDOH — SOCIAL STABILITY: SOCIAL INSECURITY: WERE YOU ABLE TO COMPLETE ALL THE BEHAVIORAL HEALTH SCREENINGS?: YES

## 2025-06-04 SDOH — SOCIAL STABILITY: SOCIAL INSECURITY: ABUSE: ADULT

## 2025-06-04 SDOH — SOCIAL STABILITY: SOCIAL INSECURITY: ARE YOU OR HAVE YOU BEEN THREATENED OR ABUSED PHYSICALLY, EMOTIONALLY, OR SEXUALLY BY ANYONE?: NO

## 2025-06-04 SDOH — ECONOMIC STABILITY: HOUSING INSECURITY: DO YOU FEEL UNSAFE GOING BACK TO THE PLACE WHERE YOU LIVE?: NO

## 2025-06-04 SDOH — SOCIAL STABILITY: SOCIAL INSECURITY: HAVE YOU HAD THOUGHTS OF HARMING ANYONE ELSE?: NO

## 2025-06-04 ASSESSMENT — ACTIVITIES OF DAILY LIVING (ADL)
ADL_ASSISTANCE: INDEPENDENT
LACK_OF_TRANSPORTATION: NO
LACK_OF_TRANSPORTATION: NO
ADL_ASSISTANCE: INDEPENDENT
LACK_OF_TRANSPORTATION: NO

## 2025-06-04 ASSESSMENT — PAIN - FUNCTIONAL ASSESSMENT
PAIN_FUNCTIONAL_ASSESSMENT: FLACC (FACE, LEGS, ACTIVITY, CRY, CONSOLABILITY)
PAIN_FUNCTIONAL_ASSESSMENT: 0-10
PAIN_FUNCTIONAL_ASSESSMENT: FLACC (FACE, LEGS, ACTIVITY, CRY, CONSOLABILITY)
PAIN_FUNCTIONAL_ASSESSMENT: 0-10
PAIN_FUNCTIONAL_ASSESSMENT: FLACC (FACE, LEGS, ACTIVITY, CRY, CONSOLABILITY)
PAIN_FUNCTIONAL_ASSESSMENT: FLACC (FACE, LEGS, ACTIVITY, CRY, CONSOLABILITY)
PAIN_FUNCTIONAL_ASSESSMENT: 0-10

## 2025-06-04 ASSESSMENT — COGNITIVE AND FUNCTIONAL STATUS - GENERAL
MOBILITY SCORE: 14
STANDING UP FROM CHAIR USING ARMS: A LOT
DRESSING REGULAR LOWER BODY CLOTHING: A LOT
MOVING TO AND FROM BED TO CHAIR: A LITTLE
PERSONAL GROOMING: A LITTLE
DAILY ACTIVITIY SCORE: 17
TOILETING: A LITTLE
CLIMB 3 TO 5 STEPS WITH RAILING: A LOT
WALKING IN HOSPITAL ROOM: A LITTLE
MOVING FROM LYING ON BACK TO SITTING ON SIDE OF FLAT BED WITH BEDRAILS: A LOT
HELP NEEDED FOR BATHING: A LOT
DRESSING REGULAR UPPER BODY CLOTHING: A LITTLE
TURNING FROM BACK TO SIDE WHILE IN FLAT BAD: A LOT

## 2025-06-04 ASSESSMENT — PAIN DESCRIPTION - LOCATION: LOCATION: CHEST

## 2025-06-04 ASSESSMENT — PATIENT HEALTH QUESTIONNAIRE - PHQ9
SUM OF ALL RESPONSES TO PHQ9 QUESTIONS 1 & 2: 3
2. FEELING DOWN, DEPRESSED OR HOPELESS: MORE THAN HALF THE DAYS
1. LITTLE INTEREST OR PLEASURE IN DOING THINGS: SEVERAL DAYS

## 2025-06-04 ASSESSMENT — PAIN SCALES - GENERAL
PAINLEVEL_OUTOF10: 0 - NO PAIN
PAINLEVEL_OUTOF10: 3
PAINLEVEL_OUTOF10: 7
PAINLEVEL_OUTOF10: 0 - NO PAIN
PAINLEVEL_OUTOF10: 7
PAINLEVEL_OUTOF10: 7
PAINLEVEL_OUTOF10: 6
PAINLEVEL_OUTOF10: 0 - NO PAIN
PAINLEVEL_OUTOF10: 3

## 2025-06-04 ASSESSMENT — PAIN DESCRIPTION - ORIENTATION: ORIENTATION: MID

## 2025-06-04 ASSESSMENT — LIFESTYLE VARIABLES
HOW OFTEN DO YOU HAVE 6 OR MORE DRINKS ON ONE OCCASION: NEVER
SKIP TO QUESTIONS 9-10: 1
HOW MANY STANDARD DRINKS CONTAINING ALCOHOL DO YOU HAVE ON A TYPICAL DAY: PATIENT DOES NOT DRINK
AUDIT-C TOTAL SCORE: 0
SUBSTANCE_ABUSE_PAST_12_MONTHS: YES
AUDIT-C TOTAL SCORE: 0
AUDIT-C TOTAL SCORE: 0
PRESCIPTION_ABUSE_PAST_12_MONTHS: NO
HOW OFTEN DO YOU HAVE A DRINK CONTAINING ALCOHOL: NEVER
SKIP TO QUESTIONS 9-10: 1

## 2025-06-04 ASSESSMENT — PAIN SCALES - WONG BAKER
WONGBAKER_NUMERICALRESPONSE: NO HURT

## 2025-06-04 ASSESSMENT — COLUMBIA-SUICIDE SEVERITY RATING SCALE - C-SSRS
1. IN THE PAST MONTH, HAVE YOU WISHED YOU WERE DEAD OR WISHED YOU COULD GO TO SLEEP AND NOT WAKE UP?: NO
6. HAVE YOU EVER DONE ANYTHING, STARTED TO DO ANYTHING, OR PREPARED TO DO ANYTHING TO END YOUR LIFE?: NO
2. HAVE YOU ACTUALLY HAD ANY THOUGHTS OF KILLING YOURSELF?: NO

## 2025-06-04 ASSESSMENT — PAIN DESCRIPTION - DESCRIPTORS
DESCRIPTORS: ACHING
DESCRIPTORS: ACHING
DESCRIPTORS: ACHING;SORE

## 2025-06-04 NOTE — NURSING NOTE
Pt placed on 100% NRB as ordered by Dr Marquis  pulse ox 94 on room air.  Placed due to pnuemo  No crepetuis noted

## 2025-06-04 NOTE — ASSESSMENT & PLAN NOTE
Patient has about underlying status post CABG x 3 vessels.  Continue current Lopressor 25 mg p.o. twice daily as well as aspirin Lipitor.  Will resume Plavix postop day 4.  Restart home medication.  So far stable cardiac wise.  Pt. care time is spent includes review of diagnostic tests, labs, radiographs, EKGs, old echoes, cardiac work-up and coordination of care. Assessment, impression and plans are reflected in the note above as well as the orders.

## 2025-06-04 NOTE — CARE PLAN
The patient's goals for the shift include To sleep pain free    The clinical goals for the shift include Patient will remain hemodynamically stable    Over the shift, the patient did not make progress toward the following goals. Barriers to progression include. Recommendations to address these barriers include   Problem: Pain - Adult  Goal: Verbalizes/displays adequate comfort level or baseline comfort level  Outcome: Progressing  Flowsheets (Taken 6/4/2025 0455)  Verbalizes/displays adequate comfort level or baseline comfort level:   Encourage patient to monitor pain and request assistance   Assess pain using appropriate pain scale   Administer analgesics based on type and severity of pain and evaluate response   Implement non-pharmacological measures as appropriate and evaluate response   Consider cultural and social influences on pain and pain management   Notify Licensed Independent Practitioner if interventions unsuccessful or patient reports new pain     Problem: Safety - Adult  Goal: Free from fall injury  Outcome: Progressing  Flowsheets (Taken 6/4/2025 0455)  Free from fall injury:   Instruct family/caregiver on patient safety   Based on caregiver fall risk screen, instruct family/caregiver to ask for assistance with transferring infant if caregiver noted to have fall risk factors     Problem: Discharge Planning  Goal: Discharge to home or other facility with appropriate resources  Outcome: Progressing  Flowsheets (Taken 6/4/2025 0455)  Discharge to home or other facility with appropriate resources:   Identify barriers to discharge with patient and caregiver   Arrange for needed discharge resources and transportation as appropriate   Identify discharge learning needs (meds, wound care, etc)   Arrange for interpreters to assist at discharge as needed   Refer to discharge planning if patient needs post-hospital services based on physician order or complex needs related to functional status, cognitive ability  or social support system     Problem: Chronic Conditions and Co-morbidities  Goal: Patient's chronic conditions and co-morbidity symptoms are monitored and maintained or improved  Outcome: Progressing  Flowsheets (Taken 6/4/2025 0455)  Care Plan - Patient's Chronic Conditions and Co-Morbidity Symptoms are Monitored and Maintained or Improved:   Monitor and assess patient's chronic conditions and comorbid symptoms for stability, deterioration, or improvement   Collaborate with multidisciplinary team to address chronic and comorbid conditions and prevent exacerbation or deterioration   Update acute care plan with appropriate goals if chronic or comorbid symptoms are exacerbated and prevent overall improvement and discharge     Problem: Nutrition  Goal: Nutrient intake appropriate for maintaining nutritional needs  Outcome: Progressing     Problem: Skin  Goal: Decreased wound size/increased tissue granulation at next dressing change  Outcome: Progressing  Flowsheets (Taken 6/4/2025 0455)  Decreased wound size/increased tissue granulation at next dressing change:   Promote sleep for wound healing   Protective dressings over bony prominences  Goal: Participates in plan/prevention/treatment measures  Outcome: Progressing  Flowsheets (Taken 6/4/2025 0455)  Participates in plan/prevention/treatment measures:   Elevate heels   Increase activity/out of bed for meals   Discuss with provider PT/OT consult  Goal: Prevent/manage excess moisture  Outcome: Progressing  Flowsheets (Taken 6/4/2025 0455)  Prevent/manage excess moisture:   Moisturize dry skin   Follow provider orders for dressing changes   Monitor for/manage infection if present  Goal: Prevent/minimize sheer/friction injuries  Outcome: Progressing  Flowsheets (Taken 6/4/2025 0455)  Prevent/minimize sheer/friction injuries:   Complete micro-shifts as needed if patient unable. Adjust patient position to relieve pressure points, not a full turn   Use pull sheet   HOB 30  degrees or less  Goal: Promote/optimize nutrition  Outcome: Progressing  Flowsheets (Taken 6/4/2025 0455)  Promote/optimize nutrition:   Offer water/supplements/favorite foods   Monitor/record intake including meals  Goal: Promote skin healing  Outcome: Progressing  Flowsheets (Taken 6/4/2025 0455)  Promote skin healing:   Protective dressings over bony prominences   Ensure correct size (line/device) and apply per  instructions   Rotate device position/do not position patient on device   Assess skin/pad under line(s)/device(s)   .

## 2025-06-04 NOTE — NURSING NOTE
Crepitus felt right upper chest wall under clavicle across chest to mid left chest under clavical  manas beah sounds right upper dim and left base dim

## 2025-06-04 NOTE — PROGRESS NOTES
TCC met with patient. Assessment complete. Patient lives in a house with her spouse. Patient is independent. Patient drives and is able to shop, cook and clean. Patient treated for depression. Patient quit smoking on 5/11/25. Patient uses marijuana. Patient follows Dr. Antonette Pereira. Patient fills prescriptions at [facility] in [facility]. At this time there is not a safe discharge plan in place. Will follow.      06/04/25 1411   Discharge Planning   Living Arrangements Spouse/significant other   Support Systems Spouse/significant other   Type of Residence Private residence   Home or Post Acute Services Other (Comment)   Expected Discharge Disposition [place]  (TBD)   Does the patient need discharge transport arranged? No   Financial Resource Strain   How hard is it for you to pay for the very basics like food, housing, medical care, and heating? Not very   Housing Stability   In the last 12 months, was there a time when you were not able to pay the mortgage or rent on time? N   In the past 12 months, how many times have you moved where you were living? 0   At any time in the past 12 months, were you homeless or living in a shelter (including now)? N   Transportation Needs   In the past 12 months, has lack of transportation kept you from medical appointments or from getting medications? no   In the past 12 months, has lack of transportation kept you from meetings, work, or from getting things needed for daily living? No

## 2025-06-04 NOTE — PROGRESS NOTES
06/04/25 1409   Physical Activity   On average, how many days per week do you engage in moderate to strenuous exercise (like a brisk walk)? 0 days   On average, how many minutes do you engage in exercise at this level? 0 min   Financial Resource Strain   How hard is it for you to pay for the very basics like food, housing, medical care, and heating? Not very   Housing Stability   In the last 12 months, was there a time when you were not able to pay the mortgage or rent on time? N   In the past 12 months, how many times have you moved where you were living? 0   At any time in the past 12 months, were you homeless or living in a shelter (including now)? N   Transportation Needs   In the past 12 months, has lack of transportation kept you from medical appointments or from getting medications? no   In the past 12 months, has lack of transportation kept you from meetings, work, or from getting things needed for daily living? No   Food Insecurity   Within the past 12 months, you worried that your food would run out before you got the money to buy more. Never true   Within the past 12 months, the food you bought just didn't last and you didn't have money to get more. Never true   Stress   Do you feel stress - tense, restless, nervous, or anxious, or unable to sleep at night because your mind is troubled all the time - these days? To some exte   Social Connections   In a typical week, how many times do you talk on the phone with family, friends, or neighbors? Never   How often do you get together with friends or relatives? Never   How often do you attend Nondenominational or Evangelical services? Never   Do you belong to any clubs or organizations such as Nondenominational groups, unions, fraternal or athletic groups, or school groups? No   How often do you attend meetings of the clubs or organizations you belong to? Never   Are you , , , , never , or living with a partner?    Intimate Partner  Violence   Within the last year, have you been afraid of your partner or ex-partner? No   Within the last year, have you been humiliated or emotionally abused in other ways by your partner or ex-partner? No   Within the last year, have you been kicked, hit, slapped, or otherwise physically hurt by your partner or ex-partner? No   Within the last year, have you been raped or forced to have any kind of sexual activity by your partner or ex-partner? No   Alcohol Use   Q1: How often do you have a drink containing alcohol? Never   Q2: How many drinks containing alcohol do you have on a typical day when you are drinking? None   Q3: How often do you have six or more drinks on one occasion? Never   Utilities   In the past 12 months has the electric, gas, oil, or water company threatened to shut off services in your home? No   Health Literacy   How often do you need to have someone help you when you read instructions, pamphlets, or other written material from your doctor or pharmacy? Sometimes   Follow-Ups   We make community resources available to all of our patients to assist with everyday needs. We may be able to connect you with those resources. Would you be interested? Y   Would you be interested in assistance with any of these areas? If so, which ones? 5;1

## 2025-06-04 NOTE — CONSULTS
Consults  [facility] Critical Care Medicine       Date:  6/3/2025  Patient:  Simona Betancourt  YOB: 1955  MRN:  [id number]   Admit Date:  6/3/2025    Chief complaint:      History of Present Illness:  Simona Betancourt is a 69 y.o. year old female patient with past medical history of CAD, MI w/ stents 10+ years ago, HTN, HLD, heavy smoker who presented 5/11 with complaints of nausea vomiting and feeling unwell. Underwent cardiac catheterization on 5/12 which showed multi-vessel disease. Today she had CABG x 3, LIMA-> LAD, RSVG-> OM-> PDA.    Interval ICU Events:  6/3: Arrived to ICU intubated and sedated. Not on pressors. Has two chest tubes, one mediastinal, one left pleural. Ventricular pacing wires VVI backup rate 50 BPM.     6/4: Right apical pneumothorax on cxr this am. Reports some nausea and vomiting this am. Pain is controlled with meds. Still requiring low dose nitroglycerin gtt.     Objective     Medical History:  Medical History[1]  Surgical History[2]  Prescriptions Prior to Admission[3]  Ace inhibitors  Social History[4]  Family History[5]    Hospital Medications:    Continuous Medications[6]    Current Medications[7]    Review of Systems:  14 point review of systems was completed and negative except for those specially mention in my HPI    Physical Exam:    Heart Rate:  [97]   Temp:  [36 °C (96.8 °F)]   Resp:  [18]   BP: (133)/(81)   Height:  [152.4 cm (5')]   Weight:  [60.8 kg (134 lb 0.6 oz)]   SpO2:  [98 %]     Physical Exam  Constitutional:       General: She is not in acute distress.     Appearance: She is ill-appearing.   HENT:      Mouth/Throat:      Mouth: Mucous membranes are moist.   Eyes:      Conjunctiva/sclera: Conjunctivae normal.   Cardiovascular:      Rate and Rhythm: Normal rate and regular rhythm.      Pulses: Normal pulses.      Heart sounds: Normal heart sounds.      Comments: Doppler DP pulses  Pulmonary:      Effort: No respiratory distress.      Breath sounds:  Normal breath sounds. No wheezing, rhonchi or rales.   Abdominal:      General: Bowel sounds are normal.      Palpations: Abdomen is soft.      Tenderness: There is no abdominal tenderness.   Musculoskeletal:         General: No swelling.      Left lower leg: No edema.   Skin:     General: Skin is dry.      Comments: Chest bandage clean and dry   Neurological:      Mental Status: She is oriented to person, place, and time.   Psychiatric:         Mood and Affect: Mood normal.         Behavior: Behavior normal.         Objective:    I have reviewed all medications, laboratory results, and imaging pertinent for today's encounter.    Vent Mode: Pressure regulated volume control/assist control  S RR:  [18] 18  S VT:  [360 mL] 360 mL  PEEP/CPAP (cm H2O):  [8 cm H20] 8 cm H20  MAP (cm H2O):  [12] 12      Intake/Output Summary (Last 24 hours) at 6/3/2025 1339  Last data filed at 6/3/2025 1325  Gross per 24 hour   Intake 2287 ml   Output 455 ml   Net 1832 ml            Intra-Op:  Procedure/Surgeon: CABG x 3. With Dr. Beltran Javed  Frontliner/Anesthesia:   Out of OR Time (document on ventilator card):      OR Course/Complications:      CPB time:   Cross clamp time:   Echo Pre/Post: Pre: ; post:   Chest Tubes/Drains: 1 left pleural, 1 mediastinal   Temporary wires location/setting: Ventricular pacing wires, backup @ 50     Fluids-   Crystalloid: LR 2L  Colloid: -  Cellsaver: 287cc  Products: -  EBL: 250cc  UOP: ~300cc     Anesthesia-  Intubation: MAC 3 ... Grade 1   Intravenous Access:  rt brachial art line, CVC- RIJ   Regional anesthesia:   Benzodiazepine dose/last administration:   Opioid dose/last administration:   NMB dose/last administration: last rocuronium total at 1230  TOF/ reversal given: not reversed   Antibiotic time: ancef 1215  Temperature on admission to ICU: 36    Assessment/Plan:    I am currently managing this critically ill patient for the following problems:    Neuro/Psych/Pain Ctrl/Sedation:  Acute  post-operative pain - likely incisional pain  - Pt currently intubated and sedated on propofol   - Serial neuro and pain assessments   - Scheduled acetaminophen, lidoderm   - PRN oxycodone and dilaudid for pain  - CAM ICU qshift, sleep/wake hygiene, delirium precautions     Respiratory/ENT:  Post-operative respiratory insufficiency without respiratory failure   Right apical pneumothorax  Arrived to ICU intubated on ventilator with appropriate oxygenation and ventilation. Left pleural and mediastinal chest tubes with no airleak and appropriate serosang output, to suction   - No underlying pulmonary disease  - Wean FiO2 to maintain SpO2 goal >92%, PaO2 >60mmHg  - 1 left pleural, 1 mediastinal chest tube, maintain to wall suction, monitor output, notify provider if > 100cc/hr  - ABG as needed  - Daily CXR while chest tubes in place   - Continuous pulse ox monitoring   - Bronchopulmonary hygiene and incentive spirometry once extubated   - right apical pneumothorax on cxr this am- will repeat cxr at 11am  - will place on NRB x 4 hours for pneumo    Cardiovascular:  Triple vessel CAD s/p CABG x 3  Patient underwent CABGx3 LIMA-> LAD, RSVG-> OM-> PDA  ; Arrived to ICU on no vasopressors; NSR on monitor ; Pre-op echo: ; Post-op echo:   - V wires in place, paced at VVI with backup rate of 50.  - Volume resuscitation postoperatively with 250cc boluses LR as needed and albumin 5%  - Wean vasoactive medications to MAP 70-90, CI > 2.2, SVO2 >65%  - Start aspirin within 6 hours post-op then daily   - Statin POD #1   - Start low dose BB POD #1  - CTS following and appreciate further management  - Continuous cardiac monitoring per ICU protocol  - Maintain MAPS >70-90  - Daily EKGs while in ICU    GI:  No acute issues  - advance diet as tolerated d/t nausea vomiting this am  - Swallow eval 4 hours post-extubation   - BR with miralax, senna BID, PRN dulc suppositories     Renal/Volume Status (Intra & Extravascular):  Olivarez catheter  in place with adequate UOP -- remove on or prior to POD2   - Goal urine output 0.5-1.0cc/kg/hr  - Monitor I/O's  - Replete electrolytes to maintain K >4.0 and Mg >2.0  - Daily BMP, Mg, Phos     Endocrine  Post-operative hyperglycemia  - No history of DM, likely stress induced  - Strict blood glucose control post-operatively  - SSI q4hrs while NPO  - Maintain glucose <180     Infectious Disease:  No acute issues  - Laila-op Ancef x 48 hours  - MRSA PCR: +/-  - Monitor SIRS criteria    Heme/Onc:  Acute post-operative blood loss anemia   - Baseline Hgb 13-14  - DAPT POD #4 if STEMI/NSTEMI   - Monitor for s/sx of anemia such as bleeding and bruising   - Transfuse if Hgb <7.0 or massive blood loss with hemodynamic instability  - Daily CBC    MSK:  No acute issues  - PT/OT consult and OOB POD #1 and to chair as tolerated   - Padded pressure points     Skin  - ICU skin protocol    Ethics/Code Status:  Full code     :  DVT Prophylaxis: SCDs; lovenox  GI Prophylaxis: no  Bowel Regimen: Miralax, senna, PRN dulc   Diet: reg  CVC: RIJ  Birdsnest: r brachial  Olivarez: yes  Restraints: no  Dispo: ICU    Critical Care Time:  45 minutes spent in preparing to see patient (I.e.labs,imaging, etc.), documentation, discussion plan of care with patient/family/caregiver, and/ or coordination of care with multidisciplinary team including the attending. Time does not include completion of procedure time.     Estrellita Lloyd APRABDIAZIZ-CNP  Pulmonology & Critical Care Medicine   [facility]           [1]   Past Medical History:  Diagnosis Date    ADHD (attention deficit hyperactivity disorder)     Anxiety     Chronic viral hepatitis C (Multi)     Depression     Diabetes mellitus with hyperglycemia     Heart disease     Hypercholesterolemia     Hypertension     Hypothyroidism     Myocardial infarction (Multi)     Nausea and vomiting 04/10/2023    Obesity     Visual impairment    [2]   Past Surgical History:  Procedure Laterality  Date    CARDIAC CATHETERIZATION N/A 2025    Procedure: LHC, With LV;  Surgeon: Jordi Simpson DO;  Location: [facility];  Service: Cardiovascular;  Laterality: N/A;     SECTION, LOW TRANSVERSE      CORONARY ANGIOPLASTY WITH STENT PLACEMENT  2014    KNEE ARTHROPLASTY     [3]   Medications Prior to Admission   Medication Sig Dispense Refill Last Dose/Taking    aspirin 81 mg EC tablet = 1 tab(s) ( 81 mg ), PO, Daily, # 90 tab(s), 0 Refill(s), Type: Maintenance   6/3/2025 at  4:30 AM    atorvastatin (Lipitor) 80 mg tablet Take 1 tablet (80 mg) by mouth once daily at bedtime. 30 tablet 0 2025 at  9:00 PM    buPROPion SR (Wellbutrin SR) 200 mg 12 hr tablet  1 tab(s), Oral, bid 60 tablet 0 6/3/2025 at  4:30 AM    chlorhexidine (Peridex) 0.12 % solution Use as directed 473 mL 0 6/3/2025 at  4:30 AM    escitalopram (Lexapro) 10 mg tablet Take 1 tablet (10 mg) by mouth once daily. 90 tablet 1 6/3/2025 at  4:30 AM    levothyroxine (Synthroid, Levoxyl) 125 mcg tablet TAKE 1 TABLET DAILY 90 tablet 2 6/3/2025 at  4:30 AM    losartan (Cozaar) 50 mg tablet Take 1 tablet (50 mg) by mouth once daily. 30 tablet 0 Past Week    metFORMIN XR (Glucophage-XR) 750 mg 24 hr tablet Take 1 tablet (750 mg) by mouth 2 times a day. Do not crush, chew, or split.   Past Week    multivitamin (MULTIPLE VITAMINS ORAL) 1 cap(s), PO, Daily, # 90 cap(s), 0 Refill(s), Type: Maintenance   Past Week    mupirocin (Bactroban) 2 % ointment Apply topically 2 times a day.   6/3/2025 at  4:30 AM    buPROPion SR (Wellbutrin SR) 200 mg 12 hr tablet  1 tab(s), Oral, bid 180 tablet 1     nitroglycerin (Nitrostat) 0.4 mg SL tablet Place 1 tablet (0.4 mg) under the tongue every 5 minutes if needed for chest pain. Can take every 5 minutes for 3 total doses then call 911 90 tablet 12     tirzepatide (Mounjaro) 7.5 mg/0.5 mL pen injector Inject 7.5 mg under the skin 1 (one) time per week. (Patient taking differently: Inject 7.5 mg under the  skin 1 (one) time per week. Thursday  last dose25) 2 mL 5    [4]   Social History  Tobacco Use    Smoking status: Former     Current packs/day: 0.00     Average packs/day: 0.5 packs/day for 54.3 years (27.2 ttl pk-yrs)     Types: Cigarettes     Start date: 1971     Quit date: 2025     Years since quittin.0     Passive exposure: Current    Smokeless tobacco: Former   Vaping Use    Vaping status: Never Used   Substance Use Topics    Alcohol use: Not Currently    Drug use: Yes     Frequency: 4.0 times per week     Types: Marijuana     Comment: smokes thc nightly   [5]   Family History  Problem Relation Name Age of Onset    Hypertension Mother Mom     Stroke Mother Mom     Hypertension Father Dad     Cancer Father Dad     Alcohol abuse Father Dad     Drug abuse Father Dad    [6] aminocaproic acid, 1 g/hr  lactated Ringer's, 30 mL/hr  lactated Ringer's, 5 mL/hr  propofol, 0-50 mcg/kg/min  [7]   Current Facility-Administered Medications:     acetaminophen (Tylenol) tablet 650 mg, 650 mg, oral, q6h, Natalia Jamison PA-C    alteplase (Cathflo Activase) injection 2 mg, 2 mg, intra-catheter, PRN, Natalia Jamison PA-C    aminocaproic acid (Amicar) 25 g in sodium chloride 0.9% 250 mL (0.1 g/mL) infusion, 1 g/hr, intravenous, Continuous, Natalia Jamison PA-C    aspirin chewable tablet 81 mg, 81 mg, oral, Daily, Natalia Jamison PA-C    [START ON 2025] atorvastatin (Lipitor) tablet 80 mg, 80 mg, oral, Nightly, Natalia Jamison PA-C    calcium chloride 0.5 g in dextrose 5% 50 mL IV, 0.5 g, intravenous, q8h PRN, Natalia Jamison PA-C    calcium chloride 1 g in dextrose 5% 50 mL IV, 1 g, intravenous, q8h PRN, Natalia Jamison PA-C    ceFAZolin (Ancef) 2 g in dextrose (iso) IV 50 mL, 2 g, intravenous, q8h, Natalia M Shaheen, PA-C    docusate sodium (Colace) capsule 100 mg, 100 mg, oral, BID, Natalia Jamison PA-C    [START ON 2025] enoxaparin (Lovenox) syringe 40 mg, 40 mg, subcutaneous, q24h, Natalia Jamison PA-C     HYDROmorphone PF (Dilaudid) injection 0.2 mg, 0.2 mg, intravenous, q15 min PRN, Natalia Jamison PA-C    lactated Ringer's infusion, 30 mL/hr, intravenous, Continuous, Natalia Jamison PA-C    lactated Ringer's infusion, 5 mL/hr, intravenous, Continuous, Natalia Jamison PA-C    lidocaine 4 % patch 1 patch, 1 patch, transdermal, q24h, Natalia Jamison PA-C    magnesium citrate solution 296 mL, 296 mL, oral, Once PRN, Natalia Jamison PA-C    magnesium hydroxide (Milk of Magnesia) 400 mg/5 mL suspension 30 mL, 30 mL, oral, Daily PRN, Natalia Jamison PA-C    magnesium sulfate 2 g in sterile water for injection 50 mL, 2 g, intravenous, q6h PRN, Natalia Jamison PA-C    magnesium sulfate 3 g in dextrose 5% 100 mL IV, 3 g, intravenous, q6h PRN, Natalia Jamison PA-C    metoclopramide (Reglan) tablet 10 mg, 10 mg, oral, q6h PRN **OR** metoclopramide (Reglan) injection 10 mg, 10 mg, intravenous, q6h PRN, Natalia Jamison PA-C    mupirocin (Bactroban) 2 % ointment 1 Application, 1 Application, Each Nostril, BID, Natalia Jamison PA-C    naloxone (Narcan) injection 0.2 mg, 0.2 mg, intravenous, q5 min PRN, Natalia Jamison PA-C    oxyCODONE (Roxicodone) immediate release tablet 10 mg, 10 mg, oral, q4h PRN, Natalia Jamison PA-C    oxyCODONE (Roxicodone) immediate release tablet 5 mg, 5 mg, oral, q4h PRN, Natalia Jamison PA-C    oxygen (O2) therapy, , inhalation, q4h, Oj Javed MD    potassium chloride CR (Klor-Con M20) ER tablet 20 mEq, 20 mEq, oral, q6h PRN **OR** potassium chloride (Klor-Con) packet 20 mEq, 20 mEq, oral, q6h PRN, Natalia Jamison PA-C    potassium chloride CR (Klor-Con M20) ER tablet 40 mEq, 40 mEq, oral, q6h PRN **OR** potassium chloride (Klor-Con) packet 40 mEq, 40 mEq, oral, q6h PRN, Natalia Jamison PA-C    potassium chloride 20 mEq in sterile water for injection 100 mL, 20 mEq, intravenous, q6h PRN, Natalia Jamison PA-C    potassium chloride 40 mEq in sterile water for injection 100 mL, 40 mEq, intravenous, q6h PRN, Natalai  TIO Jamison PA-C    propofol (Diprivan) infusion, 0-50 mcg/kg/min, intravenous, Continuous, Natalia Jamison PA-C

## 2025-06-04 NOTE — NURSING NOTE
Ct's removed as ordered dsd applied  msi drsg changed incision clean dry well approximated glue sealant  intact dsd applied chst expansion symmetrical manas breah sounds dim bases

## 2025-06-04 NOTE — NURSING NOTE
Pt has a RIJ TLC, drsg dry and intact (dated 6/3) without any redness, swelling or drainage, all three lumens currently in use and infusing without any difficulty.

## 2025-06-04 NOTE — CARE PLAN
Problem: Pain - Adult  Goal: Verbalizes/displays adequate comfort level or baseline comfort level  Outcome: Progressing     Problem: Safety - Adult  Goal: Free from fall injury  Outcome: Progressing     Problem: Discharge Planning  Goal: Discharge to home or other facility with appropriate resources  Outcome: Progressing     Problem: Chronic Conditions and Co-morbidities  Goal: Patient's chronic conditions and co-morbidity symptoms are monitored and maintained or improved  Outcome: Progressing     Problem: Nutrition  Goal: Nutrient intake appropriate for maintaining nutritional needs  Outcome: Progressing     Problem: Skin  Goal: Decreased wound size/increased tissue granulation at next dressing change  Outcome: Progressing  Flowsheets (Taken 6/4/2025 0455 by Trent Willis RN)  Decreased wound size/increased tissue granulation at next dressing change:   Promote sleep for wound healing   Protective dressings over bony prominences  Goal: Participates in plan/prevention/treatment measures  Outcome: Progressing  Flowsheets (Taken 6/4/2025 1729)  Participates in plan/prevention/treatment measures:   Discuss with provider PT/OT consult   Elevate heels   Increase activity/out of bed for meals  Goal: Prevent/manage excess moisture  Outcome: Progressing  Flowsheets (Taken 6/4/2025 1729)  Prevent/manage excess moisture:   Cleanse incontinence/protect with barrier cream   Moisturize dry skin  Goal: Prevent/minimize sheer/friction injuries  Outcome: Progressing  Flowsheets (Taken 6/4/2025 1729)  Prevent/minimize sheer/friction injuries:   Turn/reposition every 2 hours/use positioning/transfer devices   HOB 30 degrees or less   Use pull sheet  Goal: Promote/optimize nutrition  Outcome: Progressing  Flowsheets (Taken 6/4/2025 1729)  Promote/optimize nutrition:   Offer water/supplements/favorite foods   Monitor/record intake including meals  Goal: Promote skin healing  Outcome: Progressing  Flowsheets (Taken 6/4/2025  1729)  Promote skin healing:   Assess skin/pad under line(s)/device(s)   Turn/reposition every 2 hours/use positioning/transfer devices     Problem: Skin  Goal: Decreased wound size/increased tissue granulation at next dressing change  Outcome: Progressing  Flowsheets (Taken 6/4/2025 0455 by Trent Willis RN)  Decreased wound size/increased tissue granulation at next dressing change:   Promote sleep for wound healing   Protective dressings over bony prominences  Goal: Participates in plan/prevention/treatment measures  Outcome: Progressing  Flowsheets (Taken 6/4/2025 1729)  Participates in plan/prevention/treatment measures:   Discuss with provider PT/OT consult   Elevate heels   Increase activity/out of bed for meals  Goal: Prevent/manage excess moisture  Outcome: Progressing  Flowsheets (Taken 6/4/2025 1729)  Prevent/manage excess moisture:   Cleanse incontinence/protect with barrier cream   Moisturize dry skin  Goal: Prevent/minimize sheer/friction injuries  Outcome: Progressing  Flowsheets (Taken 6/4/2025 1729)  Prevent/minimize sheer/friction injuries:   Turn/reposition every 2 hours/use positioning/transfer devices   HOB 30 degrees or less   Use pull sheet  Goal: Promote/optimize nutrition  Outcome: Progressing  Flowsheets (Taken 6/4/2025 1729)  Promote/optimize nutrition:   Offer water/supplements/favorite foods   Monitor/record intake including meals  Goal: Promote skin healing  Outcome: Progressing  Flowsheets (Taken 6/4/2025 1729)  Promote skin healing:   Assess skin/pad under line(s)/device(s)   Turn/reposition every 2 hours/use positioning/transfer devices   The patient's goals for the shift include To sleep pain free    The clinical goals for the shift include pt will remain hemodynamically stable    Over the shift, the patient did make progress toward the following goals. Barriers to progression include pnuemo,. Recommendations to address these barriers include monitor resp status pulse ox vs cxr .

## 2025-06-04 NOTE — NURSING NOTE
Pulse ox 100  on 100% NRB   crepituis noted left of ms incision under clavical pt denies any shortness of breath  left base dim and right upper lobe dim   mp- sb

## 2025-06-04 NOTE — PROGRESS NOTES
"Occupational Therapy    Evaluation/Treatment    Patient Name: Simona Betancourt \"Isabel\"  MRN: [id number]  Department: [facility] 3 N ICU  Room: 07/07-A  Today's Date: 06/04/25  Time Calculation  Start Time: 0940  Stop Time: 1008  Time Calculation (min): 28 min       Assessment:  OT Assessment: OT order received, chart reviewed, evaluation completed. Pt demonstrated deficits in ADLs and functional transfers and would benefit from acute OT services to facilitate return to [facility].  Prognosis: Good  Barriers to Discharge Home: Caregiver assistance, Physical needs  Caregiver Assistance: Caregiver assistance needed per identified barriers - however, level of patient's required assistance exceeds assistance available at home  Physical Needs: Intermittent mobility assistance needed, Intermittent ADL assistance needed  Evaluation/Treatment Tolerance: Patient limited by fatigue  Medical Staff Made Aware: Yes  End of Session Communication: Bedside nurse  End of Session Patient Position: Bed, 3 rail up, Alarm on  OT Assessment Results: Decreased ADL status, Decreased endurance, Decreased functional mobility, Decreased IADLs, Decreased trunk control for functional activities  Prognosis: Good  Evaluation/Treatment Tolerance: Patient limited by fatigue  Medical Staff Made Aware: Yes  Strengths: Premorbid level of function  Barriers to Participation: Comorbidities  Plan:  Treatment Interventions: ADL retraining, Functional transfer training, Endurance training, Patient/family training, Equipment evaluation/education, Compensatory technique education  OT Frequency: 5 times per week  OT Discharge Recommendations: Low intensity level of continued care  Equipment Recommended upon Discharge: Wheeled walker  OT Recommended Transfer Status: Moderate assist  OT - OK to Discharge: Yes  Treatment Interventions: ADL retraining, Functional transfer training, Endurance training, Patient/family training, Equipment evaluation/education, Compensatory " technique education    Subjective   Current Problem:  1. Atherosclerosis of native coronary artery of native heart with unstable angina pectoris  Anesthesia Intraoperative Transesophageal Echocardiogram    Anesthesia Intraoperative Transesophageal Echocardiogram        OT Visit Info:  OT Received On: 06/04/25  General Visit Info:   General  Reason for Referral: pt is a 68 y/o female admitted with atherosclerosis of native coronary artery with unstable angina; pt is s/p CABG x 3 by Dr. Javed on 6/3/25; activities of daily living  Referred By: Dr. Javed  Past Medical History Relevant to Rehab: CAD, HTN, NSTEMI, MI with stents, HLD, HEP C, depression , DM  Family/Caregiver Present: No  Co-Treatment: PT  Co-Treatment Reason: complex patient seen in ICU.  Prior to Session Communication: Bedside nurse  Patient Position Received: Up in chair, Alarm off, not on at start of session  General Comment: Pt cleared by nursing, agreeable to OT evalaution   Precautions:  Hearing/Visual Limitations: wears reading glasses; hearing WFL  Medical Precautions: Fall precautions, Chest tube  Post-Surgical Precautions: Move in the Tube  Precautions Comment: educated pt on MITT precautions; + chset tube x 2, A-line, catheter     Date/Time Vitals Session Patient Position Pulse Resp SpO2 BP MAP (mmHg)    06/04/25 1100 --  --  63  16  100 %  119/68  79     06/04/25 1200 --  --  66  15  100 %  139/71  86           Vital Signs Comment: pt on room air     Pain:  Pain Assessment  Pain Assessment: 0-10  0-10 (Numeric) Pain Score: 7  Pain Location: Sternum  Pain Interventions: Ambulation/increased activity  Response to Interventions: Resting quietly (nurisng aware of pain level)    Objective   Cognition:  Overall Cognitive Status: Within Functional Limits  Orientation Level: Oriented X4           Home Living:  Type of Home: House  Lives With: Spouse  Home Adaptive Equipment: Cane  Home Layout: One level, Laundry main level  Home Access:  (2 entry  stairs with 1 railing on R)  Bathroom Shower/Tub: Walk-in shower  Bathroom Equipment: Tub transfer bench, Grab bars in shower  Home Living Comments: pt has 12 stairs with 1 railing to an upstairs  - but DOES NOT USE: pt has no basement  Prior Function:  Level of [place]: Independent with ADLs and functional transfers, Independent with homemaking with ambulation  Receives Help From: Family  ADL Assistance: Independent  Homemaking Assistance: Independent  Ambulatory Assistance: Independent (uses no assistive device)  Vocational: Part time employment  Prior Function Comments: pt was active and driving in community    Activity Tolerance:  Endurance: Tolerates 10 - 20 min exercise with multiple rests  Rate of Perceived Exertion (RPE): 5/10  Functional Standing Tolerance:     Bed Mobility/Transfers: Bed Mobility  Bed Mobility: Yes  Bed Mobility 1  Bed Mobility 1: Sitting to supine  Level of Assistance 1: Moderate assistance (Assist for B LEs and trunk into supine and assist for log roll. Dep scoot to HOB.)    Transfers  Transfer: Yes  Transfer 1  Transfer From 1: Chair with arms to  Transfer to 1: Stand  Technique 1: Sit to stand  Transfer Device 1: Walker  Transfer Level of Assistance 1: Moderate assistance  Trials/Comments 1: Cues for transfer technique and safe hand placement to maintain precautions with mod A for elevation from surface.  Transfers 2  Transfer From 2: Stand to  Transfer to 2: Bed  Technique 2: Stand to sit  Transfer Device 2: Walker  Transfer Level of Assistance 2: Minimum assistance  Trials/Comments 2: Cues for safe hand placement and min A to control descent to EOB.      Functional Mobility:  Functional Mobility  Functional Mobility Performed: Yes  Functional Mobility 1  Surface 1: Level tile  Device 1: Rolling walker  Assistance 1: Minimum assistance  Comments 1: Pt performed functional moblity household distance at min A and assist for RW support, cues for safe hand placement and sequencing  and cues for rest breaks.    Sensation:  Light Touch: No apparent deficits  Sensation Comment: denies any numbness/tingling  Strength:  Strength Comments: B UEs grossly WFL observed within MIIT precautions    Coordination:  Movements are Fluid and Coordinated: No  Upper Body Coordination: delayed rate and accuracy of movements   Hand Function:  Hand Function  Gross Grasp: Functional  Coordination: Functional  Extremities: RUE   RUE : Within Functional Limits and LUE   LUE: Within Functional Limits    Outcome Measures: [facility] Daily Activity  Putting on and taking off regular lower body clothing: A lot  Bathing (including washing, rinsing, drying): A lot  Putting on and taking off regular upper body clothing: A little  Toileting, which includes using toilet, bedpan or urinal: A little  Taking care of personal grooming such as brushing teeth: A little  Eating Meals: None  Daily Activity - Total Score: 17        Education Documentation  ADL Training, taught by Kelli Fagan OT at 6/4/2025 12:57 PM.  Learner: Patient  Readiness: Acceptance  Method: Explanation  Response: Verbalizes Understanding  Comment: Pt edu on OT POC    Education Comments  No comments found.        OP EDUCATION:       Goals:  Encounter Problems       Encounter Problems (Active)       OT Goals       ADLs (Progressing)       Start:  06/04/25    Expected End:  06/27/25       Pt will complete ADL tasks with Mod I, using AE as needed, in order to complete self-care tasks.           Functional transfers (Progressing)       Start:  06/04/25    Expected End:  06/27/25       Pt will perform functional mobility household distance at mod ind level with RW           Functional mobility (Progressing)       Start:  06/04/25    Expected End:  06/27/25       Pt will perform functional mobility household distance at mod ind level with RW

## 2025-06-04 NOTE — PROGRESS NOTES
"Simona Betancourt \"Mian" is a 69 y.o. female on day 1 of admission presenting with Atherosclerosis of native coronary artery of native heart with unstable angina pectoris.    Subjective   Patient resting in chair. Feels ok little worn out.       Objective     Physical Exam  HENT:      Head: Normocephalic.   Cardiovascular:      Rate and Rhythm: Normal rate and regular rhythm.   Pulmonary:      Breath sounds: Normal breath sounds.   Abdominal:      General: Bowel sounds are normal.      Palpations: Abdomen is soft.   Skin:     General: Skin is warm and dry.   Neurological:      Mental Status: She is alert and oriented to person, place, and time.     Left CT 130cc/since surgery no leak to suction  Med CT 300cc/since surgery no leak to suction  MSI dressing dry and intact    Last Recorded Vitals  Blood pressure 103/64, pulse 94, temperature 37.7 °C (99.9 °F), resp. rate 16, height 1.524 m (5'), weight 58.6 kg (129 lb 3 oz), last menstrual period 01/01/2011, SpO2 93%.  Intake/Output last 3 Shifts:  I/O last 3 completed shifts:  In: 2677 (45.7 mL/kg) [P.O.:90; Blood:287; IV Piggyback:2300]  Out: 2362 (40.3 mL/kg) [Urine:1755 (0.8 mL/kg/hr); Blood:250; Chest Tube:357]  Weight: 58.6 kg     Relevant Results               XR chest 1 view 06/03/2025    Narrative  Interpreted By:  Venancio Desai,  STUDY:  XR CHEST 1 VIEW; 6/3/2025 2:00 pm    INDICATION:  CLINICAL INFORMATION: Signs/Symptoms:Post op cardiac surgery.    COMPARISON:  01/27/2018    ACCESSION NUMBER(S):  [id number]    ORDERING CLINICIAN:  BELIA FARRAR    TECHNIQUE:  Portable chest one view.    FINDINGS:  The cardiac size is indeterminate in view of the AP projection.  Sternal sutures are identified. NG tube extends into the left upper  quadrant. Left-sided chest tube is present without evidence for  pneumothorax. ET tube tip lies at the level of the clavicles,  approximately 3.5 cm above the narciso. Mediastinal drain is present.  Right internal jugular venous " catheter overlies the SVC at the level  of the azygous vein. There is no evidence of pneumothorax on the  right. No infiltrates or effusions are identified.    Impression  Postoperative findings as above.    MACRO:  none    Signed by: Venancio Desai 6/3/2025 2:55 PM  Dictation workstation:   [id number]             Results for orders placed or performed during the hospital encounter of 06/03/25 (from the past 24 hours)   Blood Gas Arterial Full Panel Unsolicited   Result Value Ref Range    POCT pH, Arterial 7.38 7.38 - 7.42 pH    POCT pCO2, Arterial 48 (H) 38 - 42 mm Hg    POCT pO2, Arterial 434 (H) 85 - 95 mm Hg    POCT SO2, Arterial 99 94 - 100 %    POCT Oxy Hemoglobin, Arterial 97.0 94.0 - 98.0 %    POCT Hematocrit Calculated, Arterial 41.0 36.0 - 46.0 %    POCT Sodium, Arterial 134 (L) 136 - 145 mmol/L    POCT Potassium, Arterial 3.7 3.5 - 5.3 mmol/L    POCT Chloride, Arterial 102 98 - 107 mmol/L    POCT Ionized Calcium, Arterial 1.21 1.10 - 1.33 mmol/L    POCT Glucose, Arterial 94 74 - 99 mg/dL    POCT Lactate, Arterial 1.6 0.4 - 2.0 mmol/L    POCT Base Excess, Arterial 2.5 -2.0 - 3.0 mmol/L    POCT HCO3 Calculated, Arterial 28.4 (H) 22.0 - 26.0 mmol/L    POCT Hemoglobin, Arterial 13.7 12.0 - 16.0 g/dL    POCT Anion Gap, Arterial 7 (L) 10 - 25 mmo/L    Patient Temperature 37.0 degrees Celsius    FiO2 50 %   Blood Gas Arterial Full Panel Unsolicited   Result Value Ref Range    POCT pH, Arterial 7.36 (L) 7.38 - 7.42 pH    POCT pCO2, Arterial 49 (H) 38 - 42 mm Hg    POCT pO2, Arterial 417 (H) 85 - 95 mm Hg    POCT SO2, Arterial 99 94 - 100 %    POCT Oxy Hemoglobin, Arterial 97.3 94.0 - 98.0 %    POCT Hematocrit Calculated, Arterial 41.0 36.0 - 46.0 %    POCT Sodium, Arterial 133 (L) 136 - 145 mmol/L    POCT Potassium, Arterial 3.7 3.5 - 5.3 mmol/L    POCT Chloride, Arterial 102 98 - 107 mmol/L    POCT Ionized Calcium, Arterial 1.21 1.10 - 1.33 mmol/L    POCT Glucose, Arterial 94 74 - 99 mg/dL    POCT Lactate,  Arterial 1.6 0.4 - 2.0 mmol/L    POCT Base Excess, Arterial 1.5 -2.0 - 3.0 mmol/L    POCT HCO3 Calculated, Arterial 27.7 (H) 22.0 - 26.0 mmol/L    POCT Hemoglobin, Arterial 13.7 12.0 - 16.0 g/dL    POCT Anion Gap, Arterial 7 (L) 10 - 25 mmo/L    Patient Temperature 37.0 degrees Celsius    FiO2 50 %   Prepare RBC: 2 Units   Result Value Ref Range    PRODUCT CODE H3666S03     Unit Number [id number]     Unit ABO O     Unit RH POS     XM INTEP COMP     Dispense Status XM     Blood Expiration Date 6/21/2025 11:59:00 PM EDT     PRODUCT BLOOD TYPE 5100     UNIT VOLUME 277     PRODUCT CODE R6211O42     Unit Number [id number]     Unit ABO O     Unit RH POS     XM INTEP COMP     Dispense Status XM     Blood Expiration Date 6/23/2025 11:59:00 PM EDT     PRODUCT BLOOD TYPE 5100     UNIT VOLUME 350    Blood Gas Arterial Full Panel Unsolicited   Result Value Ref Range    POCT pH, Arterial 7.39 7.38 - 7.42 pH    POCT pCO2, Arterial 43 (H) 38 - 42 mm Hg    POCT pO2, Arterial 211 (H) 85 - 95 mm Hg    POCT SO2, Arterial 99 94 - 100 %    POCT Oxy Hemoglobin, Arterial 97.0 94.0 - 98.0 %    POCT Hematocrit Calculated, Arterial 40.0 36.0 - 46.0 %    POCT Sodium, Arterial 133 (L) 136 - 145 mmol/L    POCT Potassium, Arterial 3.9 3.5 - 5.3 mmol/L    POCT Chloride, Arterial 101 98 - 107 mmol/L    POCT Ionized Calcium, Arterial 1.18 1.10 - 1.33 mmol/L    POCT Glucose, Arterial 78 74 - 99 mg/dL    POCT Lactate, Arterial 1.7 0.4 - 2.0 mmol/L    POCT Base Excess, Arterial 0.8 -2.0 - 3.0 mmol/L    POCT HCO3 Calculated, Arterial 26.0 22.0 - 26.0 mmol/L    POCT Hemoglobin, Arterial 13.4 12.0 - 16.0 g/dL    POCT Anion Gap, Arterial 10 10 - 25 mmo/L    Patient Temperature 37.0 degrees Celsius    FiO2 50 %   Blood Gas Arterial Full Panel Unsolicited   Result Value Ref Range    POCT pH, Arterial 7.36 (L) 7.38 - 7.42 pH    POCT pCO2, Arterial 45 (H) 38 - 42 mm Hg    POCT pO2, Arterial 182 (H) 85 - 95 mm Hg    POCT SO2, Arterial 99 94 - 100 %     POCT Oxy Hemoglobin, Arterial 96.9 94.0 - 98.0 %    POCT Hematocrit Calculated, Arterial 38.0 36.0 - 46.0 %    POCT Sodium, Arterial 134 (L) 136 - 145 mmol/L    POCT Potassium, Arterial 3.9 3.5 - 5.3 mmol/L    POCT Chloride, Arterial 102 98 - 107 mmol/L    POCT Ionized Calcium, Arterial 1.13 1.10 - 1.33 mmol/L    POCT Glucose, Arterial 86 74 - 99 mg/dL    POCT Lactate, Arterial 1.7 0.4 - 2.0 mmol/L    POCT Base Excess, Arterial -0.4 -2.0 - 3.0 mmol/L    POCT HCO3 Calculated, Arterial 25.4 22.0 - 26.0 mmol/L    POCT Hemoglobin, Arterial 12.8 12.0 - 16.0 g/dL    POCT Anion Gap, Arterial 11 10 - 25 mmo/L    Patient Temperature 37.0 degrees Celsius    FiO2 50 %   Blood Gas Arterial Full Panel Unsolicited   Result Value Ref Range    POCT pH, Arterial 7.40 7.38 - 7.42 pH    POCT pCO2, Arterial 46 (H) 38 - 42 mm Hg    POCT pO2, Arterial 533 (H) 85 - 95 mm Hg    POCT SO2, Arterial 99 94 - 100 %    POCT Oxy Hemoglobin, Arterial 97.4 94.0 - 98.0 %    POCT Hematocrit Calculated, Arterial 29.0 (L) 36.0 - 46.0 %    POCT Sodium, Arterial 134 (L) 136 - 145 mmol/L    POCT Potassium, Arterial 4.0 3.5 - 5.3 mmol/L    POCT Chloride, Arterial 99 98 - 107 mmol/L    POCT Ionized Calcium, Arterial 0.97 (L) 1.10 - 1.33 mmol/L    POCT Glucose, Arterial 80 74 - 99 mg/dL    POCT Lactate, Arterial 2.0 0.4 - 2.0 mmol/L    POCT Base Excess, Arterial 3.2 (H) -2.0 - 3.0 mmol/L    POCT HCO3 Calculated, Arterial 28.5 (H) 22.0 - 26.0 mmol/L    POCT Hemoglobin, Arterial 9.6 (L) 12.0 - 16.0 g/dL    POCT Anion Gap, Arterial 11 10 - 25 mmo/L    Patient Temperature 37.0 degrees Celsius    FiO2 100 %   Blood Gas Venous Full Panel Unsolicited   Result Value Ref Range    POCT pH, Venous 7.41 7.33 - 7.43 pH    POCT pCO2, Venous 45 41 - 51 mm Hg    POCT pO2, Venous 50 (H) 35 - 45 mm Hg    POCT SO2, Venous 88 (H) 45 - 75 %    POCT Oxy Hemoglobin, Venous 85.4 (H) 45.0 - 75.0 %    POCT Hematocrit Calculated, Venous 29.0 (L) 36.0 - 46.0 %    POCT Sodium, Venous  134 (L) 136 - 145 mmol/L    POCT Potassium, Venous 4.5 3.5 - 5.3 mmol/L    POCT Chloride, Venous 101 98 - 107 mmol/L    POCT Ionized Calicum, Venous 1.05 (L) 1.10 - 1.33 mmol/L    POCT Glucose, Venous 84 74 - 99 mg/dL    POCT Lactate, Venous 1.9 0.4 - 2.0 mmol/L    POCT Base Excess, Venous 3.4 (H) -2.0 - 3.0 mmol/L    POCT HCO3 Calculated, Venous 28.5 (H) 22.0 - 26.0 mmol/L    POCT Hemoglobin, Venous 9.7 (L) 12.0 - 16.0 g/dL    POCT Anion Gap, Venous 9.0 (L) 10.0 - 25.0 mmol/L    Patient Temperature 37.0 degrees Celsius    FiO2 85 %   Blood Gas Arterial Full Panel Unsolicited   Result Value Ref Range    POCT pH, Arterial 7.43 (H) 7.38 - 7.42 pH    POCT pCO2, Arterial 37 (L) 38 - 42 mm Hg    POCT pO2, Arterial 446 (H) 85 - 95 mm Hg    POCT SO2, Arterial 99 94 - 100 %    POCT Oxy Hemoglobin, Arterial 97.6 94.0 - 98.0 %    POCT Hematocrit Calculated, Arterial 28.0 (L) 36.0 - 46.0 %    POCT Sodium, Arterial 130 (L) 136 - 145 mmol/L    POCT Potassium, Arterial 4.7 3.5 - 5.3 mmol/L    POCT Chloride, Arterial 100 98 - 107 mmol/L    POCT Ionized Calcium, Arterial 1.05 (L) 1.10 - 1.33 mmol/L    POCT Glucose, Arterial 80 74 - 99 mg/dL    POCT Lactate, Arterial 1.6 0.4 - 2.0 mmol/L    POCT Base Excess, Arterial 0.4 -2.0 - 3.0 mmol/L    POCT HCO3 Calculated, Arterial 24.6 22.0 - 26.0 mmol/L    POCT Hemoglobin, Arterial 9.4 (L) 12.0 - 16.0 g/dL    POCT Anion Gap, Arterial 10 10 - 25 mmo/L    Patient Temperature 37.0 degrees Celsius    FiO2 85 %   Blood Gas Arterial Full Panel Unsolicited   Result Value Ref Range    POCT pH, Arterial 7.41 7.38 - 7.42 pH    POCT pCO2, Arterial 41 38 - 42 mm Hg    POCT pO2, Arterial 419 (H) 85 - 95 mm Hg    POCT SO2, Arterial 99 94 - 100 %    POCT Oxy Hemoglobin, Arterial 97.1 94.0 - 98.0 %    POCT Hematocrit Calculated, Arterial 29.0 (L) 36.0 - 46.0 %    POCT Sodium, Arterial 131 (L) 136 - 145 mmol/L    POCT Potassium, Arterial 4.7 3.5 - 5.3 mmol/L    POCT Chloride, Arterial 101 98 - 107 mmol/L     POCT Ionized Calcium, Arterial 1.07 (L) 1.10 - 1.33 mmol/L    POCT Glucose, Arterial 88 74 - 99 mg/dL    POCT Lactate, Arterial 1.5 0.4 - 2.0 mmol/L    POCT Base Excess, Arterial 1.2 -2.0 - 3.0 mmol/L    POCT HCO3 Calculated, Arterial 26.0 22.0 - 26.0 mmol/L    POCT Hemoglobin, Arterial 9.6 (L) 12.0 - 16.0 g/dL    POCT Anion Gap, Arterial 9 (L) 10 - 25 mmo/L    Patient Temperature 37.0 degrees Celsius    FiO2 85 %   Blood Gas Arterial Full Panel Unsolicited   Result Value Ref Range    POCT pH, Arterial 7.41 7.38 - 7.42 pH    POCT pCO2, Arterial 41 38 - 42 mm Hg    POCT pO2, Arterial 167 (H) 85 - 95 mm Hg    POCT SO2, Arterial 99 94 - 100 %    POCT Oxy Hemoglobin, Arterial 97.3 94.0 - 98.0 %    POCT Hematocrit Calculated, Arterial 31.0 (L) 36.0 - 46.0 %    POCT Sodium, Arterial 133 (L) 136 - 145 mmol/L    POCT Potassium, Arterial 4.4 3.5 - 5.3 mmol/L    POCT Chloride, Arterial 102 98 - 107 mmol/L    POCT Ionized Calcium, Arterial 1.08 (L) 1.10 - 1.33 mmol/L    POCT Glucose, Arterial 93 74 - 99 mg/dL    POCT Lactate, Arterial 1.3 0.4 - 2.0 mmol/L    POCT Base Excess, Arterial 1.2 -2.0 - 3.0 mmol/L    POCT HCO3 Calculated, Arterial 26.0 22.0 - 26.0 mmol/L    POCT Hemoglobin, Arterial 10.2 (L) 12.0 - 16.0 g/dL    POCT Anion Gap, Arterial 9 (L) 10 - 25 mmo/L    Patient Temperature 37.0 degrees Celsius    FiO2 50 %   POCT GLUCOSE   Result Value Ref Range    POCT Glucose 79 74 - 99 mg/dL   Magnesium   Result Value Ref Range    Magnesium 2.74 (H) 1.60 - 2.40 mg/dL   Coagulation Screen   Result Value Ref Range    Protime 12.8 (H) 9.3 - 12.7 seconds    INR 1.2 0.9 - 1.2    aPTT 26.8 22.0 - 32.5 seconds   Fibrinogen   Result Value Ref Range    Fibrinogen 161 (L) 200 - 400 mg/dL   CBC   Result Value Ref Range    WBC 18.5 (H) 4.4 - 11.3 x10*3/uL    nRBC 0.0 0.0 - 0.0 /100 WBCs    RBC 3.53 (L) 4.00 - 5.20 x10*6/uL    Hemoglobin 11.1 (L) 12.0 - 16.0 g/dL    Hematocrit 33.1 (L) 36.0 - 46.0 %    MCV 94 80 - 100 fL    MCH 31.4 26.0 -  34.0 pg    MCHC 33.5 32.0 - 36.0 g/dL    RDW 12.7 11.5 - 14.5 %    Platelets 160 150 - 450 x10*3/uL   Renal Function Panel   Result Value Ref Range    Glucose 90 74 - 99 mg/dL    Sodium 138 136 - 145 mmol/L    Potassium 3.9 3.5 - 5.3 mmol/L    Chloride 107 98 - 107 mmol/L    Bicarbonate 24 21 - 32 mmol/L    Anion Gap 11 10 - 20 mmol/L    Urea Nitrogen 35 (H) 6 - 23 mg/dL    Creatinine 1.37 (H) 0.50 - 1.05 mg/dL    eGFR 42 (L) >60 mL/min/1.73m*2    Calcium 8.6 8.6 - 10.3 mg/dL    Phosphorus 4.3 2.5 - 4.9 mg/dL    Albumin 3.1 (L) 3.4 - 5.0 g/dL   Calcium, Ionized   Result Value Ref Range    POCT Calcium, Ionized 1.14 1.1 - 1.33 mmol/L   Blood Gas Arterial Full Panel   Result Value Ref Range    POCT pH, Arterial 7.37 (L) 7.38 - 7.42 pH    POCT pCO2, Arterial 42 38 - 42 mm Hg    POCT pO2, Arterial 213 (H) 85 - 95 mm Hg    POCT SO2, Arterial 98 94 - 100 %    POCT Oxy Hemoglobin, Arterial 95.4 94.0 - 98.0 %    POCT Hematocrit Calculated, Arterial 38.0 36.0 - 46.0 %    POCT Sodium, Arterial 134 (L) 136 - 145 mmol/L    POCT Potassium, Arterial 3.9 3.5 - 5.3 mmol/L    POCT Chloride, Arterial 104 98 - 107 mmol/L    POCT Ionized Calcium, Arterial 1.24 1.10 - 1.33 mmol/L    POCT Glucose, Arterial 104 (H) 74 - 99 mg/dL    POCT Lactate, Arterial 0.8 0.4 - 2.0 mmol/L    POCT Base Excess, Arterial -1.0 -2.0 - 3.0 mmol/L    POCT HCO3 Calculated, Arterial 24.3 22.0 - 26.0 mmol/L    POCT Hemoglobin, Arterial 12.7 12.0 - 16.0 g/dL    POCT Anion Gap, Arterial 10 10 - 25 mmo/L    Patient Temperature 37.0 degrees Celsius    FiO2 50 %    Apparatus      Ventilator Mode      Ventilator Rate 18 bpm    Tidal Volume 360 mL    Peep CHM2O 8.0 cm H2O    Site of Arterial Puncture Arterial Line     Jamil's Test Positive    ECG 12 Lead   Result Value Ref Range    Ventricular Rate 57 BPM    Atrial Rate 57 BPM    MA Interval 148 ms    QRS Duration 102 ms    QT Interval 522 ms    QTC Calculation(Bazett) 508 ms    P Axis 68 degrees    R Axis -61 degrees     T Axis 28 degrees    QRS Count 10 beats    Q Onset 207 ms    P Onset 133 ms    P Offset 191 ms    T Offset 468 ms    QTC Fredericia 513 ms   POCT GLUCOSE   Result Value Ref Range    POCT Glucose 110 (H) 74 - 99 mg/dL   POCT GLUCOSE   Result Value Ref Range    POCT Glucose 135 (H) 74 - 99 mg/dL   POCT GLUCOSE   Result Value Ref Range    POCT Glucose 161 (H) 74 - 99 mg/dL   Blood Gas Arterial Full Panel   Result Value Ref Range    POCT pH, Arterial 7.32 (L) 7.38 - 7.42 pH    POCT pCO2, Arterial 44 (H) 38 - 42 mm Hg    POCT pO2, Arterial 117 (H) 85 - 95 mm Hg    POCT SO2, Arterial 98 94 - 100 %    POCT Oxy Hemoglobin, Arterial 95.1 94.0 - 98.0 %    POCT Hematocrit Calculated, Arterial 41.0 36.0 - 46.0 %    POCT Sodium, Arterial 134 (L) 136 - 145 mmol/L    POCT Potassium, Arterial 5.0 3.5 - 5.3 mmol/L    POCT Chloride, Arterial 103 98 - 107 mmol/L    POCT Ionized Calcium, Arterial 1.26 1.10 - 1.33 mmol/L    POCT Glucose, Arterial 175 (H) 74 - 99 mg/dL    POCT Lactate, Arterial 1.1 0.4 - 2.0 mmol/L    POCT Base Excess, Arterial -3.5 (L) -2.0 - 3.0 mmol/L    POCT HCO3 Calculated, Arterial 22.7 22.0 - 26.0 mmol/L    POCT Hemoglobin, Arterial 13.8 12.0 - 16.0 g/dL    POCT Anion Gap, Arterial 13 10 - 25 mmo/L    Patient Temperature 37.0 degrees Celsius    FiO2 30 %    Apparatus      Ventilator Mode      Peep CHM2O 5.0 cm H2O    Pressure Support 5 cm H2O    Site of Arterial Puncture Arterial Line    POCT GLUCOSE   Result Value Ref Range    POCT Glucose 162 (H) 74 - 99 mg/dL   Renal function panel   Result Value Ref Range    Glucose 133 (H) 74 - 99 mg/dL    Sodium 136 136 - 145 mmol/L    Potassium 4.3 3.5 - 5.3 mmol/L    Chloride 104 98 - 107 mmol/L    Bicarbonate 24 21 - 32 mmol/L    Anion Gap 12 10 - 20 mmol/L    Urea Nitrogen 34 (H) 6 - 23 mg/dL    Creatinine 1.26 (H) 0.50 - 1.05 mg/dL    eGFR 47 (L) >60 mL/min/1.73m*2    Calcium 9.0 8.6 - 10.3 mg/dL    Phosphorus 5.5 (H) 2.5 - 4.9 mg/dL    Albumin 4.0 3.4 - 5.0 g/dL    Magnesium   Result Value Ref Range    Magnesium 2.40 1.60 - 2.40 mg/dL   POCT GLUCOSE   Result Value Ref Range    POCT Glucose 136 (H) 74 - 99 mg/dL   Blood Gas Arterial Full Panel   Result Value Ref Range    POCT pH, Arterial 7.35 (L) 7.38 - 7.42 pH    POCT pCO2, Arterial 45 (H) 38 - 42 mm Hg    POCT pO2, Arterial 117 (H) 85 - 95 mm Hg    POCT SO2, Arterial 99 94 - 100 %    POCT Oxy Hemoglobin, Arterial 97.9 94.0 - 98.0 %    POCT Hematocrit Calculated, Arterial 38.0 36.0 - 46.0 %    POCT Sodium, Arterial 134 (L) 136 - 145 mmol/L    POCT Potassium, Arterial 4.3 3.5 - 5.3 mmol/L    POCT Chloride, Arterial 103 98 - 107 mmol/L    POCT Ionized Calcium, Arterial 1.22 1.10 - 1.33 mmol/L    POCT Glucose, Arterial 125 (H) 74 - 99 mg/dL    POCT Lactate, Arterial 0.9 0.4 - 2.0 mmol/L    POCT Base Excess, Arterial -1.1 -2.0 - 3.0 mmol/L    POCT HCO3 Calculated, Arterial 24.8 22.0 - 26.0 mmol/L    POCT Hemoglobin, Arterial 12.8 12.0 - 16.0 g/dL    POCT Anion Gap, Arterial 11 10 - 25 mmo/L    Patient Temperature 37.0 degrees Celsius    FiO2 28 %    Site of Arterial Puncture Arterial Line     Jamil's Test Positive    POCT GLUCOSE   Result Value Ref Range    POCT Glucose 140 (H) 74 - 99 mg/dL   Calcium, Ionized   Result Value Ref Range    POCT Calcium, Ionized 1.12 1.1 - 1.33 mmol/L   Magnesium   Result Value Ref Range    Magnesium 2.25 1.60 - 2.40 mg/dL   Renal Function Panel   Result Value Ref Range    Glucose 123 (H) 74 - 99 mg/dL    Sodium 135 (L) 136 - 145 mmol/L    Potassium 4.3 3.5 - 5.3 mmol/L    Chloride 102 98 - 107 mmol/L    Bicarbonate 23 21 - 32 mmol/L    Anion Gap 14 10 - 20 mmol/L    Urea Nitrogen 33 (H) 6 - 23 mg/dL    Creatinine 1.17 (H) 0.50 - 1.05 mg/dL    eGFR 51 (L) >60 mL/min/1.73m*2    Calcium 8.9 8.6 - 10.3 mg/dL    Phosphorus 5.4 (H) 2.5 - 4.9 mg/dL    Albumin 3.8 3.4 - 5.0 g/dL   CBC and Auto Differential   Result Value Ref Range    WBC 17.3 (H) 4.4 - 11.3 x10*3/uL    nRBC 0.0 0.0 - 0.0 /100 WBCs    RBC  3.93 (L) 4.00 - 5.20 x10*6/uL    Hemoglobin 12.2 12.0 - 16.0 g/dL    Hematocrit 37.3 36.0 - 46.0 %    MCV 95 80 - 100 fL    MCH 31.0 26.0 - 34.0 pg    MCHC 32.7 32.0 - 36.0 g/dL    RDW 12.9 11.5 - 14.5 %    Platelets 183 150 - 450 x10*3/uL    Neutrophils % 85.2 40.0 - 80.0 %    Immature Granulocytes %, Automated 0.4 0.0 - 0.9 %    Lymphocytes % 5.8 13.0 - 44.0 %    Monocytes % 8.5 2.0 - 10.0 %    Eosinophils % 0.0 0.0 - 6.0 %    Basophils % 0.1 0.0 - 2.0 %    Neutrophils Absolute 14.70 (H) 1.20 - 7.70 x10*3/uL    Immature Granulocytes Absolute, Automated 0.07 0.00 - 0.70 x10*3/uL    Lymphocytes Absolute 1.00 (L) 1.20 - 4.80 x10*3/uL    Monocytes Absolute 1.46 (H) 0.10 - 1.00 x10*3/uL    Eosinophils Absolute 0.00 0.00 - 0.70 x10*3/uL    Basophils Absolute 0.02 0.00 - 0.10 x10*3/uL           Assessment & Plan  Atherosclerosis of native coronary artery of native heart with unstable angina pectoris    S/P CABG x3. LIMA-LAD,SVG-OM^PDA. EVH POD#1  VSS afebrile 93% on RA  Wean off NTG drip. Lopressor 25mg po BID ordered.   On ASA and lipitor. Will start Plavix POD#4.  Patient restarted on Wellbutrin,Lexapro and Synthroid.  CXR small right apical pneumothorax. Will check cxr at 11 to assess any increase.   IS/OOB/PT      I spent 30 minutes in the professional and overall care of this patient.      Natalia Jamison PA-C

## 2025-06-04 NOTE — PROGRESS NOTES
Subjective Data:  Patient status post CABG.  Monitor sinus rhythm.  Patient with a three-vessel CABG.  LIMA to LAD, SVG to OM, SVG to PDA.  Slowly weaning off the nitro drip as well as p.o. Lopressor currently on.  Also on aspirin Lipitor.  Overnight Events:    None  Objective   Last Recorded Vitals  /67   Pulse 60   Temp 37 °C (98.6 °F) (Oral)   Resp 14   Wt 58.6 kg (129 lb 3 oz)   SpO2 100%     Intake/Output Summary (Last 24 hours) at 6/4/2025 1503  Last data filed at 6/4/2025 1300  Gross per 24 hour   Intake 1583 ml   Output 1766 ml   Net -183 ml     Physical Exam:  HEENT: Normocephalic/atraumatic pupils equal react light  Neck exam mild JVD, no bruit  Lung exam  few crackles at the bases  Cardiac exam is regular rhythm S1-S2, soft systolic murmur heard.   Abdomen soft nontender, nondistended  Extremities no clubbing, cyanosis, trace edema  Neuro exam grossly intact.  Image Results  XR chest 1 view  Narrative: Interpreted By:  Lillian Snow,   STUDY:  XR CHEST 1 VIEW 6/4/2025 11:09 am      INDICATION:  Signs/Symptoms:right apical pneumothorax      COMPARISON:  06/04/2025 done at 4:49 a.m.      ACCESSION NUMBER(S):  [id number]      ORDERING CLINICIAN:  BELIA FARRAR      TECHNIQUE:  AP erect view of the chest      FINDINGS:  A right apical pneumothorax is once again noted with no change in its  size when compared with the study done earlier today. This right  apical pneumothorax measures 1.9 cm in depth.      The right jugular CVP line terminates in the SVC. There are sternal  wires and mediastinal clips seen. Mediastinal drain and left chest  tube are unchanged in placement.      There are a few linear areas of atelectasis within the left lower  lung zone with lungs otherwise clear.      Impression: Stable right apical pneumothorax.      Several linear areas of atelectasis now seen in the left lower lung  zone.      Signed by: Lillian Snow 6/4/2025 2:50 PM  Dictation workstation:   [id number]  XR chest 1  view  Narrative: Interpreted By:  Pennie Modi,   STUDY:  XR CHEST 1 VIEW;  6/4/2025 1:47 pm      INDICATION:  Signs/Symptoms:post CT removal.      COMPARISON:  06/04/2025      ACCESSION NUMBER(S):  [id number]      ORDERING CLINICIAN:  BELIA FARRAR      FINDINGS:  CARDIOMEDIASTINAL SILHOUETTE:  There are sternotomy wires from recent cardiac surgery.  There is a right internal jugular central venous catheter with the  tip in the superior vena cava.      LUNGS:  There is increase in size of a superolateral pneumothorax. Currently  there is a 2.3 cm pleural separation and previously there was 1.9 cm  pleural separation. The left chest tube has been removed. There is a  small left apical pneumothorax with 0.8 cm pleural separation. There  is bibasilar atelectasis.      ABDOMEN:  No remarkable upper abdominal findings.          BONES:  No acute osseous changes.      Impression: 1. Status post recent cardiac surgery.  2. Slight increase in size of a right apicolateral pneumothorax.  3. Removal left chest tube with a small left apical pneumothorax.      MACRO:  None      Signed by: Pennie Modi 6/4/2025 2:43 PM  Dictation workstation:   [id number]  ECG 12 Lead  Sinus bradycardia  Left anterior fascicular block  Septal infarct (cited on or before 11-MAY-2025)  T wave abnormality, consider anterior ischemia  Prolonged QT  Abnormal ECG  Confirmed by Robles Villaseñor ([id number]) on 6/4/2025 10:30:46 AM  XR chest 1 view  Narrative: Interpreted By:  Lillian Snow,   STUDY:  XR CHEST 1 VIEW; ;  6/4/2025 4:58 am      INDICATION:  Signs/Symptoms:Post op cardiac surgery.          COMPARISON:  06/03/2025      ACCESSION NUMBER(S):  [id number]      ORDERING CLINICIAN:  BELIA FARRAR      TECHNIQUE:  AP semi-erect view of the chest      FINDINGS:      The endotracheal and nasogastric tubes have been removed. The right  jugular CVP line terminates in the SVC with stable positioning of the  left chest tube and mediastinal drain. Sternal  wires and mediastinal  clips are present.      The cardiac size is within normal limits. There is a right apical  pneumothorax seen measuring 1.8 cm in depth. No left-sided  pneumothorax is present. The lungs are clear.      Impression:     Removal of the endotracheal and nasogastric tubes.      Right apical pneumothorax comprising 10-15% of the volume of right  hemithorax.      MACRO:  Lillian Snow discussed the significance and urgency of this critical  finding by secure urgent chat with  BELIA FARRAR on 6/4/2025 at 7:54  am.  (**-RCF-**) Findings:  See findings.      Signed by: Lillian Snow 6/4/2025 7:54 AM  Dictation workstation:   [id number]    Last Labs:  CBC - 6/4/2025:  3:34 AM  17.3 12.2 183    37.3      CMP - 6/4/2025:  3:34 AM  8.9 5.7 19 --- 0.8   5.4 3.8 10 57      PTT - 6/3/2025:  1:36 PM  1.2   12.8 26.8     Inpatient Medications:  Scheduled Medications[1]  Assessment & Plan  Atherosclerosis of native coronary artery of native heart with unstable angina pectoris  Patient has about underlying status post CABG x 3 vessels.  Continue current Lopressor 25 mg p.o. twice daily as well as aspirin Lipitor.  Will resume Plavix postop day 4.  Restart home medication.  So far stable cardiac wise.  Pt. care time is spent includes review of diagnostic tests, labs, radiographs, EKGs, old echoes, cardiac work-up and coordination of care. Assessment, impression and plans are reflected in the note above as well as the orders.    Code Status:  Full Code  I spent 35 minutes in the professional and overall care of this patient.  Sudheer Barrientos MD           [1]   Scheduled medications   Medication Dose Route Frequency    acetaminophen  650 mg oral q6h    aspirin  81 mg oral Daily    atorvastatin  80 mg oral Nightly    buPROPion SR  200 mg oral BID    ceFAZolin  2 g intravenous q8h    docusate sodium  100 mg oral BID    enoxaparin  40 mg subcutaneous Daily    escitalopram  10 mg oral Daily    insulin lispro  0-5 Units  subcutaneous q4h KELECHI    levothyroxine  125 mcg oral Daily    lidocaine  1 patch transdermal q24h    [Held by provider] losartan  50 mg oral Daily    metoprolol tartrate  25 mg oral BID    mupirocin  1 Application Each Nostril BID

## 2025-06-04 NOTE — PROGRESS NOTES
Physical Therapy    Physical Therapy Evaluation & Treatment    Patient Name: Isabel Betancourt  MRN: [id number]  Department: [address] ICU  Room: 07/07-A  Today's Date: 6/4/2025   Time Calculation  Start Time: 0938  Stop Time: 1006  Time Calculation (min): 28 min    Assessment/Plan   PT Assessment  PT Assessment Results: Decreased strength, Decreased mobility, Decreased coordination, Decreased endurance, Pain, Decreased skin integrity  Rehab Prognosis: Good  Barriers to Discharge Home: Physical needs  Evaluation/Treatment Tolerance: Patient limited by fatigue, Patient limited by pain  Medical Staff Made Aware: Yes  Barriers to Participation: Comorbidities  End of Session Communication: Bedside nurse  Assessment Comment: pt would benefit from skilled therapy services with intermittent assist/supprt.  End of Session Patient Position: Bed, 3 rail up, Alarm on (call button in reach)   IP OR SWING BED PT PLAN  Inpatient or Swing Bed: Inpatient    PT Plan  Treatment/Interventions: Bed mobility, Transfer training, Gait training, Stair training, Strengthening, Therapeutic exercise  PT Plan: Ongoing PT  PT Frequency: 6 times per week  PT Discharge Recommendations: Low intensity level of continued care  Equipment Recommended upon Discharge: Wheeled walker  PT Recommended Transfer Status:  (MIN/MOD A)  PT - OK to Discharge: Yes      Subjective     PT Visit Info:  PT Received On: 06/04/25  General Visit Information:  General  Reason for Referral: pt is a 70 y/o female admitted with atherosclerosis of native coronary artery with unstable angina; pt is s/p CABG x 3 by Dr. Javed on 6/3/25; impaired mobility  Referred By: Dr. Javed  Past Medical History Relevant to Rehab: CAD, HTN, NSTEMI, MI with stents, HLD, HEP C, depression , DM  Family/Caregiver Present: No  Co-Treatment: OT  Co-Treatment Reason: complex patient seen in ICU.  Prior to Session Communication: Bedside nurse  Patient Position Received: Up in chair, Alarm off, not on at  start of session  General Comment: pt cooperative and reported feeling tired and wanting to return to bed.    Home Living:  Home Living  Type of Home: House  Lives With: Spouse  Home Adaptive Equipment: Cane  Home Layout: One level, Laundry main level  Home Access:  (2 entry stairs with 1 railing on R)  Bathroom Shower/Tub: Walk-in shower  Bathroom Equipment: Tub transfer bench, Grab bars in shower  Home Living Comments: pt has 12 stairs with 1 railing to an upstairs  - but DOES NOT USE: pt has no basement    Prior Level of Function:  Prior Function Per Pt/Caregiver Report  Level of [place]: Independent with ADLs and functional transfers, Independent with homemaking with ambulation  Receives Help From: Family  ADL Assistance: Independent  Homemaking Assistance: Independent  Ambulatory Assistance: Independent (uses no assistive device)  Vocational: Part time employment ()  Prior Function Comments: pt was active and driving in community    Precautions:  Precautions  Hearing/Visual Limitations: wears reading glasses; hearing WFL  Medical Precautions: Fall precautions, Chest tube  Post-Surgical Precautions: Move in the Tube  Precautions Comment: educated pt on MITT precautions; + chset tube x 2, A-line, catheter     Date/Time Vitals Session Patient Position Pulse Resp SpO2 BP MAP (mmHg)    06/04/25 0938 --  --  73 15  96% 131/72 93          Vital Signs Comment: pt on room air    Objective   Pain:  Pain Assessment  Pain Assessment:  (7/10 mid sternal incision; RN to medicate)  Cognition:  Cognition  Overall Cognitive Status: Within Functional Limits  Orientation Level: Oriented X4  Safety/Judgement: Within Functional Limits  Insight: Within function limits    General Assessments:        Activity Tolerance  Endurance:  (GOOD- activity tolerance)    Sensation  Sensation Comment: denies any numbness/tingling       Coordination  Coordination Comment: slow steady gait    Postural Control  Posture Comment:  rounded shoulders    Static Sitting Balance  Static Sitting-Comment/Number of Minutes: WNL  Dynamic Sitting Balance  Dynamic Sitting-Comments: GOOD    Static Standing Balance  Static Standing-Comment/Number of Minutes: GOOD  Dynamic Standing Balance  Dynamic Standing-Comments: GOOD-      Functional Assessments:  Bed Mobility  Bed Mobility:  (sit to supine via log rolling technique with MOD A x 2 for trunk and B LE.)    Transfers  Transfer:  (sit <-> stand with MOD A; increased time and effort noted. VC for safe hand placement)    Ambulation/Gait Training  Ambulation/Gait Training Performed:  (pt amb 50' x 1 using rolling walker with MIN A. pt presented with slow smiley, narrow GUANACO and short B step length; occasional shuffling noted.)       Extremity/Trunk Assessments:  RUE   RUE :  (N/T)  LUE   LUE:  (N/T)  RLE   RLE : Within Functional Limits (WFL with grossly 3+/5 strength; pt wearing compression wrap.)  LLE   LLE :  (WFL with 3+/5 strength)      Treatments:     Instructed pt in RLE: LAQ, AP, GS, And QS x 10 reps    Bed Mobility  Bed Mobility:  (sit to supine via log rolling technique with MOD A x 2 for trunk and B LE.)    Ambulation/Gait Training  Ambulation/Gait Training Performed:  (pt amb 50' x 1 using rolling walker with MIN A. pt presented with slow smiley, narrow GUANACO and short B step length; occasional shuffling noted.)      Transfers  Transfer:  (sit <-> stand with MOD A; increased time and effort noted. VC for safe hand placement)       Outcome Measures:  [facility] Basic Mobility  Turning from your back to your side while in a flat bed without using bedrails: A lot  Moving from lying on your back to sitting on the side of a flat bed without using bedrails: A lot  Moving to and from bed to chair (including a wheelchair): A little  Standing up from a chair using your arms (e.g. wheelchair or bedside chair): A lot  To walk in hospital room: A little  Climbing 3-5 steps with railing: A lot  Basic Mobility -  Total Score: 14        Encounter Problems       Encounter Problems (Active)       Mobility       STG - Patient will ambulate 150' x 1 using LRAD with MOD INDEPENDENT (Progressing)       Start:  06/04/25    Expected End:  06/18/25            STG - Patient will negotiate 2 stairs with 1 railing MOD INDEPENDENT (Progressing)       Start:  06/04/25    Expected End:  06/18/25               PT Transfers       STG - Patient to transfer to and from sit to supine with DIST SUPERVISION (Progressing)       Start:  06/04/25    Expected End:  06/18/25            STG - Patient will transfer sit to and from stand with MOD INDEPENDENT (Progressing)       Start:  06/04/25    Expected End:  06/18/25               Pain - Adult              Education Documentation  Precautions, taught by Matt Mcnulty, PT at 6/4/2025 12:58 PM.  Learner: Patient  Readiness: Eager  Method: Explanation  Response: Verbalizes Understanding    Body Mechanics, taught by Matt Mcnulty, PT at 6/4/2025 12:58 PM.  Learner: Patient  Readiness: Eager  Method: Explanation  Response: Verbalizes Understanding    Mobility Training, taught by Matt Mcnulty, PT at 6/4/2025 12:58 PM.  Learner: Patient  Readiness: Eager  Method: Explanation  Response: Verbalizes Understanding

## 2025-06-05 ENCOUNTER — APPOINTMENT (OUTPATIENT)
Dept: RADIOLOGY | Facility: HOSPITAL | Age: 70
DRG: 236 | End: 2025-06-05
Payer: COMMERCIAL

## 2025-06-05 LAB
ALBUMIN SERPL BCP-MCNC: 3.5 G/DL (ref 3.4–5)
ANION GAP SERPL CALCULATED.3IONS-SCNC: 12 MMOL/L (ref 10–20)
BASOPHILS # BLD AUTO: 0.03 X10*3/UL (ref 0–0.1)
BASOPHILS NFR BLD AUTO: 0.1 %
BUN SERPL-MCNC: 31 MG/DL (ref 6–23)
CA-I BLD-SCNC: 1.15 MMOL/L (ref 1.1–1.33)
CALCIUM SERPL-MCNC: 8.9 MG/DL (ref 8.6–10.3)
CHLORIDE SERPL-SCNC: 101 MMOL/L (ref 98–107)
CO2 SERPL-SCNC: 25 MMOL/L (ref 21–32)
CREAT SERPL-MCNC: 0.99 MG/DL (ref 0.5–1.05)
EGFRCR SERPLBLD CKD-EPI 2021: 63 ML/MIN/1.73M*2
EOSINOPHIL # BLD AUTO: 0.01 X10*3/UL (ref 0–0.7)
EOSINOPHIL NFR BLD AUTO: 0 %
ERYTHROCYTE [DISTWIDTH] IN BLOOD BY AUTOMATED COUNT: 12.8 % (ref 11.5–14.5)
GLUCOSE BLD MANUAL STRIP-MCNC: 116 MG/DL (ref 74–99)
GLUCOSE BLD MANUAL STRIP-MCNC: 139 MG/DL (ref 74–99)
GLUCOSE BLD MANUAL STRIP-MCNC: 141 MG/DL (ref 74–99)
GLUCOSE BLD MANUAL STRIP-MCNC: 143 MG/DL (ref 74–99)
GLUCOSE BLD MANUAL STRIP-MCNC: 169 MG/DL (ref 74–99)
GLUCOSE BLD MANUAL STRIP-MCNC: 213 MG/DL (ref 74–99)
GLUCOSE SERPL-MCNC: 125 MG/DL (ref 74–99)
HCT VFR BLD AUTO: 33.6 % (ref 36–46)
HGB BLD-MCNC: 11.2 G/DL (ref 12–16)
IMM GRANULOCYTES # BLD AUTO: 0.09 X10*3/UL (ref 0–0.7)
IMM GRANULOCYTES NFR BLD AUTO: 0.4 % (ref 0–0.9)
LYMPHOCYTES # BLD AUTO: 1.46 X10*3/UL (ref 1.2–4.8)
LYMPHOCYTES NFR BLD AUTO: 7.2 %
MAGNESIUM SERPL-MCNC: 1.86 MG/DL (ref 1.6–2.4)
MCH RBC QN AUTO: 31.4 PG (ref 26–34)
MCHC RBC AUTO-ENTMCNC: 33.3 G/DL (ref 32–36)
MCV RBC AUTO: 94 FL (ref 80–100)
MONOCYTES # BLD AUTO: 1.8 X10*3/UL (ref 0.1–1)
MONOCYTES NFR BLD AUTO: 8.8 %
NEUTROPHILS # BLD AUTO: 16.98 X10*3/UL (ref 1.2–7.7)
NEUTROPHILS NFR BLD AUTO: 83.5 %
NRBC BLD-RTO: 0 /100 WBCS (ref 0–0)
PHOSPHATE SERPL-MCNC: 3.4 MG/DL (ref 2.5–4.9)
PLATELET # BLD AUTO: 153 X10*3/UL (ref 150–450)
POTASSIUM SERPL-SCNC: 3.9 MMOL/L (ref 3.5–5.3)
RBC # BLD AUTO: 3.57 X10*6/UL (ref 4–5.2)
SODIUM SERPL-SCNC: 134 MMOL/L (ref 136–145)
STAPHYLOCOCCUS SPEC CULT: NORMAL
WBC # BLD AUTO: 20.4 X10*3/UL (ref 4.4–11.3)

## 2025-06-05 PROCEDURE — 85025 COMPLETE CBC W/AUTO DIFF WBC: CPT

## 2025-06-05 PROCEDURE — 82330 ASSAY OF CALCIUM: CPT | Performed by: PHYSICIAN ASSISTANT

## 2025-06-05 PROCEDURE — 2500000002 HC RX 250 W HCPCS SELF ADMINISTERED DRUGS (ALT 637 FOR MEDICARE OP, ALT 636 FOR OP/ED)

## 2025-06-05 PROCEDURE — 2500000002 HC RX 250 W HCPCS SELF ADMINISTERED DRUGS (ALT 637 FOR MEDICARE OP, ALT 636 FOR OP/ED): Performed by: PHYSICIAN ASSISTANT

## 2025-06-05 PROCEDURE — 97530 THERAPEUTIC ACTIVITIES: CPT | Mod: GO

## 2025-06-05 PROCEDURE — 71045 X-RAY EXAM CHEST 1 VIEW: CPT | Performed by: STUDENT IN AN ORGANIZED HEALTH CARE EDUCATION/TRAINING PROGRAM

## 2025-06-05 PROCEDURE — 82947 ASSAY GLUCOSE BLOOD QUANT: CPT

## 2025-06-05 PROCEDURE — 2500000004 HC RX 250 GENERAL PHARMACY W/ HCPCS (ALT 636 FOR OP/ED): Performed by: PHYSICIAN ASSISTANT

## 2025-06-05 PROCEDURE — 80069 RENAL FUNCTION PANEL: CPT | Performed by: PHYSICIAN ASSISTANT

## 2025-06-05 PROCEDURE — 2500000001 HC RX 250 WO HCPCS SELF ADMINISTERED DRUGS (ALT 637 FOR MEDICARE OP)

## 2025-06-05 PROCEDURE — 71045 X-RAY EXAM CHEST 1 VIEW: CPT

## 2025-06-05 PROCEDURE — 2500000005 HC RX 250 GENERAL PHARMACY W/O HCPCS: Performed by: PHYSICIAN ASSISTANT

## 2025-06-05 PROCEDURE — 71045 X-RAY EXAM CHEST 1 VIEW: CPT | Performed by: RADIOLOGY

## 2025-06-05 PROCEDURE — 83735 ASSAY OF MAGNESIUM: CPT | Performed by: PHYSICIAN ASSISTANT

## 2025-06-05 PROCEDURE — 97110 THERAPEUTIC EXERCISES: CPT | Mod: GP

## 2025-06-05 PROCEDURE — 2500000004 HC RX 250 GENERAL PHARMACY W/ HCPCS (ALT 636 FOR OP/ED): Performed by: INTERNAL MEDICINE

## 2025-06-05 PROCEDURE — 2500000001 HC RX 250 WO HCPCS SELF ADMINISTERED DRUGS (ALT 637 FOR MEDICARE OP): Performed by: PHYSICIAN ASSISTANT

## 2025-06-05 PROCEDURE — 37799 UNLISTED PX VASCULAR SURGERY: CPT | Performed by: PHYSICIAN ASSISTANT

## 2025-06-05 PROCEDURE — 99232 SBSQ HOSP IP/OBS MODERATE 35: CPT | Performed by: PHYSICIAN ASSISTANT

## 2025-06-05 PROCEDURE — 2500000004 HC RX 250 GENERAL PHARMACY W/ HCPCS (ALT 636 FOR OP/ED)

## 2025-06-05 PROCEDURE — 97116 GAIT TRAINING THERAPY: CPT | Mod: GP

## 2025-06-05 PROCEDURE — 2060000001 HC INTERMEDIATE ICU ROOM DAILY

## 2025-06-05 PROCEDURE — 99232 SBSQ HOSP IP/OBS MODERATE 35: CPT | Performed by: INTERNAL MEDICINE

## 2025-06-05 PROCEDURE — 99221 1ST HOSP IP/OBS SF/LOW 40: CPT

## 2025-06-05 RX ORDER — FAMOTIDINE 10 MG/ML
20 INJECTION, SOLUTION INTRAVENOUS DAILY
Status: ACTIVE | OUTPATIENT
Start: 2025-06-05

## 2025-06-05 RX ORDER — NICARDIPINE HYDROCHLORIDE 0.2 MG/ML
0-15 INJECTION INTRAVENOUS CONTINUOUS
Status: DISCONTINUED | OUTPATIENT
Start: 2025-06-05 | End: 2025-06-05

## 2025-06-05 RX ORDER — FAMOTIDINE 20 MG/1
20 TABLET, FILM COATED ORAL DAILY
Status: DISPENSED | OUTPATIENT
Start: 2025-06-05

## 2025-06-05 RX ORDER — INSULIN LISPRO 100 [IU]/ML
0-15 INJECTION, SOLUTION INTRAVENOUS; SUBCUTANEOUS
Status: DISPENSED | OUTPATIENT
Start: 2025-06-06

## 2025-06-05 RX ORDER — INSULIN LISPRO 100 [IU]/ML
0-5 INJECTION, SOLUTION INTRAVENOUS; SUBCUTANEOUS
Status: DISCONTINUED | OUTPATIENT
Start: 2025-06-05 | End: 2025-06-05

## 2025-06-05 RX ADMIN — ASPIRIN 81 MG: 81 TABLET, CHEWABLE ORAL at 08:55

## 2025-06-05 RX ADMIN — BUPROPION HYDROCHLORIDE 200 MG: 100 TABLET, FILM COATED, EXTENDED RELEASE ORAL at 20:54

## 2025-06-05 RX ADMIN — METOPROLOL TARTRATE 25 MG: 25 TABLET, FILM COATED ORAL at 20:54

## 2025-06-05 RX ADMIN — OXYCODONE HYDROCHLORIDE 10 MG: 5 TABLET ORAL at 20:40

## 2025-06-05 RX ADMIN — MUPIROCIN 1 APPLICATION: 20 OINTMENT TOPICAL at 20:53

## 2025-06-05 RX ADMIN — INSULIN LISPRO 2 UNITS: 100 INJECTION, SOLUTION INTRAVENOUS; SUBCUTANEOUS at 12:02

## 2025-06-05 RX ADMIN — ESCITALOPRAM OXALATE 10 MG: 10 TABLET ORAL at 08:55

## 2025-06-05 RX ADMIN — FAMOTIDINE 20 MG: 20 TABLET, FILM COATED ORAL at 13:46

## 2025-06-05 RX ADMIN — LEVOTHYROXINE SODIUM 125 MCG: 0.12 TABLET ORAL at 06:44

## 2025-06-05 RX ADMIN — ENOXAPARIN SODIUM 40 MG: 40 INJECTION SUBCUTANEOUS at 08:55

## 2025-06-05 RX ADMIN — ACETAMINOPHEN 650 MG: 325 TABLET ORAL at 00:14

## 2025-06-05 RX ADMIN — CEFAZOLIN SODIUM 2 G: 2 SOLUTION INTRAVENOUS at 03:53

## 2025-06-05 RX ADMIN — OXYCODONE HYDROCHLORIDE 10 MG: 5 TABLET ORAL at 00:21

## 2025-06-05 RX ADMIN — ACETAMINOPHEN 650 MG: 325 TABLET ORAL at 13:46

## 2025-06-05 RX ADMIN — DOCUSATE SODIUM 100 MG: 100 CAPSULE, LIQUID FILLED ORAL at 08:55

## 2025-06-05 RX ADMIN — MUPIROCIN 1 APPLICATION: 20 OINTMENT TOPICAL at 08:55

## 2025-06-05 RX ADMIN — LIDOCAINE 4% 1 PATCH: 40 PATCH TOPICAL at 13:47

## 2025-06-05 RX ADMIN — POTASSIUM CHLORIDE 20 MEQ: 1500 TABLET, EXTENDED RELEASE ORAL at 05:29

## 2025-06-05 RX ADMIN — INSULIN LISPRO 1 UNITS: 100 INJECTION, SOLUTION INTRAVENOUS; SUBCUTANEOUS at 20:53

## 2025-06-05 RX ADMIN — MAGNESIUM SULFATE IN WATER 2 G: 40 INJECTION, SOLUTION INTRAVENOUS at 05:46

## 2025-06-05 RX ADMIN — ACETAMINOPHEN 650 MG: 325 TABLET ORAL at 06:44

## 2025-06-05 RX ADMIN — DOCUSATE SODIUM 100 MG: 100 CAPSULE, LIQUID FILLED ORAL at 20:54

## 2025-06-05 RX ADMIN — ATORVASTATIN CALCIUM 80 MG: 80 TABLET, FILM COATED ORAL at 20:54

## 2025-06-05 RX ADMIN — CEFAZOLIN SODIUM 2 G: 2 SOLUTION INTRAVENOUS at 12:02

## 2025-06-05 RX ADMIN — METOPROLOL TARTRATE 25 MG: 25 TABLET, FILM COATED ORAL at 08:55

## 2025-06-05 RX ADMIN — BUPROPION HYDROCHLORIDE 200 MG: 100 TABLET, FILM COATED, EXTENDED RELEASE ORAL at 08:55

## 2025-06-05 RX ADMIN — ACETAMINOPHEN 650 MG: 325 TABLET ORAL at 20:39

## 2025-06-05 RX ADMIN — NICARDIPINE HYDROCHLORIDE 2.5 MG/HR: 0.2 INJECTION INTRAVENOUS at 01:15

## 2025-06-05 ASSESSMENT — COGNITIVE AND FUNCTIONAL STATUS - GENERAL
DRESSING REGULAR UPPER BODY CLOTHING: A LITTLE
TURNING FROM BACK TO SIDE WHILE IN FLAT BAD: A LITTLE
DRESSING REGULAR LOWER BODY CLOTHING: A LITTLE
MOVING TO AND FROM BED TO CHAIR: A LITTLE
WALKING IN HOSPITAL ROOM: A LITTLE
STANDING UP FROM CHAIR USING ARMS: A LITTLE
CLIMB 3 TO 5 STEPS WITH RAILING: A LITTLE
TOILETING: A LITTLE
MOVING FROM LYING ON BACK TO SITTING ON SIDE OF FLAT BED WITH BEDRAILS: A LITTLE
DAILY ACTIVITIY SCORE: 20
HELP NEEDED FOR BATHING: A LITTLE
MOBILITY SCORE: 18

## 2025-06-05 ASSESSMENT — PAIN - FUNCTIONAL ASSESSMENT
PAIN_FUNCTIONAL_ASSESSMENT: 0-10

## 2025-06-05 ASSESSMENT — PAIN DESCRIPTION - LOCATION
LOCATION: RIB CAGE
LOCATION: BACK

## 2025-06-05 ASSESSMENT — PAIN SCALES - GENERAL
PAINLEVEL_OUTOF10: 8
PAINLEVEL_OUTOF10: 0 - NO PAIN
PAINLEVEL_OUTOF10: 3
PAINLEVEL_OUTOF10: 3
PAINLEVEL_OUTOF10: 0 - NO PAIN
PAINLEVEL_OUTOF10: 7

## 2025-06-05 ASSESSMENT — PAIN DESCRIPTION - ORIENTATION
ORIENTATION: RIGHT;LOWER;MID
ORIENTATION: LEFT

## 2025-06-05 ASSESSMENT — PAIN DESCRIPTION - DESCRIPTORS
DESCRIPTORS: STABBING;SHARP;ACHING
DESCRIPTORS: SHARP

## 2025-06-05 ASSESSMENT — PAIN SCALES - WONG BAKER
WONGBAKER_NUMERICALRESPONSE: HURTS LITTLE BIT
WONGBAKER_NUMERICALRESPONSE: NO HURT

## 2025-06-05 NOTE — ASSESSMENT & PLAN NOTE
Patient has about underlying status post CABG x 3 vessels.  Continue current Lopressor 25 mg p.o. twice daily as well as aspirin Lipitor.  Will resume Plavix postop day 4.  Restart home medication.  So far stable cardiac wise.  Pt. care time is spent includes review of diagnostic tests, labs, radiographs, EKGs, old echoes, cardiac work-up and coordination of care. Assessment, impression and plans are reflected in the note above as well as the orders.  6/5: Stable cardiac wise status post CABG.  Blood pressure stable.  Labs send stable hemoglobin 11.2.  Continue current aspirin, Lipitor, Lovenox for DVT as well as Lopressor 25 mg tablet p.o. twice daily.  Probably discharge in 1 to 2 days.

## 2025-06-05 NOTE — CARE PLAN
The patient's goals for the shift include To sleep pain free    The clinical goals for the shift include Patient will remain hemodynamically stable/maintain O2 SAT WDL    Over the shift, the patient did not make progress toward the following goals. Barriers to progression include. Recommendations to address these barriers include  Problem: Pain - Adult  Goal: Verbalizes/displays adequate comfort level or baseline comfort level  Outcome: Progressing     Problem: Safety - Adult  Goal: Free from fall injury  Outcome: Progressing     Problem: Discharge Planning  Goal: Discharge to home or other facility with appropriate resources  Outcome: Progressing     Problem: Chronic Conditions and Co-morbidities  Goal: Patient's chronic conditions and co-morbidity symptoms are monitored and maintained or improved  Outcome: Progressing     Problem: Nutrition  Goal: Nutrient intake appropriate for maintaining nutritional needs  Outcome: Progressing     Problem: Skin  Goal: Decreased wound size/increased tissue granulation at next dressing change  Outcome: Progressing  Flowsheets (Taken 6/5/2025 0047)  Decreased wound size/increased tissue granulation at next dressing change:   Promote sleep for wound healing   Protective dressings over bony prominences  Goal: Participates in plan/prevention/treatment measures  Outcome: Progressing  Flowsheets (Taken 6/5/2025 0047)  Participates in plan/prevention/treatment measures:   Discuss with provider PT/OT consult   Elevate heels  Goal: Prevent/manage excess moisture  Outcome: Progressing  Flowsheets (Taken 6/5/2025 0047)  Prevent/manage excess moisture:   Cleanse incontinence/protect with barrier cream   Moisturize dry skin   Monitor for/manage infection if present   Follow provider orders for dressing changes  Goal: Prevent/minimize sheer/friction injuries  Outcome: Progressing  Flowsheets (Taken 6/5/2025 0047)  Prevent/minimize sheer/friction injuries:   Complete micro-shifts as needed if  patient unable. Adjust patient position to relieve pressure points, not a full turn   Increase activity/out of bed for meals   Use pull sheet   HOB 30 degrees or less  Goal: Promote/optimize nutrition  Outcome: Progressing  Flowsheets (Taken 6/5/2025 0047)  Promote/optimize nutrition:   Monitor/record intake including meals   Offer water/supplements/favorite foods   Consume > 50% meals/supplements   Assist with feeding  Goal: Promote skin healing  Outcome: Progressing  Flowsheets (Taken 6/5/2025 0047)  Promote skin healing:   Assess skin/pad under line(s)/device(s)   Protective dressings over bony prominences   Rotate device position/do not position patient on device   .

## 2025-06-05 NOTE — CARE PLAN
The patient's goals for the shift include To sleep pain free    The clinical goals for the shift include Patient will remain hemodynamically stable/maintain O2 SAT WDL      Problem: Pain - Adult  Goal: Verbalizes/displays adequate comfort level or baseline comfort level  6/5/2025 0848 by Gabriela Kenny RN  Outcome: Progressing  6/5/2025 0837 by Gabriela Kenny RN  Outcome: Progressing     Problem: Safety - Adult  Goal: Free from fall injury  6/5/2025 0848 by Gabriela Kenny RN  Outcome: Progressing  6/5/2025 0837 by Gabriela Kenny RN  Outcome: Progressing     Problem: Discharge Planning  Goal: Discharge to home or other facility with appropriate resources  6/5/2025 0848 by Gabriela Kenny RN  Outcome: Progressing  6/5/2025 0837 by Gabriela Kenny RN  Outcome: Progressing     Problem: Chronic Conditions and Co-morbidities  Goal: Patient's chronic conditions and co-morbidity symptoms are monitored and maintained or improved  6/5/2025 0848 by Gabriela Kenny RN  Outcome: Progressing  6/5/2025 0837 by Gabriela Kenny RN  Outcome: Progressing     Problem: Nutrition  Goal: Nutrient intake appropriate for maintaining nutritional needs  6/5/2025 0848 by Gabriela Kenny RN  Outcome: Progressing  6/5/2025 0837 by Gabriela Kenny RN  Outcome: Progressing     Problem: Skin  Goal: Decreased wound size/increased tissue granulation at next dressing change  6/5/2025 0848 by Gabriela Kenny RN  Outcome: Progressing  Flowsheets (Taken 6/5/2025 0848)  Decreased wound size/increased tissue granulation at next dressing change:   Promote sleep for wound healing   Protective dressings over bony prominences  6/5/2025 0837 by Gabriela Kenny RN  Outcome: Progressing  Flowsheets (Taken 6/5/2025 0837)  Decreased wound size/increased tissue granulation at next dressing change:   Promote sleep for wound healing   Protective dressings over bony prominences  Goal: Participates in plan/prevention/treatment measures  6/5/2025 0848 by Gabriela  BIANCA Kenny  Outcome: Progressing  Flowsheets (Taken 6/5/2025 0848)  Participates in plan/prevention/treatment measures:   Discuss with provider PT/OT consult   Increase activity/out of bed for meals  6/5/2025 0837 by Gabriela Kenny RN  Outcome: Progressing  Flowsheets (Taken 6/5/2025 0837)  Participates in plan/prevention/treatment measures:   Discuss with provider PT/OT consult   Increase activity/out of bed for meals  Goal: Prevent/manage excess moisture  6/5/2025 0848 by Gabriela Kenny RN  Outcome: Progressing  Flowsheets (Taken 6/5/2025 0848)  Prevent/manage excess moisture:   Monitor for/manage infection if present   Follow provider orders for dressing changes  6/5/2025 0837 by Gabriela Kenny RN  Outcome: Progressing  Goal: Prevent/minimize sheer/friction injuries  6/5/2025 0848 by Gabriela Kenny RN  Outcome: Progressing  Flowsheets (Taken 6/5/2025 0848)  Prevent/minimize sheer/friction injuries:   Increase activity/out of bed for meals   HOB 30 degrees or less   Turn/reposition every 2 hours/use positioning/transfer devices   Use pull sheet  6/5/2025 0837 by Gabriela eKnny RN  Outcome: Progressing  Goal: Promote/optimize nutrition  6/5/2025 0848 by Gabriela Kenny RN  Outcome: Progressing  Flowsheets (Taken 6/5/2025 0848)  Promote/optimize nutrition:   Assist with feeding   Monitor/record intake including meals   Offer water/supplements/favorite foods   Consume > 50% meals/supplements  6/5/2025 0837 by Gabriela Kenny RN  Outcome: Progressing  Goal: Promote skin healing  6/5/2025 0848 by Gabriela Kenny RN  Outcome: Progressing  Flowsheets (Taken 6/5/2025 0848)  Promote skin healing:   Protective dressings over bony prominences   Turn/reposition every 2 hours/use positioning/transfer devices  6/5/2025 0837 by Gabriela Kenny RN  Outcome: Progressing

## 2025-06-05 NOTE — OP NOTE
"CABG X3 Operative Note     Date: 6/3/2025  OR Location: [facility]    Name: Simona Betancourt \"Isabel\", : 1955, Age: 69 y.o., MRN: [id number], Sex: female    Diagnosis  Pre-op Diagnosis      * Atherosclerosis of native coronary artery of native heart with unstable angina pectoris [I25.110] Post-op Diagnosis     * Atherosclerosis of native coronary artery of native heart with unstable angina pectoris [I25.110]     Procedures  CABG X3  74583 - RI CABG W/ARTERIAL GRAFT TWO ARTERIAL GRAFTS    1.  CABG x 3 off-pump with a LIMA to LAD, saphenous vein graft to PDA and OM.  2.  Endoscopic vein harvest.  Surgeons      * Oj Javed - Primary    Resident/Fellow/Other Assistant:  Surgeons and Role:     * Festus Reyes MD - Fellow open and close the chest, to be on the memory and perform the distal of the sequential graft.    Staff:   Circulator: Elmer  Scrub Person: Aurea  Surgical Assistant: Aneudy  Surgical Assistant: Amita  Circulator: Silvana    Anesthesia Staff: Anesthesiologist: Mychal Busch MD  CRNA: MICKEY Landrum-CRNA  Perfusionist: Ebony Hutchinson    Procedure Summary  Anesthesia: General  ASA: III  Estimated Blood Loss: 200 mL  Intra-op Medications:   Administrations occurring from 0730 to 1200 on 25:   Medication Name Total Dose   heparin (porcine) 5,000 Units in sodium chloride 0.9% 500 mL irrigation 5,000 Units   papaverine injection 30 mg   vancomycin (Vancocin) vial for injection 5 g   ceFAZolin (Ancef) in dextrose (iso) IV 2 gram/50 mL  - Omnicell Override Pull Cannot be calculated   aminocaproic acid (Amicar) injection 13.17 g   ceFAZolin (Ancef) IV 2 g in 50 mL dextrose (iso) - duplex 2 g   fentaNYL (Sublimaze) injection 50 mcg/mL 750 mcg   heparin injection 1,000 units/mL 27,000 Units   insulin regular (HumuLIN R,NovoLIN R) 100 Units in sodium chloride 0.9% 100 mL (1 Units/mL) infusion 1.79 Units   LR bolus Cannot be calculated   lidocaine (Xylocaine) injection 1 % 100 " "mg   magnesium sulfate 50 % injection 2 g   metoprolol 5 mg/5 mL 2.5 mg   midazolam (Versed) injection 1 mg/mL 8 mg   phenylephrine 100 mcg/mL syringe 10 mL (prefilled) 300 mcg   phenylephrine 40 mcg/mL syringe 10 mL 1,250 mcg   rocuronium (ZeMuron) 50 mg/5 mL injection 150 mg         Intraprocedure I/O Totals       None           Specimen: No specimens collected              Drains and/or Catheters:   [REMOVED] Urethral Catheter Temperature probe 14 Fr. (Removed)   Present on Admission to Healthcare Facility N 06/04/25 1600   Site Assessment Clean;Skin intact 06/05/25 0817   Collection Container Urometer 06/05/25 0000   Securement Method Securing device (Describe) 06/05/25 0000   Reason for Continuing Urinary Catheterization acute urinary retention 06/05/25 0902   Output (mL) 12 mL 06/05/25 1202       Tourniquet Times:         Implants:     Findings: 1.There were no pericardial adhesions or effusions.   2. The ascending aorta was soft without evidence of atherosclerosis.   3. The heart was normal sinus rhythm and demonstrated normal left ventricular function.    4.The patient had severe diffuse coronary artery disease, however we were able to find locations on the LAD, OM and PDA.  That was suitable for grafting.  5.  The conduits were suitable for grafting.  6.  The flow of the graft after the protamine were acceptable.      Indications: Simona Betancourt \"Isabel\" is an 69 y.o. female who is having surgery for Atherosclerosis of native coronary artery of native heart with unstable angina pectoris [I25.110].  Simona was sent to our outpatient clinic after being diagnosed with symptomatic triple-vessel disease with proximal LAD.  The patient have class I indication for CABG.We extensively discussed with the patient the risk of the surgery, including the risk of mortality and morbidity especially stroke,  renal failure and infection but also the benefit in terms of long-term prognosis and symptoms relief.  After that " the patient agreed to proceed with the surgery.     The patient was seen in the preoperative area. The risks, benefits, complications, treatment options, non-operative alternatives, expected recovery and outcomes were discussed with the patient. The possibilities of reaction to medication, pulmonary aspiration, injury to surrounding structures, bleeding, recurrent infection, the need for additional procedures, failure to diagnose a condition, and creating a complication requiring transfusion or operation were discussed with the patient. The patient concurred with the proposed plan, giving informed consent.  The site of surgery was properly noted/marked if necessary per policy. The patient has been actively warmed in preoperative area. Preoperative antibiotics have been ordered and given within 1 hours of incision. Venous thrombosis prophylaxis are not indicated.    Procedure Details: After informed consent, and positive identification, the patient was brought to the operative theatre. They were placed on the operating room table in the supine position and an arterial monitoring line was placed. They subsequently  underwent induction of anesthesia, and gretta-tracheal intubation.  A JOSEPH probe was placed in the esophagus, and central intravenous access obtained. They were then prepped and draped in a sterile surgical fashion.      A surgical time-out was performed with the correct patient, correct procedure, laterality, timing and dosage of antibiotics, availability of blood, administration of beta blocker, fire risk, and sterility of  instruments were all verified, before beginning the case.     A median sternotomy was performed.  The left internal thoracic arteries was dissected from the chest wall in a skeletonized manner and saphenous vein was harvested endoscopic from the thigh.  The thymus was divided and the pericardium was opened.  The ascending aorta was palpated and it was without evidence of atherosclerosis.   The patient was heparinized.  The distal ascending aorta and right atrium were cannulated for cardiopulmonary bypass and ante grade and retrograde cardioplegia cannulas were placed.  The patient was placed on cardiopulmonary bypass, the ascending aorta was cross clamped, and the heart was arrested and protected with cold blood cardioplegia.  During the remainder of the cross-clamp time cold blood cardioplegia was given every 15 to 20 minutes.   The saphenous vein graft was used as a sequential graft to bypass the PDA and OM  The distal anastomosis was done to the PDA, As an end-to-side T anastomosis using 7-0 Prolene in a running fashion.  The intermediate anastomosis was performed to the OM.As a side-to-side diam shape using 8-0 Prolene in a running fashion.  The proximal was anastomosed to the ascending aorta using a single cross-clamp technique  The flow of the graft was satisfactory.  The left internal thoracic artery was anastomosed in situ graft to the LAD an end-to-side anastomosis was performed using 7-0 Prolene in a running fashion.  Immediately after the flow of the graft was measured.  The heart and ascending aorta were de-aired and the aortic cross-clamp was removed.  When the patient's heart was completely recovered and thoroughly de-aired he was weaned from cardiopulmonary bypass without difficulty.  All cannulas were removed and the heparin effect was reversed with protamine.  Hemostasis was obtained, mediastinal and bilateral pleural chest tubes were placed, right ventricular pacemaker wires were placed on the diaphragmatic surface of the RV, and the wounds were closed in the standard fashion.  The patient tolerated the procedure well and was brought to the intensive care unit in stable condition.  To sponge counts, instrument counts, and needle counts were reported correct by the circulating nurse.  There were no specimens sent to pathology.  The drains included mediastinal and pleural chest  tube.      Evidence of Infection: No   Complications:  None; patient tolerated the procedure well.    Disposition: ICU - intubated and hemodynamically stable.  Condition: stable         Task Performed by RNFA or Surgical Assistant:  Aneudy Chavez was the assistant.  Harvested vein and close the chest.            Additional Details:     Attending Attestation: I was present and scrubbed for the entire procedure.    Oj Javed  Phone Number: [phone]

## 2025-06-05 NOTE — PROGRESS NOTES
"Simona Betancourt \"Mian" is a 69 y.o. female on day 2 of admission presenting with Atherosclerosis of native coronary artery of native heart with unstable angina pectoris.    Subjective   Patient resting in chair. C/O little numbness tingling left ant chest       Objective     Physical Exam  HENT:      Head: Normocephalic.   Cardiovascular:      Rate and Rhythm: Normal rate and regular rhythm.   Pulmonary:      Breath sounds: Normal breath sounds.   Abdominal:      General: Bowel sounds are normal.      Palpations: Abdomen is soft.   Skin:     General: Skin is warm and dry.   Neurological:      Mental Status: She is alert and oriented to person, place, and time.       MSI dry and intact    Last Recorded Vitals  Blood pressure 131/64, pulse 67, temperature 36.7 °C (98.1 °F), temperature source Axillary, resp. rate 19, height 1.524 m (5'), weight 58.4 kg (128 lb 12 oz), last menstrual period 01/01/2011, SpO2 94%.  Intake/Output last 3 Shifts:  I/O last 3 completed shifts:  In: 2811.1 (48.1 mL/kg) [P.O.:870; I.V.:1441.1 (24.7 mL/kg); IV Piggyback:500]  Out: 2269 (38.9 mL/kg) [Urine:2050 (1 mL/kg/hr); Chest Tube:219]  Weight: 58.4 kg     Relevant Results                 XR chest 1 view 06/05/2025    Narrative  Interpreted By:  Venancio Desai,  STUDY:  XR CHEST 1 VIEW; 6/5/2025 5:01 am    INDICATION:  CLINICAL INFORMATION: Signs/Symptoms:post op cabg.    COMPARISON:  06/04/2025 at 2204 hours    ACCESSION NUMBER(S):  [id number]    ORDERING CLINICIAN:  BELIA FARRAR    TECHNIQUE:  Portable chest one view.    FINDINGS:  The cardiac silhouette is quite prominent suggesting cardiomegaly.  Sternal fixations devices and mediastinal clips are present. There is  a persistent small right apical pneumothorax. There is density at the  left base consistent with atelectasis and/or infiltrate along with a  small left effusion. Small right effusion is suspected. There is no  change in the right internal jugular venous " catheter.    Impression  1. Small right apical pneumothorax.  2. Left basilar density consistent with atelectasis and/or infiltrate  along with a small left effusion. Small right effusion suspected as  well.    MACRO:  none    Signed by: Venancio Desai 6/5/2025 8:11 AM  Dictation workstation:   [id number]  Results for orders placed or performed during the hospital encounter of 06/03/25 (from the past 24 hours)   POCT GLUCOSE   Result Value Ref Range    POCT Glucose 158 (H) 74 - 99 mg/dL   POCT GLUCOSE   Result Value Ref Range    POCT Glucose 126 (H) 74 - 99 mg/dL   POCT GLUCOSE   Result Value Ref Range    POCT Glucose 182 (H) 74 - 99 mg/dL   POCT GLUCOSE   Result Value Ref Range    POCT Glucose 116 (H) 74 - 99 mg/dL   Calcium, Ionized   Result Value Ref Range    POCT Calcium, Ionized 1.15 1.1 - 1.33 mmol/L   Magnesium   Result Value Ref Range    Magnesium 1.86 1.60 - 2.40 mg/dL   Renal Function Panel   Result Value Ref Range    Glucose 125 (H) 74 - 99 mg/dL    Sodium 134 (L) 136 - 145 mmol/L    Potassium 3.9 3.5 - 5.3 mmol/L    Chloride 101 98 - 107 mmol/L    Bicarbonate 25 21 - 32 mmol/L    Anion Gap 12 10 - 20 mmol/L    Urea Nitrogen 31 (H) 6 - 23 mg/dL    Creatinine 0.99 0.50 - 1.05 mg/dL    eGFR 63 >60 mL/min/1.73m*2    Calcium 8.9 8.6 - 10.3 mg/dL    Phosphorus 3.4 2.5 - 4.9 mg/dL    Albumin 3.5 3.4 - 5.0 g/dL   CBC and Auto Differential   Result Value Ref Range    WBC 20.4 (H) 4.4 - 11.3 x10*3/uL    nRBC 0.0 0.0 - 0.0 /100 WBCs    RBC 3.57 (L) 4.00 - 5.20 x10*6/uL    Hemoglobin 11.2 (L) 12.0 - 16.0 g/dL    Hematocrit 33.6 (L) 36.0 - 46.0 %    MCV 94 80 - 100 fL    MCH 31.4 26.0 - 34.0 pg    MCHC 33.3 32.0 - 36.0 g/dL    RDW 12.8 11.5 - 14.5 %    Platelets 153 150 - 450 x10*3/uL    Neutrophils % 83.5 40.0 - 80.0 %    Immature Granulocytes %, Automated 0.4 0.0 - 0.9 %    Lymphocytes % 7.2 13.0 - 44.0 %    Monocytes % 8.8 2.0 - 10.0 %    Eosinophils % 0.0 0.0 - 6.0 %    Basophils % 0.1 0.0 - 2.0 %    Neutrophils  Absolute 16.98 (H) 1.20 - 7.70 x10*3/uL    Immature Granulocytes Absolute, Automated 0.09 0.00 - 0.70 x10*3/uL    Lymphocytes Absolute 1.46 1.20 - 4.80 x10*3/uL    Monocytes Absolute 1.80 (H) 0.10 - 1.00 x10*3/uL    Eosinophils Absolute 0.01 0.00 - 0.70 x10*3/uL    Basophils Absolute 0.03 0.00 - 0.10 x10*3/uL   POCT GLUCOSE   Result Value Ref Range    POCT Glucose 143 (H) 74 - 99 mg/dL   POCT GLUCOSE   Result Value Ref Range    POCT Glucose 139 (H) 74 - 99 mg/dL                    Assessment & Plan  Atherosclerosis of native coronary artery of native heart with unstable angina pectoris    S/P CABG x3. LIMA-LAD,SVG-OM^PDA. EVH POD#2  VSS afebrile 94% on 2 LNC  On ASA,Lopressor and Lipitor.  Home meds Wellbutrin,Lexapro and Synthroid restarted. Will start Plavix POD#4  CXR small left apical pneumothorax improving. Left no longer evident.  Fletcher,Olivarez and cent line to be discontinued per intensivist service.  Patient progressing well.  IS/OOB/PT      I spent 30 minutes in the professional and overall care of this patient.      Natalia Jamison PA-C

## 2025-06-05 NOTE — PROGRESS NOTES
"Physical Therapy    Physical Therapy Treatment    Patient Name: Simona Betancourt \"Isabel\"  MRN: [id number]  Department: [facility] 3 N ICU  Room: 07/07-A  Today's Date: 6/5/2025  Time Calculation  Start Time: 0902  Stop Time: 0935  Time Calculation (min): 33 min         Assessment/Plan   PT Assessment  PT Assessment Results: Decreased strength, Decreased mobility, Decreased coordination, Decreased endurance, Pain, Decreased skin integrity  Rehab Prognosis: Good  Barriers to Discharge Home: No anticipated barriers  Evaluation/Treatment Tolerance: Patient tolerated treatment well  Medical Staff Made Aware: Yes  Barriers to Participation: Comorbidities  End of Session Communication: Bedside nurse  Assessment Comment: pt progressing well. increased gait distance and activity tolerance observed. MIN A/CGA given for transfers/mobility  End of Session Patient Position: Up in chair, Alarm off, caregiver present  PT Plan  Inpatient/Swing Bed or Outpatient: Inpatient    PT Plan  Treatment/Interventions: Bed mobility, Transfer training, Gait training, Stair training, Strengthening, Therapeutic exercise  PT Plan: Ongoing PT  PT Frequency: 6 times per week  PT Discharge Recommendations: Low intensity level of continued care  Equipment Recommended upon Discharge: Wheeled walker  PT Recommended Transfer Status:  (CGA/ELIO)  PT - OK to Discharge: Yes    PT Visit Info:  PT Received On: 06/05/25     General Visit Information:   General  Reason for Referral: pt is a 68 y/o female admitted with atherosclerosis of native coronary artery with unstable angina; pt is s/p CABG x 3 by Dr. Javed on 6/3/25; activities of daily living  Referred By: Dr. Javed  Past Medical History Relevant to Rehab: CAD, HTN, NSTEMI, MI with stents, HLD, HEP C, depression , DM  Family/Caregiver Present: Yes  Caregiver Feedback: pts spouse present  Prior to Session Communication: Bedside nurse  Patient Position Received: Bed, 2 rail up, Alarm on  General Comment: Pt cleared " by nursing, pt alert and eager to walk    Subjective   Precautions:  Precautions  Medical Precautions: Oxygen therapy device and L/min  Post-Surgical Precautions: Move in the Tube  Precautions Comment: educated pt on MITT precautions; A-line, catheter     Date/Time Vitals Session Patient Position Pulse Resp SpO2 BP MAP (mmHg)    06/05/25 0900 --  --  64  13  94 %  104/64  77     06/05/25 0902 --  --  67  19  94 %  131/64  --     06/05/25 1000 --  --  66  24  96 %  --  --           Vital Signs Comment: pt wearing 15L non-rebreather     Objective   Pain:  Pain Assessment  Pain Assessment:  (3/10 at mid-sternal incision; RN to medicate)  Cognition:  Cognition  Overall Cognitive Status: Within Functional Limits  Orientation Level: Oriented X4  Insight: Within function limits  Coordination:  Coordination Comment: slow steady gait    Postural Control:  Postural Control  Posture Comment: rounded shoulders    Activity Tolerance:  Activity Tolerance  Endurance:  (GOOD- activity tolerance)      Treatments:  Therapeutic Exercise  Therapeutic Exercise Performed:  (B LE AROM therex: AP, GS, QS, HS, ABD, LAQ, hip flexion and calf raises x 15 reps.)         Bed Mobility  Bed Mobility:  (supine to sit via log rolling with MIN A for trunk up. pt given seated rest break on EOB)    Ambulation/Gait Training  Ambulation/Gait Training Performed:  (pt amb 75' x 1 using rolling walker with CGA. pt presented with slow continous gait and narrow GUANACO. no LOB or SOB noted.)      Transfers  Transfer:  (sit <-> stand with CGA; increased time and effort noted.)       Outcome Measures:  [facility] Basic Mobility  Turning from your back to your side while in a flat bed without using bedrails: A little  Moving from lying on your back to sitting on the side of a flat bed without using bedrails: A little  Moving to and from bed to chair (including a wheelchair): A little  Standing up from a chair using your arms (e.g. wheelchair or bedside chair): A  little  To walk in hospital room: A little  Climbing 3-5 steps with railing: A little  Basic Mobility - Total Score: 18    FSS-ICU  Ambulation: Walks >/ or equal to 50 feet with any assistance x1  Rolling: Minimal assistance (performs 75% or more of task)  Sitting: Supervision or set-up only  Transfer Sit-to-Stand: Minimal assistance (performs 75% or more of task)  Transfer Supine-to-Sit: Minimal assistance (performs 75% or more of task)  Total Score: 19         Encounter Problems       Encounter Problems (Active)       Mobility       STG - Patient will ambulate 150' x 1 using LRAD with MOD INDEPENDENT (Progressing)       Start:  06/04/25    Expected End:  06/18/25            STG - Patient will negotiate 2 stairs with 1 railing MOD INDEPENDENT (Progressing)       Start:  06/04/25    Expected End:  06/18/25               PT Transfers       STG - Patient to transfer to and from sit to supine with DIST SUPERVISION (Progressing)       Start:  06/04/25    Expected End:  06/18/25            STG - Patient will transfer sit to and from stand with MOD INDEPENDENT (Progressing)       Start:  06/04/25    Expected End:  06/18/25               Pain - Adult

## 2025-06-05 NOTE — PROGRESS NOTES
"Occupational Therapy    Occupational Therapy Treatment    Name: Simona Betancourt \"Isabel\"  MRN: [id number]  Department: [facility] 3 N ICU  Room: 07/07-A  Date: 06/05/25  Time Calculation  Start Time: 1330  Stop Time: 1344  Time Calculation (min): 14 min    Assessment:  OT Assessment: Limited educational only treatment this date due to fatigue at pt request. Pt would benefit from continued acute OT services to faciltiate return to OF.  Prognosis: Good  Barriers to Discharge Home: Caregiver assistance, Physical needs  Caregiver Assistance: Caregiver assistance needed per identified barriers - however, level of patient's required assistance exceeds assistance available at home  Physical Needs: Intermittent mobility assistance needed, Intermittent ADL assistance needed  Evaluation/Treatment Tolerance: Patient limited by fatigue  Medical Staff Made Aware: Yes  End of Session Communication: Bedside nurse  End of Session Patient Position: Bed, 3 rail up, Alarm on  Plan:  Treatment Interventions: ADL retraining, Functional transfer training, Endurance training, Patient/family training, Equipment evaluation/education, Compensatory technique education  OT Frequency: 5 times per week  OT Discharge Recommendations: Low intensity level of continued care  Equipment Recommended upon Discharge: Wheeled walker  OT Recommended Transfer Status: Minimal assist  OT - OK to Discharge: Yes    Subjective     OT Visit Info:  OT Received On: 06/05/25  General:  General  Family/Caregiver Present: No  Prior to Session Communication: Bedside nurse  Patient Position Received: Bed, 3 rail up, Alarm on  General Comment: Pt agreeable for limited educational only session this date due to fatigue and just returning to bed.  Precautions:  Hearing/Visual Limitations: wears reading glasses; hearing WFL  Medical Precautions: Fall precautions, Oxygen therapy device and L/min  Post-Surgical Precautions: Move in the Tube  Precautions Comment: Pt educated on MIIT " precautions     Date/Time Vitals Session Patient Position Pulse Resp SpO2 BP MAP (mmHg)    06/05/25 1300 --  --  67  16  98 %  --  --           Pain Assessment:  Pain Assessment  Pain Assessment: 0-10  0-10 (Numeric) Pain Score: 3  Pain Type: Surgical pain  Pain Location:  (reports heartburn)  Pain Interventions: Rest (approached nursing for medication)  Response to Interventions: Resting quietly    Objective   Cognition:  Overall Cognitive Status: Within Functional Limits  Orientation Level: Oriented X4  Activities of Daily Living:      Grooming  Grooming Comments: Pt declined ADLs    Bed Mobility/Transfers: Bed Mobility  Bed Mobility:  (Pt declined bed mobility and transfers)    Therapy/Activity: Therapeutic Activity  Therapeutic Activity Performed: Yes  Therapeutic Activity 1: Pt edu on MIIT precautions for homegoing for dressing technique, provided demonstration of donning/doffing T shirt and donning pants in figure 4 position. Pt verbalized understanding. Edu on safe bathing technique. Edu on IADL modifications to maintain MIIT precautions and activities to avoid and modify at home. Edu on car transfer safety (sitting in back seat to avoid airbag etc). Pt verbalized understanding of ADL and home safety to maintain precautions at home.    Outcome Measures:  [facility] Daily Activity  Putting on and taking off regular lower body clothing: A little  Bathing (including washing, rinsing, drying): A little  Putting on and taking off regular upper body clothing: A little  Toileting, which includes using toilet, bedpan or urinal: A little  Taking care of personal grooming such as brushing teeth: None  Eating Meals: None  Daily Activity - Total Score: 20        Education Documentation  ADL Training, taught by Kelli Fagan OT at 6/5/2025  2:00 PM.  Learner: Patient  Readiness: Acceptance  Method: Explanation  Response: Verbalizes Understanding  Comment: Pt edu on OT POC    Education Comments  No comments  found.      Goals:  Encounter Problems       Encounter Problems (Active)       OT Goals       ADLs (Progressing)       Start:  06/04/25    Expected End:  06/27/25       Pt will complete ADL tasks with Mod I, using AE as needed, in order to complete self-care tasks.           Functional transfers (Progressing)       Start:  06/04/25    Expected End:  06/27/25       Pt will perform functional mobility household distance at mod ind level with RW           Functional mobility (Progressing)       Start:  06/04/25    Expected End:  06/27/25       Pt will perform functional mobility household distance at mod ind level with RW

## 2025-06-05 NOTE — PROGRESS NOTES
Subjective Data:  Patient alert no chest pain or tightness.  So far stable cardiac wise.  Overnight Events:    None  Objective   Last Recorded Vitals  /64   Pulse 63   Temp 36.7 °C (98.1 °F) (Axillary)   Resp 17   Wt 58.4 kg (128 lb 12 oz)   SpO2 97%     Intake/Output Summary (Last 24 hours) at 6/5/2025 1103  Last data filed at 6/5/2025 0900  Gross per 24 hour   Intake 1428.13 ml   Output 1367 ml   Net 61.13 ml     Physical Exam:  HEENT: Normocephalic/atraumatic pupils equal react light  Neck exam mild JVD, no bruit  Lung exam  few crackles at the bases  Cardiac exam is regular rhythm S1-S2, soft systolic murmur heard.   Abdomen soft nontender, nondistended  Extremities no clubbing, cyanosis, trace edema  Neuro exam grossly intact.  Image Results  XR chest 1 view  Narrative: Interpreted By:  Venancio Desai,   STUDY:  XR CHEST 1 VIEW; 6/5/2025 5:01 am      INDICATION:  CLINICAL INFORMATION: Signs/Symptoms:post op cabg.      COMPARISON:  06/04/2025 at 2204 hours      ACCESSION NUMBER(S):  [id number]      ORDERING CLINICIAN:  BELIA FARRAR      TECHNIQUE:  Portable chest one view.      FINDINGS:  The cardiac silhouette is quite prominent suggesting cardiomegaly.  Sternal fixations devices and mediastinal clips are present. There is  a persistent small right apical pneumothorax. There is density at the  left base consistent with atelectasis and/or infiltrate along with a  small left effusion. Small right effusion is suspected. There is no  change in the right internal jugular venous catheter.      Impression: 1. Small right apical pneumothorax.  2. Left basilar density consistent with atelectasis and/or infiltrate  along with a small left effusion. Small right effusion suspected as  well.      MACRO:  none      Signed by: Venancio Desai 6/5/2025 8:11 AM  Dictation workstation:   [id number]    Last Labs:  CBC - 6/5/2025:  3:27 AM  20.4 11.2 153    33.6      CMP - 6/5/2025:  3:27 AM  8.9 5.7 19 --- 0.8   3.4  3.5 10 57      PTT - 6/3/2025:  1:36 PM  1.2   12.8 26.8     Inpatient Medications:  Scheduled Medications[1]  Assessment & Plan  Atherosclerosis of native coronary artery of native heart with unstable angina pectoris  Patient has about underlying status post CABG x 3 vessels.  Continue current Lopressor 25 mg p.o. twice daily as well as aspirin Lipitor.  Will resume Plavix postop day 4.  Restart home medication.  So far stable cardiac wise.  Pt. care time is spent includes review of diagnostic tests, labs, radiographs, EKGs, old echoes, cardiac work-up and coordination of care. Assessment, impression and plans are reflected in the note above as well as the orders.  6/5: Stable cardiac wise status post CABG.  Blood pressure stable.  Labs send stable hemoglobin 11.2.  Continue current aspirin, Lipitor, Lovenox for DVT as well as Lopressor 25 mg tablet p.o. twice daily.  Probably discharge in 1 to 2 days.    Code Status:  Full Code  I spent 35 minutes in the professional and overall care of this patient.  Sudheer Barrientos MD           [1]   Scheduled medications   Medication Dose Route Frequency    acetaminophen  650 mg oral q6h    aspirin  81 mg oral Daily    atorvastatin  80 mg oral Nightly    buPROPion SR  200 mg oral BID    ceFAZolin  2 g intravenous q8h    docusate sodium  100 mg oral BID    enoxaparin  40 mg subcutaneous Daily    escitalopram  10 mg oral Daily    insulin lispro  0-5 Units subcutaneous q4h [facility]    levothyroxine  125 mcg oral Daily    lidocaine  1 patch transdermal q24h    [Held by provider] losartan  50 mg oral Daily    metoprolol tartrate  25 mg oral BID    mupirocin  1 Application Each Nostril BID

## 2025-06-05 NOTE — CONSULTS
Consults  [facility]       Date:  6/5/2025  Patient:  Simona Betancourt  YOB: 1955  MRN:  [id number]   Admit Date:  6/3/2025    Chief complaint:      History of Present Illness:  Simnoa Betancourt is a 69 y.o. year old female patient with past medical history of CAD, MI w/ stents 10+ years ago, HTN, HLD, heavy smoker who presented 5/11 with complaints of nausea vomiting and feeling unwell. Underwent cardiac catheterization on 5/12 which showed multi-vessel disease. Today she had CABG x 3, LIMA-> LAD, RSVG-> OM-> PDA.    Interval ICU Events:  6/3: Arrived to ICU intubated and sedated. Not on pressors. Has two chest tubes, one mediastinal, one left pleural. Ventricular pacing wires VVI backup rate 50 BPM.     6/4: Right apical pneumothorax on cxr this am. Reports some nausea and vomiting this am. Pain is controlled with meds. Still requiring low dose nitroglycerin gtt.     6/5: Pneumothorax stable to improved this am. Transition from NRB to NC. Will remove lines and gordon today. Reports nagging pain to left upper abd/lower chest, no obvious source. Will monitor. Hemodynamically stable off drips. Denies continued n/v, will try to advance diet.        Objective     Medical History:  Medical History[1]  Surgical History[2]  Prescriptions Prior to Admission[3]  Ace inhibitors  Social History[4]  Family History[5]    Hospital Medications:    Continuous Medications[6]    Current Medications[7]    Review of Systems:  14 point review of systems was completed and negative except for those specially mention in my HPI    Physical Exam:    Heart Rate:  [56-92]   Temp:  [36 °C (96.8 °F)-37.7 °C (99.9 °F)]   Resp:  [0-27]   BP: (105-139)/()   Weight:  [56 kg (123 lb 7.3 oz)-58.4 kg (128 lb 12 oz)]   SpO2:  [92 %-100 %]     Physical Exam  Constitutional:       General: She is not in acute distress.  HENT:      Mouth/Throat:      Mouth: Mucous membranes are moist.   Eyes:       Conjunctiva/sclera: Conjunctivae normal.   Cardiovascular:      Rate and Rhythm: Normal rate and regular rhythm.      Pulses: Normal pulses.      Heart sounds: Normal heart sounds.   Pulmonary:      Effort: No respiratory distress.      Breath sounds: Normal breath sounds. No wheezing, rhonchi or rales.   Abdominal:      General: Bowel sounds are normal.      Palpations: Abdomen is soft.      Tenderness: There is no abdominal tenderness.      Comments: Reports nagging pain to left upper abd/lower chest. No obvious source.   Musculoskeletal:         General: No swelling.      Left lower leg: No edema.   Skin:     General: Skin is dry.      Comments: Chest bandage clean and dry   Neurological:      Mental Status: She is oriented to person, place, and time.   Psychiatric:         Mood and Affect: Mood normal.         Behavior: Behavior normal.         Objective:    I have reviewed all medications, laboratory results, and imaging pertinent for today's encounter.           Intake/Output Summary (Last 24 hours) at 6/5/2025 0772  Last data filed at 6/5/2025 0700  Gross per 24 hour   Intake 2521.13 ml   Output 1385 ml   Net 1136.13 ml            Intra-Op:  Procedure/Surgeon: CABG x 3. With Dr. Beltran Javed  Frontliner/Anesthesia:   Out of OR Time (document on ventilator card):      OR Course/Complications:      CPB time:   Cross clamp time:   Echo Pre/Post: Pre: ; post:   Chest Tubes/Drains: 1 left pleural, 1 mediastinal   Temporary wires location/setting: Ventricular pacing wires, backup @ 50     Fluids-   Crystalloid: LR 2L  Colloid: -  Cellsaver: 287cc  Products: -  EBL: 250cc  UOP: ~300cc     Anesthesia-  Intubation: MAC 3 ... Grade 1   Intravenous Access:  rt brachial art line, CVC- RIJ   Regional anesthesia:   Benzodiazepine dose/last administration:   Opioid dose/last administration:   NMB dose/last administration: last rocuronium total at 1230  TOF/ reversal given: not reversed   Antibiotic time: ancef  1215  Temperature on admission to ICU: 36    Assessment/Plan:    I am currently managing this critically ill patient for the following problems:    Neuro/Psych/Pain Ctrl/Sedation:  Acute post-operative pain - likely incisional pain  - Pt currently intubated and sedated on propofol   - Serial neuro and pain assessments   - Scheduled acetaminophen, lidoderm   - PRN oxycodone and dilaudid for pain  - CAM ICU qshift, sleep/wake hygiene, delirium precautions     Respiratory/ENT:  Post-operative respiratory insufficiency without respiratory failure   Right apical pneumothorax  Arrived to ICU intubated on ventilator with appropriate oxygenation and ventilation. Left pleural and mediastinal chest tubes with no airleak and appropriate serosang output, to suction   - No underlying pulmonary disease  - Wean FiO2 to maintain SpO2 goal >92%, PaO2 >60mmHg  - 1 left pleural, 1 mediastinal chest tube, maintain to wall suction, monitor output, notify provider if > 100cc/hr  - ABG as needed  - Daily CXR while chest tubes in place   - Continuous pulse ox monitoring   - Bronchopulmonary hygiene and incentive spirometry once extubated   - right apical pneumothorax- improving, f/up cxr this afternoon  - closely monitor oxygen requirements    Cardiovascular:  Triple vessel CAD s/p CABG x 3  Patient underwent CABGx3 LIMA-> LAD, RSVG-> OM-> PDA  ; Arrived to ICU on no vasopressors; NSR on monitor ; Pre-op echo: ; Post-op echo:   - V wires in place, paced at VVI with backup rate of 50.  - Wean vasoactive medications to MAP 70-90, CI > 2.2, SVO2 >65%  - Start aspirin within 6 hours post-op then daily   - Statin POD #1   - Start low dose BB POD #1  - CTS following and appreciate further management  - Continuous cardiac monitoring per ICU protocol  - Maintain MAPS >70-90  - Daily EKGs while in ICU    GI:  No acute issues  - advance diet as tolerated    - BR with miralax, senna BID, PRN dulc suppositories     Renal/Volume Status (Intra &  Extravascular):  Olivarez catheter in place with adequate UOP -- remove on or prior to POD2   - Goal urine output 0.5-1.0cc/kg/hr  - Monitor I/O's  - Replete electrolytes to maintain K >4.0 and Mg >2.0  - Daily BMP, Mg, Phos     Endocrine  Post-operative hyperglycemia  - No history of DM, likely stress induced  - Strict blood glucose control post-operatively  - SSI q4hrs while NPO  - Maintain glucose <180     Infectious Disease:  No acute issues  - Laila-op Ancef x 48 hours  - MRSA PCR: +/-  - Monitor SIRS criteria    Heme/Onc:  Acute post-operative blood loss anemia   - Baseline Hgb 13-14  - DAPT POD #4 if STEMI/NSTEMI   - Monitor for s/sx of anemia such as bleeding and bruising   - Transfuse if Hgb <7.0 or massive blood loss with hemodynamic instability  - Daily CBC    MSK:  No acute issues  - PT/OT consult and OOB POD #1 and to chair as tolerated   - Padded pressure points     Skin  - ICU skin protocol    Ethics/Code Status:  Full code     :  DVT Prophylaxis: SCDs; lovenox  GI Prophylaxis: no  Bowel Regimen: Miralax, senna, PRN dulc   Diet: reg  CVC: RIJ remove today  Ellie: r brachial remove today  Olivarez: yes remove today  Restraints: no  Dispo: ICU    Critical Care Time:  45 minutes spent in preparing to see patient (I.e.labs,imaging, etc.), documentation, discussion plan of care with patient/family/caregiver, and/ or coordination of care with multidisciplinary team including the attending. Time does not include completion of procedure time.     Estrellita AREVALO-CNP  Pulmonology & Critical Care Medicine   [facility]           [1]   Past Medical History:  Diagnosis Date    ADHD (attention deficit hyperactivity disorder)     Anxiety     Chronic viral hepatitis C (Multi)     Depression     Diabetes mellitus with hyperglycemia     Heart disease     Hypercholesterolemia     Hypertension     Hypothyroidism     Myocardial infarction (Multi)     Nausea and vomiting 04/10/2023    Obesity      Visual impairment    [2]   Past Surgical History:  Procedure Laterality Date    CARDIAC CATHETERIZATION N/A 2025    Procedure: LHC, With LV;  Surgeon: Jordi Simpson DO;  Location: [facility] Cardiac [facility] Lab;  Service: Cardiovascular;  Laterality: N/A;     SECTION, LOW TRANSVERSE      CORONARY ANGIOPLASTY WITH STENT PLACEMENT  2014    KNEE ARTHROPLASTY     [3]   Medications Prior to Admission   Medication Sig Dispense Refill Last Dose/Taking    aspirin 81 mg EC tablet = 1 tab(s) ( 81 mg ), PO, Daily, # 90 tab(s), 0 Refill(s), Type: Maintenance   6/3/2025 at  4:30 AM    atorvastatin (Lipitor) 80 mg tablet Take 1 tablet (80 mg) by mouth once daily at bedtime. 30 tablet 0 2025 at  9:00 PM    buPROPion SR (Wellbutrin SR) 200 mg 12 hr tablet  1 tab(s), Oral, bid 60 tablet 0 6/3/2025 at  4:30 AM    [] chlorhexidine (Peridex) 0.12 % solution Use as directed 473 mL 0 6/3/2025 at  4:30 AM    escitalopram (Lexapro) 10 mg tablet Take 1 tablet (10 mg) by mouth once daily. 90 tablet 1 6/3/2025 at  4:30 AM    levothyroxine (Synthroid, Levoxyl) 125 mcg tablet TAKE 1 TABLET DAILY 90 tablet 2 6/3/2025 at  4:30 AM    losartan (Cozaar) 50 mg tablet Take 1 tablet (50 mg) by mouth once daily. 30 tablet 0 Past Week    metFORMIN XR (Glucophage-XR) 750 mg 24 hr tablet Take 1 tablet (750 mg) by mouth 2 times a day. Do not crush, chew, or split.   Past Week    multivitamin (MULTIPLE VITAMINS ORAL) 1 cap(s), PO, Daily, # 90 cap(s), 0 Refill(s), Type: Maintenance   Past Week    mupirocin (Bactroban) 2 % ointment Apply topically 2 times a day.   6/3/2025 at  4:30 AM    buPROPion SR (Wellbutrin SR) 200 mg 12 hr tablet  1 tab(s), Oral, bid 180 tablet 1     nitroglycerin (Nitrostat) 0.4 mg SL tablet Place 1 tablet (0.4 mg) under the tongue every 5 minutes if needed for chest pain. Can take every 5 minutes for 3 total doses then call 798 84 tablet 12     tirzepatide (Mounjaro) 7.5 mg/0.5 mL pen injector Inject 7.5 mg under the  skin 1 (one) time per week. (Patient taking differently: Inject 7.5 mg under the skin 1 (one) time per week. Thursday  last dose25) 2 mL 5    [4]   Social History  Tobacco Use    Smoking status: Former     Current packs/day: 0.00     Average packs/day: 0.5 packs/day for 54.3 years (27.2 ttl pk-yrs)     Types: Cigarettes     Start date: 1971     Quit date: 2025     Years since quittin.0     Passive exposure: Current    Smokeless tobacco: Former   Vaping Use    Vaping status: Never Used   Substance Use Topics    Alcohol use: Not Currently    Drug use: Yes     Frequency: 4.0 times per week     Types: Marijuana     Comment: smokes thc nightly   [5]   Family History  Problem Relation Name Age of Onset    Hypertension Mother Mom     Stroke Mother Mom     Hypertension Father Dad     Cancer Father Dad     Alcohol abuse Father Dad     Drug abuse Father Dad    [6] aminocaproic acid, 1 g/hr, Last Rate: Stopped (25)  lactated Ringer's, 30 mL/hr, Last Rate: 30 mL/hr (25)  lactated Ringer's, 5 mL/hr, Last Rate: 5 mL/hr (25)  niCARdipine, 0-15 mg/hr, Last Rate: Stopped (25)  nitroglycerin, 5-200 mcg/min, Last Rate: Stopped (25 0830)     [7]   Current Facility-Administered Medications:     acetaminophen (Tylenol) tablet 650 mg, 650 mg, oral, q6h, Paramjit Marin PA-C, 650 mg at 25    alteplase (Cathflo Activase) injection 2 mg, 2 mg, intra-catheter, PRN, Natalia Jamison PA-C    aminocaproic acid (Amicar) 25 g in sodium chloride 0.9% 250 mL (0.1 g/mL) infusion, 1 g/hr, intravenous, Continuous, Natalia Jamison PA-C, Stopped at 25    aspirin chewable tablet 81 mg, 81 mg, oral, Daily, Natalia Jamison PA-C, 81 mg at 25 0841    atorvastatin (Lipitor) tablet 80 mg, 80 mg, oral, Nightly, Natalia Jamison PA-C, 80 mg at 25    buPROPion SR (Wellbutrin SR) 12 hr tablet 200 mg, 200 mg, oral, BID, Paramjit Marin PA-C, 200 mg at 25  2054    calcium chloride 0.5 g in dextrose 5% 50 mL IV, 0.5 g, intravenous, q8h PRN, Natalia Jamison PA-C    calcium chloride 1 g in dextrose 5% 50 mL IV, 1 g, intravenous, q8h PRN, Natalia Jamison PA-C    ceFAZolin (Ancef) 2 g in dextrose (iso) IV 50 mL, 2 g, intravenous, q8h, Natalia Jamison PA-C, Stopped at 06/05/25 0456    docusate sodium (Colace) capsule 100 mg, 100 mg, oral, BID, Natalia Jamison PA-C, 100 mg at 06/04/25 2054    enoxaparin (Lovenox) syringe 40 mg, 40 mg, subcutaneous, Daily, Paramjit Marin PA-C, 40 mg at 06/04/25 0841    escitalopram (Lexapro) tablet 10 mg, 10 mg, oral, Daily, Paramjit Marin PA-C, 10 mg at 06/04/25 0841    HYDROmorphone PF (Dilaudid) injection 0.2 mg, 0.2 mg, intravenous, q15 min PRN, Estrellita Lloyd, APRN-CNP, 0.2 mg at 06/04/25 0642    insulin lispro injection 0-5 Units, 0-5 Units, subcutaneous, q4h KELECHI, Paramjit Marin PA-C, 1 Units at 06/04/25 2054    lactated Ringer's infusion, 30 mL/hr, intravenous, Continuous, Natalia Jamison PA-C, Last Rate: 30 mL/hr at 06/04/25 1752, 30 mL/hr at 06/04/25 1752    lactated Ringer's infusion, 5 mL/hr, intravenous, Continuous, Natalia Jamison PA-C, Last Rate: 5 mL/hr at 06/04/25 1752, 5 mL/hr at 06/04/25 1752    levothyroxine (Synthroid, Levoxyl) tablet 125 mcg, 125 mcg, oral, Daily, Paramjit Marin PA-C, 125 mcg at 06/05/25 0644    lidocaine 4 % patch 1 patch, 1 patch, transdermal, q24h, Natalia Jamison PA-C, 1 patch at 06/04/25 1405    [Held by provider] losartan (Cozaar) tablet 50 mg, 50 mg, oral, Daily, Paramjit Marin PA-C    magnesium citrate solution 296 mL, 296 mL, oral, Once PRN, Natalia Jamison PA-C    magnesium hydroxide (Milk of Magnesia) 400 mg/5 mL suspension 30 mL, 30 mL, oral, Daily PRN, Natalia Jamison PA-C    magnesium sulfate 2 g in sterile water for injection 50 mL, 2 g, intravenous, q6h PRN, Natalia Jamison PA-C, Last Rate: 25 mL/hr at 06/05/25 0546, 2 g at 06/05/25 0546    magnesium sulfate 3 g in dextrose 5% 100 mL IV,  3 g, intravenous, q6h PRN, Natalia Jamison PA-C    metoclopramide (Reglan) tablet 10 mg, 10 mg, oral, q6h PRN **OR** metoclopramide (Reglan) injection 10 mg, 10 mg, intravenous, q6h PRN, Natalia Jaimson PA-C, 10 mg at 06/04/25 0414    metoprolol tartrate (Lopressor) tablet 25 mg, 25 mg, oral, BID, Natalia Jamison PA-C, 25 mg at 06/04/25 2054    mupirocin (Bactroban) 2 % ointment 1 Application, 1 Application, Each Nostril, BID, Natlaia Jamison PA-C, 1 Application at 06/04/25 2054    naloxone (Narcan) injection 0.2 mg, 0.2 mg, intravenous, q5 min PRN, Natalia Jamison PA-C    niCARdipine (Cardene) 40 mg in sodium chloride 200 mL (0.2 mg/mL) infusion (premix), 0-15 mg/hr, intravenous, Continuous, Romina Morfin MD, Stopped at 06/05/25 0648    nitroglycerin (Tridil) 50 mg in dextrose 5% 250 mL (0.2 mg/mL) infusion (premix), 5-200 mcg/min, intravenous, Continuous, Paramjit Marin PA-C, Stopped at 06/04/25 0830    ondansetron ODT (Zofran-ODT) disintegrating tablet 4 mg, 4 mg, oral, q8h PRN **OR** ondansetron (Zofran) injection 4 mg, 4 mg, intravenous, q4h PRN, Paramjit Marin PA-C    oxyCODONE (Roxicodone) immediate release tablet 10 mg, 10 mg, oral, q4h PRN, Natalia Jamison PA-C, 10 mg at 06/05/25 0021    oxyCODONE (Roxicodone) immediate release tablet 5 mg, 5 mg, oral, q4h PRN, Natalia Jamison PA-C    oxygen (O2) therapy, , inhalation, Continuous PRN - O2/gases, Oj Javed MD    potassium chloride CR (Klor-Con M20) ER tablet 20 mEq, 20 mEq, oral, q6h PRN, 20 mEq at 06/05/25 0529 **OR** potassium chloride (Klor-Con) packet 20 mEq, 20 mEq, oral, q6h PRN, Natalia Jamison PA-C    potassium chloride CR (Klor-Con M20) ER tablet 40 mEq, 40 mEq, oral, q6h PRN **OR** potassium chloride (Klor-Con) packet 40 mEq, 40 mEq, oral, q6h PRN, Natalia Jamison PA-C    potassium chloride 20 mEq in sterile water for injection 100 mL, 20 mEq, intravenous, q6h PRN, Ntaalia Jamison PA-C, Stopped at 06/03/25 1648    potassium chloride 40 mEq  in sterile water for injection 100 mL, 40 mEq, intravenous, q6h PRN, Natalia Jamison PA-C

## 2025-06-05 NOTE — PROGRESS NOTES
Patient not medically clear. POD #2 CABG. TCC met with patient and her spouse and both are agreeable for C, choiced for [facility]. Patient will need an internal C referral, Natalia Jamison PAC aware. Will continue to follow.      06/05/25 1403   Discharge Planning   Home or Post Acute Services In home services   Type of Home Care Services Home nursing visits;Home OT;Home PT   Expected Discharge Disposition Home H  (TBD)   Does the patient need discharge transport arranged? No

## 2025-06-06 ENCOUNTER — APPOINTMENT (OUTPATIENT)
Dept: RADIOLOGY | Facility: HOSPITAL | Age: 70
DRG: 236 | End: 2025-06-06
Payer: COMMERCIAL

## 2025-06-06 ENCOUNTER — APPOINTMENT (OUTPATIENT)
Dept: CARDIOLOGY | Facility: HOSPITAL | Age: 70
DRG: 236 | End: 2025-06-06
Payer: COMMERCIAL

## 2025-06-06 ENCOUNTER — APPOINTMENT (OUTPATIENT)
Facility: CLINIC | Age: 70
End: 2025-06-06
Payer: COMMERCIAL

## 2025-06-06 ENCOUNTER — HOME HEALTH ADMISSION (OUTPATIENT)
Dept: HOME HEALTH SERVICES | Facility: HOME HEALTH | Age: 70
End: 2025-06-06
Payer: COMMERCIAL

## 2025-06-06 LAB
ALBUMIN SERPL BCP-MCNC: 3.2 G/DL (ref 3.4–5)
ANION GAP SERPL CALCULATED.3IONS-SCNC: 8 MMOL/L (ref 10–20)
ATRIAL RATE: 68 BPM
BASOPHILS # BLD AUTO: 0.02 X10*3/UL (ref 0–0.1)
BASOPHILS NFR BLD AUTO: 0.1 %
BUN SERPL-MCNC: 38 MG/DL (ref 6–23)
CA-I BLD-SCNC: 1.19 MMOL/L (ref 1.1–1.33)
CALCIUM SERPL-MCNC: 8.7 MG/DL (ref 8.6–10.3)
CHLORIDE SERPL-SCNC: 101 MMOL/L (ref 98–107)
CO2 SERPL-SCNC: 28 MMOL/L (ref 21–32)
CREAT SERPL-MCNC: 0.9 MG/DL (ref 0.5–1.05)
EGFRCR SERPLBLD CKD-EPI 2021: 70 ML/MIN/1.73M*2
EJECTION FRACTION: 63 %
EOSINOPHIL # BLD AUTO: 0.01 X10*3/UL (ref 0–0.7)
EOSINOPHIL NFR BLD AUTO: 0 %
ERYTHROCYTE [DISTWIDTH] IN BLOOD BY AUTOMATED COUNT: 12.7 % (ref 11.5–14.5)
GLUCOSE BLD MANUAL STRIP-MCNC: 126 MG/DL (ref 74–99)
GLUCOSE BLD MANUAL STRIP-MCNC: 173 MG/DL (ref 74–99)
GLUCOSE BLD MANUAL STRIP-MCNC: 211 MG/DL (ref 74–99)
GLUCOSE BLD MANUAL STRIP-MCNC: 85 MG/DL (ref 74–99)
GLUCOSE SERPL-MCNC: 120 MG/DL (ref 74–99)
HCT VFR BLD AUTO: 31.3 % (ref 36–46)
HGB BLD-MCNC: 10.5 G/DL (ref 12–16)
IMM GRANULOCYTES # BLD AUTO: 0.34 X10*3/UL (ref 0–0.7)
IMM GRANULOCYTES NFR BLD AUTO: 1.7 % (ref 0–0.9)
LYMPHOCYTES # BLD AUTO: 0.95 X10*3/UL (ref 1.2–4.8)
LYMPHOCYTES NFR BLD AUTO: 4.7 %
MAGNESIUM SERPL-MCNC: 2.35 MG/DL (ref 1.6–2.4)
MCH RBC QN AUTO: 31.7 PG (ref 26–34)
MCHC RBC AUTO-ENTMCNC: 33.5 G/DL (ref 32–36)
MCV RBC AUTO: 95 FL (ref 80–100)
MONOCYTES # BLD AUTO: 1.53 X10*3/UL (ref 0.1–1)
MONOCYTES NFR BLD AUTO: 7.5 %
NEUTROPHILS # BLD AUTO: 17.47 X10*3/UL (ref 1.2–7.7)
NEUTROPHILS NFR BLD AUTO: 86 %
NRBC BLD-RTO: 0 /100 WBCS (ref 0–0)
P AXIS: 47 DEGREES
P OFFSET: 203 MS
P ONSET: 148 MS
PHOSPHATE SERPL-MCNC: 2.5 MG/DL (ref 2.5–4.9)
PLATELET # BLD AUTO: 140 X10*3/UL (ref 150–450)
POTASSIUM SERPL-SCNC: 4.1 MMOL/L (ref 3.5–5.3)
PR INTERVAL: 136 MS
Q ONSET: 216 MS
QRS COUNT: 11 BEATS
QRS DURATION: 106 MS
QT INTERVAL: 404 MS
QTC CALCULATION(BAZETT): 429 MS
QTC FREDERICIA: 421 MS
R AXIS: -16 DEGREES
RBC # BLD AUTO: 3.31 X10*6/UL (ref 4–5.2)
SODIUM SERPL-SCNC: 133 MMOL/L (ref 136–145)
T AXIS: 36 DEGREES
T OFFSET: 418 MS
VENTRICULAR RATE: 68 BPM
WBC # BLD AUTO: 20.3 X10*3/UL (ref 4.4–11.3)

## 2025-06-06 PROCEDURE — 99232 SBSQ HOSP IP/OBS MODERATE 35: CPT | Performed by: PHYSICIAN ASSISTANT

## 2025-06-06 PROCEDURE — 2500000002 HC RX 250 W HCPCS SELF ADMINISTERED DRUGS (ALT 637 FOR MEDICARE OP, ALT 636 FOR OP/ED)

## 2025-06-06 PROCEDURE — 36415 COLL VENOUS BLD VENIPUNCTURE: CPT

## 2025-06-06 PROCEDURE — 97530 THERAPEUTIC ACTIVITIES: CPT | Mod: GP

## 2025-06-06 PROCEDURE — 97116 GAIT TRAINING THERAPY: CPT | Mod: GP

## 2025-06-06 PROCEDURE — 80069 RENAL FUNCTION PANEL: CPT

## 2025-06-06 PROCEDURE — 83735 ASSAY OF MAGNESIUM: CPT

## 2025-06-06 PROCEDURE — 2060000001 HC INTERMEDIATE ICU ROOM DAILY

## 2025-06-06 PROCEDURE — 71045 X-RAY EXAM CHEST 1 VIEW: CPT | Performed by: RADIOLOGY

## 2025-06-06 PROCEDURE — 99232 SBSQ HOSP IP/OBS MODERATE 35: CPT | Performed by: INTERNAL MEDICINE

## 2025-06-06 PROCEDURE — 71045 X-RAY EXAM CHEST 1 VIEW: CPT

## 2025-06-06 PROCEDURE — 82330 ASSAY OF CALCIUM: CPT

## 2025-06-06 PROCEDURE — 2500000001 HC RX 250 WO HCPCS SELF ADMINISTERED DRUGS (ALT 637 FOR MEDICARE OP): Performed by: PHYSICIAN ASSISTANT

## 2025-06-06 PROCEDURE — 85025 COMPLETE CBC W/AUTO DIFF WBC: CPT

## 2025-06-06 PROCEDURE — 2500000004 HC RX 250 GENERAL PHARMACY W/ HCPCS (ALT 636 FOR OP/ED)

## 2025-06-06 PROCEDURE — 93005 ELECTROCARDIOGRAM TRACING: CPT

## 2025-06-06 PROCEDURE — 82947 ASSAY GLUCOSE BLOOD QUANT: CPT

## 2025-06-06 PROCEDURE — 2500000001 HC RX 250 WO HCPCS SELF ADMINISTERED DRUGS (ALT 637 FOR MEDICARE OP)

## 2025-06-06 PROCEDURE — 93010 ELECTROCARDIOGRAM REPORT: CPT | Performed by: INTERNAL MEDICINE

## 2025-06-06 PROCEDURE — 97535 SELF CARE MNGMENT TRAINING: CPT | Mod: GO,CO

## 2025-06-06 PROCEDURE — 99233 SBSQ HOSP IP/OBS HIGH 50: CPT

## 2025-06-06 PROCEDURE — E0143 WALKER FOLDING WHEELED W/O S: HCPCS | Performed by: FAMILY MEDICINE

## 2025-06-06 RX ORDER — METOPROLOL TARTRATE 25 MG/1
12.5 TABLET, FILM COATED ORAL 2 TIMES DAILY
Status: DISCONTINUED | OUTPATIENT
Start: 2025-06-06 | End: 2025-06-08

## 2025-06-06 RX ORDER — POLYETHYLENE GLYCOL 3350 17 G/17G
17 POWDER, FOR SOLUTION ORAL DAILY PRN
Status: DISPENSED | OUTPATIENT
Start: 2025-06-06

## 2025-06-06 RX ADMIN — METOPROLOL TARTRATE 12.5 MG: 25 TABLET, FILM COATED ORAL at 10:05

## 2025-06-06 RX ADMIN — LEVOTHYROXINE SODIUM 125 MCG: 0.12 TABLET ORAL at 06:03

## 2025-06-06 RX ADMIN — ACETAMINOPHEN 650 MG: 325 TABLET ORAL at 18:25

## 2025-06-06 RX ADMIN — MUPIROCIN 1 APPLICATION: 20 OINTMENT TOPICAL at 20:56

## 2025-06-06 RX ADMIN — ACETAMINOPHEN 650 MG: 325 TABLET ORAL at 06:03

## 2025-06-06 RX ADMIN — OXYCODONE HYDROCHLORIDE 5 MG: 5 TABLET ORAL at 08:55

## 2025-06-06 RX ADMIN — BUPROPION HYDROCHLORIDE 200 MG: 100 TABLET, FILM COATED, EXTENDED RELEASE ORAL at 08:54

## 2025-06-06 RX ADMIN — ENOXAPARIN SODIUM 40 MG: 40 INJECTION SUBCUTANEOUS at 08:54

## 2025-06-06 RX ADMIN — ATORVASTATIN CALCIUM 80 MG: 80 TABLET, FILM COATED ORAL at 20:56

## 2025-06-06 RX ADMIN — OXYCODONE HYDROCHLORIDE 5 MG: 5 TABLET ORAL at 18:27

## 2025-06-06 RX ADMIN — DOCUSATE SODIUM 100 MG: 100 CAPSULE, LIQUID FILLED ORAL at 20:56

## 2025-06-06 RX ADMIN — OXYCODONE HYDROCHLORIDE 10 MG: 5 TABLET ORAL at 03:26

## 2025-06-06 RX ADMIN — FAMOTIDINE 20 MG: 20 TABLET, FILM COATED ORAL at 08:55

## 2025-06-06 RX ADMIN — BUPROPION HYDROCHLORIDE 200 MG: 100 TABLET, FILM COATED, EXTENDED RELEASE ORAL at 20:56

## 2025-06-06 RX ADMIN — DOCUSATE SODIUM 100 MG: 100 CAPSULE, LIQUID FILLED ORAL at 08:54

## 2025-06-06 RX ADMIN — MUPIROCIN 1 APPLICATION: 20 OINTMENT TOPICAL at 08:54

## 2025-06-06 RX ADMIN — INSULIN LISPRO 10 UNITS: 100 INJECTION, SOLUTION INTRAVENOUS; SUBCUTANEOUS at 11:53

## 2025-06-06 RX ADMIN — METOPROLOL TARTRATE 12.5 MG: 25 TABLET, FILM COATED ORAL at 20:56

## 2025-06-06 RX ADMIN — ASPIRIN 81 MG: 81 TABLET, CHEWABLE ORAL at 08:53

## 2025-06-06 RX ADMIN — ESCITALOPRAM OXALATE 10 MG: 10 TABLET ORAL at 08:54

## 2025-06-06 ASSESSMENT — COGNITIVE AND FUNCTIONAL STATUS - GENERAL
HELP NEEDED FOR BATHING: A LITTLE
MOVING FROM LYING ON BACK TO SITTING ON SIDE OF FLAT BED WITH BEDRAILS: A LITTLE
DRESSING REGULAR UPPER BODY CLOTHING: A LITTLE
MOVING TO AND FROM BED TO CHAIR: A LITTLE
TOILETING: A LITTLE
CLIMB 3 TO 5 STEPS WITH RAILING: A LITTLE
MOBILITY SCORE: 19
DAILY ACTIVITIY SCORE: 20
TURNING FROM BACK TO SIDE WHILE IN FLAT BAD: A LITTLE
WALKING IN HOSPITAL ROOM: A LITTLE
DRESSING REGULAR LOWER BODY CLOTHING: A LITTLE

## 2025-06-06 ASSESSMENT — PAIN SCALES - GENERAL
PAINLEVEL_OUTOF10: 7
PAINLEVEL_OUTOF10: 0 - NO PAIN
PAINLEVEL_OUTOF10: 4
PAINLEVEL_OUTOF10: 3
PAINLEVEL_OUTOF10: 4
PAINLEVEL_OUTOF10: 0 - NO PAIN
PAINLEVEL_OUTOF10: 0 - NO PAIN
PAINLEVEL_OUTOF10: 8
PAINLEVEL_OUTOF10: 8

## 2025-06-06 ASSESSMENT — PAIN DESCRIPTION - LOCATION
LOCATION: BACK
LOCATION: RIB CAGE

## 2025-06-06 ASSESSMENT — PAIN - FUNCTIONAL ASSESSMENT
PAIN_FUNCTIONAL_ASSESSMENT: 0-10

## 2025-06-06 ASSESSMENT — PAIN DESCRIPTION - DESCRIPTORS
DESCRIPTORS: ACHING;STABBING
DESCRIPTORS: DULL;DISCOMFORT

## 2025-06-06 ASSESSMENT — PAIN DESCRIPTION - ORIENTATION
ORIENTATION: RIGHT;UPPER
ORIENTATION: RIGHT

## 2025-06-06 ASSESSMENT — ACTIVITIES OF DAILY LIVING (ADL): HOME_MANAGEMENT_TIME_ENTRY: 23

## 2025-06-06 NOTE — PROGRESS NOTES
"Physical Therapy    Physical Therapy Treatment    Patient Name: Simona Betancourt \"Isabel\"  MRN: [id number]  Department: [facility] 3 N ICU  Room: 07/07-A  Today's Date: 6/6/2025  Time Calculation  Start Time: 1310  Stop Time: 1333  Time Calculation (min): 23 min         Assessment/Plan   PT Assessment  PT Assessment Results: Decreased strength, Decreased mobility, Decreased coordination, Decreased endurance, Pain, Decreased skin integrity  Rehab Prognosis: Good  Barriers to Discharge Home: No anticipated barriers  Evaluation/Treatment Tolerance: Patient tolerated treatment well  Medical Staff Made Aware: Yes  Strengths: Ability to acquire knowledge, Attitude of self, Support of extended family/friends  End of Session Communication: Bedside nurse  Assessment Comment: Patient is progressing well and is able to tolerate mobility while HR stayed between 73-75bpm and SpO2 stayed between 88-91% while on room air. Patient will needed close supervision for transfers and ambulation.  End of Session Patient Position: Bed, 3 rail up, Alarm off, not on at start of session  PT Plan  Inpatient/Swing Bed or Outpatient: Inpatient  PT Plan  Treatment/Interventions: Bed mobility, Transfer training, Gait training, Balance training, Neuromuscular re-education, Strengthening, Range of motion, Endurance training, Therapeutic exercise, Therapeutic activity, Home exercise program  PT Plan: Ongoing PT  PT Frequency: 6 times per week  PT Discharge Recommendations: Low intensity level of continued care  Equipment Recommended upon Discharge: Wheeled walker  PT Recommended Transfer Status: Stand by assist  PT - OK to Discharge: Yes    PT Visit Info:  PT Received On: 06/06/25  Response to Previous Treatment: Patient with no complaints from previous session.     General Visit Information:   General  Reason for Referral: Patient is a 70 yo female admitted with artherosclerosis of native coronary artery of native heart with unstable angina pectoris s/p CABG " x3 on 6/3/25.  Referred By: Katie Reynolds RN (auto-released)  Past Medical History Relevant to Rehab: CAD, HTN, NSTEMI, MI with stents, HLD, HEP C, depression , DM  Family/Caregiver Present: No  Prior to Session Communication: Bedside nurse  Patient Position Received: Alarm off, not on at start of session, Up in chair  Preferred Learning Style: auditory, verbal, visual  General Comment: Patient agreeable to treatment and ready to ambulate. Patient was asleep upon arrivale to treatment session.    Subjective   Precautions:  Precautions  Hearing/Visual Limitations: wears reading glasses; hearing WFL  Medical Precautions: Fall precautions  Post-Surgical Precautions: Move in the Tube  Precautions Comment: Patient reminded of MITT precautions and portrays compliance.     Date/Time Vitals Session Patient Position Pulse Resp SpO2 BP MAP (mmHg)    06/06/25 1300 --  --  70  14  95 %  --  --     06/06/25 1400 --  --  70  17  94 %  --  --           Vital Signs Comment: Pre-therapy: HR: 73 RR: 16 SpO2: 94% Post: HR: 75 RR: 23 SpO2: 88-91%     Objective   Pain:  Pain Assessment  Pain Assessment: 0-10  0-10 (Numeric) Pain Score: 4  Pain Type: Surgical pain  Pain Location: Chest  Pain Interventions: Repositioned, Ambulation/increased activity  Response to Interventions: Resting quietly, Content/relaxed  Cognition:  Cognition  Overall Cognitive Status: Within Functional Limits  Orientation Level: Oriented X4  Attention: Within Functional Limits  Impulsive: Within functional limits  Processing Speed: Within funtional limits  Coordination:  Movements are Fluid and Coordinated: Yes  Coordination Comment: Pt is ambulating fluidly  Postural Control:  Postural Control  Postural Control: Within Functional Limits  Posture Comment: slightly rounded shoulders  Static Sitting Balance  Static Sitting-Balance Support: Bilateral upper extremity supported  Static Sitting-Level of Assistance: Independent  Static Sitting-Comment/Number of  Minutes: Patient was up in chair at beginning of treatment.  Static Standing Balance  Static Standing-Balance Support: Bilateral upper extremity supported  Static Standing-Level of Assistance: Close supervision  Static Standing-Comment/Number of Minutes: Standing static good with rolling walker  Dynamic Standing Balance  Dynamic Standing-Balance Support: Bilateral upper extremity supported  Dynamic Standing-Level of Assistance: Close supervision  Dynamic Standing-Comments: dynamic standing good with rolling walker  Extremity/Trunk Assessments:  RUE   RUE : Within Functional Limits  LUE   LUE: Within Functional Limits  RLE   RLE : Within Functional Limits  LLE   LLE : Within Functional Limits  Activity Tolerance:  Activity Tolerance  Endurance: Tolerates less than 10 min exercise with changes in vital signs  Activity Tolerance Comments: Patient ambulated 60'x2 with out any rest breaks but was not able to keep SpO2 above 90% as it dropped to 88% but was then recovered to 91%.  Rate of Perceived Exertion (RPE): 5/10  Treatments:  Therapeutic Activity  Therapeutic Activity Performed: Yes  Therapeutic Activity 1: Patient educated and reminded of MITT precautions. Pt did a sit<>stand with contact supervision and ambulated 60'x2 with rolling walker and contact supervision.    Bed Mobility  Bed Mobility: Yes  Bed Mobility 1  Bed Mobility 1: Sitting to supine  Level of Assistance 1: Close supervision  Bed Mobility Comments 1: Patient performed sit to supine in bed with close supervision and slow movement    Ambulation/Gait Training 1  Surface 1: Level tile  Device 1: Rolling walker  Assistance 1: Contact guard, Close supervision  Quality of Gait 1: Diminished heel strike, Decreased step length, Inconsistent stride length  Comments/Distance (ft) 1: Patient ambulated 60'x2 with rolling walker and started with CG for the first 10' then went to close supervision for the remainder of ambulation. Patient appeared steady and  efficient.  Transfers  Transfer: Yes  Transfer 1  Transfer From 1: Sit to, Stand to  Transfer to 1: Stand  Technique 1: Sit to stand  Transfer Level of Assistance 1: Close supervision  Trials/Comments 1: Patient transferred fluidly.  Transfers 2  Transfer From 2: Stand to, Sit to  Transfer to 2: Sit, Bed  Technique 2: Stand to sit  Transfer Level of Assistance 2: Close supervision  Trials/Comments 2: Pt transferred with close supervision and moved fluidly into bed and appeared to be a baseline transfer.    Outcome Measures:  [facility] Basic Mobility  Turning from your back to your side while in a flat bed without using bedrails: A little  Moving from lying on your back to sitting on the side of a flat bed without using bedrails: A little  Moving to and from bed to chair (including a wheelchair): A little  Standing up from a chair using your arms (e.g. wheelchair or bedside chair): None  To walk in hospital room: A little  Climbing 3-5 steps with railing: A little  Basic Mobility - Total Score: 19    FSS-ICU  Ambulation: Walks >/ or equal to 50 feet with any assistance x1  Rolling: Supervision or set-up only  Sitting: Complete independence  Transfer Sit-to-Stand: Supervision or set-up only  Transfer Supine-to-Sit: Minimal assistance (performs 75% or more of task)  Total Score: 23    Education Documentation  Precautions, taught by FANNIE Alvarado at 6/6/2025  2:10 PM.  Learner: Patient  Readiness: Acceptance  Method: Explanation  Response: Verbalizes Understanding  Comment: Patient educated on PT POC.    Body Mechanics, taught by FANNIE Alvarado at 6/6/2025  2:10 PM.  Learner: Patient  Readiness: Acceptance  Method: Explanation  Response: Verbalizes Understanding  Comment: Patient educated on PT POC.    Mobility Training, taught by FANNIE Alvarado at 6/6/2025  2:10 PM.  Learner: Patient  Readiness: Acceptance  Method: Explanation  Response: Verbalizes Understanding  Comment: Patient educated on PT  POC.    Education Comments  No comments found.

## 2025-06-06 NOTE — PROGRESS NOTES
Patient not medically clear. POD#3 CABG. At the time of discharge, patient will return home with [facility]. We will need the start of care date before patient can safely discharge. Will follow.      06/06/25 3198   Discharge Planning   Home or Post Acute Services In home services   Type of Home Care Services Home nursing visits;Home OT;Home PT   Expected Discharge Disposition Home H  ([facility])   Does the patient need discharge transport arranged? No

## 2025-06-06 NOTE — PROGRESS NOTES
[facility] Critical Care Medicine       Date:  6/6/2025  Patient:  Simona Betancourt  YOB: 1955  MRN:  [id number]   Admit Date:  6/3/2025  Hospital Length of Stay: 3   ICU Length of Stay: 2d 18h       Chief complaint: coronary artery disease         History of Present Illness:  Simona Betancourt is a 69 y.o. year old female patient with past medical history of CAD, MI w/ stents 10+ years ago, HTN, HLD, heavy smoker who presented 5/11 with complaints of nausea vomiting and feeling unwell. Underwent cardiac catheterization on 5/12 which showed multi-vessel disease. Today she had CABG x 3, LIMA-> LAD, RSVG-> OM-> PDA.     Interval ICU Events:  6/3: Arrived to ICU intubated and sedated. Not on pressors. Has two chest tubes, one mediastinal, one left pleural. Ventricular pacing wires VVI backup rate 50 BPM.      6/4: Right apical pneumothorax on cxr this am. Reports some nausea and vomiting this am. Pain is controlled with meds. Still requiring low dose nitroglycerin gtt.      6/5: Pneumothorax stable to improved this am. Transition from NRB to NC. Will remove lines and gordon today. Reports nagging pain to left upper abd/lower chest, no obvious source. Will monitor. Hemodynamically stable off drips. Denies continued n/v, will try to advance diet.    6/6: POD 3- Brief episode of bradycardia early this morning requiring pacing to kick in for ~4 beats, no more bradycardic events since. Blood pressure remains borderline soft, but improving. Encouraging PO intake. Decrease BB to half-dose due to above. Progressing well otherwise.     Objective     Medical History:  Medical History[1]  Surgical History[2]  Prescriptions Prior to Admission[3]  Ace inhibitors  Social History[4]  Family History[5]    Hospital Medications:    Continuous Medications[6]    Current Medications[7]    Review of Systems:  14 point review of systems was completed and negative except for those specially mention in my HPI    Physical  Exam:    Heart Rate:  [50-75]   Temp:  [36 °C (96.8 °F)-37 °C (98.6 °F)]   Resp:  [10-24]   BP: ()/(47-85)   Weight:  [57.2 kg (126 lb 1.7 oz)-58.4 kg (128 lb 12 oz)]   SpO2:  [89 %-99 %]     Physical Exam  Constitutional:       General: She is not in acute distress.  HENT:      Head:      Comments: Healing bruise to R anterior forehead   Eyes:      Conjunctiva/sclera: Conjunctivae normal.      Pupils: Pupils are equal, round, and reactive to light.   Cardiovascular:      Rate and Rhythm: Normal rate and regular rhythm.      Heart sounds: Normal heart sounds.   Pulmonary:      Effort: Pulmonary effort is normal.      Breath sounds: Normal breath sounds.   Abdominal:      General: There is no distension.      Palpations: Abdomen is soft.      Tenderness: There is no abdominal tenderness.   Musculoskeletal:      Right lower leg: No edema.      Left lower leg: No edema.   Neurological:      General: No focal deficit present.      Mental Status: She is alert.         Objective:    I have reviewed all medications, laboratory results, and imaging pertinent for today's encounter.    Intra-Op:  Procedure/Surgeon: CABG x 3. With Dr. Beltran Javed  Frontliner/Anesthesia:   Out of OR Time (document on ventilator card):      OR Course/Complications:      CPB time:   Cross clamp time:   Echo Pre/Post: Pre: ; post:   Chest Tubes/Drains: 1 left pleural, 1 mediastinal   Temporary wires location/setting: Ventricular pacing wires, backup @ 50     Fluids-   Crystalloid: LR 2L  Colloid: -  Cellsaver: 287cc  Products: -  EBL: 250cc  UOP: ~300cc     Anesthesia-  Intubation: MAC 3 ... Grade 1   Intravenous Access:  rt brachial art line, CVC- RIJ   Regional anesthesia:   Benzodiazepine dose/last administration:   Opioid dose/last administration:   NMB dose/last administration: last rocuronium total at 1230  TOF/ reversal given: not reversed   Antibiotic time: ancef 1215  Temperature on admission to ICU: 36           Intake/Output Summary  (Last 24 hours) at 6/6/2025 0730  Last data filed at 6/6/2025 0700  Gross per 24 hour   Intake 1066.95 ml   Output 599 ml   Net 467.95 ml       Daily Labs:  CBC:   Results from last 7 days   Lab Units 06/06/25  0559 06/05/25  0327   WBC AUTO x10*3/uL 20.3* 20.4*   HEMOGLOBIN g/dL 10.5* 11.2*   HEMATOCRIT % 31.3* 33.6*   MCV fL 95 94     BMP:    Results from last 7 days   Lab Units 06/06/25  0559 06/05/25  0327   SODIUM mmol/L 133* 134*   POTASSIUM mmol/L 4.1 3.9   CHLORIDE mmol/L 101 101   CO2 mmol/L 28 25   BUN mg/dL 38* 31*   CREATININE mg/dL 0.90 0.99   CALCIUM mg/dL 8.7 8.9   GLUCOSE mg/dL 120* 125*   MAGNESIUM mg/dL 2.35 1.86     ABG:   Results from last 7 days   Lab Units 06/04/25  0041 06/03/25  1640   POCT PH, ARTERIAL pH 7.35* 7.32*   POCT PCO2, ARTERIAL mm Hg 45* 44*   POCT PO2, ARTERIAL mm Hg 117* 117*   POCT SO2, ARTERIAL % 99 98   POCT HCO3 CALCULATED, ARTERIAL mmol/L 24.8 22.7   POCT LACTATE, ARTERIAL mmol/L 0.9 1.1      VBG:   Results from last 7 days   Lab Units 06/03/25  1054   POCT PH, VENOUS pH 7.41   POCT PCO2, VENOUS mm Hg 45   POCT PO2, VENOUS mm Hg 50*   POCT SO2, VENOUS % 88*   POCT HCO3 CALCULATED, VENOUS mmol/L 28.5*   POCT LACTATE, VENOUS mmol/L 1.9             Assessment/Plan:    I am currently managing this critically ill patient for the following problems:    Neurology/Psychiatry/Pain Control/Sedation:   Acute post-operative pain - likely incisional pain  History of anxiety, depression   - Pain Control: acetaminophen PRN  - Scheduled acetaminophen, lidoderm, PRN oxycodone  - Continue home wellbutrin, lexapro  - CAM ICU qshift, sleep-wake hygiene, delirium precautions     Respiratory/ENT:  Post-operative respiratory insufficiency without respiratory failure   Right apical pneumothorax - trace on morning CXR  - Supplemental O2: none, on room air   - Maintain SpO2 >92%  - Continuous pulse ox monitoring   - Bronchopulmonary hygiene    Cardiovascular:   Triple vessel CAD s/p CABG x 3 LIMA ->  LAD, RSVG -> OM, PDA  History of hypertension, NSTEMI, HTN, CAD  - Continue aspirin, metoprolol, lipitor   - Hold home losartan  - Maintain MAPs >65  - Possible initiation of plavix tomorrow, to be discussed with surgeon dt recent fall   - CTS following and appreciate further management  - Continuous cardiac monitoring   - EKGs PRN for ACS symptoms, arrhythmias     Gastrointestinal:  No active concerns   - Diet: cardiac   - BR: colace  - GI Prophylaxis: none    Renal/Volume Status/Electrolytes:  Acute kidney injury - Cr 1.37 post-operatively  - Baseline Cr ~0.9-1.0  - Hourly I/O's, maintain urine output >0.5cc/kg/hr  - Replete electrolytes to maintain K >4.0 and Mg >2.0  - Daily RFP, Mg    Endocrinology:  History of T2DM, hypothyroidism   - Home diabetic regimen: metformin 750 BID - on hold   - cSSI ACHS   - Continue home synthroid   - Hypoglycemia protocol PRN     Infectious Disease:  Leukocytosis - WBC 20, predominnantly neutorphils, no toxic left shift  - Antibiotics: dave-op ancef x48 hours, completed   - Persistent leukocytosis presumed to be reactive currently, no signs of infection, no other SIRS cri tertia met  - Monitor w daily CBC, can broaden infectious work-up s/sx present     Hematology/Oncology:  Acute post-operative blood loss anemia and thrombocytopenia   - Baseline Hgb 13-14  - Transfuse if Hgb <7.0 or symptomatic anemia  - Daily CBC    MSK/Skin:  No active concerns   - PT/OT eval, OOB and to chair as tolerated   - ICU skin protocol, padded pressure points    Ethics/Code Status:  Full code     :  DVT Prophylaxis: SQH, SCDs  GI Prophylaxis: none  Bowel Regimen: colace, miralax PRN  Diet: cardiac  CVC: none  Kerman: none  Olivarez: none  Restraints: none  Disposition: stepdown status     Paramjit Marin PA-C  Pulmonary & Critical Care Medicine   [facility]         [1]   Past Medical History:  Diagnosis Date    ADHD (attention deficit hyperactivity disorder)     Anxiety      Chronic viral hepatitis C (Multi)     Depression     Diabetes mellitus with hyperglycemia     Heart disease     Hypercholesterolemia     Hypertension     Hypothyroidism     Myocardial infarction (Multi)     Nausea and vomiting 04/10/2023    Obesity     Visual impairment    [2]   Past Surgical History:  Procedure Laterality Date    CARDIAC CATHETERIZATION N/A 2025    Procedure: LHC, With LV;  Surgeon: Jordi Simpson DO;  Location: [facility] Cardiac Cath Lab;  Service: Cardiovascular;  Laterality: N/A;     SECTION, LOW TRANSVERSE      CORONARY ANGIOPLASTY WITH STENT PLACEMENT  2014    KNEE ARTHROPLASTY     [3]   Medications Prior to Admission   Medication Sig Dispense Refill Last Dose/Taking    aspirin 81 mg EC tablet = 1 tab(s) ( 81 mg ), PO, Daily, # 90 tab(s), 0 Refill(s), Type: Maintenance   6/3/2025 at  4:30 AM    atorvastatin (Lipitor) 80 mg tablet Take 1 tablet (80 mg) by mouth once daily at bedtime. 30 tablet 0 2025 at  9:00 PM    buPROPion SR (Wellbutrin SR) 200 mg 12 hr tablet  1 tab(s), Oral, bid 60 tablet 0 6/3/2025 at  4:30 AM    [] chlorhexidine (Peridex) 0.12 % solution Use as directed 473 mL 0 6/3/2025 at  4:30 AM    escitalopram (Lexapro) 10 mg tablet Take 1 tablet (10 mg) by mouth once daily. 90 tablet 1 6/3/2025 at  4:30 AM    levothyroxine (Synthroid, Levoxyl) 125 mcg tablet TAKE 1 TABLET DAILY 90 tablet 2 6/3/2025 at  4:30 AM    losartan (Cozaar) 50 mg tablet Take 1 tablet (50 mg) by mouth once daily. 30 tablet 0 Past Week    metFORMIN XR (Glucophage-XR) 750 mg 24 hr tablet Take 1 tablet (750 mg) by mouth 2 times a day. Do not crush, chew, or split.   Past Week    multivitamin (MULTIPLE VITAMINS ORAL) 1 cap(s), PO, Daily, # 90 cap(s), 0 Refill(s), Type: Maintenance   Past Week    mupirocin (Bactroban) 2 % ointment Apply topically 2 times a day.   6/3/2025 at  4:30 AM    buPROPion SR (Wellbutrin SR) 200 mg 12 hr tablet  1 tab(s), Oral, bid 180 tablet 1     nitroglycerin  (Nitrostat) 0.4 mg SL tablet Place 1 tablet (0.4 mg) under the tongue every 5 minutes if needed for chest pain. Can take every 5 minutes for 3 total doses then call 911 90 tablet 12     tirzepatide (Mounjaro) 7.5 mg/0.5 mL pen injector Inject 7.5 mg under the skin 1 (one) time per week. (Patient taking differently: Inject 7.5 mg under the skin 1 (one) time per week. Thursday  last dose25) 2 mL 5    [4]   Social History  Tobacco Use    Smoking status: Former     Current packs/day: 0.00     Average packs/day: 0.5 packs/day for 54.3 years (27.2 ttl pk-yrs)     Types: Cigarettes     Start date: 1971     Quit date: 2025     Years since quittin.0     Passive exposure: Current    Smokeless tobacco: Former   Vaping Use    Vaping status: Never Used   Substance Use Topics    Alcohol use: Not Currently    Drug use: Yes     Frequency: 4.0 times per week     Types: Marijuana     Comment: smokes thc nightly   [5]   Family History  Problem Relation Name Age of Onset    Hypertension Mother Mom     Stroke Mother Mom     Hypertension Father Dad     Cancer Father Dad     Alcohol abuse Father Dad     Drug abuse Father Dad    [6]    [7]   Current Facility-Administered Medications:     acetaminophen (Tylenol) tablet 650 mg, 650 mg, oral, q6h, Paramjit Marin PA-C, 650 mg at 25 0603    aspirin chewable tablet 81 mg, 81 mg, oral, Daily, Natalia Jamison PA-C, 81 mg at 25 08    atorvastatin (Lipitor) tablet 80 mg, 80 mg, oral, Nightly, Natalia Jamison PA-C, 80 mg at 25    buPROPion SR (Wellbutrin SR) 12 hr tablet 200 mg, 200 mg, oral, BID, Paramjit Marin PA-C, 200 mg at 25    calcium chloride 0.5 g in dextrose 5% 50 mL IV, 0.5 g, intravenous, q8h PRN, Natalia Jamison PA-C    calcium chloride 1 g in dextrose 5% 50 mL IV, 1 g, intravenous, q8h PRN, Natalia Jamison PA-C    docusate sodium (Colace) capsule 100 mg, 100 mg, oral, BID, Natalia Jamison PA-C, 100 mg at 25    enoxaparin  (Lovenox) syringe 40 mg, 40 mg, subcutaneous, Daily, Paramjit Marin PA-C, 40 mg at 06/05/25 0855    escitalopram (Lexapro) tablet 10 mg, 10 mg, oral, Daily, Paramjit Marin PA-C, 10 mg at 06/05/25 0855    famotidine (Pepcid) tablet 20 mg, 20 mg, oral, Daily, 20 mg at 06/05/25 1346 **OR** famotidine PF (Pepcid) injection 20 mg, 20 mg, intravenous, Daily, LAURITA Jonas    HYDROmorphone PF (Dilaudid) injection 0.2 mg, 0.2 mg, intravenous, q15 min PRN, LAURITA Jonas, 0.2 mg at 06/04/25 0642    insulin lispro injection 0-15 Units, 0-15 Units, subcutaneous, TID AC, LAURITA Szymanski    levothyroxine (Synthroid, Levoxyl) tablet 125 mcg, 125 mcg, oral, Daily, Paramjit Marin PA-C, 125 mcg at 06/06/25 0603    lidocaine 4 % patch 1 patch, 1 patch, transdermal, q24h, Natalia Jamison PA-C, 1 patch at 06/05/25 1347    [Held by provider] losartan (Cozaar) tablet 50 mg, 50 mg, oral, Daily, Paramjit Marin PA-C    magnesium citrate solution 296 mL, 296 mL, oral, Once PRN, Natalia Jamison PA-C    magnesium hydroxide (Milk of Magnesia) 400 mg/5 mL suspension 30 mL, 30 mL, oral, Daily PRN, Natalia Jamison PA-C    magnesium sulfate 2 g in sterile water for injection 50 mL, 2 g, intravenous, q6h PRN, Natalia Jamison PA-C, Stopped at 06/05/25 0745    magnesium sulfate 3 g in dextrose 5% 100 mL IV, 3 g, intravenous, q6h PRN, Natalia Jamison PA-C    metoclopramide (Reglan) tablet 10 mg, 10 mg, oral, q6h PRN **OR** metoclopramide (Reglan) injection 10 mg, 10 mg, intravenous, q6h PRN, Natalia Jamison PA-C, 10 mg at 06/04/25 0414    metoprolol tartrate (Lopressor) tablet 25 mg, 25 mg, oral, BID, Natalia Jamison PA-C, 25 mg at 06/05/25 2054    mupirocin (Bactroban) 2 % ointment 1 Application, 1 Application, Each Nostril, BID, Natalia Jamison PA-C, 1 Application at 06/05/25 2053    naloxone (Narcan) injection 0.2 mg, 0.2 mg, intravenous, q5 min PRN, Natalia Jamison PA-C    ondansetron ODT (Zofran-ODT) disintegrating  tablet 4 mg, 4 mg, oral, q8h PRN **OR** ondansetron (Zofran) injection 4 mg, 4 mg, intravenous, q4h PRN, Paramjit Marin PA-C    oxyCODONE (Roxicodone) immediate release tablet 10 mg, 10 mg, oral, q4h PRN, Natalia Jamison PA-C, 10 mg at 06/06/25 0326    oxyCODONE (Roxicodone) immediate release tablet 5 mg, 5 mg, oral, q4h PRN, Natalia Jamison PA-C    oxygen (O2) therapy, , inhalation, Continuous PRN - O2/gases, Oj Javed MD    potassium chloride CR (Klor-Con M20) ER tablet 20 mEq, 20 mEq, oral, q6h PRN, 20 mEq at 06/05/25 0529 **OR** potassium chloride (Klor-Con) packet 20 mEq, 20 mEq, oral, q6h PRN, Natalia Jamison PA-C    potassium chloride CR (Klor-Con M20) ER tablet 40 mEq, 40 mEq, oral, q6h PRN **OR** potassium chloride (Klor-Con) packet 40 mEq, 40 mEq, oral, q6h PRN, Natalia Jamsion PA-C    potassium chloride 20 mEq in sterile water for injection 100 mL, 20 mEq, intravenous, q6h PRN, Natalia Jamison PA-C, Stopped at 06/03/25 1648    potassium chloride 40 mEq in sterile water for injection 100 mL, 40 mEq, intravenous, q6h PRN, Natalia Jamison PA-C

## 2025-06-06 NOTE — ASSESSMENT & PLAN NOTE
Patient has about underlying status post CABG x 3 vessels.  Continue current Lopressor 25 mg p.o. twice daily as well as aspirin Lipitor.  Will resume Plavix postop day 4.  Restart home medication.  So far stable cardiac wise.  Pt. care time is spent includes review of diagnostic tests, labs, radiographs, EKGs, old echoes, cardiac work-up and coordination of care. Assessment, impression and plans are reflected in the note above as well as the orders.  6/5: Stable cardiac wise status post CABG.  Blood pressure stable.  Labs send stable hemoglobin 11.2.  Continue current aspirin, Lipitor, Lovenox for DVT as well as Lopressor 25 mg tablet p.o. twice daily.  Probably discharge in 1 to 2 days.  6/6: Patient episode of bradycardia last night.  Lowered the Lopressor to 12.5 mg tablet p.o. twice daily.  Continue current aspirin, Lipitor.  Also continue Synthroid 125 mcg p.o. daily.  Okay to discharge home from cardiac standpoint once bradycardia/pauses resolved.

## 2025-06-06 NOTE — NURSING NOTE
Cardiac Rehab: Patient seen for qualifying diagnosis to cardiac rehab program. Described and discussed Phase II Cardiac Rehab program with patient, gave CABG booklet, CR pamphlet and smoking cessation booklet and discussed enrollment into Phase II. Reviewed cardiac lifestyle modifications necessary for improved heart health. Encouraged cardiology follow up post hospitalization. Discussed the importance of medication compliance. Cardiac rehab staff will contact patient following discharge for enrollment in program.

## 2025-06-06 NOTE — PROGRESS NOTES
"Simona Betancourt \"Mian" is a 69 y.o. female on day 3 of admission presenting with Atherosclerosis of native coronary artery of native heart with unstable angina pectoris.    Subjective   Patient resting in bed. Feels ok.       Objective     Physical Exam  HENT:      Head: Normocephalic.   Cardiovascular:      Rate and Rhythm: Normal rate and regular rhythm.   Pulmonary:      Breath sounds: Normal breath sounds.   Abdominal:      General: Bowel sounds are normal.      Palpations: Abdomen is soft.   Skin:     General: Skin is warm and dry.   Neurological:      Mental Status: She is alert and oriented to person, place, and time.       MSI incision dry and intact    Last Recorded Vitals  Blood pressure 92/51, pulse 69, temperature 36.4 °C (97.5 °F), temperature source Oral, resp. rate 19, height 1.524 m (5'), weight 57.2 kg (126 lb 1.7 oz), last menstrual period 01/01/2011, SpO2 91%.  Intake/Output last 3 Shifts:  I/O last 3 completed shifts:  In: 1347 (23.5 mL/kg) [P.O.:720; I.V.:527 (9.2 mL/kg); IV Piggyback:100]  Out: 1469 (25.7 mL/kg) [Urine:1469 (0.7 mL/kg/hr)]  Weight: 57.2 kg     Relevant Results               XR chest 1 view 06/06/2025    Narrative  Interpreted By:  Lillian Snow,  STUDY:  XR CHEST 1 VIEW 6/6/2025 5:30 am    INDICATION:  Signs/Symptoms:routine, post op CABG    COMPARISON:  06/05/2025    ACCESSION NUMBER(S):  [id number]    ORDERING CLINICIAN:  YO THAKKAR    TECHNIQUE:  AP semi-erect view of the chest at bedside    FINDINGS:  There is mild cardiac enlargement with sternal wires noted.    A small left pleural effusion is present with atelectasis in  retrocardiac left lower lobe. A right apical pneumothorax has  decreased in size comprising no more than 5% of the volume of the  right hemithorax. The right lung is clear.    Impression  5% residual right apical pneumothorax decreased in size since  yesterday's study.    Stable small left pleural effusion with persistent " atelectasis  retrocardiac left lower lobe.    Signed by: Lillian Snow 6/6/2025 8:00 AM  Dictation workstation:   [id number]   Results for orders placed or performed during the hospital encounter of 06/03/25 (from the past 24 hours)   POCT GLUCOSE   Result Value Ref Range    POCT Glucose 213 (H) 74 - 99 mg/dL   POCT GLUCOSE   Result Value Ref Range    POCT Glucose 141 (H) 74 - 99 mg/dL   POCT GLUCOSE   Result Value Ref Range    POCT Glucose 169 (H) 74 - 99 mg/dL   CBC and Auto Differential   Result Value Ref Range    WBC 20.3 (H) 4.4 - 11.3 x10*3/uL    nRBC 0.0 0.0 - 0.0 /100 WBCs    RBC 3.31 (L) 4.00 - 5.20 x10*6/uL    Hemoglobin 10.5 (L) 12.0 - 16.0 g/dL    Hematocrit 31.3 (L) 36.0 - 46.0 %    MCV 95 80 - 100 fL    MCH 31.7 26.0 - 34.0 pg    MCHC 33.5 32.0 - 36.0 g/dL    RDW 12.7 11.5 - 14.5 %    Platelets 140 (L) 150 - 450 x10*3/uL    Neutrophils % 86.0 40.0 - 80.0 %    Immature Granulocytes %, Automated 1.7 (H) 0.0 - 0.9 %    Lymphocytes % 4.7 13.0 - 44.0 %    Monocytes % 7.5 2.0 - 10.0 %    Eosinophils % 0.0 0.0 - 6.0 %    Basophils % 0.1 0.0 - 2.0 %    Neutrophils Absolute 17.47 (H) 1.20 - 7.70 x10*3/uL    Immature Granulocytes Absolute, Automated 0.34 0.00 - 0.70 x10*3/uL    Lymphocytes Absolute 0.95 (L) 1.20 - 4.80 x10*3/uL    Monocytes Absolute 1.53 (H) 0.10 - 1.00 x10*3/uL    Eosinophils Absolute 0.01 0.00 - 0.70 x10*3/uL    Basophils Absolute 0.02 0.00 - 0.10 x10*3/uL   Calcium, Ionized   Result Value Ref Range    POCT Calcium, Ionized 1.19 1.1 - 1.33 mmol/L   Magnesium   Result Value Ref Range    Magnesium 2.35 1.60 - 2.40 mg/dL   Renal Function Panel   Result Value Ref Range    Glucose 120 (H) 74 - 99 mg/dL    Sodium 133 (L) 136 - 145 mmol/L    Potassium 4.1 3.5 - 5.3 mmol/L    Chloride 101 98 - 107 mmol/L    Bicarbonate 28 21 - 32 mmol/L    Anion Gap 8 (L) 10 - 20 mmol/L    Urea Nitrogen 38 (H) 6 - 23 mg/dL    Creatinine 0.90 0.50 - 1.05 mg/dL    eGFR 70 >60 mL/min/1.73m*2    Calcium 8.7 8.6 - 10.3  mg/dL    Phosphorus 2.5 2.5 - 4.9 mg/dL    Albumin 3.2 (L) 3.4 - 5.0 g/dL   POCT GLUCOSE   Result Value Ref Range    POCT Glucose 126 (H) 74 - 99 mg/dL                    Assessment & Plan  Atherosclerosis of native coronary artery of native heart with unstable angina pectoris    S/P CABG x3. LIMA-LAD,SVG-OM^PDA. EVH POD#3  VSS afebrile 94% on RA  On ASA,Lopressor and Lipitor.Will decrease dose of BB d/t bradycardia  Home meds Wellbutrin,Lexapro and Synthroid restarted. Will start Plavix POD#4  Patient still somewhat weak.  IS/OOB/PT      I spent 30 minutes in the professional and overall care of this patient.      Natalia Jamison PA-C

## 2025-06-06 NOTE — PROGRESS NOTES
"Occupational Therapy    OT Treatment    Patient Name: Simona Betancourt \"Isabel\"  MRN: [id number]  Department: [facility] 3 N ICU  Room: 07/07-A  Today's Date: 6/6/2025  Time Calculation  Start Time: 1237  Stop Time: 1300  Time Calculation (min): 23 min        Assessment:  OT Assessment: Tolerated session well, demonstrating progression towards POC with good carryover of precautions. Education provided on homegoing safety + importance of precautions. Pt would benefit from continued skilled OT services to improve strength, balance, and functional tolerance to increase independence with ADL tasks  Prognosis: Good  Barriers to Discharge Home: Caregiver assistance, Physical needs  Caregiver Assistance: Caregiver assistance needed per identified barriers - however, level of patient's required assistance exceeds assistance available at home  Physical Needs: Intermittent mobility assistance needed, Intermittent ADL assistance needed  Evaluation/Treatment Tolerance: Patient limited by fatigue  End of Session Communication: Bedside nurse  End of Session Patient Position: Up in chair, Alarm on  OT Assessment Results: Decreased ADL status, Decreased endurance, Decreased functional mobility, Decreased IADLs, Decreased trunk control for functional activities  Prognosis: Good  Evaluation/Treatment Tolerance: Patient limited by fatigue  Plan:  Treatment Interventions: ADL retraining, Functional transfer training, Endurance training, Patient/family training, Equipment evaluation/education, Compensatory technique education  OT Frequency: 5 times per week  OT Discharge Recommendations: Low intensity level of continued care  Equipment Recommended upon Discharge: Wheeled walker  OT Recommended Transfer Status: Minimal assist  OT - OK to Discharge: Yes  Treatment Interventions: ADL retraining, Functional transfer training, Endurance training, Patient/family training, Equipment evaluation/education, Compensatory technique education    Subjective "   OT Visit Info:  OT Received On: 06/06/25  General Visit Info:  General  Reason for Referral: pt is a 68 y/o female admitted with atherosclerosis of native coronary artery with unstable angina; pt is s/p CABG x 3 by Dr. Javed on 6/3/25; activities of daily living  Past Medical History Relevant to Rehab: CAD, HTN, NSTEMI, MI with stents, HLD, HEP C, depression , DM  Prior to Session Communication: Bedside nurse  Patient Position Received: Up in chair, Alarm on  General Comment: Cleared for therapy per RN. Pt seated in chair upon arrival and agreeable to tx  Precautions:  Hearing/Visual Limitations: wears reading glasses; hearing WFL  Medical Precautions: Fall precautions  Post-Surgical Precautions: Move in the Tube  Precautions Comment: Pt educated on MIIT precautions     Date/Time Vitals Session Patient Position Pulse Resp SpO2 BP MAP (mmHg)    06/06/25 1200 --  --  79  14  94 %  --  --     06/06/25 1300 --  --  70  14  95 %  --  --                 Pain:  Pain Assessment  Pain Assessment: 0-10  0-10 (Numeric) Pain Score: 0 - No pain    Objective    Cognition:  Cognition  Orientation Level: Oriented X4    Activities of Daily Living:    Grooming  Grooming Level of Assistance: Setup, Close supervision  Grooming Where Assessed: Chair  Grooming Comments: face washing, oral hygiene, and hair brushing tasks with cues for carryover of precautions during overhead reaching    UE Dressing  UE Dressing Level of Assistance: Setup, Close supervision  UE Dressing Where Assessed: Chair  UE Dressing Comments: Educated on adaptive UE dressing technique with pt able to doff gown and don overhead shirt with increased time required for task completion    LE Dressing  LE Dressing: Yes  LE Dressing Comments: educated pt on figure four dressing technique for carryover of MITT precautions with pt able to demonstrate, expressing she was able to don shorts earlier this date    Bed Mobility/Transfers:    Transfers  Transfer: No (awaiting to  participate in functional mobility with PT)  Other Activity:  Other Activity Performed: Yes  Other Activity 1: Education provided throughout session on carryover of MITT precautions during ADL/IADL tasks upon homegoing. Additional questions/concerns addressed to pt satisfaction.  Outcome Measures:[facility] Daily Activity  Putting on and taking off regular lower body clothing: A little  Bathing (including washing, rinsing, drying): A little  Putting on and taking off regular upper body clothing: A little  Toileting, which includes using toilet, bedpan or urinal: A little  Taking care of personal grooming such as brushing teeth: None  Eating Meals: None  Daily Activity - Total Score: 20    Education Documentation  ADL Training, taught by ISAIAH Sexton at 6/6/2025  1:48 PM.  Learner: Patient  Readiness: Acceptance  Method: Explanation  Response: Verbalizes Understanding, Demonstrated Understanding  Comment: educated on MITT precautions    Education Comments  No comments found.    Problem: OT Goals  Goal: ADLs  Description: Pt will complete ADL tasks with Mod I, using AE as needed, in order to complete self-care tasks.    Outcome: Progressing  Goal: Functional transfers  Description: Pt will perform functional mobility household distance at mod ind level with RW    Outcome: Progressing  Goal: Functional mobility  Description: Pt will perform functional mobility household distance at mod ind level with RW    Outcome: Progressing

## 2025-06-06 NOTE — CARE PLAN
The patient's goals for the shift include To sleep pain free    The clinical goals for the shift include remain henodynamically stable and pain control    Over the shift, the patient did not make progress toward the following goals. Barriers to progression include . Recommendations to address these barriers include .

## 2025-06-06 NOTE — CARE PLAN
The patient's goals for the shift include To sleep pain free    The clinical goals for the shift include remain henodynamically stable and pain control      Problem: Pain - Adult  Goal: Verbalizes/displays adequate comfort level or baseline comfort level  Outcome: Progressing     Problem: Safety - Adult  Goal: Free from fall injury  Outcome: Progressing     Problem: Discharge Planning  Goal: Discharge to home or other facility with appropriate resources  Outcome: Progressing     Problem: Chronic Conditions and Co-morbidities  Goal: Patient's chronic conditions and co-morbidity symptoms are monitored and maintained or improved  Outcome: Progressing     Problem: Nutrition  Goal: Nutrient intake appropriate for maintaining nutritional needs  Outcome: Progressing     Problem: Skin  Goal: Decreased wound size/increased tissue granulation at next dressing change  Outcome: Progressing  Goal: Participates in plan/prevention/treatment measures  Outcome: Progressing  Goal: Prevent/manage excess moisture  Outcome: Progressing  Goal: Prevent/minimize sheer/friction injuries  Outcome: Progressing  Goal: Promote/optimize nutrition  Outcome: Progressing  Goal: Promote skin healing  Outcome: Progressing

## 2025-06-06 NOTE — PROGRESS NOTES
Subjective Data:  Patient stable cardiac wise status post CABG now with a low blood pressure.  Few episode of bradycardia.  Labs looks okay.  H&H is stable.  Cut back on the Lopressor to 12.5 mg tablet p.o. twice daily.  Currently on Synthroid as well as a statin and Lipitor.  Overnight Events:    Episode of bradycardia  Objective   Last Recorded Vitals  BP 96/51   Pulse 79   Temp 36.9 °C (98.4 °F) (Oral)   Resp 14   Wt 57.2 kg (126 lb 1.7 oz)   SpO2 94%     Intake/Output Summary (Last 24 hours) at 6/6/2025 1231  Last data filed at 6/6/2025 0700  Gross per 24 hour   Intake 1066.95 ml   Output 420 ml   Net 646.95 ml     Physical Exam:  HEENT: Normocephalic/atraumatic pupils equal react light  Neck exam mild JVD, no bruit  Lung exam clear to auscultation.  Cardiac exam is regular rhythm S1-S2, soft systolic murmur heard.   Abdomen soft nontender, nondistended  Extremities no clubbing, cyanosis, trace edema  Neuro exam grossly intact.  Image Results  Electrocardiogram 12-lead PRN for arrhythmia  Normal sinus rhythm with sinus arrhythmia  Septal infarct , age undetermined  Abnormal ECG  No previous ECGs available  Confirmed by Robles Villaseñor ([id number]) on 6/6/2025 12:24:39 PM  Anesthesia Intraoperative Transesophageal [place]chocardiogram          [place] [address]             Phone [phone]    TRANSESOPHAGEAL ECHOCARDIOGRAM REPORT    Patient Name:         ESTELLA Del Cid Physician:    [id number] Azul MD  Study Date:           6/3/2025             Ordering Provider:    [id number] AZUL  MRN/PID:              [id number]             Fellow:  Accession#:           [id number]         Nurse:  Date of Birth/Age:    1955 / 69 years Sonographer:  Gender Assigned at    F                    Additional  Staff:  Birth:  Height:               152.00 cm            Admit Date:  Weight:               60.00 kg             Admission Status:  BSA / BMI:            1.56 m2 / 25.97      Department Location:                        kg/m2    Study Type:    ANESTHESIA INTRAOPERATIVE JOSEPH  Diagnosis/ICD: Atherosclerotic heart disease of native coronary artery with                 unstable angina pectoris-I25.110  CPT Codes:     Echo JOSEPH I and R Only-[id number]   Study Detail: The following Echo studies were performed: 2D. The patient is                intubated. Patient's heart rhythm is normal sinus rhythm. A bubble                study was not performed. The patient was under general anesthesia.       PHYSICIAN INTERPRETATION:  JOSEPH Details: The JOSEPH probe used was x7-2t. Technically adequate omniplane transesophageal echocardiogram performed. Color flow Doppler echo was performed to assess for the presence of a patent foramen ovale.  JOSEPH Medication: The patient was sedated by Anesthesia; please refer to anesthesia flow sheet for medications used.  JOSEPH Procedure: The probe was passed without difficulty. The following complication was encountered during the procedure: Patient tolerated the procedure well without any apparent complications.  Left Ventricle: The left ventricular systolic function is normal with a visually estimated ejection fraction of 60-65%. There are no regional wall motion abnormalities. The left ventricular cavity size is normal. Spectral Doppler shows a normal pattern of left ventricular diastolic filling.  Left Atrium: The left atrial size is normal. A bubble study using agitated saline was not performed. There is a normal sized left atrial appendage and there is no thrombus visualized in the left atrial appendage.  Right Ventricle: The right ventricle is normal in size. There is normal right ventricular global systolic function.  Right Atrium: The right atrial size is normal.  Aortic Valve: The aortic valve  appears structurally normal. There is no evidence of aortic valve regurgitation. The estimated MELISSA is 2.38 cm2. Annulus 1.71 cm    Proximal ascending aorta 3.7 cm.  Mitral Valve: The mitral valve is normal in structure. There is no evidence of mitral valve regurgitation.  Tricuspid Valve: The tricuspid valve is structurally normal. There is mild to moderate tricuspid regurgitation.  Pulmonic Valve: The pulmonic valve is structurally normal. There is no indication of pulmonic valve regurgitation.  Pericardium: No pericardial effusion noted.  Aorta: The aortic root is abnormal. There is no evidence of aortic dissection. The ascending Ao diameter is 2.71 cm. The descending aorta is classified as a Grade 2 [mild (focal or diffuse) intimal thickening of 2-3 mm] atherosclerosis.       CONCLUSIONS:   1. The left ventricular systolic function is normal with a visually estimated ejection fraction of 60-65%.   2. There is normal right ventricular global systolic function.   3. Mild to moderate tricuspid regurgitation.    POST PROCEDURE REPORT:  Patient has a normal sinus rhythm. Patient is on no inotropic support. Global LV function is normal. Global RV function is normal. No evidence of post aortic cannulation dissection. All transesophageal echocardiogram findings discussed with surgeon.     QUANTITATIVE DATA SUMMARY:     LV SYSTOLIC FUNCTION:                       Normal Ranges:  EF-Visual:      63 %  LV EF Reported: 63 %       [id number] Mychal Busch MD  Electronically signed on 6/6/2025 at 9:34:00 AM       ** Final **  XR chest 1 view  Narrative: Interpreted By:  Lillian Snow,   STUDY:  XR CHEST 1 VIEW 6/6/2025 5:30 am      INDICATION:  Signs/Symptoms:routine, post op CABG      COMPARISON:  06/05/2025      ACCESSION NUMBER(S):  [id number]      ORDERING CLINICIAN:  YO THAKKAR      TECHNIQUE:  AP semi-erect view of the chest at bedside      FINDINGS:  There is mild cardiac enlargement with sternal wires noted.      A  small left pleural effusion is present with atelectasis in  retrocardiac left lower lobe. A right apical pneumothorax has  decreased in size comprising no more than 5% of the volume of the  right hemithorax. The right lung is clear.      Impression: 5% residual right apical pneumothorax decreased in size since  yesterday's study.      Stable small left pleural effusion with persistent atelectasis  retrocardiac left lower lobe.      Signed by: Lillian Snow 6/6/2025 8:00 AM  Dictation workstation:   [id number]    Last Labs:  CBC - 6/6/2025:  5:59 AM  20.3 10.5 140    31.3      CMP - 6/6/2025:  5:59 AM  8.7 5.7 19 --- 0.8   2.5 3.2 10 57      PTT - 6/3/2025:  1:36 PM  1.2   12.8 26.8     Inpatient Medications:  Scheduled Medications[1]  Assessment & Plan  Atherosclerosis of native coronary artery of native heart with unstable angina pectoris  Patient has about underlying status post CABG x 3 vessels.  Continue current Lopressor 25 mg p.o. twice daily as well as aspirin Lipitor.  Will resume Plavix postop day 4.  Restart home medication.  So far stable cardiac wise.  Pt. care time is spent includes review of diagnostic tests, labs, radiographs, EKGs, old echoes, cardiac work-up and coordination of care. Assessment, impression and plans are reflected in the note above as well as the orders.  6/5: Stable cardiac wise status post CABG.  Blood pressure stable.  Labs send stable hemoglobin 11.2.  Continue current aspirin, Lipitor, Lovenox for DVT as well as Lopressor 25 mg tablet p.o. twice daily.  Probably discharge in 1 to 2 days.  6/6: Patient episode of bradycardia last night.  Lowered the Lopressor to 12.5 mg tablet p.o. twice daily.  Continue current aspirin, Lipitor.  Also continue Synthroid 125 mcg p.o. daily.  Okay to discharge home from cardiac standpoint once bradycardia/pauses resolved.    Code Status:  Full Code  I spent 35 minutes in the professional and overall care of this patient.  Sudheer Barrientos MD            [1]   Scheduled medications   Medication Dose Route Frequency    acetaminophen  650 mg oral q6h    aspirin  81 mg oral Daily    atorvastatin  80 mg oral Nightly    buPROPion SR  200 mg oral BID    docusate sodium  100 mg oral BID    enoxaparin  40 mg subcutaneous Daily    escitalopram  10 mg oral Daily    famotidine  20 mg oral Daily    Or    famotidine  20 mg intravenous Daily    insulin lispro  0-15 Units subcutaneous TID AC    levothyroxine  125 mcg oral Daily    lidocaine  1 patch transdermal q24h    [Held by provider] losartan  50 mg oral Daily    metoprolol tartrate  12.5 mg oral BID    mupirocin  1 Application Each Nostril BID

## 2025-06-07 ENCOUNTER — APPOINTMENT (OUTPATIENT)
Dept: RADIOLOGY | Facility: HOSPITAL | Age: 70
DRG: 236 | End: 2025-06-07
Payer: COMMERCIAL

## 2025-06-07 LAB
ALBUMIN SERPL BCP-MCNC: 3 G/DL (ref 3.4–5)
ALBUMIN SERPL BCP-MCNC: 3.2 G/DL (ref 3.4–5)
ANION GAP SERPL CALCULATED.3IONS-SCNC: 7 MMOL/L (ref 10–20)
ANION GAP SERPL CALCULATED.3IONS-SCNC: 9 MMOL/L (ref 10–20)
BASOPHILS # BLD AUTO: 0.02 X10*3/UL (ref 0–0.1)
BASOPHILS NFR BLD AUTO: 0.1 %
BLOOD EXPIRATION DATE: NORMAL
BLOOD EXPIRATION DATE: NORMAL
BUN SERPL-MCNC: 42 MG/DL (ref 6–23)
BUN SERPL-MCNC: 44 MG/DL (ref 6–23)
CA-I BLD-SCNC: 1.23 MMOL/L (ref 1.1–1.33)
CALCIUM SERPL-MCNC: 8.4 MG/DL (ref 8.6–10.3)
CALCIUM SERPL-MCNC: 8.6 MG/DL (ref 8.6–10.3)
CHLORIDE SERPL-SCNC: 100 MMOL/L (ref 98–107)
CHLORIDE SERPL-SCNC: 101 MMOL/L (ref 98–107)
CO2 SERPL-SCNC: 29 MMOL/L (ref 21–32)
CO2 SERPL-SCNC: 29 MMOL/L (ref 21–32)
CREAT SERPL-MCNC: 0.9 MG/DL (ref 0.5–1.05)
CREAT SERPL-MCNC: 0.98 MG/DL (ref 0.5–1.05)
DISPENSE STATUS: NORMAL
DISPENSE STATUS: NORMAL
EGFRCR SERPLBLD CKD-EPI 2021: 63 ML/MIN/1.73M*2
EGFRCR SERPLBLD CKD-EPI 2021: 70 ML/MIN/1.73M*2
EOSINOPHIL # BLD AUTO: 0.03 X10*3/UL (ref 0–0.7)
EOSINOPHIL NFR BLD AUTO: 0.2 %
ERYTHROCYTE [DISTWIDTH] IN BLOOD BY AUTOMATED COUNT: 12.7 % (ref 11.5–14.5)
GLUCOSE BLD MANUAL STRIP-MCNC: 119 MG/DL (ref 74–99)
GLUCOSE BLD MANUAL STRIP-MCNC: 151 MG/DL (ref 74–99)
GLUCOSE BLD MANUAL STRIP-MCNC: 189 MG/DL (ref 74–99)
GLUCOSE BLD MANUAL STRIP-MCNC: 255 MG/DL (ref 74–99)
GLUCOSE SERPL-MCNC: 110 MG/DL (ref 74–99)
GLUCOSE SERPL-MCNC: 121 MG/DL (ref 74–99)
HCT VFR BLD AUTO: 29.3 % (ref 36–46)
HGB BLD-MCNC: 10 G/DL (ref 12–16)
IMM GRANULOCYTES # BLD AUTO: 0.14 X10*3/UL (ref 0–0.7)
IMM GRANULOCYTES NFR BLD AUTO: 0.9 % (ref 0–0.9)
LYMPHOCYTES # BLD AUTO: 1.07 X10*3/UL (ref 1.2–4.8)
LYMPHOCYTES NFR BLD AUTO: 7.1 %
MAGNESIUM SERPL-MCNC: 1.95 MG/DL (ref 1.6–2.4)
MAGNESIUM SERPL-MCNC: 2.2 MG/DL (ref 1.6–2.4)
MCH RBC QN AUTO: 31.1 PG (ref 26–34)
MCHC RBC AUTO-ENTMCNC: 34.1 G/DL (ref 32–36)
MCV RBC AUTO: 91 FL (ref 80–100)
MONOCYTES # BLD AUTO: 1.36 X10*3/UL (ref 0.1–1)
MONOCYTES NFR BLD AUTO: 9 %
NEUTROPHILS # BLD AUTO: 12.47 X10*3/UL (ref 1.2–7.7)
NEUTROPHILS NFR BLD AUTO: 82.7 %
NRBC BLD-RTO: 0 /100 WBCS (ref 0–0)
PHOSPHATE SERPL-MCNC: 2 MG/DL (ref 2.5–4.9)
PHOSPHATE SERPL-MCNC: 2.3 MG/DL (ref 2.5–4.9)
PLATELET # BLD AUTO: 159 X10*3/UL (ref 150–450)
POTASSIUM SERPL-SCNC: 4 MMOL/L (ref 3.5–5.3)
POTASSIUM SERPL-SCNC: 4 MMOL/L (ref 3.5–5.3)
PRODUCT BLOOD TYPE: 5100
PRODUCT BLOOD TYPE: 5100
PRODUCT CODE: NORMAL
PRODUCT CODE: NORMAL
RBC # BLD AUTO: 3.22 X10*6/UL (ref 4–5.2)
SODIUM SERPL-SCNC: 133 MMOL/L (ref 136–145)
SODIUM SERPL-SCNC: 134 MMOL/L (ref 136–145)
UNIT ABO: NORMAL
UNIT ABO: NORMAL
UNIT NUMBER: NORMAL
UNIT NUMBER: NORMAL
UNIT RH: NORMAL
UNIT RH: NORMAL
UNIT VOLUME: 277
UNIT VOLUME: 350
WBC # BLD AUTO: 15.1 X10*3/UL (ref 4.4–11.3)
XM INTEP: NORMAL
XM INTEP: NORMAL

## 2025-06-07 PROCEDURE — 2500000004 HC RX 250 GENERAL PHARMACY W/ HCPCS (ALT 636 FOR OP/ED)

## 2025-06-07 PROCEDURE — 2500000001 HC RX 250 WO HCPCS SELF ADMINISTERED DRUGS (ALT 637 FOR MEDICARE OP): Performed by: PHYSICIAN ASSISTANT

## 2025-06-07 PROCEDURE — 80069 RENAL FUNCTION PANEL: CPT

## 2025-06-07 PROCEDURE — 71045 X-RAY EXAM CHEST 1 VIEW: CPT

## 2025-06-07 PROCEDURE — 36415 COLL VENOUS BLD VENIPUNCTURE: CPT

## 2025-06-07 PROCEDURE — 83735 ASSAY OF MAGNESIUM: CPT

## 2025-06-07 PROCEDURE — 2500000001 HC RX 250 WO HCPCS SELF ADMINISTERED DRUGS (ALT 637 FOR MEDICARE OP)

## 2025-06-07 PROCEDURE — 71045 X-RAY EXAM CHEST 1 VIEW: CPT | Performed by: RADIOLOGY

## 2025-06-07 PROCEDURE — 2500000002 HC RX 250 W HCPCS SELF ADMINISTERED DRUGS (ALT 637 FOR MEDICARE OP, ALT 636 FOR OP/ED)

## 2025-06-07 PROCEDURE — 97530 THERAPEUTIC ACTIVITIES: CPT | Mod: GO,CO

## 2025-06-07 PROCEDURE — 2060000001 HC INTERMEDIATE ICU ROOM DAILY

## 2025-06-07 PROCEDURE — 85025 COMPLETE CBC W/AUTO DIFF WBC: CPT

## 2025-06-07 PROCEDURE — 99232 SBSQ HOSP IP/OBS MODERATE 35: CPT | Performed by: INTERNAL MEDICINE

## 2025-06-07 PROCEDURE — 2500000005 HC RX 250 GENERAL PHARMACY W/O HCPCS

## 2025-06-07 PROCEDURE — 82330 ASSAY OF CALCIUM: CPT

## 2025-06-07 PROCEDURE — 99233 SBSQ HOSP IP/OBS HIGH 50: CPT

## 2025-06-07 PROCEDURE — 82947 ASSAY GLUCOSE BLOOD QUANT: CPT

## 2025-06-07 RX ORDER — FUROSEMIDE 10 MG/ML
20 INJECTION INTRAMUSCULAR; INTRAVENOUS ONCE
Status: COMPLETED | OUTPATIENT
Start: 2025-06-07 | End: 2025-06-07

## 2025-06-07 RX ORDER — LIDOCAINE 560 MG/1
1 PATCH PERCUTANEOUS; TOPICAL; TRANSDERMAL DAILY
Status: DISPENSED | OUTPATIENT
Start: 2025-06-07

## 2025-06-07 RX ORDER — CLOPIDOGREL BISULFATE 75 MG/1
75 TABLET ORAL DAILY
Status: DISCONTINUED | OUTPATIENT
Start: 2025-06-07 | End: 2025-06-07

## 2025-06-07 RX ORDER — AMOXICILLIN 250 MG
2 CAPSULE ORAL 2 TIMES DAILY
Status: DISPENSED | OUTPATIENT
Start: 2025-06-07

## 2025-06-07 RX ADMIN — SODIUM PHOSPHATE, MONOBASIC, MONOHYDRATE AND SODIUM PHOSPHATE, DIBASIC, ANHYDROUS 15 MMOL: 276; 142 INJECTION, SOLUTION INTRAVENOUS at 22:28

## 2025-06-07 RX ADMIN — FUROSEMIDE 20 MG: 10 INJECTION, SOLUTION INTRAMUSCULAR; INTRAVENOUS at 10:07

## 2025-06-07 RX ADMIN — ACETAMINOPHEN 650 MG: 325 TABLET ORAL at 06:06

## 2025-06-07 RX ADMIN — BUPROPION HYDROCHLORIDE 200 MG: 100 TABLET, FILM COATED, EXTENDED RELEASE ORAL at 20:04

## 2025-06-07 RX ADMIN — OXYCODONE HYDROCHLORIDE 5 MG: 5 TABLET ORAL at 10:07

## 2025-06-07 RX ADMIN — MUPIROCIN 1 APPLICATION: 20 OINTMENT TOPICAL at 20:07

## 2025-06-07 RX ADMIN — DOCUSATE SODIUM 100 MG: 100 CAPSULE, LIQUID FILLED ORAL at 09:16

## 2025-06-07 RX ADMIN — FAMOTIDINE 20 MG: 20 TABLET, FILM COATED ORAL at 08:45

## 2025-06-07 RX ADMIN — LIDOCAINE 4% 1 PATCH: 40 PATCH TOPICAL at 13:24

## 2025-06-07 RX ADMIN — ATORVASTATIN CALCIUM 80 MG: 80 TABLET, FILM COATED ORAL at 20:04

## 2025-06-07 RX ADMIN — DIBASIC SODIUM PHOSPHATE, MONOBASIC POTASSIUM PHOSPHATE AND MONOBASIC SODIUM PHOSPHATE 250 MG: 852; 155; 130 TABLET ORAL at 18:58

## 2025-06-07 RX ADMIN — ACETAMINOPHEN 650 MG: 325 TABLET ORAL at 13:25

## 2025-06-07 RX ADMIN — MUPIROCIN 1 APPLICATION: 20 OINTMENT TOPICAL at 09:31

## 2025-06-07 RX ADMIN — DOCUSATE SODIUM 100 MG: 100 CAPSULE, LIQUID FILLED ORAL at 20:04

## 2025-06-07 RX ADMIN — METOPROLOL TARTRATE 12.5 MG: 25 TABLET, FILM COATED ORAL at 09:16

## 2025-06-07 RX ADMIN — METOPROLOL TARTRATE 12.5 MG: 25 TABLET, FILM COATED ORAL at 20:03

## 2025-06-07 RX ADMIN — ASPIRIN 81 MG: 81 TABLET, CHEWABLE ORAL at 09:15

## 2025-06-07 RX ADMIN — ENOXAPARIN SODIUM 40 MG: 40 INJECTION SUBCUTANEOUS at 09:16

## 2025-06-07 RX ADMIN — ESCITALOPRAM OXALATE 10 MG: 10 TABLET ORAL at 09:16

## 2025-06-07 RX ADMIN — LEVOTHYROXINE SODIUM 125 MCG: 0.12 TABLET ORAL at 06:06

## 2025-06-07 RX ADMIN — ACETAMINOPHEN 650 MG: 325 TABLET ORAL at 20:04

## 2025-06-07 RX ADMIN — DIBASIC SODIUM PHOSPHATE, MONOBASIC POTASSIUM PHOSPHATE AND MONOBASIC SODIUM PHOSPHATE 250 MG: 852; 155; 130 TABLET ORAL at 13:25

## 2025-06-07 RX ADMIN — SENNOSIDES AND DOCUSATE SODIUM 2 TABLET: 50; 8.6 TABLET ORAL at 22:27

## 2025-06-07 RX ADMIN — OXYCODONE HYDROCHLORIDE 10 MG: 5 TABLET ORAL at 13:55

## 2025-06-07 RX ADMIN — OXYCODONE HYDROCHLORIDE 5 MG: 5 TABLET ORAL at 00:10

## 2025-06-07 RX ADMIN — OXYCODONE HYDROCHLORIDE 10 MG: 5 TABLET ORAL at 20:03

## 2025-06-07 RX ADMIN — BUPROPION HYDROCHLORIDE 200 MG: 100 TABLET, FILM COATED, EXTENDED RELEASE ORAL at 09:15

## 2025-06-07 RX ADMIN — ACETAMINOPHEN 650 MG: 325 TABLET ORAL at 00:10

## 2025-06-07 ASSESSMENT — COGNITIVE AND FUNCTIONAL STATUS - GENERAL
DRESSING REGULAR LOWER BODY CLOTHING: A LITTLE
TOILETING: A LITTLE
HELP NEEDED FOR BATHING: A LITTLE
DRESSING REGULAR UPPER BODY CLOTHING: A LITTLE
DAILY ACTIVITIY SCORE: 20

## 2025-06-07 ASSESSMENT — PAIN DESCRIPTION - DESCRIPTORS
DESCRIPTORS: RADIATING
DESCRIPTORS: RADIATING
DESCRIPTORS: ACHING;DISCOMFORT
DESCRIPTORS: CRAMPING;DISCOMFORT
DESCRIPTORS: RADIATING
DESCRIPTORS: RADIATING

## 2025-06-07 ASSESSMENT — PAIN - FUNCTIONAL ASSESSMENT
PAIN_FUNCTIONAL_ASSESSMENT: 0-10

## 2025-06-07 ASSESSMENT — PAIN DESCRIPTION - LOCATION
LOCATION: STERNUM
LOCATION: STERNUM

## 2025-06-07 ASSESSMENT — PAIN SCALES - GENERAL
PAINLEVEL_OUTOF10: 6
PAINLEVEL_OUTOF10: 5 - MODERATE PAIN
PAINLEVEL_OUTOF10: 6
PAINLEVEL_OUTOF10: 8
PAINLEVEL_OUTOF10: 0 - NO PAIN
PAINLEVEL_OUTOF10: 8
PAINLEVEL_OUTOF10: 3
PAINLEVEL_OUTOF10: 0 - NO PAIN
PAINLEVEL_OUTOF10: 4

## 2025-06-07 ASSESSMENT — PAIN DESCRIPTION - ORIENTATION
ORIENTATION: ANTERIOR;POSTERIOR;RIGHT
ORIENTATION: RIGHT;ANTERIOR;POSTERIOR;UPPER

## 2025-06-07 NOTE — PROGRESS NOTES
"Physical Therapy                 Therapy Communication Note    Patient Name: Simona Betancourt \"Isabel\"  MRN: [id number]  Department: [facility] 3 N ICU  Room: 07/07-A  Today's Date: 6/7/2025     Discipline: Physical Therapy    Missed Visit: PT Missed Visit: Yes     Missed Visit Reason: Missed Visit Reason: Other (Comment) (PT tx attempted. RN reporting that pt just walked and returned to bed. Imaging also at bedside.)    Missed Time: Attempt    Comment:      "

## 2025-06-07 NOTE — PROGRESS NOTES
"Occupational Therapy    OT Treatment    Patient Name: Simona Betancourt \"Isabel\"  MRN: [id number]  Department: [facility] 3 N ICU  Room: 07/07-A  Today's Date: 6/7/2025  Time Calculation  Start Time: 1151  Stop Time: 1215  Time Calculation (min): 24 min        Assessment:  OT Assessment: Fair progress made towards OT goals. Continue with current OT POC to increase strength, balance and functional tolerance to maximize safety and independence during ADLs.  Barriers to Discharge Home: No anticipated barriers  Evaluation/Treatment Tolerance: Patient limited by fatigue  End of Session Communication: Bedside nurse  End of Session Patient Position: Up in chair, Alarm off, not on at start of session (all needs in reach)  OT Assessment Results: Decreased ADL status, Decreased endurance, Decreased functional mobility, Decreased IADLs, Decreased trunk control for functional activities  Barriers to Discharge: None  Evaluation/Treatment Tolerance: Patient limited by fatigue  Plan:  Treatment Interventions: ADL retraining, Functional transfer training, Endurance training, Patient/family training, Equipment evaluation/education, Compensatory technique education  OT Frequency: 5 times per week  OT Discharge Recommendations: Low intensity level of continued care  Equipment Recommended upon Discharge: Wheeled walker  OT Recommended Transfer Status: Minimal assist  OT - OK to Discharge: Yes  Treatment Interventions: ADL retraining, Functional transfer training, Endurance training, Patient/family training, Equipment evaluation/education, Compensatory technique education    Subjective   OT Visit Info:  OT Received On: 06/07/25  General Visit Info:  General  Reason for Referral: impaired ADLs s/p CABG x3  Past Medical History Relevant to Rehab: CAD, HTN, NSTEMI, MI with stents, HLD, HEP C, depression , DM  Prior to Session Communication: Bedside nurse  Patient Position Received: Bed, 2 rail up, Alarm off, not on at start of session  General " Comment: Patient cleared for OT session per nursing, cooperative with encouragement. Questions, comments and concerns addressed regarding OT.  Precautions:  Hearing/Visual Limitations: wears reading glasses; hearing WFL  Medical Precautions: Fall precautions  Post-Surgical Precautions: Move in the Tube  Precautions Comment: ICU lines, pt able to independently recall MITT precautions this date.     Date/Time Vitals Session Patient Position Pulse Resp SpO2 BP MAP (mmHg)    06/07/25 1300 --  --  72  14  96 %  --  --     06/07/25 1400 --  --  69  21  97 %  --  --           Vital Signs Comment: Patient on room air, vitals WNL     Pain:  Pain Assessment  Pain Assessment: 0-10  0-10 (Numeric) Pain Score: 3  Pain Type: Surgical pain  Pain Location: Sternum  Clinical Progression: Not changed  Pain Interventions: Repositioned, Ambulation/increased activity  Response to Interventions: No change in pain    Objective    Cognition:  Cognition  Overall Cognitive Status: Within Functional Limits  Orientation Level: Oriented X4  Cognition Comments: Patient intermittently self-limiting requiring education and encouragement on importance of task completion trials to maximize potential.  Safety/Judgement: Exceptions to WFL  Insight: Mild  Impulsive: Within functional limits  Task Initiation: Initiates with cues  Processing Speed: Delayed  Coordination:  Movements are Fluid and Coordinated: No  Upper Body Coordination: delayed rate and accuracy of movements  Lower Body Coordination: slower rate of movement  Activities of Daily Living: UE Bathing  UE Bathing Comments: Verbal instruction and demonstration provided on compensatory techniques for bathing tasks with MITT precautions. Pt able to verbalize and demonstrate understanding during simulation.    LE Bathing  LE Bathing Comments: verbal instruction and demonstration provided on compensatory techniques for LB bathing tasks with MITT precautions    UE Dressing  UE Dressing Comments:  Verbal instruction and demonstration provided for don/doff pull-over garment and front-opening garments using compensatory techniques with MITT precautions from seated position.    LE Dressing  LE Dressing: Yes  LE Dressing Comments: Verbal instruction and demonstration provided on compensatory techniques for donning of pants over hips in standing.    Toileting  Toileting Comments: Verbal instruction and demonstration provided for compensatory tehcniques for posterior hygiene with MITT precautions. Pt able to verbalize and demonstrate understanding during simulations.  Functional Standing Tolerance:     Bed Mobility/Transfers: Bed Mobility  Bed Mobility: Yes  Bed Mobility 1  Bed Mobility 1: Supine to sitting  Level of Assistance 1: Close supervision  Bed Mobility Comments 1: HOB moderately elevated, increased time and effort required to complete.    Transfers  Transfer: Yes  Transfer 1  Trials/Comments 1: sit<>stands completed from standard EOB and chair heights with close supervision  Modalities:     IADL's: Meal Prep  Meal Preparation: Verbal instruction and demonstration provided on compensatory techniques for meal preparation and light housekeeping  Other Activity:  Other Activity Performed: Yes  Other Activity 1: EC/WS education reviewed- questions, comments and concerns addressed regarding OT.      Outcome Measures:[facility] Daily Activity  Putting on and taking off regular lower body clothing: A little  Bathing (including washing, rinsing, drying): A little  Putting on and taking off regular upper body clothing: A little  Toileting, which includes using toilet, bedpan or urinal: A little  Taking care of personal grooming such as brushing teeth: None  Eating Meals: None  Daily Activity - Total Score: 20        Education Documentation  ADL Training, taught by ISAIAH Priest at 6/7/2025  2:41 PM.  Learner: Patient  Readiness: Acceptance  Method: Explanation, Demonstration  Response: Verbalizes Understanding,  Demonstrated Understanding, Needs Reinforcement  Comment: safety awareness throughout functional tasks/ transfers    Education Comments  Education provided on role of OT/POC, safety awareness throughout functional tasks/transfers, importance of activity/ rest routine, EC/WS techniques, and use of call light for assistance. Questions, comments and concerns addressed regarding OT.      Goals:  Encounter Problems       Encounter Problems (Active)       OT Goals       ADLs (Progressing)       Start:  06/04/25    Expected End:  06/27/25       Pt will complete ADL tasks with Mod I, using AE as needed, in order to complete self-care tasks.           Functional transfers (Progressing)       Start:  06/04/25    Expected End:  06/27/25       Pt will perform functional mobility household distance at mod ind level with RW           Functional mobility (Progressing)       Start:  06/04/25    Expected End:  06/27/25       Pt will perform functional mobility household distance at mod ind level with RW

## 2025-06-07 NOTE — PROGRESS NOTES
"Simona Betancourt \"Mian" is a 69 y.o. female on day 4 of admission presenting with Atherosclerosis of native coronary artery of native heart with unstable angina pectoris.    Subjective   Resting comfortably.       Objective     Physical Exam   Gen: NAD   Neck: no JVD, carotid upstroke is brisk and without delay   Heart: rrr, s1s2+ no mrg   Lungs: CTA   Ext: warm no edema  Last Recorded Vitals  Blood pressure 105/70, pulse 68, temperature 36.4 °C (97.5 °F), temperature source Temporal, resp. rate 15, height 1.524 m (5'), weight 58.1 kg (128 lb 1.4 oz), last menstrual period 01/01/2011, SpO2 96%.  Intake/Output last 3 Shifts:  I/O last 3 completed shifts:  In: 2283.3 (39.3 mL/kg) [P.O.:2260; I.V.:23.3 (0.4 mL/kg)]  Out: 1320 (22.7 mL/kg) [Urine:1320 (0.6 mL/kg/hr)]  Weight: 58.1 kg       Assessment & Plan  Atherosclerosis of native coronary artery of native heart with unstable angina pectoris    Atherosclerotic heart disease: Stable status post surgical revascularization.  Remains on aspirin, would start clopidogrel.  Will continue her high potency statin.  Ischemic Cardiomyopathy: Ejection fraction 40 to 45%. continue metoprolol at lower dose given overnight bradycardia.  Pressures have been marginal and have precluded further medication titration.        Brandon Mayo, DO    "

## 2025-06-07 NOTE — CARE PLAN
Problem: Pain - Adult  Goal: Verbalizes/displays adequate comfort level or baseline comfort level  Outcome: Progressing     Problem: Safety - Adult  Goal: Free from fall injury  Outcome: Progressing     Problem: Discharge Planning  Goal: Discharge to home or other facility with appropriate resources  Outcome: Progressing     Problem: Chronic Conditions and Co-morbidities  Goal: Patient's chronic conditions and co-morbidity symptoms are monitored and maintained or improved  Outcome: Progressing     Problem: Nutrition  Goal: Nutrient intake appropriate for maintaining nutritional needs  Outcome: Progressing     Problem: Skin  Goal: Decreased wound size/increased tissue granulation at next dressing change  Outcome: Progressing  Goal: Participates in plan/prevention/treatment measures  Outcome: Progressing  Goal: Prevent/manage excess moisture  Outcome: Progressing  Goal: Prevent/minimize sheer/friction injuries  Outcome: Progressing  Goal: Promote/optimize nutrition  Outcome: Progressing  Goal: Promote skin healing  Outcome: Progressing   The patient's goals for the shift include To sleep pain free    The clinical goals for the shift include remain hemodynamically stable    Over the shift, the patient did not make progress toward the following goals. Barriers to progression include . Recommendations to address these barriers include .

## 2025-06-07 NOTE — PROGRESS NOTES
[facility] Critical Care Medicine       Date:  6/7/2025  Patient:  Simona Betancourt  YOB: 1955  MRN:  [id number]   Admit Date:  6/3/2025  Hospital Length of Stay: 4   ICU Length of Stay: 3d 18h       Chief complaint: coronary artery disease            History of Present Illness:  Simona Betancourt is a 69 y.o. year old female patient with past medical history of CAD, MI w/ stents 10+ years ago, HTN, HLD, heavy smoker who presented 5/11 with complaints of nausea vomiting and feeling unwell. Underwent cardiac catheterization on 5/12 which showed multi-vessel disease. Today she had CABG x 3, LIMA-> LAD, RSVG-> OM-> PDA.       Interval ICU Events:  6/3: Arrived to ICU intubated and sedated. Not on pressors. Has two chest tubes, one mediastinal, one left pleural. Ventricular pacing wires VVI backup rate 50 BPM.      6/4: Right apical pneumothorax on cxr this am. Reports some nausea and vomiting this am. Pain is controlled with meds. Still requiring low dose nitroglycerin gtt.      6/5: Pneumothorax stable to improved this am. Transition from NRB to NC. Will remove lines and gordon today. Reports nagging pain to left upper abd/lower chest, no obvious source. Will monitor. Hemodynamically stable off drips. Denies continued n/v, will try to advance diet.     6/6: POD 3- Brief episode of bradycardia early this morning requiring pacing to kick in for ~4 beats, no more bradycardic events since. Blood pressure remains borderline soft, but improving. Encouraging PO intake. Decrease BB to half-dose due to above. Progressing well otherwise.    6/7: POD 4- Episode of hypoxia overnight requiring 2L NC - PTX appears stable/trace on serial imaging.  Now on room air this morning.  Consider gentle diuresis to optimize fluid status.  Review of telemetry shows multiple episodes of bradycardia earlier this morning again requiring backup pacing for a few beats.  May lower pacer to backup rate of 40 to assess true underlying  intrinsic rhythm.     Objective     Medical History:  Medical History[1]  Surgical History[2]  Prescriptions Prior to Admission[3]  Ace inhibitors  Social History[4]  Family History[5]    Hospital Medications:    Continuous Medications[6]    Current Medications[7]    Review of Systems:  14 point review of systems was completed and negative except for those specially mention in my HPI    Physical Exam:    Heart Rate:  [60-80]   Temp:  [36.4 °C (97.5 °F)-36.9 °C (98.4 °F)]   Resp:  [11-20]   BP: ()/(51-78)   Weight:  [57.2 kg (126 lb 1.7 oz)-58.1 kg (128 lb 1.4 oz)]   SpO2:  [88 %-99 %]     Physical Exam  HENT:      Head: Normocephalic and atraumatic.      Comments: Healing bruise to R anterior forehead  Cardiovascular:      Rate and Rhythm: Normal rate and regular rhythm.      Heart sounds: Normal heart sounds.   Pulmonary:      Effort: Pulmonary effort is normal.      Breath sounds: No wheezing or rhonchi.      Comments: Diminished anteriorly, most prominently on the left   Abdominal:      General: There is no distension.      Palpations: Abdomen is soft.      Tenderness: There is no abdominal tenderness.   Musculoskeletal:      Right lower leg: No edema.      Left lower leg: No edema.   Skin:     General: Skin is warm.   Neurological:      General: No focal deficit present.      Mental Status: She is alert and oriented to person, place, and time.      GCS: GCS eye subscore is 4. GCS verbal subscore is 5. GCS motor subscore is 6.         Objective:    I have reviewed all medications, laboratory results, and imaging pertinent for today's encounter.           Intake/Output Summary (Last 24 hours) at 6/7/2025 0725  Last data filed at 6/7/2025 0600  Gross per 24 hour   Intake 1720 ml   Output 1100 ml   Net 620 ml       Daily Labs:  CBC:   Results from last 7 days   Lab Units 06/07/25  0416 06/06/25  0559   WBC AUTO x10*3/uL 15.1* 20.3*   HEMOGLOBIN g/dL 10.0* 10.5*   HEMATOCRIT % 29.3* 31.3*   MCV fL 91 95      BMP:    Results from last 7 days   Lab Units 06/07/25  0416 06/06/25  0559   SODIUM mmol/L 133* 133*   POTASSIUM mmol/L 4.0 4.1   CHLORIDE mmol/L 101 101   CO2 mmol/L 29 28   BUN mg/dL 44* 38*   CREATININE mg/dL 0.98 0.90   CALCIUM mg/dL 8.4* 8.7   GLUCOSE mg/dL 110* 120*   MAGNESIUM mg/dL 2.20 2.35     ABG:   Results from last 7 days   Lab Units 06/04/25  0041 06/03/25  1640   POCT PH, ARTERIAL pH 7.35* 7.32*   POCT PCO2, ARTERIAL mm Hg 45* 44*   POCT PO2, ARTERIAL mm Hg 117* 117*   POCT SO2, ARTERIAL % 99 98   POCT HCO3 CALCULATED, ARTERIAL mmol/L 24.8 22.7   POCT LACTATE, ARTERIAL mmol/L 0.9 1.1      VBG:   Results from last 7 days   Lab Units 06/03/25  1054   POCT PH, VENOUS pH 7.41   POCT PCO2, VENOUS mm Hg 45   POCT PO2, VENOUS mm Hg 50*   POCT SO2, VENOUS % 88*   POCT HCO3 CALCULATED, VENOUS mmol/L 28.5*   POCT LACTATE, VENOUS mmol/L 1.9             Assessment/Plan:    I am currently managing this critically ill patient for the following problems:    Neurology/Psychiatry/Pain Control/Sedation:   Acute post-operative pain - likely incisional pain  History of anxiety, depression   - Pain Control: acetaminophen PRN  - Scheduled acetaminophen, lidoderm, PRN oxycodone  - Continue home wellbutrin, lexapro  - CAM ICU qshift, sleep-wake hygiene, delirium precautions      Respiratory/ENT:  Post-operative respiratory insufficiency without respiratory failure   Right apical pneumothorax - trace on morning CXR  - Supplemental O2: none, on room air   -- Did require 2L NC overnight   - Lasix x1   - Maintain SpO2 >92%  - Continuous pulse ox monitoring   - Bronchopulmonary hygiene     Cardiovascular:   Triple vessel CAD s/p CABG x 3 LIMA -> LAD, RSVG -> OM, PDA  Intermittent sinus bradycardia - asymptomatic, requires brief pacing  History of hypertension, NSTEMI, HTN, CAD  - Continue aspirin, metoprolol, lipitor   - Hold home losartan   - Maintain pacing wires VVI @ 50, may lower to 40 bpm   - Plavix to start Monday per  CTS  - Maintain MAPs >65  - CTS following and appreciate further management  - Continuous cardiac monitoring   - EKGs PRN for ACS symptoms, arrhythmias      Gastrointestinal:  No active concerns   - Diet: cardiac   - BR: colace  - GI Prophylaxis: none     Renal/Volume Status/Electrolytes:  Acute kidney injury - Cr 1.37 post-operatively  Hyponatremia suspect hypervolemia - Na 133  - Baseline Cr ~0.9-1.0  - Will gently diurese as BP tolerates   - Hourly I/O's, maintain urine output >0.5cc/kg/hr  - Replete electrolytes to maintain K >4.0 and Mg >2.0  - Daily RFP, Mg    Endocrinology:  History of T2DM, hypothyroidism   - Home diabetic regimen: metformin 750 BID - on hold   - cSSI ACHS   - Continue home synthroid   - Hypoglycemia protocol PRN      Infectious Disease:  Leukocytosis - WBC 20 -> 15, predominnantly neutorphils, no toxic left shift  - Antibiotics: dave-op ancef x48 hours, completed   - Persistent leukocytosis presumed to be reactive currently, no signs of infection, no other SIRS critertia met  - Monitor w daily CBC, can broaden infectious work-up s/sx present      Hematology/Oncology:  Acute post-operative blood loss anemia and thrombocytopenia   - Baseline Hgb 13-14  - Transfuse if Hgb <7.0 or symptomatic anemia  - Daily CBC     MSK/Skin:  No active concerns   - PT/OT eval, OOB and to chair as tolerated   - ICU skin protocol, padded pressure points     Ethics/Code Status:  Full code      :  DVT Prophylaxis: SQH, SCDs  GI Prophylaxis: none  Bowel Regimen: colace, miralax PRN  Diet: cardiac  CVC: none  [place]: none  [place]: none  Restraints: none  Disposition: stepdown status     Paramjit Marin PA-C  Pulmonary & Critical Care Medicine   [facility]         [1]   Past Medical History:  Diagnosis Date    ADHD (attention deficit hyperactivity disorder)     Anxiety     Chronic viral hepatitis C (Multi)     Depression     Diabetes mellitus with hyperglycemia     Heart disease      Hypercholesterolemia     Hypertension     Hypothyroidism     Myocardial infarction (Multi)     Nausea and vomiting 04/10/2023    Obesity     Visual impairment    [2]   Past Surgical History:  Procedure Laterality Date    CARDIAC CATHETERIZATION N/A 2025    Procedure: LHC, With LV;  Surgeon: Jordi Simpson DO;  Location: [facility] Cardiac Cath Lab;  Service: Cardiovascular;  Laterality: N/A;     SECTION, LOW TRANSVERSE      CORONARY ANGIOPLASTY WITH STENT PLACEMENT  2014    KNEE ARTHROPLASTY     [3]   Medications Prior to Admission   Medication Sig Dispense Refill Last Dose/Taking    aspirin 81 mg EC tablet = 1 tab(s) ( 81 mg ), PO, Daily, # 90 tab(s), 0 Refill(s), Type: Maintenance   6/3/2025 at  4:30 AM    atorvastatin (Lipitor) 80 mg tablet Take 1 tablet (80 mg) by mouth once daily at bedtime. 30 tablet 0 2025 at  9:00 PM    buPROPion SR (Wellbutrin SR) 200 mg 12 hr tablet  1 tab(s), Oral, bid 60 tablet 0 6/3/2025 at  4:30 AM    [] chlorhexidine (Peridex) 0.12 % solution Use as directed 473 mL 0 6/3/2025 at  4:30 AM    escitalopram (Lexapro) 10 mg tablet Take 1 tablet (10 mg) by mouth once daily. 90 tablet 1 6/3/2025 at  4:30 AM    levothyroxine (Synthroid, Levoxyl) 125 mcg tablet TAKE 1 TABLET DAILY 90 tablet 2 6/3/2025 at  4:30 AM    losartan (Cozaar) 50 mg tablet Take 1 tablet (50 mg) by mouth once daily. 30 tablet 0 Past Week    metFORMIN XR (Glucophage-XR) 750 mg 24 hr tablet Take 1 tablet (750 mg) by mouth 2 times a day. Do not crush, chew, or split.   Past Week    multivitamin (MULTIPLE VITAMINS ORAL) 1 cap(s), PO, Daily, # 90 cap(s), 0 Refill(s), Type: Maintenance   Past Week    mupirocin (Bactroban) 2 % ointment Apply topically 2 times a day.   6/3/2025 at  4:30 AM    buPROPion SR (Wellbutrin SR) 200 mg 12 hr tablet  1 tab(s), Oral, bid 180 tablet 1     nitroglycerin (Nitrostat) 0.4 mg SL tablet Place 1 tablet (0.4 mg) under the tongue every 5 minutes if needed for chest pain. Can take  every 5 minutes for 3 total doses then call 911 90 tablet 12     tirzepatide (Mounjaro) 7.5 mg/0.5 mL pen injector Inject 7.5 mg under the skin 1 (one) time per week. (Patient taking differently: Inject 7.5 mg under the skin 1 (one) time per week. Thursday  last dose25) 2 mL 5    [4]   Social History  Tobacco Use    Smoking status: Former     Current packs/day: 0.00     Average packs/day: 0.5 packs/day for 54.3 years (27.2 ttl pk-yrs)     Types: Cigarettes     Start date: 1971     Quit date: 2025     Years since quittin.0     Passive exposure: Current    Smokeless tobacco: Former   Vaping Use    Vaping status: Never Used   Substance Use Topics    Alcohol use: Not Currently    Drug use: Yes     Frequency: 4.0 times per week     Types: Marijuana     Comment: smokes thc nightly   [5]   Family History  Problem Relation Name Age of Onset    Hypertension Mother Mom     Stroke Mother Mom     Hypertension Father Dad     Cancer Father Dad     Alcohol abuse Father Dad     Drug abuse Father Dad    [6]    [7]   Current Facility-Administered Medications:     acetaminophen (Tylenol) tablet 650 mg, 650 mg, oral, q6h, Paramjit Marin PA-C, 650 mg at 25    aspirin chewable tablet 81 mg, 81 mg, oral, Daily, Natalia Jamison PA-C, 81 mg at 25    atorvastatin (Lipitor) tablet 80 mg, 80 mg, oral, Nightly, Natalia Jamison PA-C, 80 mg at 25    buPROPion SR (Wellbutrin SR) 12 hr tablet 200 mg, 200 mg, oral, BID, Paramjit Marin PA-C, 200 mg at 25    calcium chloride 0.5 g in dextrose 5% 50 mL IV, 0.5 g, intravenous, q8h PRN, Natalia Jamsion PA-C    calcium chloride 1 g in dextrose 5% 50 mL IV, 1 g, intravenous, q8h PRN, Natalia Jamison PA-C    docusate sodium (Colace) capsule 100 mg, 100 mg, oral, BID, Natalia Jamison PA-C, 100 mg at 25    enoxaparin (Lovenox) syringe 40 mg, 40 mg, subcutaneous, Daily, Paramjit Marin PA-C, 40 mg at 25 0802    escitalopram (Lexapro)  tablet 10 mg, 10 mg, oral, Daily, Paramjit Marin PA-C, 10 mg at 06/06/25 0854    famotidine (Pepcid) tablet 20 mg, 20 mg, oral, Daily, 20 mg at 06/06/25 0855 **OR** famotidine PF (Pepcid) injection 20 mg, 20 mg, intravenous, Daily, Estrellita Lloyd, APRN-CNP    insulin lispro injection 0-15 Units, 0-15 Units, subcutaneous, TID AC, Owen Deleon, APRN-CNP, 10 Units at 06/06/25 1153    levothyroxine (Synthroid, Levoxyl) tablet 125 mcg, 125 mcg, oral, Daily, Paramjit Marin PA-C, 125 mcg at 06/07/25 0606    [Held by provider] losartan (Cozaar) tablet 50 mg, 50 mg, oral, Daily, Paarmjit Marin PA-C    magnesium citrate solution 296 mL, 296 mL, oral, Once PRN, Natalia Jamison PA-C    magnesium hydroxide (Milk of Magnesia) 400 mg/5 mL suspension 30 mL, 30 mL, oral, Daily PRN, Natalia Jamison PA-C    magnesium sulfate 2 g in sterile water for injection 50 mL, 2 g, intravenous, q6h PRN, Natalia Jamison PA-C, Stopped at 06/05/25 0745    magnesium sulfate 3 g in dextrose 5% 100 mL IV, 3 g, intravenous, q6h PRN, Natalia Jamison PA-C    metoclopramide (Reglan) tablet 10 mg, 10 mg, oral, q6h PRN **OR** metoclopramide (Reglan) injection 10 mg, 10 mg, intravenous, q6h PRN, Natalia Jamison PA-C, 10 mg at 06/04/25 0414    metoprolol tartrate (Lopressor) tablet 12.5 mg, 12.5 mg, oral, BID, Paramjit Marin PA-C, 12.5 mg at 06/06/25 2056    mupirocin (Bactroban) 2 % ointment 1 Application, 1 Application, Each Nostril, BID, Natalia Jamison PA-C, 1 Application at 06/06/25 2056    naloxone (Narcan) injection 0.2 mg, 0.2 mg, intravenous, q5 min PRN, Natalia Jamison PA-C    ondansetron ODT (Zofran-ODT) disintegrating tablet 4 mg, 4 mg, oral, q8h PRN **OR** ondansetron (Zofran) injection 4 mg, 4 mg, intravenous, q4h PRN, Paramjit Marin PA-C    oxyCODONE (Roxicodone) immediate release tablet 10 mg, 10 mg, oral, q4h PRN, Natalia Jamison PA-C, 10 mg at 06/06/25 0326    oxyCODONE (Roxicodone) immediate release tablet 5 mg, 5 mg, oral, q4h PRN,  Natalia Jamison PA-C, 5 mg at 06/07/25 0010    oxygen (O2) therapy, , inhalation, Continuous PRN - O2/gases, Oj Javed MD    polyethylene glycol (Glycolax, Miralax) packet 17 g, 17 g, oral, Daily PRN, Paramjit Marin PA-C    potassium chloride CR (Klor-Con M20) ER tablet 20 mEq, 20 mEq, oral, q6h PRN, 20 mEq at 06/05/25 0529 **OR** potassium chloride (Klor-Con) packet 20 mEq, 20 mEq, oral, q6h PRN, Natalia Jamison PA-C    potassium chloride CR (Klor-Con M20) ER tablet 40 mEq, 40 mEq, oral, q6h PRN **OR** potassium chloride (Klor-Con) packet 40 mEq, 40 mEq, oral, q6h PRN, Natalia Jamison PA-C    potassium chloride 20 mEq in sterile water for injection 100 mL, 20 mEq, intravenous, q6h PRN, Natalia Jamison PA-C, Stopped at 06/03/25 1648    potassium chloride 40 mEq in sterile water for injection 100 mL, 40 mEq, intravenous, q6h PRN, Natalia Jamison PA-C

## 2025-06-07 NOTE — CARE PLAN
The patient's goals for the shift include go home tomorrow    The clinical goals for the shift include remain hemodynamically stable    Problem: Pain - Adult  Goal: Verbalizes/displays adequate comfort level or baseline comfort level  Outcome: Progressing     Problem: Safety - Adult  Goal: Free from fall injury  Outcome: Progressing     Problem: Discharge Planning  Goal: Discharge to home or other facility with appropriate resources  Outcome: Progressing     Problem: Chronic Conditions and Co-morbidities  Goal: Patient's chronic conditions and co-morbidity symptoms are monitored and maintained or improved  Outcome: Progressing     Problem: Nutrition  Goal: Nutrient intake appropriate for maintaining nutritional needs  Outcome: Progressing     Problem: Skin  Goal: Decreased wound size/increased tissue granulation at next dressing change  Outcome: Progressing  Goal: Participates in plan/prevention/treatment measures  Outcome: Progressing  Goal: Prevent/manage excess moisture  Outcome: Progressing  Goal: Prevent/minimize sheer/friction injuries  Outcome: Progressing  Goal: Promote/optimize nutrition  Outcome: Progressing  Goal: Promote skin healing  Outcome: Progressing

## 2025-06-08 ENCOUNTER — APPOINTMENT (OUTPATIENT)
Dept: RADIOLOGY | Facility: HOSPITAL | Age: 70
DRG: 236 | End: 2025-06-08
Payer: COMMERCIAL

## 2025-06-08 LAB
ALBUMIN SERPL BCP-MCNC: 3 G/DL (ref 3.4–5)
ANION GAP SERPL CALCULATED.3IONS-SCNC: 10 MMOL/L (ref 10–20)
BASOPHILS # BLD AUTO: 0.02 X10*3/UL (ref 0–0.1)
BASOPHILS NFR BLD AUTO: 0.1 %
BUN SERPL-MCNC: 32 MG/DL (ref 6–23)
CA-I BLD-SCNC: 1.16 MMOL/L (ref 1.1–1.33)
CALCIUM SERPL-MCNC: 8.5 MG/DL (ref 8.6–10.3)
CHLORIDE SERPL-SCNC: 102 MMOL/L (ref 98–107)
CO2 SERPL-SCNC: 28 MMOL/L (ref 21–32)
CREAT SERPL-MCNC: 0.66 MG/DL (ref 0.5–1.05)
EGFRCR SERPLBLD CKD-EPI 2021: >90 ML/MIN/1.73M*2
EOSINOPHIL # BLD AUTO: 0.09 X10*3/UL (ref 0–0.7)
EOSINOPHIL NFR BLD AUTO: 0.7 %
ERYTHROCYTE [DISTWIDTH] IN BLOOD BY AUTOMATED COUNT: 12.9 % (ref 11.5–14.5)
GLUCOSE BLD MANUAL STRIP-MCNC: 132 MG/DL (ref 74–99)
GLUCOSE BLD MANUAL STRIP-MCNC: 226 MG/DL (ref 74–99)
GLUCOSE BLD MANUAL STRIP-MCNC: 231 MG/DL (ref 74–99)
GLUCOSE BLD MANUAL STRIP-MCNC: 92 MG/DL (ref 74–99)
GLUCOSE SERPL-MCNC: 128 MG/DL (ref 74–99)
HCT VFR BLD AUTO: 28.5 % (ref 36–46)
HGB BLD-MCNC: 9.6 G/DL (ref 12–16)
IMM GRANULOCYTES # BLD AUTO: 0.1 X10*3/UL (ref 0–0.7)
IMM GRANULOCYTES NFR BLD AUTO: 0.7 % (ref 0–0.9)
LYMPHOCYTES # BLD AUTO: 1 X10*3/UL (ref 1.2–4.8)
LYMPHOCYTES NFR BLD AUTO: 7.4 %
MAGNESIUM SERPL-MCNC: 1.89 MG/DL (ref 1.6–2.4)
MCH RBC QN AUTO: 31.5 PG (ref 26–34)
MCHC RBC AUTO-ENTMCNC: 33.7 G/DL (ref 32–36)
MCV RBC AUTO: 93 FL (ref 80–100)
MONOCYTES # BLD AUTO: 1.22 X10*3/UL (ref 0.1–1)
MONOCYTES NFR BLD AUTO: 9 %
NEUTROPHILS # BLD AUTO: 11.13 X10*3/UL (ref 1.2–7.7)
NEUTROPHILS NFR BLD AUTO: 82.1 %
NRBC BLD-RTO: 0 /100 WBCS (ref 0–0)
PHOSPHATE SERPL-MCNC: 3.2 MG/DL (ref 2.5–4.9)
PLATELET # BLD AUTO: 197 X10*3/UL (ref 150–450)
POTASSIUM SERPL-SCNC: 3.8 MMOL/L (ref 3.5–5.3)
RBC # BLD AUTO: 3.05 X10*6/UL (ref 4–5.2)
SODIUM SERPL-SCNC: 136 MMOL/L (ref 136–145)
WBC # BLD AUTO: 13.6 X10*3/UL (ref 4.4–11.3)

## 2025-06-08 PROCEDURE — 2500000005 HC RX 250 GENERAL PHARMACY W/O HCPCS

## 2025-06-08 PROCEDURE — 2500000002 HC RX 250 W HCPCS SELF ADMINISTERED DRUGS (ALT 637 FOR MEDICARE OP, ALT 636 FOR OP/ED)

## 2025-06-08 PROCEDURE — 82947 ASSAY GLUCOSE BLOOD QUANT: CPT

## 2025-06-08 PROCEDURE — 36415 COLL VENOUS BLD VENIPUNCTURE: CPT

## 2025-06-08 PROCEDURE — 2500000001 HC RX 250 WO HCPCS SELF ADMINISTERED DRUGS (ALT 637 FOR MEDICARE OP): Performed by: INTERNAL MEDICINE

## 2025-06-08 PROCEDURE — 2500000001 HC RX 250 WO HCPCS SELF ADMINISTERED DRUGS (ALT 637 FOR MEDICARE OP)

## 2025-06-08 PROCEDURE — 2500000001 HC RX 250 WO HCPCS SELF ADMINISTERED DRUGS (ALT 637 FOR MEDICARE OP): Performed by: PHYSICIAN ASSISTANT

## 2025-06-08 PROCEDURE — 99233 SBSQ HOSP IP/OBS HIGH 50: CPT

## 2025-06-08 PROCEDURE — 82330 ASSAY OF CALCIUM: CPT

## 2025-06-08 PROCEDURE — 85025 COMPLETE CBC W/AUTO DIFF WBC: CPT

## 2025-06-08 PROCEDURE — 80069 RENAL FUNCTION PANEL: CPT

## 2025-06-08 PROCEDURE — 71045 X-RAY EXAM CHEST 1 VIEW: CPT

## 2025-06-08 PROCEDURE — 2500000004 HC RX 250 GENERAL PHARMACY W/ HCPCS (ALT 636 FOR OP/ED)

## 2025-06-08 PROCEDURE — 71045 X-RAY EXAM CHEST 1 VIEW: CPT | Performed by: RADIOLOGY

## 2025-06-08 PROCEDURE — 2060000001 HC INTERMEDIATE ICU ROOM DAILY

## 2025-06-08 PROCEDURE — 99233 SBSQ HOSP IP/OBS HIGH 50: CPT | Performed by: INTERNAL MEDICINE

## 2025-06-08 PROCEDURE — 83735 ASSAY OF MAGNESIUM: CPT

## 2025-06-08 RX ORDER — METOPROLOL TARTRATE 25 MG/1
25 TABLET, FILM COATED ORAL 2 TIMES DAILY
Status: DISPENSED | OUTPATIENT
Start: 2025-06-08

## 2025-06-08 RX ORDER — ACETAMINOPHEN 325 MG/1
650 TABLET ORAL EVERY 6 HOURS PRN
Status: ACTIVE | OUTPATIENT
Start: 2025-06-08

## 2025-06-08 RX ORDER — FUROSEMIDE 10 MG/ML
20 INJECTION INTRAMUSCULAR; INTRAVENOUS ONCE
Status: COMPLETED | OUTPATIENT
Start: 2025-06-08 | End: 2025-06-08

## 2025-06-08 RX ORDER — LOSARTAN POTASSIUM 25 MG/1
25 TABLET ORAL DAILY
Status: DISPENSED | OUTPATIENT
Start: 2025-06-08

## 2025-06-08 RX ADMIN — ESCITALOPRAM OXALATE 10 MG: 10 TABLET ORAL at 08:47

## 2025-06-08 RX ADMIN — POLYETHYLENE GLYCOL 3350 17 G: 17 POWDER, FOR SOLUTION ORAL at 17:52

## 2025-06-08 RX ADMIN — ACETAMINOPHEN 650 MG: 325 TABLET ORAL at 18:01

## 2025-06-08 RX ADMIN — ACETAMINOPHEN 650 MG: 325 TABLET ORAL at 12:13

## 2025-06-08 RX ADMIN — ATORVASTATIN CALCIUM 80 MG: 80 TABLET, FILM COATED ORAL at 20:37

## 2025-06-08 RX ADMIN — METOPROLOL TARTRATE 25 MG: 25 TABLET, FILM COATED ORAL at 08:48

## 2025-06-08 RX ADMIN — LEVOTHYROXINE SODIUM 125 MCG: 0.12 TABLET ORAL at 06:12

## 2025-06-08 RX ADMIN — ASPIRIN 81 MG: 81 TABLET, CHEWABLE ORAL at 08:48

## 2025-06-08 RX ADMIN — ACETAMINOPHEN 650 MG: 325 TABLET ORAL at 06:12

## 2025-06-08 RX ADMIN — LIDOCAINE 4% 1 PATCH: 40 PATCH TOPICAL at 08:48

## 2025-06-08 RX ADMIN — EMPAGLIFLOZIN 10 MG: 10 TABLET, FILM COATED ORAL at 11:18

## 2025-06-08 RX ADMIN — FUROSEMIDE 20 MG: 10 INJECTION, SOLUTION INTRAMUSCULAR; INTRAVENOUS at 08:48

## 2025-06-08 RX ADMIN — ENOXAPARIN SODIUM 40 MG: 40 INJECTION SUBCUTANEOUS at 08:47

## 2025-06-08 RX ADMIN — BUPROPION HYDROCHLORIDE 200 MG: 100 TABLET, FILM COATED, EXTENDED RELEASE ORAL at 20:37

## 2025-06-08 RX ADMIN — METOPROLOL TARTRATE 25 MG: 25 TABLET, FILM COATED ORAL at 20:37

## 2025-06-08 RX ADMIN — LOSARTAN POTASSIUM 25 MG: 25 TABLET, FILM COATED ORAL at 11:18

## 2025-06-08 RX ADMIN — SENNOSIDES AND DOCUSATE SODIUM 2 TABLET: 50; 8.6 TABLET ORAL at 08:48

## 2025-06-08 RX ADMIN — OXYCODONE HYDROCHLORIDE 10 MG: 5 TABLET ORAL at 20:36

## 2025-06-08 RX ADMIN — INSULIN LISPRO 15 UNITS: 100 INJECTION, SOLUTION INTRAVENOUS; SUBCUTANEOUS at 11:30

## 2025-06-08 RX ADMIN — BUPROPION HYDROCHLORIDE 200 MG: 100 TABLET, FILM COATED, EXTENDED RELEASE ORAL at 08:48

## 2025-06-08 RX ADMIN — FAMOTIDINE 20 MG: 20 TABLET, FILM COATED ORAL at 08:48

## 2025-06-08 RX ADMIN — SENNOSIDES AND DOCUSATE SODIUM 2 TABLET: 50; 8.6 TABLET ORAL at 20:37

## 2025-06-08 RX ADMIN — OXYCODONE HYDROCHLORIDE 10 MG: 5 TABLET ORAL at 08:49

## 2025-06-08 ASSESSMENT — PAIN SCALES - GENERAL
PAINLEVEL_OUTOF10: 0 - NO PAIN
PAINLEVEL_OUTOF10: 8
PAINLEVEL_OUTOF10: 5 - MODERATE PAIN
PAINLEVEL_OUTOF10: 0 - NO PAIN
PAINLEVEL_OUTOF10: 0 - NO PAIN
PAINLEVEL_OUTOF10: 7
PAINLEVEL_OUTOF10: 4

## 2025-06-08 ASSESSMENT — PAIN - FUNCTIONAL ASSESSMENT
PAIN_FUNCTIONAL_ASSESSMENT: 0-10

## 2025-06-08 ASSESSMENT — PAIN DESCRIPTION - DESCRIPTORS
DESCRIPTORS: RADIATING
DESCRIPTORS: ACHING;DISCOMFORT
DESCRIPTORS: RADIATING
DESCRIPTORS: DISCOMFORT

## 2025-06-08 ASSESSMENT — PAIN DESCRIPTION - LOCATION: LOCATION: STERNUM

## 2025-06-08 ASSESSMENT — PAIN DESCRIPTION - ORIENTATION: ORIENTATION: ANTERIOR;RIGHT;POSTERIOR

## 2025-06-08 NOTE — NURSING NOTE
Bilateral heel borders removed per patient request. Heels dry and intact. Right calf dressings removed and open to air s/p harvest punctures.

## 2025-06-08 NOTE — CARE PLAN
Problem: Pain - Adult  Goal: Verbalizes/displays adequate comfort level or baseline comfort level  Outcome: Progressing     Problem: Safety - Adult  Goal: Free from fall injury  Outcome: Progressing     Problem: Discharge Planning  Goal: Discharge to home or other facility with appropriate resources  Outcome: Progressing     Problem: Chronic Conditions and Co-morbidities  Goal: Patient's chronic conditions and co-morbidity symptoms are monitored and maintained or improved  Outcome: Progressing     Problem: Nutrition  Goal: Nutrient intake appropriate for maintaining nutritional needs  Outcome: Progressing     Problem: Skin  Goal: Decreased wound size/increased tissue granulation at next dressing change  Outcome: Progressing  Goal: Participates in plan/prevention/treatment measures  Outcome: Progressing  Goal: Prevent/manage excess moisture  Outcome: Progressing  Goal: Prevent/minimize sheer/friction injuries  Outcome: Progressing  Goal: Promote/optimize nutrition  Outcome: Progressing  Goal: Promote skin healing  Outcome: Progressing   The patient's goals for the shift include To sleep pain free    The clinical goals for the shift include have a BM    Over the shift, the patient did not make progress toward the following goals. Barriers to progression include pain. Recommendations to address these barriers include opiod admin.

## 2025-06-08 NOTE — CARE PLAN
The patient's goals for the shift include To sleep pain free    The clinical goals for the shift include have a BM      Problem: Pain - Adult  Goal: Verbalizes/displays adequate comfort level or baseline comfort level  Outcome: Progressing     Problem: Safety - Adult  Goal: Free from fall injury  Outcome: Progressing     Problem: Discharge Planning  Goal: Discharge to home or other facility with appropriate resources  Outcome: Progressing     Problem: Chronic Conditions and Co-morbidities  Goal: Patient's chronic conditions and co-morbidity symptoms are monitored and maintained or improved  Outcome: Progressing     Problem: Nutrition  Goal: Nutrient intake appropriate for maintaining nutritional needs  Outcome: Progressing     Problem: Skin  Goal: Decreased wound size/increased tissue granulation at next dressing change  Outcome: Progressing  Goal: Participates in plan/prevention/treatment measures  Outcome: Progressing  Goal: Prevent/manage excess moisture  Outcome: Progressing  Goal: Prevent/minimize sheer/friction injuries  Outcome: Progressing  Goal: Promote/optimize nutrition  Outcome: Progressing  Goal: Promote skin healing  Outcome: Progressing

## 2025-06-08 NOTE — PROGRESS NOTES
[facility] Critical Care Medicine       Date:  6/8/2025  Patient:  Simona Betancourt  YOB: 1955  MRN:  [id number]   Admit Date:  6/3/2025  Hospital Length of Stay: 5   ICU Length of Stay: 4d 18h       Chief complaint: coronary artery disease            History of Present Illness:  Simona Betancourt is a 69 y.o. year old female patient with past medical history of CAD, MI w/ stents 10+ years ago, HTN, HLD, heavy smoker who presented 5/11 with complaints of nausea vomiting and feeling unwell. Underwent cardiac catheterization on 5/12 which showed multi-vessel disease. Today she had CABG x 3, LIMA-> LAD, RSVG-> OM-> PDA.        Interval ICU Events:  6/3: Arrived to ICU intubated and sedated. Not on pressors. Has two chest tubes, one mediastinal, one left pleural. Ventricular pacing wires VVI backup rate 50 BPM.      6/4: Right apical pneumothorax on cxr this am. Reports some nausea and vomiting this am. Pain is controlled with meds. Still requiring low dose nitroglycerin gtt.      6/5: Pneumothorax stable to improved this am. Transition from NRB to NC. Will remove lines and gordon today. Reports nagging pain to left upper abd/lower chest, no obvious source. Will monitor. Hemodynamically stable off drips. Denies continued n/v, will try to advance diet.     6/6: POD 3- Brief episode of bradycardia early this morning requiring pacing to kick in for ~4 beats, no more bradycardic events since. Blood pressure remains borderline soft, but improving. Encouraging PO intake. Decrease BB to half-dose due to above. Progressing well otherwise.     6/7: POD 4- Episode of hypoxia overnight requiring 2L NC - PTX appears stable/trace on serial imaging.  Now on room air this morning.  Consider gentle diuresis to optimize fluid status.  Review of telemetry shows multiple episodes of bradycardia earlier this morning again requiring backup pacing for a few beats.  May lower pacer to backup rate of 40 to assess true  underlying intrinsic rhythm.    6/8: POD 5- No episodes of bradycardia or hypoxia overnight, remains on room air. Will increase BB back to 25 BID. Continue gentle diuresis today. Progressing well, anticipate discharge in the morning.     Objective     Medical History:  Medical History[1]  Surgical History[2]  Prescriptions Prior to Admission[3]  Ace inhibitors  Social History[4]  Family History[5]    Hospital Medications:    Continuous Medications[6]    Current Medications[7]    Review of Systems:  14 point review of systems was completed and negative except for those specially mention in my HPI    Physical Exam:    Heart Rate:  [66-80]   Temp:  [36 °C (96.8 °F)-36.7 °C (98.1 °F)]   Resp:  [11-21]   BP: (125-138)/(69-92)   Weight:  [58.1 kg (128 lb 1.4 oz)-58.2 kg (128 lb 4.9 oz)]   SpO2:  [93 %-97 %]     Physical Exam  HENT:      Head: Normocephalic.   Eyes:      Extraocular Movements: Extraocular movements intact.      Conjunctiva/sclera: Conjunctivae normal.      Pupils: Pupils are equal, round, and reactive to light.   Cardiovascular:      Rate and Rhythm: Normal rate and regular rhythm.      Heart sounds: Normal heart sounds. No murmur heard.     Comments: Midline sternal incision with overlying dressing CDI  Pulmonary:      Effort: Pulmonary effort is normal.      Breath sounds: Examination of the right-lower field reveals decreased breath sounds. Examination of the left-lower field reveals decreased breath sounds. Decreased breath sounds present.   Abdominal:      General: There is no distension.      Palpations: Abdomen is soft.      Tenderness: There is no abdominal tenderness.   Skin:     General: Skin is warm.   Neurological:      General: No focal deficit present.      Mental Status: She is alert and oriented to person, place, and time.         Objective:    I have reviewed all medications, laboratory results, and imaging pertinent for today's encounter.           Intake/Output Summary (Last 24 hours) at  6/8/2025 0737  Last data filed at 6/8/2025 0600  Gross per 24 hour   Intake 1960 ml   Output 2600 ml   Net -640 ml       Daily Labs:  CBC:   Results from last 7 days   Lab Units 06/08/25  0418 06/07/25  0416   WBC AUTO x10*3/uL 13.6* 15.1*   HEMOGLOBIN g/dL 9.6* 10.0*   HEMATOCRIT % 28.5* 29.3*   MCV fL 93 91     BMP:    Results from last 7 days   Lab Units 06/08/25  0418 06/07/25  1719   SODIUM mmol/L 136 134*   POTASSIUM mmol/L 3.8 4.0   CHLORIDE mmol/L 102 100   CO2 mmol/L 28 29   BUN mg/dL 32* 42*   CREATININE mg/dL 0.66 0.90   CALCIUM mg/dL 8.5* 8.6   GLUCOSE mg/dL 128* 121*   MAGNESIUM mg/dL 1.89 1.95     ABG:   Results from last 7 days   Lab Units 06/04/25  0041 06/03/25  1640   POCT PH, ARTERIAL pH 7.35* 7.32*   POCT PCO2, ARTERIAL mm Hg 45* 44*   POCT PO2, ARTERIAL mm Hg 117* 117*   POCT SO2, ARTERIAL % 99 98   POCT HCO3 CALCULATED, ARTERIAL mmol/L 24.8 22.7   POCT LACTATE, ARTERIAL mmol/L 0.9 1.1      VBG:   Results from last 7 days   Lab Units 06/03/25  1054   POCT PH, VENOUS pH 7.41   POCT PCO2, VENOUS mm Hg 45   POCT PO2, VENOUS mm Hg 50*   POCT SO2, VENOUS % 88*   POCT HCO3 CALCULATED, VENOUS mmol/L 28.5*   POCT LACTATE, VENOUS mmol/L 1.9             Assessment/Plan:    I am currently managing this critically ill patient for the following problems:    Neurology/Psychiatry/Pain Control/Sedation:   Acute post-operative pain - likely incisional pain  History of anxiety, depression   - Pain Control: acetaminophen PRN  - Scheduled acetaminophen, lidoderm, PRN oxycodone  - Continue home wellbutrin, lexapro  - CAM ICU qshift, sleep-wake hygiene, delirium precautions      Respiratory/ENT:  Post-operative respiratory insufficiency without respiratory failure   Right apical pneumothorax - trace on morning CXR  - Supplemental O2: none, on room air   - Lasix x1 and PRN  - Maintain SpO2 >92%  - Continuous pulse ox monitoring   - Bronchopulmonary hygiene     Cardiovascular:   Triple vessel CAD s/p CABG x 3 LIMA ->  LAD, RSVG -> OM, PDA  Intermittent sinus bradycardia - asymptomatic, requires brief pacing  History of hypertension, NSTEMI, HTN, CAD, ischemic cardiomopathy  Pre-op TTE: LVED 40-45%, grade I diastolic dysfunction, slight elevated RVSP 33mmHg  - Continue aspirin, metoprolol, lipitor   - Resume home losartan at half dose   - Start jardiance for further GDMT   - Maintain pacing wires VVI @ 30  - Plavix to start Monday per CTS  - Maintain MAPs >65  - CTS following and appreciate further management  - Continuous cardiac monitoring   - EKGs PRN for ACS symptoms, arrhythmias      Gastrointestinal:  No active concerns   - Diet: regular  - BR: colace  - GI Prophylaxis: none     Renal/Volume Status/Electrolytes:  Acute kidney injury - Cr 1.37 post-operatively  Hyponatremia suspect hypervolemia   - Baseline Cr ~0.9-1.0  - Will gently diurese as BP tolerates   - Hyponatremia resolved w diuresis   - Replete electrolytes to maintain K >4.0 and Mg >2.0  - Daily RFP, Mg    Endocrinology:  History of T2DM, hypothyroidism   - Home diabetic regimen: metformin 750 BID - on hold   - cSSI ACHS   - Continue home synthroid   - Hypoglycemia protocol PRN      Infectious Disease:  Leukocytosis - WBC 15 -> 13, predominnantly neutorphils, no toxic left shift  - Antibiotics: dave-op ancef x48 hours, completed   - Persistent leukocytosis presumed to be reactive currently, no signs of infection, no other SIRS critertia met  - Monitor w daily CBC, can broaden infectious work-up s/sx present      Hematology/Oncology:  Acute post-operative blood loss anemia and thrombocytopenia   - Baseline Hgb 13-14  - Transfuse if Hgb <7.0 or symptomatic anemia  - Daily CBC     MSK/Skin:  No active concerns   - PT/OT eval, OOB and to chair as tolerated   - ICU skin protocol, padded pressure points     Ethics/Code Status:  Full code      :  DVT Prophylaxis: SQH, SCDs  GI Prophylaxis: none  Bowel Regimen: colace, miralax PRN  Diet: cardiac  CVC:  none  Ellie: none  Olivarez: none  Restraints: none  Disposition: stepdown status    Paramjit Marin PA-C  Pulmonary & Critical Care Medicine   [facility]         [1]   Past Medical History:  Diagnosis Date    ADHD (attention deficit hyperactivity disorder)     Anxiety     Chronic viral hepatitis C (Multi)     Depression     Diabetes mellitus with hyperglycemia     Heart disease     Hypercholesterolemia     Hypertension     Hypothyroidism     Myocardial infarction (Multi)     Nausea and vomiting 04/10/2023    Obesity     Visual impairment    [2]   Past Surgical History:  Procedure Laterality Date    CARDIAC CATHETERIZATION N/A 2025    Procedure: LHC, With LV;  Surgeon: Jordi Simpson DO;  Location: [facility] Cardiac Cath Lab;  Service: Cardiovascular;  Laterality: N/A;     SECTION, LOW TRANSVERSE      CORONARY ANGIOPLASTY WITH STENT PLACEMENT      KNEE ARTHROPLASTY     [3]   Medications Prior to Admission   Medication Sig Dispense Refill Last Dose/Taking    aspirin 81 mg EC tablet = 1 tab(s) ( 81 mg ), PO, Daily, # 90 tab(s), 0 Refill(s), Type: Maintenance   6/3/2025 at  4:30 AM    atorvastatin (Lipitor) 80 mg tablet Take 1 tablet (80 mg) by mouth once daily at bedtime. 30 tablet 0 2025 at  9:00 PM    buPROPion SR (Wellbutrin SR) 200 mg 12 hr tablet  1 tab(s), Oral, bid 60 tablet 0 6/3/2025 at  4:30 AM    [] chlorhexidine (Peridex) 0.12 % solution Use as directed 473 mL 0 6/3/2025 at  4:30 AM    escitalopram (Lexapro) 10 mg tablet Take 1 tablet (10 mg) by mouth once daily. 90 tablet 1 6/3/2025 at  4:30 AM    levothyroxine (Synthroid, Levoxyl) 125 mcg tablet TAKE 1 TABLET DAILY 90 tablet 2 6/3/2025 at  4:30 AM    losartan (Cozaar) 50 mg tablet Take 1 tablet (50 mg) by mouth once daily. 30 tablet 0 Past Week    metFORMIN XR (Glucophage-XR) 750 mg 24 hr tablet Take 1 tablet (750 mg) by mouth 2 times a day. Do not crush, chew, or split.   Past Week    multivitamin (MULTIPLE VITAMINS  ORAL) 1 cap(s), PO, Daily, # 90 cap(s), 0 Refill(s), Type: Maintenance   Past Week    mupirocin (Bactroban) 2 % ointment Apply topically 2 times a day.   6/3/2025 at  4:30 AM    buPROPion SR (Wellbutrin SR) 200 mg 12 hr tablet  1 tab(s), Oral, bid 180 tablet 1     nitroglycerin (Nitrostat) 0.4 mg SL tablet Place 1 tablet (0.4 mg) under the tongue every 5 minutes if needed for chest pain. Can take every 5 minutes for 3 total doses then call 911 90 tablet 12     tirzepatide (Mounjaro) 7.5 mg/0.5 mL pen injector Inject 7.5 mg under the skin 1 (one) time per week. (Patient taking differently: Inject 7.5 mg under the skin 1 (one) time per week. Thursday  last dose25) 2 mL 5    [4]   Social History  Tobacco Use    Smoking status: Former     Current packs/day: 0.00     Average packs/day: 0.5 packs/day for 54.3 years (27.2 ttl pk-yrs)     Types: Cigarettes     Start date: 1971     Quit date: 2025     Years since quittin.0     Passive exposure: Current    Smokeless tobacco: Former   Vaping Use    Vaping status: Never Used   Substance Use Topics    Alcohol use: Not Currently    Drug use: Yes     Frequency: 4.0 times per week     Types: Marijuana     Comment: smokes thc nightly   [5]   Family History  Problem Relation Name Age of Onset    Hypertension Mother Mom     Stroke Mother Mom     Hypertension Father Dad     Cancer Father Dad     Alcohol abuse Father Dad     Drug abuse Father Dad    [6]    [7]   Current Facility-Administered Medications:     acetaminophen (Tylenol) tablet 650 mg, 650 mg, oral, q6h, Paramjit Marin PA-C, 650 mg at 25 0612    aspirin chewable tablet 81 mg, 81 mg, oral, Daily, Natalia Jamison PA-C, 81 mg at 25    atorvastatin (Lipitor) tablet 80 mg, 80 mg, oral, Nightly, Natalia Jamison PA-C, 80 mg at 25    buPROPion SR (Wellbutrin SR) 12 hr tablet 200 mg, 200 mg, oral, BID, Paramjit Marin PA-C, 200 mg at 25    calcium chloride 0.5 g in dextrose 5%  50 mL IV, 0.5 g, intravenous, q8h PRN, Natalia Jamison PA-C    calcium chloride 1 g in dextrose 5% 50 mL IV, 1 g, intravenous, q8h PRN, Natalia Jamison PA-C    enoxaparin (Lovenox) syringe 40 mg, 40 mg, subcutaneous, Daily, Paramjit Marin PA-C, 40 mg at 06/07/25 0916    escitalopram (Lexapro) tablet 10 mg, 10 mg, oral, Daily, Paramjit Marin PA-C, 10 mg at 06/07/25 0916    famotidine (Pepcid) tablet 20 mg, 20 mg, oral, Daily, 20 mg at 06/07/25 0845 **OR** famotidine PF (Pepcid) injection 20 mg, 20 mg, intravenous, Daily, MICKEY Jonas-CNP    insulin lispro injection 0-15 Units, 0-15 Units, subcutaneous, TID AC, Owen Deleon, APRN-CNP, 10 Units at 06/06/25 1153    levothyroxine (Synthroid, Levoxyl) tablet 125 mcg, 125 mcg, oral, Daily, Paramjit Marin PA-C, 125 mcg at 06/08/25 0612    lidocaine 4 % patch 1 patch, 1 patch, transdermal, Daily, Paramjit Marin PA-C, 1 patch at 06/07/25 1324    [Held by provider] losartan (Cozaar) tablet 50 mg, 50 mg, oral, Daily, Paramjit Marin PA-C    magnesium citrate solution 296 mL, 296 mL, oral, Once PRN, Natalia Jamison PA-C    magnesium hydroxide (Milk of Magnesia) 400 mg/5 mL suspension 30 mL, 30 mL, oral, Daily PRN, Natalia Jamison PA-C    magnesium sulfate 2 g in sterile water for injection 50 mL, 2 g, intravenous, q6h PRN, Natalia Jamison PA-C, Stopped at 06/05/25 0745    magnesium sulfate 3 g in dextrose 5% 100 mL IV, 3 g, intravenous, q6h PRN, Natalia Jamison PA-C    metoclopramide (Reglan) tablet 10 mg, 10 mg, oral, q6h PRN **OR** metoclopramide (Reglan) injection 10 mg, 10 mg, intravenous, q6h PRN, Natalia Jamison PA-C, 10 mg at 06/04/25 0414    metoprolol tartrate (Lopressor) tablet 12.5 mg, 12.5 mg, oral, BID, Paramjit Marin PA-C, 12.5 mg at 06/07/25 2003    naloxone (Narcan) injection 0.2 mg, 0.2 mg, intravenous, q5 min PRN, Natalia Jamison PA-C    ondansetron ODT (Zofran-ODT) disintegrating tablet 4 mg, 4 mg, oral, q8h PRN **OR** ondansetron (Zofran)  injection 4 mg, 4 mg, intravenous, q4h PRN, Paramjit Marin PA-C    oxyCODONE (Roxicodone) immediate release tablet 10 mg, 10 mg, oral, q4h PRN, Natalia Jamison PA-C, 10 mg at 06/07/25 2003    oxyCODONE (Roxicodone) immediate release tablet 5 mg, 5 mg, oral, q4h PRN, Natalia Jamison PA-C, 5 mg at 06/07/25 1007    oxygen (O2) therapy, , inhalation, Continuous PRN - O2/gases, Oj Javed MD    polyethylene glycol (Glycolax, Miralax) packet 17 g, 17 g, oral, Daily PRN, Paramjit Marin PA-C    potassium chloride CR (Klor-Con M20) ER tablet 20 mEq, 20 mEq, oral, q6h PRN, 20 mEq at 06/05/25 0529 **OR** potassium chloride (Klor-Con) packet 20 mEq, 20 mEq, oral, q6h PRN, Natalia Jamison PA-C    potassium chloride CR (Klor-Con M20) ER tablet 40 mEq, 40 mEq, oral, q6h PRN **OR** potassium chloride (Klor-Con) packet 40 mEq, 40 mEq, oral, q6h PRN, Natalia Jamison PA-C    potassium chloride 20 mEq in sterile water for injection 100 mL, 20 mEq, intravenous, q6h PRN, Natalia Jamison PA-C, Stopped at 06/03/25 1648    potassium chloride 40 mEq in sterile water for injection 100 mL, 40 mEq, intravenous, q6h PRN, Natalia Jamison PA-C    sennosides-docusate sodium (Laila-Colace) 8.6-50 mg per tablet 2 tablet, 2 tablet, oral, BID, Owen Deleno APRN-CNP, 2 tablet at 06/07/25 5645

## 2025-06-08 NOTE — PROGRESS NOTES
"Simona Betancourt \"Mian" is a 69 y.o. female on day 5 of admission presenting with Atherosclerosis of native coronary artery of native heart with unstable angina pectoris.    Subjective   Resting comfortably       Objective     Physical Exam   Gen: NAD   Neck: no JVD, carotid upstroke is brisk and without delay   Heart: rrr, s1s2+ no mrg   Lungs: Diminished at the bases   Ext: warm trace ankle edema  Last Recorded Vitals  Blood pressure (!) 125/92, pulse 66, temperature 36.2 °C (97.2 °F), temperature source Temporal, resp. rate 14, height 1.524 m (5'), weight 58.2 kg (128 lb 4.9 oz), last menstrual period 01/01/2011, SpO2 94%.  Intake/Output last 3 Shifts:  I/O last 3 completed shifts:  In: 2680 (46 mL/kg) [P.O.:2680]  Out: 3500 (60.1 mL/kg) [Urine:3500 (1.7 mL/kg/hr)]  Weight: 58.2 kg         Assessment & Plan  Atherosclerosis of native coronary artery of native heart with unstable angina pectoris    Atherosclerotic heart disease: Stable status post surgical revascularization.  Remains on aspirin, would start clopidogrel.  Will continue her high potency statin.  Ischemic Cardiomyopathy: Ejection fraction 40 to 45%. continue metoprolol, remains in sinus rhythm and heart rates have improved.  Blood pressure is better today.  I would add back her losartan at 25 mg daily.  Adding Farxiga.          Brandon Mayo, DO    "

## 2025-06-09 ENCOUNTER — APPOINTMENT (OUTPATIENT)
Dept: RADIOLOGY | Facility: HOSPITAL | Age: 70
DRG: 236 | End: 2025-06-09
Payer: COMMERCIAL

## 2025-06-09 ENCOUNTER — DOCUMENTATION (OUTPATIENT)
Dept: HOME HEALTH SERVICES | Facility: HOME HEALTH | Age: 70
End: 2025-06-09
Payer: COMMERCIAL

## 2025-06-09 VITALS
WEIGHT: 126.32 LBS | HEIGHT: 60 IN | HEART RATE: 70 BPM | BODY MASS INDEX: 24.8 KG/M2 | OXYGEN SATURATION: 93 % | SYSTOLIC BLOOD PRESSURE: 167 MMHG | TEMPERATURE: 98.1 F | RESPIRATION RATE: 18 BRPM | DIASTOLIC BLOOD PRESSURE: 95 MMHG

## 2025-06-09 VITALS
WEIGHT: 128.31 LBS | HEART RATE: 66 BPM | BODY MASS INDEX: 25.19 KG/M2 | TEMPERATURE: 97.9 F | DIASTOLIC BLOOD PRESSURE: 79 MMHG | HEIGHT: 60 IN | SYSTOLIC BLOOD PRESSURE: 154 MMHG | OXYGEN SATURATION: 96 % | RESPIRATION RATE: 14 BRPM

## 2025-06-09 DIAGNOSIS — Z95.1 S/P CABG (CORONARY ARTERY BYPASS GRAFT): ICD-10-CM

## 2025-06-09 LAB
ALBUMIN SERPL BCP-MCNC: 3.1 G/DL (ref 3.4–5)
ANION GAP SERPL CALCULATED.3IONS-SCNC: 10 MMOL/L (ref 10–20)
BASOPHILS # BLD AUTO: 0.02 X10*3/UL (ref 0–0.1)
BASOPHILS NFR BLD AUTO: 0.1 %
BUN SERPL-MCNC: 23 MG/DL (ref 6–23)
CA-I BLD-SCNC: 1.15 MMOL/L (ref 1.1–1.33)
CALCIUM SERPL-MCNC: 8.7 MG/DL (ref 8.6–10.3)
CHLORIDE SERPL-SCNC: 100 MMOL/L (ref 98–107)
CO2 SERPL-SCNC: 31 MMOL/L (ref 21–32)
CREAT SERPL-MCNC: 0.72 MG/DL (ref 0.5–1.05)
EGFRCR SERPLBLD CKD-EPI 2021: >90 ML/MIN/1.73M*2
EOSINOPHIL # BLD AUTO: 0.12 X10*3/UL (ref 0–0.7)
EOSINOPHIL NFR BLD AUTO: 0.9 %
ERYTHROCYTE [DISTWIDTH] IN BLOOD BY AUTOMATED COUNT: 12.7 % (ref 11.5–14.5)
GLUCOSE BLD MANUAL STRIP-MCNC: 120 MG/DL (ref 74–99)
GLUCOSE BLD MANUAL STRIP-MCNC: 188 MG/DL (ref 74–99)
GLUCOSE BLD MANUAL STRIP-MCNC: 67 MG/DL (ref 74–99)
GLUCOSE SERPL-MCNC: 99 MG/DL (ref 74–99)
HCT VFR BLD AUTO: 29 % (ref 36–46)
HGB BLD-MCNC: 10.2 G/DL (ref 12–16)
IMM GRANULOCYTES # BLD AUTO: 0.08 X10*3/UL (ref 0–0.7)
IMM GRANULOCYTES NFR BLD AUTO: 0.6 % (ref 0–0.9)
LYMPHOCYTES # BLD AUTO: 1.23 X10*3/UL (ref 1.2–4.8)
LYMPHOCYTES NFR BLD AUTO: 9 %
MAGNESIUM SERPL-MCNC: 1.77 MG/DL (ref 1.6–2.4)
MCH RBC QN AUTO: 31.6 PG (ref 26–34)
MCHC RBC AUTO-ENTMCNC: 35.2 G/DL (ref 32–36)
MCV RBC AUTO: 90 FL (ref 80–100)
MONOCYTES # BLD AUTO: 1.51 X10*3/UL (ref 0.1–1)
MONOCYTES NFR BLD AUTO: 11.1 %
NEUTROPHILS # BLD AUTO: 10.67 X10*3/UL (ref 1.2–7.7)
NEUTROPHILS NFR BLD AUTO: 78.3 %
NRBC BLD-RTO: 0 /100 WBCS (ref 0–0)
PHOSPHATE SERPL-MCNC: 2.7 MG/DL (ref 2.5–4.9)
PLATELET # BLD AUTO: 246 X10*3/UL (ref 150–450)
POTASSIUM SERPL-SCNC: 4.1 MMOL/L (ref 3.5–5.3)
RBC # BLD AUTO: 3.23 X10*6/UL (ref 4–5.2)
SODIUM SERPL-SCNC: 137 MMOL/L (ref 136–145)
WBC # BLD AUTO: 13.6 X10*3/UL (ref 4.4–11.3)

## 2025-06-09 PROCEDURE — 82330 ASSAY OF CALCIUM: CPT

## 2025-06-09 PROCEDURE — 82947 ASSAY GLUCOSE BLOOD QUANT: CPT

## 2025-06-09 PROCEDURE — 2500000004 HC RX 250 GENERAL PHARMACY W/ HCPCS (ALT 636 FOR OP/ED)

## 2025-06-09 PROCEDURE — 2500000001 HC RX 250 WO HCPCS SELF ADMINISTERED DRUGS (ALT 637 FOR MEDICARE OP)

## 2025-06-09 PROCEDURE — 71045 X-RAY EXAM CHEST 1 VIEW: CPT

## 2025-06-09 PROCEDURE — 99239 HOSP IP/OBS DSCHRG MGMT >30: CPT | Performed by: PHYSICIAN ASSISTANT

## 2025-06-09 PROCEDURE — 71045 X-RAY EXAM CHEST 1 VIEW: CPT | Performed by: RADIOLOGY

## 2025-06-09 PROCEDURE — 85025 COMPLETE CBC W/AUTO DIFF WBC: CPT

## 2025-06-09 PROCEDURE — 2500000001 HC RX 250 WO HCPCS SELF ADMINISTERED DRUGS (ALT 637 FOR MEDICARE OP): Performed by: INTERNAL MEDICINE

## 2025-06-09 PROCEDURE — 99233 SBSQ HOSP IP/OBS HIGH 50: CPT

## 2025-06-09 PROCEDURE — 2500000001 HC RX 250 WO HCPCS SELF ADMINISTERED DRUGS (ALT 637 FOR MEDICARE OP): Performed by: PHYSICIAN ASSISTANT

## 2025-06-09 PROCEDURE — 99232 SBSQ HOSP IP/OBS MODERATE 35: CPT | Performed by: INTERNAL MEDICINE

## 2025-06-09 PROCEDURE — 36415 COLL VENOUS BLD VENIPUNCTURE: CPT

## 2025-06-09 PROCEDURE — 2500000002 HC RX 250 W HCPCS SELF ADMINISTERED DRUGS (ALT 637 FOR MEDICARE OP, ALT 636 FOR OP/ED)

## 2025-06-09 PROCEDURE — 83735 ASSAY OF MAGNESIUM: CPT

## 2025-06-09 PROCEDURE — 80069 RENAL FUNCTION PANEL: CPT

## 2025-06-09 RX ORDER — CLOPIDOGREL BISULFATE 75 MG/1
75 TABLET ORAL DAILY
Status: DISCONTINUED | OUTPATIENT
Start: 2025-06-09 | End: 2025-06-09 | Stop reason: HOSPADM

## 2025-06-09 RX ORDER — HYDRALAZINE HYDROCHLORIDE 20 MG/ML
10 INJECTION INTRAMUSCULAR; INTRAVENOUS ONCE
Status: COMPLETED | OUTPATIENT
Start: 2025-06-09 | End: 2025-06-09

## 2025-06-09 RX ORDER — METOPROLOL TARTRATE 25 MG/1
25 TABLET, FILM COATED ORAL 2 TIMES DAILY
Qty: 60 TABLET | Refills: 1 | Status: SHIPPED | OUTPATIENT
Start: 2025-06-09

## 2025-06-09 RX ORDER — LOSARTAN POTASSIUM 50 MG/1
50 TABLET ORAL DAILY
Status: DISCONTINUED | OUTPATIENT
Start: 2025-06-09 | End: 2025-06-09 | Stop reason: HOSPADM

## 2025-06-09 RX ORDER — ACETAMINOPHEN 325 MG/1
650 TABLET ORAL EVERY 6 HOURS PRN
Start: 2025-06-09

## 2025-06-09 RX ORDER — OXYCODONE HYDROCHLORIDE 5 MG/1
5 TABLET ORAL EVERY 6 HOURS PRN
Qty: 28 TABLET | Refills: 0 | Status: SHIPPED | OUTPATIENT
Start: 2025-06-09

## 2025-06-09 RX ORDER — CLOPIDOGREL BISULFATE 75 MG/1
75 TABLET ORAL DAILY
Qty: 30 TABLET | Refills: 1 | Status: SHIPPED | OUTPATIENT
Start: 2025-06-09

## 2025-06-09 RX ADMIN — LEVOTHYROXINE SODIUM 125 MCG: 0.12 TABLET ORAL at 06:07

## 2025-06-09 RX ADMIN — ESCITALOPRAM OXALATE 10 MG: 10 TABLET ORAL at 09:02

## 2025-06-09 RX ADMIN — EMPAGLIFLOZIN 10 MG: 10 TABLET, FILM COATED ORAL at 09:23

## 2025-06-09 RX ADMIN — ASPIRIN 81 MG: 81 TABLET, CHEWABLE ORAL at 09:02

## 2025-06-09 RX ADMIN — HYDRALAZINE HYDROCHLORIDE 10 MG: 20 INJECTION INTRAMUSCULAR; INTRAVENOUS at 06:24

## 2025-06-09 RX ADMIN — ENOXAPARIN SODIUM 40 MG: 40 INJECTION SUBCUTANEOUS at 09:03

## 2025-06-09 RX ADMIN — BUPROPION HYDROCHLORIDE 200 MG: 100 TABLET, FILM COATED, EXTENDED RELEASE ORAL at 09:02

## 2025-06-09 RX ADMIN — OXYCODONE HYDROCHLORIDE 10 MG: 5 TABLET ORAL at 09:15

## 2025-06-09 RX ADMIN — FAMOTIDINE 20 MG: 20 TABLET, FILM COATED ORAL at 09:02

## 2025-06-09 RX ADMIN — CLOPIDOGREL 75 MG: 75 TABLET ORAL at 09:02

## 2025-06-09 RX ADMIN — OXYCODONE HYDROCHLORIDE 10 MG: 5 TABLET ORAL at 01:49

## 2025-06-09 RX ADMIN — METOPROLOL TARTRATE 25 MG: 25 TABLET, FILM COATED ORAL at 09:02

## 2025-06-09 RX ADMIN — SENNOSIDES AND DOCUSATE SODIUM 2 TABLET: 50; 8.6 TABLET ORAL at 09:02

## 2025-06-09 RX ADMIN — LOSARTAN POTASSIUM 50 MG: 50 TABLET, FILM COATED ORAL at 09:02

## 2025-06-09 RX ADMIN — INSULIN LISPRO 10 UNITS: 100 INJECTION, SOLUTION INTRAVENOUS; SUBCUTANEOUS at 09:03

## 2025-06-09 ASSESSMENT — PAIN DESCRIPTION - LOCATION: LOCATION: CHEST

## 2025-06-09 ASSESSMENT — PAIN - FUNCTIONAL ASSESSMENT
PAIN_FUNCTIONAL_ASSESSMENT: 0-10

## 2025-06-09 ASSESSMENT — PAIN DESCRIPTION - DESCRIPTORS
DESCRIPTORS: STABBING
DESCRIPTORS: ACHING;DISCOMFORT
DESCRIPTORS: STABBING

## 2025-06-09 ASSESSMENT — PAIN SCALES - GENERAL
PAINLEVEL_OUTOF10: 0 - NO PAIN
PAINLEVEL_OUTOF10: 4
PAINLEVEL_OUTOF10: 8
PAINLEVEL_OUTOF10: 7
PAINLEVEL_OUTOF10: 0 - NO PAIN

## 2025-06-09 ASSESSMENT — PAIN DESCRIPTION - ORIENTATION: ORIENTATION: RIGHT

## 2025-06-09 NOTE — CARE PLAN
The patient's goals for the shift include To sleep pain free    The clinical goals for the shift include have BM and get some rest, discharge today.         Problem: Pain - Adult  Goal: Verbalizes/displays adequate comfort level or baseline comfort level  Outcome: Progressing     Problem: Safety - Adult  Goal: Free from fall injury  Outcome: Progressing     Problem: Discharge Planning  Goal: Discharge to home or other facility with appropriate resources  Outcome: Progressing     Problem: Chronic Conditions and Co-morbidities  Goal: Patient's chronic conditions and co-morbidity symptoms are monitored and maintained or improved  Outcome: Progressing     Problem: Nutrition  Goal: Nutrient intake appropriate for maintaining nutritional needs  Outcome: Progressing     Problem: Skin  Goal: Decreased wound size/increased tissue granulation at next dressing change  Outcome: Progressing  Flowsheets (Taken 6/9/2025 0812)  Decreased wound size/increased tissue granulation at next dressing change:   Promote sleep for wound healing   Protective dressings over bony prominences  Goal: Participates in plan/prevention/treatment measures  Outcome: Progressing  Flowsheets (Taken 6/9/2025 0812)  Participates in plan/prevention/treatment measures:   Discuss with provider PT/OT consult   Increase activity/out of bed for meals  Goal: Prevent/manage excess moisture  Outcome: Progressing  Flowsheets (Taken 6/9/2025 0812)  Prevent/manage excess moisture:   Follow provider orders for dressing changes   Monitor for/manage infection if present  Goal: Prevent/minimize sheer/friction injuries  Outcome: Progressing  Flowsheets (Taken 6/9/2025 0812)  Prevent/minimize sheer/friction injuries:   Increase activity/out of bed for meals   Use pull sheet   HOB 30 degrees or less   Turn/reposition every 2 hours/use positioning/transfer devices  Goal: Promote/optimize nutrition  Outcome: Progressing  Flowsheets (Taken 6/9/2025 0812)  Promote/optimize  nutrition:   Monitor/record intake including meals   Consume > 50% meals/supplements   Offer water/supplements/favorite foods  Goal: Promote skin healing  Outcome: Progressing  Flowsheets (Taken 6/9/2025 0812)  Promote skin healing:   Protective dressings over bony prominences   Turn/reposition every 2 hours/use positioning/transfer devices

## 2025-06-09 NOTE — NURSING NOTE
Post-op CABG book given to and reviewed with patient and spouse, smoking cessation done with patient, cardiac rehab discussed, questions answered, receptive

## 2025-06-09 NOTE — NURSING NOTE
Assumed care of patient.  She is alert and awake.  NATHAN Deng is here to remove her pacer wires and remove dressings on her incisions.  She tolerated well.  The plan is for discharge today.

## 2025-06-09 NOTE — PROGRESS NOTES
"Occupational Therapy                 Therapy Communication Note    Patient Name: Simona Betancourt \"Isabel\"  MRN: [id number]  Department: [facility] [address]  Room: 07/07-A  Today's Date: 6/9/2025     Discipline: Occupational Therapy      Missed Visit Reason: Missed Visit Reason: Other (Comment) (Treatment attempted with pt cleared for discharge this date, expressing no concerns for homegoing with precautions addressed for safety.)    Missed Time: Attempt        "

## 2025-06-09 NOTE — PROGRESS NOTES
Subjective Data:  69-year-old woman after successful three-vessel CABG.    Overnight Events:    None cardiac     Objective Data:  Last Recorded Vitals:  Vitals:    06/09/25 0700 06/09/25 0800 06/09/25 0829 06/09/25 0900   BP:  150/82     BP Location:       Patient Position:       Pulse: 77 77  79   Resp: 14 13  16   Temp: 37.1 °C (98.8 °F)      TempSrc: Oral      SpO2:       Weight:   57.3 kg (126 lb 5.2 oz)    Height:           Last Labs:  CBC - 6/9/2025:  5:04 AM  13.6 10.2 246    29.0      CMP - 6/9/2025:  5:04 AM  8.7 5.7 19 --- 0.8   2.7 3.1 10 57      PTT - 6/3/2025:  1:36 PM  1.2   12.8 26.8     TROPHS   Date/Time Value Ref Range Status   05/12/2025 08:34  0 - 13 ng/L Final     Comment:     Previous result verified on 5/11/2025 1301 on specimen/case [id number] called with component TRPHS for procedure Troponin I, High Sensitivity, Initial with value 388 ng/L.   05/12/2025 07:41  0 - 13 ng/L Final     Comment:     Previous result verified on 5/11/2025 1301 on specimen/case [id number] called with component TRPHS for procedure Troponin I, High Sensitivity, Initial with value 388 ng/L.   05/11/2025 01:05  0 - 13 ng/L Final     Comment:     Previous result verified on 5/11/2025 1301 on specimen/case [id number] called with component TRPHS for procedure Troponin I, High Sensitivity, Initial with value 388 ng/L.     HGBA1C   Date/Time Value Ref Range Status   05/13/2025 05:42 PM 5.5 See comment % Final   05/11/2025 06:48 PM 5.5 See comment % Final   01/29/2025 09:25 AM 6.2 <5.7 % of total Hgb Final     Comment:     For someone without known diabetes, a hemoglobin   A1c value between 5.7% and 6.4% is consistent with  prediabetes and should be confirmed with a   follow-up test.     For someone with known diabetes, a value <7%  indicates that their diabetes is well controlled. A1c  targets should be individualized based on duration of  diabetes, age, comorbid conditions, and  other  considerations.     This assay result is consistent with an increased risk  of diabetes.     Currently, no consensus exists regarding use of  hemoglobin A1c for diagnosis of diabetes for children.        06/14/2024 02:09 PM 6.3 4.2 - 6.5 % Final   01/12/2024 09:12 AM 6.3 4.2 - 6.5 % Final     LDLCALC   Date/Time Value Ref Range Status   05/11/2025 06:49  <=99 mg/dL Final     Comment:                                 Near   Borderline      AGE      Desirable  Optimal    High     High     Very High     0-19 Y     0 - 109     ---    110-129   >/= 130     ----    20-24 Y     0 - 119     ---    120-159   >/= 160     ----      >24 Y     0 -  99   100-129  130-159   160-189     >/=190     01/29/2025 09:25 AM 89 mg/dL (calc) Final     Comment:     Reference range: <100     Desirable range <100 mg/dL for primary prevention;    <70 mg/dL for patients with CHD or diabetic patients   with > or = 2 CHD risk factors.     LDL-C is now calculated using the Buzz-Magdalena   calculation, which is a validated novel method providing   better accuracy than the Friedewald equation in the   estimation of LDL-C.   Buzz SS et al. LILLIAN. 2013;310(19): 5738-1115   (http://education.[employer]/faq/PQJ334)     11/27/2023 05:17 PM 68 65 - 130 mg/dL Final   05/19/2023 11:04 AM 71 65 - 130 MG/DL Final   01/08/2022 09:49 AM 81 65 - 130 MG/DL Final   09/02/2020 08:13 AM 69 65 - 130 MG/DL Final     VLDL   Date/Time Value Ref Range Status   05/11/2025 06:49 PM 14 0 - 40 mg/dL Final      Last I/O:  I/O last 3 completed shifts:  In: 2860 (49.9 mL/kg) [P.O.:2860]  Out: 1000 (17.5 mL/kg) [Urine:1000 (0.5 mL/kg/hr)]  Weight: 57.3 kg     Past Cardiology Tests (Last 3 Years):  EKG:  Electrocardiogram 12-lead PRN for arrhythmia 06/06/2025      ECG 12 Lead 06/03/2025      Electrocardiogram, 12-lead PRN ACS symptoms 05/12/2025      ECG 12 lead 05/11/2025    Echo:  Anesthesia Intraoperative Transesophageal Echocardiogram  06/03/2025      Transthoracic Echo Complete 05/13/2025    Ejection Fractions:  EF   Date/Time Value Ref Range Status   06/03/2025 08:12 AM 63 %    05/13/2025 03:12 PM 43 %      Cath:  Cardiac Catheterization Procedure 05/13/2025    Stress Test:  No results found for this or any previous visit from the past 1095 days.    Cardiac Imaging:  No results found for this or any previous visit from the past 1095 days.      Inpatient Medications:  Scheduled Medications[1]  PRN Medications[2]  Continuous Medications[3]    Physical Exam:  Heart regular rate rhythm normal S1-S2 with no murmur or rub  Lungs clear to auscultation  Abdomen soft nontender overweight  Extremities with resolved pretibial edema.     Assessment/Plan     Atherosclerotic heart disease: Stable status post surgical revascularization.  Remains on aspirin, would start clopidogrel.  Will continue her high potency statin.  Ischemic Cardiomyopathy: Ejection fraction 40 to 45%. continue metoprolol, remains in sinus rhythm and heart rates have improved.  Blood pressure is better today.  I would add back her losartan at 25 mg daily.  Adding Farxiga.    6/9: Hemodynamics are stable and she is resting comfortably, anticipate discharge home today.  Will follow-up with me in 1 month, appointment already scheduled.            Code Status:  Full Code          Jordi Simpson DO       [1]   Scheduled medications   Medication Dose Route Frequency    aspirin  81 mg oral Daily    atorvastatin  80 mg oral Nightly    buPROPion SR  200 mg oral BID    clopidogrel  75 mg oral Daily    empagliflozin  10 mg oral Daily    enoxaparin  40 mg subcutaneous Daily    escitalopram  10 mg oral Daily    famotidine  20 mg oral Daily    Or    famotidine  20 mg intravenous Daily    insulin lispro  0-15 Units subcutaneous TID AC    levothyroxine  125 mcg oral Daily    lidocaine  1 patch transdermal Daily    losartan  50 mg oral Daily    metoprolol tartrate  25 mg oral BID    sennosides-docusate  sodium  2 tablet oral BID   [2]   PRN medications   Medication    acetaminophen    calcium chloride    calcium chloride    magnesium citrate    magnesium hydroxide    magnesium sulfate    magnesium sulfate    metoclopramide    Or    metoclopramide    naloxone    ondansetron ODT    Or    ondansetron    oxyCODONE    oxyCODONE    oxygen    polyethylene glycol    potassium chloride CR    Or    potassium chloride    potassium chloride CR    Or    potassium chloride    potassium chloride    potassium chloride   [3]   Continuous Medications   Medication Dose Last Rate

## 2025-06-09 NOTE — HH CARE COORDINATION
Home Care received a Referral for Nursing and Physical Therapy. We have processed the referral for a Start of Care on 06/10-06/11.     If you have any questions or concerns, please feel free to contact us at [phone]. Follow the prompts, enter your five digit zip code, and you will be directed to your care team on [facility].

## 2025-06-09 NOTE — DISCHARGE SUMMARY
Discharge Diagnosis  Atherosclerosis of native coronary artery of native heart with unstable angina pectoris    Issues Requiring Follow-Up  none    Test Results Pending At Discharge  Pending Labs       Order Current Status    Extra Tubes In process    Lavender Top In process            Hospital Course   Patient was admitted for elective CABG on 6/3/25. CABG x3,LIMA-Lad,SVG-OM^PDA. EVH. Posterior pericardial window. She did well post operatively. She was restarted on her home meds. Weaned of 02,Pacing wires cut and cleared for discharge on POD#6    Pertinent Physical Exam At Time of Discharge  Physical Exam  HENT:      Head: Normocephalic.   Cardiovascular:      Rate and Rhythm: Normal rate and regular rhythm.   Pulmonary:      Breath sounds: Normal breath sounds.   Abdominal:      General: Bowel sounds are normal.      Palpations: Abdomen is soft.   Skin:     General: Skin is warm and dry.   Neurological:      Mental Status: She is alert and oriented to person, place, and time.     MSI dry and intact. Healing well without any signs of infection.CT sutures intact.  SVG  incision healing well    Home Medications     Medication List      START taking these medications     acetaminophen 325 mg tablet; Commonly known as: Tylenol; Take 2 tablets   (650 mg) by mouth every 6 hours if needed for moderate pain (4 - 6).   clopidogrel 75 mg tablet; Commonly known as: Plavix; Take 1 tablet (75   mg) by mouth once daily.   empagliflozin 10 mg tablet; Commonly known as: Jardiance; Take 1 tablet   (10 mg) by mouth once daily.; Start taking on: Chayo 10, 2025   metoprolol tartrate 25 mg tablet; Commonly known as: Lopressor; Take 1   tablet (25 mg) by mouth 2 times a day.   oxyCODONE 5 mg immediate release tablet; Commonly known as: Roxicodone;   Take 1 tablet (5 mg) by mouth every 6 hours if needed for moderate pain (4   - 6).     CHANGE how you take these medications     buPROPion  mg 12 hr tablet; Commonly known as:  Wellbutrin SR; 1   tab(s), Oral, bid; What changed: Another medication with the same name was   removed. Continue taking this medication, and follow the directions you   see here.     CONTINUE taking these medications     aspirin 81 mg EC tablet   atorvastatin 80 mg tablet; Commonly known as: Lipitor; Take 1 tablet (80   mg) by mouth once daily at bedtime.   escitalopram 10 mg tablet; Commonly known as: Lexapro; Take 1 tablet (10   mg) by mouth once daily.   levothyroxine 125 mcg tablet; Commonly known as: Synthroid, Levoxyl;   TAKE 1 TABLET DAILY   losartan 50 mg tablet; Commonly known as: Cozaar; Take 1 tablet (50 mg)   by mouth once daily.   metFORMIN  mg 24 hr tablet; Commonly known as: Glucophage-XR   Mounjaro 7.5 mg/0.5 mL pen injector; Generic drug: tirzepatide; Inject   7.5 mg under the skin 1 (one) time per week.   MULTIPLE VITAMINS ORAL   nitroglycerin 0.4 mg SL tablet; Commonly known as: Nitrostat; Place 1   tablet (0.4 mg) under the tongue every 5 minutes if needed for chest pain.   Can take every 5 minutes for 3 total doses then call 911     STOP taking these medications     chlorhexidine 0.12 % solution; Commonly known as: Peridex   mupirocin 2 % ointment; Commonly known as: Bactroban       Outpatient Follow-Up  Future Appointments   Date Time Provider Department Center   7/8/2025  1:00 PM Oj Javed MD [id number] [facility]   7/9/2025  9:00 AM Jordi Simpson DO [id number] [facility]   8/15/2025  7:30 AM Antonette Pereira MD [id number] [facility]   9/15/2025  8:15 AM Jesusita Ulloa, PharmD [id number] [facility]       Natalia Jamison PA-C

## 2025-06-09 NOTE — PROGRESS NOTES
Patient with an active discharge. Patient will return home with [facility], start of care in 24-48 hours. Will follow as needed.      06/09/25 1118   Discharge Planning   Home or Post Acute Services In home services   Type of Home Care Services Home nursing visits;Home OT;Home PT   Expected Discharge Disposition Home H  ([facility])   Does the patient need discharge transport arranged? No

## 2025-06-09 NOTE — CARE PLAN
The patient's goals for the shift include To sleep pain free    The clinical goals for the shift include have BM and get some rest      Problem: Pain - Adult  Goal: Verbalizes/displays adequate comfort level or baseline comfort level  Outcome: Progressing     Problem: Safety - Adult  Goal: Free from fall injury  Outcome: Progressing     Problem: Discharge Planning  Goal: Discharge to home or other facility with appropriate resources  Outcome: Progressing     Problem: Chronic Conditions and Co-morbidities  Goal: Patient's chronic conditions and co-morbidity symptoms are monitored and maintained or improved  Outcome: Progressing     Problem: Nutrition  Goal: Nutrient intake appropriate for maintaining nutritional needs  Outcome: Progressing     Problem: Skin  Goal: Decreased wound size/increased tissue granulation at next dressing change  Outcome: Progressing  Goal: Participates in plan/prevention/treatment measures  Outcome: Progressing  Goal: Prevent/manage excess moisture  Outcome: Progressing  Goal: Prevent/minimize sheer/friction injuries  Outcome: Progressing  Goal: Promote/optimize nutrition  Outcome: Progressing  Goal: Promote skin healing  Outcome: Progressing

## 2025-06-09 NOTE — PROGRESS NOTES
Subjective Data:  69-year-old female with coronary artery disease presents with unstable angina.    Overnight Events:    None noted     Objective Data:  Last Recorded Vitals:  Vitals:    06/09/25 0700 06/09/25 0800 06/09/25 0829 06/09/25 0900   BP:  150/82     BP Location:       Patient Position:       Pulse: 77 77  79   Resp: 14 13  16   Temp: 37.1 °C (98.8 °F)      TempSrc: Oral      SpO2:       Weight:   57.3 kg (126 lb 5.2 oz)    Height:           Last Labs:  CBC - 6/9/2025:  5:04 AM  13.6 10.2 246    29.0      CMP - 6/9/2025:  5:04 AM  8.7 5.7 19 --- 0.8   2.7 3.1 10 57      PTT - 6/3/2025:  1:36 PM  1.2   12.8 26.8     TROPHS   Date/Time Value Ref Range Status   05/12/2025 08:34  0 - 13 ng/L Final     Comment:     Previous result verified on 5/11/2025 1301 on specimen/case [id number] called with component TRPHS for procedure Troponin I, High Sensitivity, Initial with value 388 ng/L.   05/12/2025 07:41  0 - 13 ng/L Final     Comment:     Previous result verified on 5/11/2025 1301 on specimen/case [id number] called with component TRPHS for procedure Troponin I, High Sensitivity, Initial with value 388 ng/L.   05/11/2025 01:05  0 - 13 ng/L Final     Comment:     Previous result verified on 5/11/2025 1301 on specimen/case [id number] called with component TRPHS for procedure Troponin I, High Sensitivity, Initial with value 388 ng/L.     HGBA1C   Date/Time Value Ref Range Status   05/13/2025 05:42 PM 5.5 See comment % Final   05/11/2025 06:48 PM 5.5 See comment % Final   01/29/2025 09:25 AM 6.2 <5.7 % of total Hgb Final     Comment:     For someone without known diabetes, a hemoglobin   A1c value between 5.7% and 6.4% is consistent with  prediabetes and should be confirmed with a   follow-up test.     For someone with known diabetes, a value <7%  indicates that their diabetes is well controlled. A1c  targets should be individualized based on duration of  diabetes, age, comorbid  conditions, and other  considerations.     This assay result is consistent with an increased risk  of diabetes.     Currently, no consensus exists regarding use of  hemoglobin A1c for diagnosis of diabetes for children.        06/14/2024 02:09 PM 6.3 4.2 - 6.5 % Final   01/12/2024 09:12 AM 6.3 4.2 - 6.5 % Final     LDLCALC   Date/Time Value Ref Range Status   05/11/2025 06:49  <=99 mg/dL Final     Comment:                                 Near   Borderline      AGE      Desirable  Optimal    High     High     Very High     0-19 Y     0 - 109     ---    110-129   >/= 130     ----    20-24 Y     0 - 119     ---    120-159   >/= 160     ----      >24 Y     0 -  99   100-129  130-159   160-189     >/=190     01/29/2025 09:25 AM 89 mg/dL (calc) Final     Comment:     Reference range: <100     Desirable range <100 mg/dL for primary prevention;    <70 mg/dL for patients with CHD or diabetic patients   with > or = 2 CHD risk factors.     LDL-C is now calculated using the Buzz-Magdalena   calculation, which is a validated novel method providing   better accuracy than the Friedewald equation in the   estimation of LDL-C.   Buzz SS et al. LILLIAN. 2013;310(19): 2913-6688   (http://education.[employer]"/faq/AFV074)     11/27/2023 05:17 PM 68 65 - 130 mg/dL Final   05/19/2023 11:04 AM 71 65 - 130 MG/DL Final   01/08/2022 09:49 AM 81 65 - 130 MG/DL Final   09/02/2020 08:13 AM 69 65 - 130 MG/DL Final     VLDL   Date/Time Value Ref Range Status   05/11/2025 06:49 PM 14 0 - 40 mg/dL Final      Last I/O:  I/O last 3 completed shifts:  In: 2860 (49.9 mL/kg) [P.O.:2860]  Out: 1000 (17.5 mL/kg) [Urine:1000 (0.5 mL/kg/hr)]  Weight: 57.3 kg     Past Cardiology Tests (Last 3 Years):  EKG:  Electrocardiogram 12-lead PRN for arrhythmia 06/06/2025      ECG 12 Lead 06/03/2025      Electrocardiogram, 12-lead PRN ACS symptoms 05/12/2025      ECG 12 lead 05/11/2025    Echo:  Anesthesia Intraoperative Transesophageal Echocardiogram  06/03/2025      Transthoracic Echo Complete 05/13/2025    Ejection Fractions:  EF   Date/Time Value Ref Range Status   06/03/2025 08:12 AM 63 %    05/13/2025 03:12 PM 43 %      Cath:  Cardiac Catheterization Procedure 05/13/2025    Stress Test:  No results found for this or any previous visit from the past 1095 days.    Cardiac Imaging:  No results found for this or any previous visit from the past 1095 days.      Inpatient Medications:  Scheduled Medications[1]  PRN Medications[2]  Continuous Medications[3]    Physical Exam:  Heart regular rate rhythm normal S1-S2  Lungs base is dull and otherwise clear to auscultation  Abdomen soft positive bowel sounds  Extremities trace pedal edema     Assessment/Plan   Atherosclerotic heart disease: Stable status post surgical revascularization.  Remains on aspirin, would start clopidogrel.  Will continue her high potency statin.  Ischemic Cardiomyopathy: Ejection fraction 40 to 45%. continue metoprolol, remains in sinus rhythm and heart rates have improved.  Blood pressure is better today.  I would add back her losartan at 25 mg daily.  Adding Farxiga.            6/9: Remains in sinus rhythm, resting comfortably with stable hemodynamics on guideline directed medical therapy for ischemic cardiomyopathy.      Code Status:  Full Code    I spent *** minutes in the professional and overall care of this patient.        Jordi Simpson DO       [1]   Scheduled medications   Medication Dose Route Frequency    aspirin  81 mg oral Daily    atorvastatin  80 mg oral Nightly    buPROPion SR  200 mg oral BID    clopidogrel  75 mg oral Daily    empagliflozin  10 mg oral Daily    enoxaparin  40 mg subcutaneous Daily    escitalopram  10 mg oral Daily    famotidine  20 mg oral Daily    Or    famotidine  20 mg intravenous Daily    insulin lispro  0-15 Units subcutaneous TID AC    levothyroxine  125 mcg oral Daily    lidocaine  1 patch transdermal Daily    losartan  50 mg oral Daily    metoprolol  tartrate  25 mg oral BID    sennosides-docusate sodium  2 tablet oral BID   [2]   PRN medications   Medication    acetaminophen    calcium chloride    calcium chloride    magnesium citrate    magnesium hydroxide    magnesium sulfate    magnesium sulfate    metoclopramide    Or    metoclopramide    naloxone    ondansetron ODT    Or    ondansetron    oxyCODONE    oxyCODONE    oxygen    polyethylene glycol    potassium chloride CR    Or    potassium chloride    potassium chloride CR    Or    potassium chloride    potassium chloride    potassium chloride   [3]   Continuous Medications   Medication Dose Last Rate

## 2025-06-09 NOTE — PROGRESS NOTES
[facility] Critical Care Medicine       Date:  6/9/2025  Patient:  Simona Betancourt  YOB: 1955  MRN:  [id number]   Admit Date:  6/3/2025  Hospital Length of Stay: 6   ICU Length of Stay: 5d 18h       Chief complaint: coronary artery disease            History of Present Illness:  Simona Betancourt is a 69 y.o. year old female patient with past medical history of CAD, MI w/ stents 10+ years ago, HTN, HLD, heavy smoker who presented 5/11 with complaints of nausea vomiting and feeling unwell. Underwent cardiac catheterization on 5/12 which showed multi-vessel disease. Today she had CABG x 3, LIMA-> LAD, RSVG-> OM-> PDA.      Interval ICU Events:  6/3: Arrived to ICU intubated and sedated. Not on pressors. Has two chest tubes, one mediastinal, one left pleural. Ventricular pacing wires VVI backup rate 50 BPM.      6/4: Right apical pneumothorax on cxr this am. Reports some nausea and vomiting this am. Pain is controlled with meds. Still requiring low dose nitroglycerin gtt.      6/5: Pneumothorax stable to improved this am. Transition from NRB to NC. Will remove lines and gordon today. Reports nagging pain to left upper abd/lower chest, no obvious source. Will monitor. Hemodynamically stable off drips. Denies continued n/v, will try to advance diet.     6/6: POD 3- Brief episode of bradycardia early this morning requiring pacing to kick in for ~4 beats, no more bradycardic events since. Blood pressure remains borderline soft, but improving. Encouraging PO intake. Decrease BB to half-dose due to above. Progressing well otherwise.     6/7: POD 4- Episode of hypoxia overnight requiring 2L NC - PTX appears stable/trace on serial imaging.  Now on room air this morning.  Consider gentle diuresis to optimize fluid status.  Review of telemetry shows multiple episodes of bradycardia earlier this morning again requiring backup pacing for a few beats.  May lower pacer to backup rate of 40 to assess true underlying  intrinsic rhythm.     6/8: POD 5- No episodes of bradycardia or hypoxia overnight, remains on room air. Will increase BB back to 25 BID. Continue gentle diuresis today. Progressing well, anticipate discharge in the morning.     6/9: POD 6- Blood pressure elevated overnight. Increased losratan back to home dose. Start plavix today. Anticipate discharge home today.     Objective     Medical History:  Medical History[1]  Surgical History[2]  Prescriptions Prior to Admission[3]  Ace inhibitors  Social History[4]  Family History[5]    Hospital Medications:    Continuous Medications[6]    Current Medications[7]    Review of Systems:  14 point review of systems was completed and negative except for those specially mention in my HPI    Physical Exam:    Heart Rate:  [59-80]   Temp:  [36.2 °C (97.2 °F)-37.1 °C (98.8 °F)]   Resp:  [9-24]   BP: (136-172)/(71-94)   Weight:  [57.3 kg (126 lb 5.2 oz)]   SpO2:  [93 %-97 %]     Physical Exam  Constitutional:       General: She is not in acute distress.  HENT:      Head: Normocephalic and atraumatic.   Eyes:      Extraocular Movements: Extraocular movements intact.      Conjunctiva/sclera: Conjunctivae normal.      Pupils: Pupils are equal, round, and reactive to light.   Cardiovascular:      Rate and Rhythm: Normal rate and regular rhythm.      Pulses: Normal pulses.      Heart sounds: Normal heart sounds.   Pulmonary:      Effort: Pulmonary effort is normal.   Chest:      Comments: Midline surgical incision with mild erythema around upper border, not infectious looking   Abdominal:      General: There is no distension.      Palpations: Abdomen is soft.   Musculoskeletal:      Right lower leg: No edema.      Left lower leg: No edema.   Skin:     General: Skin is warm.   Neurological:      General: No focal deficit present.      Mental Status: She is alert and oriented to person, place, and time.         Objective:    I have reviewed all medications, laboratory results, and imaging  pertinent for today's encounter.           Intake/Output Summary (Last 24 hours) at 6/9/2025 0733  Last data filed at 6/9/2025 0600  Gross per 24 hour   Intake 2140 ml   Output --   Net 2140 ml       Daily Labs:  CBC:   Results from last 7 days   Lab Units 06/09/25  0504 06/08/25  0418   WBC AUTO x10*3/uL 13.6* 13.6*   HEMOGLOBIN g/dL 10.2* 9.6*   HEMATOCRIT % 29.0* 28.5*   MCV fL 90 93     BMP:    Results from last 7 days   Lab Units 06/09/25  0504 06/08/25  0418   SODIUM mmol/L 137 136   POTASSIUM mmol/L 4.1 3.8   CHLORIDE mmol/L 100 102   CO2 mmol/L 31 28   BUN mg/dL 23 32*   CREATININE mg/dL 0.72 0.66   CALCIUM mg/dL 8.7 8.5*   GLUCOSE mg/dL 99 128*   MAGNESIUM mg/dL 1.77 1.89     ABG:   Results from last 7 days   Lab Units 06/04/25  0041 06/03/25  1640   POCT PH, ARTERIAL pH 7.35* 7.32*   POCT PCO2, ARTERIAL mm Hg 45* 44*   POCT PO2, ARTERIAL mm Hg 117* 117*   POCT SO2, ARTERIAL % 99 98   POCT HCO3 CALCULATED, ARTERIAL mmol/L 24.8 22.7   POCT LACTATE, ARTERIAL mmol/L 0.9 1.1      VBG:   Results from last 7 days   Lab Units 06/03/25  1054   POCT PH, VENOUS pH 7.41   POCT PCO2, VENOUS mm Hg 45   POCT PO2, VENOUS mm Hg 50*   POCT SO2, VENOUS % 88*   POCT HCO3 CALCULATED, VENOUS mmol/L 28.5*   POCT LACTATE, VENOUS mmol/L 1.9             Assessment/Plan:    I am currently managing this critically ill patient for the following problems:    Neurology/Psychiatry/Pain Control/Sedation:   Acute post-operative pain - likely incisional pain  History of anxiety, depression   - Pain Control: acetaminophen PRN  - Scheduled acetaminophen, lidoderm, PRN oxycodone  - Continue home wellbutrin, lexapro     Respiratory/ENT:  Post-operative respiratory insufficiency without respiratory failure   Right apical pneumothorax - trace on morning CXR  - Supplemental O2: none, on room air   - Maintain SpO2 >92%  - Continuous pulse ox monitoring   - Bronchopulmonary hygiene     Cardiovascular:   Triple vessel CAD s/p CABG x 3 LIMA -> LAD,  RSVG -> OM, PDA  Intermittent sinus bradycardia - asymptomatic, requires brief pacing  History of hypertension, NSTEMI, HTN, CAD, ischemic cardiomopathy  Pre-op TTE: LVED 40-45%, grade I diastolic dysfunction, slight elevated RVSP 33mmHg  - Continue aspirin, metoprolol, lipitor, jardiance   - Resume home losartan dose  - Maintain pacing wires VVI @ 30  - Start plavix prior to dc  - Maintain MAPs >65  - CTS following and appreciate further management  - Continuous cardiac monitoring   - EKGs PRN for ACS symptoms, arrhythmias      Gastrointestinal:  No active concerns   - Diet: regular  - BR: colace  - GI Prophylaxis: none     Renal/Volume Status/Electrolytes:  Acute kidney injury - Cr 1.37 post-operatively  Hyponatremia suspect hypervolemia   - Baseline Cr ~0.9-1.0  - Hyponatremia resolved w diuresis   - Replete electrolytes to maintain K >4.0 and Mg >2.0  - Daily RFP, Mg    Endocrinology:  History of T2DM, hypothyroidism   - Home diabetic regimen: metformin 750 BID - on hold   -- Resume on dc  - cSSI ACHS   - Continue home synthroid   - Hypoglycemia protocol PRN      Infectious Disease:  Leukocytosis - WBC 15 -> 13, predominnantly neutorphils, no toxic left shift   - Antibiotics: dave-op ancef x48 hours, completed   - Persistent leukocytosis presumed to be reactive currently, no signs of infection, no other SIRS critertia met  - Monitor w daily CBC, can broaden infectious work-up s/sx present      Hematology/Oncology:  Acute post-operative blood loss anemia and thrombocytopenia   - Baseline Hgb 13-14  - Transfuse if Hgb <7.0 or symptomatic anemia  - Daily CBC     MSK/Skin:  No active concerns   - PT/OT eval, OOB and to chair as tolerated   - ICU skin protocol, padded pressure points     Ethics/Code Status:  Full code      :  DVT Prophylaxis: SQH, SCDs  GI Prophylaxis: none  Bowel Regimen: colace, miralax PRN  Diet: cardiac  CVC: none  Macon: none  Olivarez: none  Restraints: none  Disposition:  discharge    Paramjit Marin PA-C  Pulmonary & Critical Care Medicine   [facility]           [1]   Past Medical History:  Diagnosis Date    ADHD (attention deficit hyperactivity disorder)     Anxiety     Chronic viral hepatitis C (Multi)     Depression     Diabetes mellitus with hyperglycemia     Heart disease     Hypercholesterolemia     Hypertension     Hypothyroidism     Myocardial infarction (Multi)     Nausea and vomiting 04/10/2023    Obesity     Visual impairment    [2]   Past Surgical History:  Procedure Laterality Date    CARDIAC CATHETERIZATION N/A 2025    Procedure: LHC, With LV;  Surgeon: Jordi Simpson DO;  Location: [facility] Cardiac Cath Lab;  Service: Cardiovascular;  Laterality: N/A;     SECTION, LOW TRANSVERSE      CORONARY ANGIOPLASTY WITH STENT PLACEMENT      KNEE ARTHROPLASTY     [3]   Medications Prior to Admission   Medication Sig Dispense Refill Last Dose/Taking    aspirin 81 mg EC tablet = 1 tab(s) ( 81 mg ), PO, Daily, # 90 tab(s), 0 Refill(s), Type: Maintenance   6/3/2025 at  4:30 AM    atorvastatin (Lipitor) 80 mg tablet Take 1 tablet (80 mg) by mouth once daily at bedtime. 30 tablet 0 2025 at  9:00 PM    buPROPion SR (Wellbutrin SR) 200 mg 12 hr tablet  1 tab(s), Oral, bid 60 tablet 0 6/3/2025 at  4:30 AM    [] chlorhexidine (Peridex) 0.12 % solution Use as directed 473 mL 0 6/3/2025 at  4:30 AM    escitalopram (Lexapro) 10 mg tablet Take 1 tablet (10 mg) by mouth once daily. 90 tablet 1 6/3/2025 at  4:30 AM    levothyroxine (Synthroid, Levoxyl) 125 mcg tablet TAKE 1 TABLET DAILY 90 tablet 2 6/3/2025 at  4:30 AM    losartan (Cozaar) 50 mg tablet Take 1 tablet (50 mg) by mouth once daily. 30 tablet 0 Past Week    metFORMIN XR (Glucophage-XR) 750 mg 24 hr tablet Take 1 tablet (750 mg) by mouth 2 times a day. Do not crush, chew, or split.   Past Week    multivitamin (MULTIPLE VITAMINS ORAL) 1 cap(s), PO, Daily, # 90 cap(s), 0 Refill(s), Type: Maintenance    Past Week    mupirocin (Bactroban) 2 % ointment Apply topically 2 times a day.   6/3/2025 at  4:30 AM    buPROPion SR (Wellbutrin SR) 200 mg 12 hr tablet  1 tab(s), Oral, bid 180 tablet 1     nitroglycerin (Nitrostat) 0.4 mg SL tablet Place 1 tablet (0.4 mg) under the tongue every 5 minutes if needed for chest pain. Can take every 5 minutes for 3 total doses then call 911 90 tablet 12     tirzepatide (Mounjaro) 7.5 mg/0.5 mL pen injector Inject 7.5 mg under the skin 1 (one) time per week. (Patient taking differently: Inject 7.5 mg under the skin 1 (one) time per week. Thursday  last dose25) 2 mL 5    [4]   Social History  Tobacco Use    Smoking status: Former     Current packs/day: 0.00     Average packs/day: 0.5 packs/day for 54.3 years (27.2 ttl pk-yrs)     Types: Cigarettes     Start date: 1971     Quit date: 2025     Years since quittin.0     Passive exposure: Current    Smokeless tobacco: Former   Vaping Use    Vaping status: Never Used   Substance Use Topics    Alcohol use: Not Currently    Drug use: Yes     Frequency: 4.0 times per week     Types: Marijuana     Comment: smokes thc nightly   [5]   Family History  Problem Relation Name Age of Onset    Hypertension Mother Mom     Stroke Mother Mom     Hypertension Father Dad     Cancer Father Dad     Alcohol abuse Father Dad     Drug abuse Father Dad    [6]    [7]   Current Facility-Administered Medications:     acetaminophen (Tylenol) tablet 650 mg, 650 mg, oral, q6h PRN, Owen Deleon, APRN-CNP    aspirin chewable tablet 81 mg, 81 mg, oral, Daily, Natalia Jamison PA-C, 81 mg at 25 0848    atorvastatin (Lipitor) tablet 80 mg, 80 mg, oral, Nightly, Natalia Jamison PA-C, 80 mg at 25    buPROPion SR (Wellbutrin SR) 12 hr tablet 200 mg, 200 mg, oral, BID, Paramjit Marin PA-C, 200 mg at 25    calcium chloride 0.5 g in dextrose 5% 50 mL IV, 0.5 g, intravenous, q8h PRN, Natalia Jamison PA-C    calcium chloride 1 g in  dextrose 5% 50 mL IV, 1 g, intravenous, q8h PRN, Natalia Jamison PA-C    empagliflozin (Jardiance) tablet 10 mg, 10 mg, oral, Daily, Brandon Mayo DO, 10 mg at 06/08/25 1118    enoxaparin (Lovenox) syringe 40 mg, 40 mg, subcutaneous, Daily, Paramjit Marin PA-C, 40 mg at 06/08/25 0847    escitalopram (Lexapro) tablet 10 mg, 10 mg, oral, Daily, Paramjit Mairn PA-C, 10 mg at 06/08/25 0847    famotidine (Pepcid) tablet 20 mg, 20 mg, oral, Daily, 20 mg at 06/08/25 0848 **OR** famotidine PF (Pepcid) injection 20 mg, 20 mg, intravenous, Daily, MICKEY Jonas-CNP    insulin lispro injection 0-15 Units, 0-15 Units, subcutaneous, TID AC, MICKEY Szymanski-CNP, 15 Units at 06/08/25 1130    levothyroxine (Synthroid, Levoxyl) tablet 125 mcg, 125 mcg, oral, Daily, Paramjit Marin PA-C, 125 mcg at 06/09/25 0607    lidocaine 4 % patch 1 patch, 1 patch, transdermal, Daily, Paramjit Marin PA-C, 1 patch at 06/08/25 0848    losartan (Cozaar) tablet 50 mg, 50 mg, oral, Daily, Owen Deleon APRN-CNP    magnesium citrate solution 296 mL, 296 mL, oral, Once PRN, Natalia Jamison PA-C    magnesium hydroxide (Milk of Magnesia) 400 mg/5 mL suspension 30 mL, 30 mL, oral, Daily PRN, Natalia Jamison PA-C    magnesium sulfate 2 g in sterile water for injection 50 mL, 2 g, intravenous, q6h PRN, Natalia Jamison PA-C, Stopped at 06/05/25 0745    magnesium sulfate 3 g in dextrose 5% 100 mL IV, 3 g, intravenous, q6h PRN, Natalia Jamison PA-C    metoclopramide (Reglan) tablet 10 mg, 10 mg, oral, q6h PRN **OR** metoclopramide (Reglan) injection 10 mg, 10 mg, intravenous, q6h PRN, Natalia Jamison PA-C, 10 mg at 06/04/25 0414    metoprolol tartrate (Lopressor) tablet 25 mg, 25 mg, oral, BID, Paramjit Marin PA-C, 25 mg at 06/08/25 2037    naloxone (Narcan) injection 0.2 mg, 0.2 mg, intravenous, q5 min PRN, Natalia Jamison PA-C    ondansetron ODT (Zofran-ODT) disintegrating tablet 4 mg, 4 mg, oral, q8h PRN **OR** ondansetron (Zofran)  injection 4 mg, 4 mg, intravenous, q4h PRN, Paramjit Marin PA-C    oxyCODONE (Roxicodone) immediate release tablet 10 mg, 10 mg, oral, q4h PRN, Natalia Jamison PA-C, 10 mg at 06/09/25 0149    oxyCODONE (Roxicodone) immediate release tablet 5 mg, 5 mg, oral, q4h PRN, Natalia Jamison PA-C, 5 mg at 06/07/25 1007    oxygen (O2) therapy, , inhalation, Continuous PRN - O2/gases, Oj Javed MD    polyethylene glycol (Glycolax, Miralax) packet 17 g, 17 g, oral, Daily PRN, Paramjit Marin PA-C, 17 g at 06/08/25 1752    potassium chloride CR (Klor-Con M20) ER tablet 20 mEq, 20 mEq, oral, q6h PRN, 20 mEq at 06/05/25 0529 **OR** potassium chloride (Klor-Con) packet 20 mEq, 20 mEq, oral, q6h PRN, Natalia Jamison PA-C    potassium chloride CR (Klor-Con M20) ER tablet 40 mEq, 40 mEq, oral, q6h PRN **OR** potassium chloride (Klor-Con) packet 40 mEq, 40 mEq, oral, q6h PRN, Natalia Jamison PA-C    potassium chloride 20 mEq in sterile water for injection 100 mL, 20 mEq, intravenous, q6h PRN, Natalia Jamison PA-C, Stopped at 06/03/25 1648    potassium chloride 40 mEq in sterile water for injection 100 mL, 40 mEq, intravenous, q6h PRN, Natalia Jamison PA-C    sennosides-docusate sodium (Laila-Colace) 8.6-50 mg per tablet 2 tablet, 2 tablet, oral, BID, Owen Deleon APRN-CNP, 2 tablet at 06/08/25 2037

## 2025-06-10 ENCOUNTER — PATIENT OUTREACH (OUTPATIENT)
Dept: PRIMARY CARE | Facility: CLINIC | Age: 70
End: 2025-06-10
Payer: COMMERCIAL

## 2025-06-10 NOTE — PROGRESS NOTES
Discharge Facility:[facility]  Discharge Diagnosis:Atherosclerosis  Admission Date:6/3/25  Discharge Date: 6/9/25    PCP Appointment Date:No contact made.Message sent to office  Specialist Appointment Date: Cardiology 7/8/25  Hospital Encounter and Summary Linked: Yes    Admission (Discharged) with Oj Javed MD (06/03/2025)   Discharge Summary by Natalia Jamison PA-C (06/09/2025 08:10)     Two attempts were made to reach patient within two business days after discharge. Left voicemail with contact information for patient to call back with any non-emergent questions or concerns.

## 2025-06-12 ENCOUNTER — HOME CARE VISIT (OUTPATIENT)
Dept: HOME HEALTH SERVICES | Facility: HOME HEALTH | Age: 70
End: 2025-06-12
Payer: COMMERCIAL

## 2025-06-12 DIAGNOSIS — I21.4 NSTEMI (NON-ST ELEVATED MYOCARDIAL INFARCTION) (MULTI): ICD-10-CM

## 2025-06-12 DIAGNOSIS — E11.65 TYPE 2 DIABETES MELLITUS WITH HYPERGLYCEMIA, UNSPECIFIED WHETHER LONG TERM INSULIN USE (MULTI): Primary | ICD-10-CM

## 2025-06-12 PROCEDURE — G0299 HHS/HOSPICE OF RN EA 15 MIN: HCPCS

## 2025-06-12 RX ORDER — BLOOD-GLUCOSE METER
EACH MISCELLANEOUS
Qty: 200 STRIP | Refills: 2 | Status: SHIPPED | OUTPATIENT
Start: 2025-06-12

## 2025-06-13 ENCOUNTER — TELEPHONE (OUTPATIENT)
Dept: CARDIAC SURGERY | Facility: CLINIC | Age: 70
End: 2025-06-13
Payer: COMMERCIAL

## 2025-06-13 ENCOUNTER — HOME CARE VISIT (OUTPATIENT)
Dept: HOME HEALTH SERVICES | Facility: HOME HEALTH | Age: 70
End: 2025-06-13
Payer: COMMERCIAL

## 2025-06-13 VITALS
WEIGHT: 127 LBS | SYSTOLIC BLOOD PRESSURE: 152 MMHG | TEMPERATURE: 98 F | OXYGEN SATURATION: 97 % | HEART RATE: 61 BPM | RESPIRATION RATE: 18 BRPM | DIASTOLIC BLOOD PRESSURE: 96 MMHG | BODY MASS INDEX: 24.94 KG/M2 | HEIGHT: 60 IN

## 2025-06-13 DIAGNOSIS — R11.2 NAUSEA AND VOMITING, UNSPECIFIED VOMITING TYPE: Primary | ICD-10-CM

## 2025-06-13 RX ORDER — ONDANSETRON 4 MG/1
8 TABLET, FILM COATED ORAL EVERY 8 HOURS PRN
Qty: 30 TABLET | Refills: 1 | Status: SHIPPED | OUTPATIENT
Start: 2025-06-13 | End: 2025-06-23

## 2025-06-13 ASSESSMENT — ENCOUNTER SYMPTOMS
COUGH: 1
PAIN LOCATION: CHEST
APPETITE LEVEL: FAIR
HIGHEST PAIN SEVERITY IN PAST 24 HOURS: 7/10
DYSPNEA ACTIVITY LEVEL: AFTER AMBULATING LESS THAN 10 FT
LOWEST PAIN SEVERITY IN PAST 24 HOURS: 5/10
NAUSEA: 1
COUGH CHARACTERISTICS: PRODUCTIVE
PAIN LOCATION - PAIN QUALITY: DEEP
PAIN LOCATION - PAIN DURATION: CONSTANT
SUBJECTIVE PAIN PROGRESSION: WAXING AND WANING
PAIN LOCATION - PAIN SEVERITY: 5/10
MUSCLE WEAKNESS: 1
PAIN LOCATION - EXACERBATING FACTORS: MOVEMENT
DIZZINESS: 1
SHORTNESS OF BREATH: 1
LAST BOWEL MOVEMENT: 67367
PAIN: 1
PERSON REPORTING PAIN: PATIENT
PAIN LOCATION - PAIN FREQUENCY: CONSTANT
PAIN SEVERITY GOAL: 2/10

## 2025-06-13 ASSESSMENT — ACTIVITIES OF DAILY LIVING (ADL)
AMBULATION ASSISTANCE: 1
OASIS_M1830: 03
AMBULATION ASSISTANCE: STAND BY ASSIST
ENTERING_EXITING_HOME: NEEDS ASSISTANCE

## 2025-06-13 ASSESSMENT — LIFESTYLE VARIABLES: SMOKING_STATUS: 0

## 2025-06-13 NOTE — TELEPHONE ENCOUNTER
Patient called c/o nausea and vomiting last 4 days. Stated she thinks it is related to the Jardiance. This is a new med. Will stop jardiance,and Rx for Zofran sent. Advised patient to continue her metformin and track BS. Also,try diet ginger ale and crackers . She will let us know if she does not feel better. I stated if this does not improve over the next day or two she should seek help in the ER.

## 2025-06-16 ENCOUNTER — APPOINTMENT (OUTPATIENT)
Dept: HOME HEALTH SERVICES | Facility: HOME HEALTH | Age: 70
End: 2025-06-16
Payer: COMMERCIAL

## 2025-06-16 VITALS — OXYGEN SATURATION: 97 % | HEART RATE: 64 BPM

## 2025-06-16 PROCEDURE — G0151 HHCP-SERV OF PT,EA 15 MIN: HCPCS

## 2025-06-16 SDOH — ECONOMIC STABILITY: HOUSING INSECURITY: HOME SAFETY: AVOID LIFTING, PUSHING, PULLING MORE THAN 5 POUNDS IS HER PRECAUTION.

## 2025-06-16 ASSESSMENT — ENCOUNTER SYMPTOMS
PERSON REPORTING PAIN: PATIENT
LOWEST PAIN SEVERITY IN PAST 24 HOURS: 0/10
PAIN LOCATION: LEFT SHOULDER
PAIN SEVERITY GOAL: 0/10
PAIN: 1
HIGHEST PAIN SEVERITY IN PAST 24 HOURS: 4/10
PAIN LOCATION - PAIN SEVERITY: 4/10

## 2025-06-16 ASSESSMENT — ACTIVITIES OF DAILY LIVING (ADL)
AMBULATION ASSISTANCE: 1
AMBULATION ASSISTANCE: INDEPENDENT
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
## 2025-06-17 ENCOUNTER — HOME CARE VISIT (OUTPATIENT)
Dept: HOME HEALTH SERVICES | Facility: HOME HEALTH | Age: 70
End: 2025-06-17
Payer: COMMERCIAL

## 2025-06-17 VITALS
DIASTOLIC BLOOD PRESSURE: 68 MMHG | OXYGEN SATURATION: 98 % | SYSTOLIC BLOOD PRESSURE: 138 MMHG | HEART RATE: 58 BPM | RESPIRATION RATE: 16 BRPM | TEMPERATURE: 97.9 F | WEIGHT: 127 LBS | BODY MASS INDEX: 24.8 KG/M2

## 2025-06-17 PROCEDURE — G0300 HHS/HOSPICE OF LPN EA 15 MIN: HCPCS

## 2025-06-18 ENCOUNTER — HOME CARE VISIT (OUTPATIENT)
Dept: HOME HEALTH SERVICES | Facility: HOME HEALTH | Age: 70
End: 2025-06-18
Payer: COMMERCIAL

## 2025-06-18 VITALS
OXYGEN SATURATION: 97 % | RESPIRATION RATE: 18 BRPM | TEMPERATURE: 97.2 F | DIASTOLIC BLOOD PRESSURE: 78 MMHG | SYSTOLIC BLOOD PRESSURE: 128 MMHG | HEART RATE: 56 BPM

## 2025-06-18 PROCEDURE — RXMED WILLOW AMBULATORY MEDICATION CHARGE

## 2025-06-18 PROCEDURE — G0157 HHC PT ASSISTANT EA 15: HCPCS | Mod: CQ

## 2025-06-18 ASSESSMENT — ENCOUNTER SYMPTOMS
CHANGE IN APPETITE: UNCHANGED
PERSON REPORTING PAIN: PATIENT
DENIES PAIN: 1
APPETITE LEVEL: GOOD
LAST BOWEL MOVEMENT: 67374
DENIES PAIN: 1
HYPERTENSION: 1

## 2025-06-20 ENCOUNTER — PHARMACY VISIT (OUTPATIENT)
Dept: PHARMACY | Facility: CLINIC | Age: 70
End: 2025-06-20
Payer: COMMERCIAL

## 2025-06-20 ENCOUNTER — HOME CARE VISIT (OUTPATIENT)
Dept: HOME HEALTH SERVICES | Facility: HOME HEALTH | Age: 70
End: 2025-06-20
Payer: COMMERCIAL

## 2025-06-20 VITALS
HEART RATE: 60 BPM | DIASTOLIC BLOOD PRESSURE: 82 MMHG | SYSTOLIC BLOOD PRESSURE: 130 MMHG | OXYGEN SATURATION: 98 % | TEMPERATURE: 98.3 F | RESPIRATION RATE: 16 BRPM

## 2025-06-20 PROCEDURE — G0300 HHS/HOSPICE OF LPN EA 15 MIN: HCPCS

## 2025-06-20 ASSESSMENT — ENCOUNTER SYMPTOMS
CHANGE IN APPETITE: UNCHANGED
APPETITE LEVEL: GOOD
DENIES PAIN: 1

## 2025-06-23 ENCOUNTER — HOME CARE VISIT (OUTPATIENT)
Dept: HOME HEALTH SERVICES | Facility: HOME HEALTH | Age: 70
End: 2025-06-23
Payer: COMMERCIAL

## 2025-06-23 VITALS
DIASTOLIC BLOOD PRESSURE: 88 MMHG | RESPIRATION RATE: 18 BRPM | TEMPERATURE: 97.2 F | SYSTOLIC BLOOD PRESSURE: 138 MMHG | OXYGEN SATURATION: 98 % | HEART RATE: 53 BPM

## 2025-06-23 PROCEDURE — G0157 HHC PT ASSISTANT EA 15: HCPCS | Mod: CQ

## 2025-06-23 ASSESSMENT — ENCOUNTER SYMPTOMS
PERSON REPORTING PAIN: PATIENT
DENIES PAIN: 1

## 2025-06-24 ENCOUNTER — PATIENT OUTREACH (OUTPATIENT)
Dept: PRIMARY CARE | Facility: CLINIC | Age: 70
End: 2025-06-24
Payer: COMMERCIAL

## 2025-06-24 NOTE — PROGRESS NOTES
Confirmation of at least 2 patient identifiers.    Completed telephonic follow-up with patient approximately 14 days post discharge, no PCP follow up.     Spoke to patient during outreach call.    Patient reports feeling: Improved    Patient has questions or concerns about medications: No    Have all prescribed medications been filled? Yes    Patient has necessary resources to manage their care? Yes    Patient has questions or concerns? No    Next care management follow-up approximately within one month.  Care  information provided to patient.

## 2025-06-25 ENCOUNTER — HOME CARE VISIT (OUTPATIENT)
Dept: HOME HEALTH SERVICES | Facility: HOME HEALTH | Age: 70
End: 2025-06-25
Payer: COMMERCIAL

## 2025-06-25 VITALS
DIASTOLIC BLOOD PRESSURE: 86 MMHG | HEART RATE: 56 BPM | RESPIRATION RATE: 20 BRPM | SYSTOLIC BLOOD PRESSURE: 160 MMHG | TEMPERATURE: 98.7 F | OXYGEN SATURATION: 98 %

## 2025-06-25 VITALS
TEMPERATURE: 97.2 F | DIASTOLIC BLOOD PRESSURE: 86 MMHG | RESPIRATION RATE: 18 BRPM | OXYGEN SATURATION: 97 % | HEART RATE: 69 BPM | SYSTOLIC BLOOD PRESSURE: 162 MMHG

## 2025-06-25 PROCEDURE — G0157 HHC PT ASSISTANT EA 15: HCPCS | Mod: CQ

## 2025-06-25 PROCEDURE — G0300 HHS/HOSPICE OF LPN EA 15 MIN: HCPCS

## 2025-06-25 ASSESSMENT — ENCOUNTER SYMPTOMS
DENIES PAIN: 1
CHANGE IN APPETITE: UNCHANGED
APPETITE LEVEL: GOOD
DENIES PAIN: 1
LAST BOWEL MOVEMENT: 67380
PERSON REPORTING PAIN: PATIENT
PERSON REPORTING PAIN: PATIENT

## 2025-06-30 ENCOUNTER — HOME CARE VISIT (OUTPATIENT)
Dept: HOME HEALTH SERVICES | Facility: HOME HEALTH | Age: 70
End: 2025-06-30
Payer: COMMERCIAL

## 2025-06-30 VITALS
HEART RATE: 60 BPM | RESPIRATION RATE: 18 BRPM | OXYGEN SATURATION: 97 % | TEMPERATURE: 96.7 F | SYSTOLIC BLOOD PRESSURE: 160 MMHG | DIASTOLIC BLOOD PRESSURE: 90 MMHG

## 2025-06-30 PROCEDURE — G0157 HHC PT ASSISTANT EA 15: HCPCS | Mod: CQ

## 2025-06-30 ASSESSMENT — ENCOUNTER SYMPTOMS
PERSON REPORTING PAIN: PATIENT
DENIES PAIN: 1

## 2025-07-01 ENCOUNTER — HOME CARE VISIT (OUTPATIENT)
Dept: HOME HEALTH SERVICES | Facility: HOME HEALTH | Age: 70
End: 2025-07-01
Payer: COMMERCIAL

## 2025-07-01 VITALS
DIASTOLIC BLOOD PRESSURE: 92 MMHG | RESPIRATION RATE: 18 BRPM | SYSTOLIC BLOOD PRESSURE: 176 MMHG | TEMPERATURE: 97.4 F | OXYGEN SATURATION: 98 % | HEART RATE: 57 BPM

## 2025-07-01 DIAGNOSIS — I21.4 NSTEMI (NON-ST ELEVATED MYOCARDIAL INFARCTION) (MULTI): ICD-10-CM

## 2025-07-01 DIAGNOSIS — E11.9 TYPE 2 DIABETES MELLITUS WITHOUT COMPLICATION, WITHOUT LONG-TERM CURRENT USE OF INSULIN: ICD-10-CM

## 2025-07-01 PROCEDURE — G0299 HHS/HOSPICE OF RN EA 15 MIN: HCPCS

## 2025-07-01 RX ORDER — LOSARTAN POTASSIUM 50 MG/1
50 TABLET ORAL DAILY
Qty: 30 TABLET | Refills: 0 | Status: SHIPPED | OUTPATIENT
Start: 2025-07-01 | End: 2025-07-01 | Stop reason: SDUPTHER

## 2025-07-01 SDOH — ECONOMIC STABILITY: GENERAL

## 2025-07-01 ASSESSMENT — ENCOUNTER SYMPTOMS
APPETITE LEVEL: GOOD
SHORTNESS OF BREATH: 1
PERSON REPORTING PAIN: PATIENT
DENIES PAIN: 1
FATIGUES EASILY: 1
DYSPNEA ACTIVITY LEVEL: AFTER AMBULATING MORE THAN 20 FT
CHANGE IN APPETITE: UNCHANGED
LAST BOWEL MOVEMENT: 67387

## 2025-07-01 ASSESSMENT — ACTIVITIES OF DAILY LIVING (ADL): MONEY MANAGEMENT (EXPENSES/BILLS): NEEDS ASSISTANCE

## 2025-07-02 PROCEDURE — RXMED WILLOW AMBULATORY MEDICATION CHARGE

## 2025-07-02 RX ORDER — LOSARTAN POTASSIUM 50 MG/1
50 TABLET ORAL DAILY
Qty: 30 TABLET | Refills: 11 | Status: SHIPPED | OUTPATIENT
Start: 2025-07-02 | End: 2026-06-27

## 2025-07-03 ENCOUNTER — PHARMACY VISIT (OUTPATIENT)
Dept: PHARMACY | Facility: CLINIC | Age: 70
End: 2025-07-03
Payer: COMMERCIAL

## 2025-07-03 ENCOUNTER — HOME CARE VISIT (OUTPATIENT)
Dept: HOME HEALTH SERVICES | Facility: HOME HEALTH | Age: 70
End: 2025-07-03
Payer: COMMERCIAL

## 2025-07-03 VITALS — SYSTOLIC BLOOD PRESSURE: 135 MMHG | HEART RATE: 53 BPM | DIASTOLIC BLOOD PRESSURE: 81 MMHG | OXYGEN SATURATION: 97 %

## 2025-07-03 PROCEDURE — G0151 HHCP-SERV OF PT,EA 15 MIN: HCPCS

## 2025-07-03 ASSESSMENT — ACTIVITIES OF DAILY LIVING (ADL)
AMBULATION ASSISTANCE: 1
AMBULATION ASSISTANCE: MINIMUM ASSIST

## 2025-07-03 ASSESSMENT — ENCOUNTER SYMPTOMS
PAIN: 1
PERSON REPORTING PAIN: PATIENT
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
## 2025-07-08 ENCOUNTER — APPOINTMENT (OUTPATIENT)
Age: 70
End: 2025-07-08
Payer: COMMERCIAL

## 2025-07-08 ENCOUNTER — OFFICE VISIT (OUTPATIENT)
Dept: CARDIAC SURGERY | Facility: CLINIC | Age: 70
End: 2025-07-08
Payer: COMMERCIAL

## 2025-07-08 ENCOUNTER — HOSPITAL ENCOUNTER (OUTPATIENT)
Dept: RADIOLOGY | Facility: CLINIC | Age: 70
Discharge: HOME | End: 2025-07-08
Payer: COMMERCIAL

## 2025-07-08 VITALS
HEART RATE: 51 BPM | SYSTOLIC BLOOD PRESSURE: 155 MMHG | BODY MASS INDEX: 25.13 KG/M2 | HEIGHT: 60 IN | WEIGHT: 128 LBS | DIASTOLIC BLOOD PRESSURE: 82 MMHG | RESPIRATION RATE: 18 BRPM

## 2025-07-08 DIAGNOSIS — Z95.1 S/P CABG (CORONARY ARTERY BYPASS GRAFT): ICD-10-CM

## 2025-07-08 DIAGNOSIS — I25.10 CAD IN NATIVE ARTERY: Primary | ICD-10-CM

## 2025-07-08 PROCEDURE — 71046 X-RAY EXAM CHEST 2 VIEWS: CPT | Performed by: RADIOLOGY

## 2025-07-08 PROCEDURE — 1159F MED LIST DOCD IN RCRD: CPT | Performed by: PHYSICIAN ASSISTANT

## 2025-07-08 PROCEDURE — 3079F DIAST BP 80-89 MM HG: CPT | Performed by: PHYSICIAN ASSISTANT

## 2025-07-08 PROCEDURE — 71046 X-RAY EXAM CHEST 2 VIEWS: CPT

## 2025-07-08 PROCEDURE — 1111F DSCHRG MED/CURRENT MED MERGE: CPT | Performed by: PHYSICIAN ASSISTANT

## 2025-07-08 PROCEDURE — 3008F BODY MASS INDEX DOCD: CPT | Performed by: PHYSICIAN ASSISTANT

## 2025-07-08 PROCEDURE — 4010F ACE/ARB THERAPY RXD/TAKEN: CPT | Performed by: PHYSICIAN ASSISTANT

## 2025-07-08 PROCEDURE — 4004F PT TOBACCO SCREEN RCVD TLK: CPT | Performed by: PHYSICIAN ASSISTANT

## 2025-07-08 PROCEDURE — 3077F SYST BP >= 140 MM HG: CPT | Performed by: PHYSICIAN ASSISTANT

## 2025-07-08 PROCEDURE — 99213 OFFICE O/P EST LOW 20 MIN: CPT | Performed by: PHYSICIAN ASSISTANT

## 2025-07-08 PROCEDURE — 3050F LDL-C >= 130 MG/DL: CPT | Performed by: PHYSICIAN ASSISTANT

## 2025-07-08 PROCEDURE — 1126F AMNT PAIN NOTED NONE PRSNT: CPT | Performed by: PHYSICIAN ASSISTANT

## 2025-07-08 PROCEDURE — 3044F HG A1C LEVEL LT 7.0%: CPT | Performed by: PHYSICIAN ASSISTANT

## 2025-07-08 ASSESSMENT — LIFESTYLE VARIABLES
AUDIT-C TOTAL SCORE: 0
HOW OFTEN DO YOU HAVE SIX OR MORE DRINKS ON ONE OCCASION: NEVER
HOW MANY STANDARD DRINKS CONTAINING ALCOHOL DO YOU HAVE ON A TYPICAL DAY: PATIENT DOES NOT DRINK
HOW OFTEN DO YOU HAVE A DRINK CONTAINING ALCOHOL: NEVER
SKIP TO QUESTIONS 9-10: 1

## 2025-07-08 ASSESSMENT — ENCOUNTER SYMPTOMS
OCCASIONAL FEELINGS OF UNSTEADINESS: 0
LOSS OF SENSATION IN FEET: 0
DEPRESSION: 0

## 2025-07-08 ASSESSMENT — PAIN SCALES - GENERAL: PAINLEVEL_OUTOF10: 0-NO PAIN

## 2025-07-08 NOTE — PROGRESS NOTES
CARDIOTHORACIC SURGERY FOLLOW-UP PROGRESS NOTE    Subjective   Patient is a 69 y.o. female who presents for routine post-operative follow up. She comes in today complaining of nothing. Her activity level has been good.       Objective   Physical Exam  /82   Pulse 51   Resp 18   Ht (!) 1.524 m (5')   Wt 58.1 kg (128 lb)   LMP 01/01/2011 (Approximate)   BMI 25.00 kg/m²   Heart: regular rate and rhythm, no murmurs  Lungs: clear to auscultation bilaterally  Extremities: warm and well-perfused, peripheral pulses intact, no cyanosis, clubbing, or edema  Incision(s): sternum stable, healing well    Data Review:    X-ray: [facility]    Assessment/Plan   There are no diagnoses linked to this encounter.  Patient is s/p CABG x 3 off pump LIMA-LAD,SVG-PDA,SVG-OM. EVH on 6/3/25. She did well post operatively . She presents today for her post op appointment. She is doing well. No issues identified.  She will follow with her cardiologist in 2 weeks. We will follow on a prn basis only.  Natalia Jamison PA-C

## 2025-07-09 ENCOUNTER — APPOINTMENT (OUTPATIENT)
Facility: CLINIC | Age: 70
End: 2025-07-09
Payer: COMMERCIAL

## 2025-07-09 DIAGNOSIS — E11.65 TYPE 2 DIABETES MELLITUS WITH HYPERGLYCEMIA, UNSPECIFIED WHETHER LONG TERM INSULIN USE (MULTI): ICD-10-CM

## 2025-07-09 RX ORDER — BLOOD-GLUCOSE METER
EACH MISCELLANEOUS
Qty: 100 STRIP | Refills: 1 | Status: SHIPPED | OUTPATIENT
Start: 2025-07-09

## 2025-07-10 ENCOUNTER — APPOINTMENT (OUTPATIENT)
Dept: HOME HEALTH SERVICES | Facility: HOME HEALTH | Age: 70
End: 2025-07-10
Payer: COMMERCIAL

## 2025-07-15 DIAGNOSIS — I10 PRIMARY HYPERTENSION: ICD-10-CM

## 2025-07-16 ENCOUNTER — OFFICE VISIT (OUTPATIENT)
Dept: CARDIOLOGY | Facility: CLINIC | Age: 70
End: 2025-07-16
Payer: COMMERCIAL

## 2025-07-16 VITALS
BODY MASS INDEX: 23.75 KG/M2 | OXYGEN SATURATION: 97 % | SYSTOLIC BLOOD PRESSURE: 146 MMHG | HEART RATE: 73 BPM | WEIGHT: 121 LBS | HEIGHT: 60 IN | DIASTOLIC BLOOD PRESSURE: 92 MMHG

## 2025-07-16 DIAGNOSIS — I10 PRIMARY HYPERTENSION: ICD-10-CM

## 2025-07-16 DIAGNOSIS — E78.2 MIXED HYPERLIPIDEMIA: ICD-10-CM

## 2025-07-16 DIAGNOSIS — I25.10 CORONARY ARTERY DISEASE INVOLVING NATIVE CORONARY ARTERY OF NATIVE HEART WITHOUT ANGINA PECTORIS: Primary | ICD-10-CM

## 2025-07-16 DIAGNOSIS — I25.5 ISCHEMIC CARDIOMYOPATHY: ICD-10-CM

## 2025-07-16 DIAGNOSIS — I21.4 NSTEMI (NON-ST ELEVATED MYOCARDIAL INFARCTION) (MULTI): ICD-10-CM

## 2025-07-16 PROCEDURE — 99212 OFFICE O/P EST SF 10 MIN: CPT

## 2025-07-16 PROCEDURE — 1126F AMNT PAIN NOTED NONE PRSNT: CPT | Performed by: INTERNAL MEDICINE

## 2025-07-16 PROCEDURE — 1159F MED LIST DOCD IN RCRD: CPT | Performed by: INTERNAL MEDICINE

## 2025-07-16 PROCEDURE — 4010F ACE/ARB THERAPY RXD/TAKEN: CPT | Performed by: INTERNAL MEDICINE

## 2025-07-16 PROCEDURE — 3080F DIAST BP >= 90 MM HG: CPT | Performed by: INTERNAL MEDICINE

## 2025-07-16 PROCEDURE — 3008F BODY MASS INDEX DOCD: CPT | Performed by: INTERNAL MEDICINE

## 2025-07-16 PROCEDURE — 3077F SYST BP >= 140 MM HG: CPT | Performed by: INTERNAL MEDICINE

## 2025-07-16 PROCEDURE — 99214 OFFICE O/P EST MOD 30 MIN: CPT | Performed by: INTERNAL MEDICINE

## 2025-07-16 RX ORDER — LOSARTAN POTASSIUM 50 MG/1
50 TABLET ORAL 2 TIMES DAILY
Qty: 180 TABLET | Refills: 3 | Status: SHIPPED | OUTPATIENT
Start: 2025-07-16 | End: 2026-07-16

## 2025-07-16 RX ORDER — ATORVASTATIN CALCIUM 40 MG/1
40 TABLET, FILM COATED ORAL NIGHTLY
Qty: 90 TABLET | Refills: 3 | Status: SHIPPED | OUTPATIENT
Start: 2025-07-16 | End: 2026-07-16

## 2025-07-16 ASSESSMENT — PATIENT HEALTH QUESTIONNAIRE - PHQ9
3. TROUBLE FALLING OR STAYING ASLEEP OR SLEEPING TOO MUCH: SEVERAL DAYS
SUM OF ALL RESPONSES TO PHQ9 QUESTIONS 1 AND 2: 4
4. FEELING TIRED OR HAVING LITTLE ENERGY: SEVERAL DAYS
SUM OF ALL RESPONSES TO PHQ QUESTIONS 1-9: 13
7. TROUBLE CONCENTRATING ON THINGS, SUCH AS READING THE NEWSPAPER OR WATCHING TELEVISION: MORE THAN HALF THE DAYS
2. FEELING DOWN, DEPRESSED OR HOPELESS: MORE THAN HALF THE DAYS
5. POOR APPETITE OR OVEREATING: MORE THAN HALF THE DAYS
6. FEELING BAD ABOUT YOURSELF - OR THAT YOU ARE A FAILURE OR HAVE LET YOURSELF OR YOUR FAMILY DOWN: SEVERAL DAYS
1. LITTLE INTEREST OR PLEASURE IN DOING THINGS: MORE THAN HALF THE DAYS
9. THOUGHTS THAT YOU WOULD BE BETTER OFF DEAD, OR OF HURTING YOURSELF: NOT AT ALL
8. MOVING OR SPEAKING SO SLOWLY THAT OTHER PEOPLE COULD HAVE NOTICED. OR THE OPPOSITE, BEING SO FIGETY OR RESTLESS THAT YOU HAVE BEEN MOVING AROUND A LOT MORE THAN USUAL: MORE THAN HALF THE DAYS

## 2025-07-16 ASSESSMENT — LIFESTYLE VARIABLES
HOW OFTEN DO YOU HAVE A DRINK CONTAINING ALCOHOL: NEVER
HOW MANY STANDARD DRINKS CONTAINING ALCOHOL DO YOU HAVE ON A TYPICAL DAY: PATIENT DOES NOT DRINK
AUDIT-C TOTAL SCORE: 0
HOW OFTEN DO YOU HAVE SIX OR MORE DRINKS ON ONE OCCASION: NEVER
SKIP TO QUESTIONS 9-10: 1

## 2025-07-16 ASSESSMENT — ENCOUNTER SYMPTOMS
LOSS OF SENSATION IN FEET: 0
DEPRESSION: 0
OCCASIONAL FEELINGS OF UNSTEADINESS: 0

## 2025-07-16 ASSESSMENT — PAIN SCALES - GENERAL: PAINLEVEL_OUTOF10: 0-NO PAIN

## 2025-07-16 NOTE — ASSESSMENT & PLAN NOTE
On guideline directed medical therapy with metoprolol, losartan, and her Jardiance was discontinued.  No signs of volume overload    Echocardiogram in 3 months and then office follow-up to review lab work and echo results

## 2025-07-16 NOTE — ASSESSMENT & PLAN NOTE
Is not tolerating her current dose of atorvastatin 80 mg daily due to leg weakness and fatigue so I am reducing the dose to 40 mg.  She will take 1 week off statin therapy.  Lipid profile and metabolic profile in 3 months. Continue  low carbohydrate and low sugar Mediterranean/[place] dietary lifestyle.

## 2025-07-16 NOTE — ASSESSMENT & PLAN NOTE
Stable status post Post CABG x 3 surgical revascularization June 2025. Remains on aspirin, and clopidogrel.     Continue commitment to daily walking/exercise and dual antiplatelet therapy for 1 year

## 2025-07-16 NOTE — ASSESSMENT & PLAN NOTE
Continue antihypertensive medication as well as low carbohydrate and low sugar Mediterranean/[place] dietary lifestyle.  Increase her losartan to 50 mg twice daily for better blood pressure control.  Begin cardiac rehab

## 2025-07-16 NOTE — PROGRESS NOTES
[facility]        Subjective   Chief Complaint   Patient presents with    Hospital Follow-up     Patient presents to the office for a hospital follow up status post CABG. She states she is very tired, short of breath and her legs get tired easily.       Here for known coronary disease and follow-up after successful CABG x 3, with LIMA to LAD, SVG to circumflex OM-RCA PDA with posterior pericardial window    She has a past medical history of ADHD (attention deficit hyperactivity disorder), Anxiety, Chronic viral hepatitis C (Multi), Depression (?), Diabetes mellitus with hyperglycemia, Heart disease (?), Hypercholesterolemia, Hypertension (?), Hypothyroidism, Myocardial infarction (Multi) (?), Nausea and vomiting (04/10/2023), Obesity, and Visual impairment (?).  She has a past surgical history that includes Coronary angioplasty with stent ();  section, low transverse (); Cardiac catheterization (N/A, 2025); and Knee Arthroplasty.   Family medical history includes stroke in mother.  Current Outpatient Medications   Medication Sig Dispense Refill    acetaminophen (Tylenol) 325 mg tablet Take 2 tablets (650 mg) by mouth every 6 hours if needed for moderate pain (4 - 6).      aspirin 81 mg EC tablet = 1 tab(s) ( 81 mg ), PO, Daily, # 90 tab(s), 0 Refill(s), Type: Maintenance      blood sugar diagnostic (OneTouch Verio test strips) Test qd 100 strip 1    buPROPion SR (Wellbutrin SR) 200 mg 12 hr tablet  1 tab(s), Oral, bid 180 tablet 1    clopidogrel (Plavix) 75 mg tablet Take 1 tablet (75 mg) by mouth once daily. 30 tablet 1    empagliflozin (Jardiance) 10 mg tablet Take 1 tablet (10 mg) by mouth once daily. 30 tablet 0    escitalopram (Lexapro) 10 mg tablet Take 1 tablet (10 mg) by mouth once daily. 90 tablet 1    levothyroxine (Synthroid, Levoxyl) 125 mcg tablet TAKE 1 TABLET DAILY 90 tablet 2    metFORMIN XR (Glucophage-XR) 750 mg 24 hr  tablet Take 1 tablet (750 mg) by mouth 2 times a day. Do not crush, chew, or split.      metoprolol tartrate (Lopressor) 25 mg tablet Take 1 tablet (25 mg) by mouth 2 times a day. (Patient taking differently: Take 2 tablets (50 mg) by mouth once daily.) 60 tablet 1    multivitamin (MULTIPLE VITAMINS ORAL) 1 cap(s), PO, Daily, # 90 cap(s), 0 Refill(s), Type: Maintenance      oxyCODONE (Roxicodone) 5 mg immediate release tablet Take 1 tablet (5 mg) by mouth every 6 hours if needed for moderate pain (4 - 6). 28 tablet 0    tirzepatide (Mounjaro) 7.5 mg/0.5 mL pen injector Inject 7.5 mg under the skin 1 (one) time per week. (Patient taking differently: Inject 7.5 mg under the skin 1 (one) time per week. Thursday  last dose5-29-25) 2 mL 5    atorvastatin (Lipitor) 40 mg tablet Take 1 tablet (40 mg) by mouth once daily at bedtime. 90 tablet 3    losartan (Cozaar) 50 mg tablet Take 1 tablet (50 mg) by mouth 2 times a day. Take 50mg with breakfast.  If BP elevated at dinner time may take an additional 50mg tablet. 180 tablet 3    nitroglycerin (Nitrostat) 0.4 mg SL tablet Place 1 tablet (0.4 mg) under the tongue every 5 minutes if needed for chest pain. Can take every 5 minutes for 3 total doses then call 911 (Patient not taking: Reported on 7/16/2025) 90 tablet 12     No current facility-administered medications for this visit.      reports that she quit smoking about 2 months ago. Her smoking use included cigarettes. She started smoking about 54 years ago. She has a 27.2 pack-year smoking history. She has been exposed to tobacco smoke. She has quit using smokeless tobacco. She reports that she does not currently use alcohol. She reports current drug use. Frequency: 4.00 times per week. Drug: Marijuana.  Allergies:  Ace inhibitors    ROS: See HPI  CONSTITUTIONAL: Chills- none. Fever- none. Weight change appropriate for age.  HEENT: Headache- Negative.  Change in vision- none.  Ear pain- none. Nasal congestion- none.  Post-nasal drip-none.  Sore throat-none.  CARDIOLOGY: Chest pain- none.  Leg edema-trace.  Murmurs-soft systolic.  Palpitation- none.  RESPIRATORY: Denies any shortness of breath.  GI: Abdominal pain- none.  Change in bowel habits- none.  Constipation- none.  Diarrhea- none.  Nausea- none.  Vomiting- none.  MUSCULOSKELETAL: Joint pain- none.  Muscle aches- none.  DERMATOLOGY: Rash- none.  NEUROLOGY: Dizziness- none.   Headache- none.  PSYCHIATRY: Denies any depression or anxiety     Vitals:    07/16/25 1347   BP: (!) 146/92   Pulse: 73   SpO2: 97%   Weight: 54.9 kg (121 lb)   Height: (!) 1.524 m (5')   PainSc: 0-No pain   LMP: 01/01/2011      BMI:Body mass index is 23.63 kg/m².   General Cardiology:  General Appearance: Alert, oriented and in no acute distress.  HEENT: extra ocular movements intact (EOMI), pupils equal,  round, reactive to light and accommodation (PERRLA).  Carotid Upstroke: no bruit, normal.  Jugular Venous Distention (JVD): flat.  Chest: normal.  Lungs: Clear to auscultation,   Heart Sounds: no S3 or S4, normal S1, S2, regular rate.  Murmur, Click, Gallop: soft systolic murmur.  Abdomen: no hepatomegaly, no masses felt, soft.  Extremities: no leg edema.  Peripheral pulses: 2 plus bilateral.  NEUROLOGY Cranial nerves II-XII grossly intact.     Last Labs:  CMP:  Recent Labs     06/09/25  0504 06/08/25 0418 06/07/25  1719    136 134*   K 4.1 3.8 4.0    102 100   CO2 31 28 29   ANIONGAP 10 10 9*   BUN 23 32* 42*   CREATININE 0.72 0.66 0.90   EGFR >90 >90 70   GLUCOSE 99 128* 121*     Recent Labs     06/09/25  0504 06/08/25 0418 06/07/25  1719 05/14/25  0451 05/13/25  0409 05/12/25  0412 05/11/25  1213   ALBUMIN 3.1* 3.0* 3.2*   < > 3.6 4.4 4.8   ALKPHOS  --   --   --   --  57 74 78   ALT  --   --   --   --  10 9 10   AST  --   --   --   --  19 20 19   BILITOT  --   --   --   --  0.8 1.1 1.0    < > = values in this interval not displayed.     CBC:  Recent Labs     06/09/25  9274  06/08/25  0418 06/07/25  0416   WBC 13.6* 13.6* 15.1*   HGB 10.2* 9.6* 10.0*   HCT 29.0* 28.5* 29.3*    197 159   MCV 90 93 91     COAG:   Recent Labs     06/03/25  1336 05/29/25  0813   INR 1.2 1.0     HEME/ENDO:  Recent Labs     05/14/25  0451 05/13/25  1742 05/11/25  1849 05/11/25  1848 01/29/25  0925   TSH 1.67  --  1.88  --  1.78   HGBA1C  --  5.5  --  5.5 6.2*      CARDIAC:   Recent Labs     05/12/25  0834 05/12/25  0741 05/11/25  1305   TROPHS 647* 638* 494*     Recent Labs     05/11/25  1849 01/29/25  0925 11/27/23  1717   CHOL 209* 159 143   LDLCALC 146* 89 68   HDL 49.2 46* 43.0*   TRIG 68 137 161*       Last Cardiology Tests:  Echo:  Echo Results:  Transthoracic Echo (TTE) Complete 05/13/2025    [place] [address]  Phone [phone]    TRANSTHORACIC ECHOCARDIOGRAM REPORT    Patient Name:       ESTELLA Del Cid Physician:    [id number] Jordi Simpson DO  Study Date:         5/13/2025            Ordering Provider:    [id number] GERMAIN GANDHI  MRN/PID:            [id number]             Fellow:  Accession#:         [id number]         Nurse:  Date of Birth/Age:  1955 / 69 years Sonographer:          Julián Stratton RDCS  Gender Assigned at  F                    Additional Staff:  Birth:  Height:             152.40 cm            Admit Date:  Weight:             59.87 kg             Admission Status:     Inpatient -  Routine  BSA / BMI:          1.56 m2 / 25.78      Department Location:  [facility]  kg/m2  Blood Pressure: 127 /65 mmHg    Study Type:    TRANSTHORACIC ECHO (TTE) COMPLETE  Diagnosis/ICD: Non ST elevation (NSTEMI) myocardial infarction-I21.4  Indication:    pre-CABG, CAD  CPT Codes:     Echo Complete w Full Doppler-20244    Patient History:  Diabetes:          Yes  Pertinent History: CAD, HTN and Hyperlipidemia.    Study Detail: The following Echo studies were performed: 2D, M-Mode, Doppler and  color flow. Technically  challenging study due to body habitus,  patient lying in supine position and POST CATH.      PHYSICIAN INTERPRETATION:  Left Ventricle: Left ventricular ejection fraction is mildly decreased, by visual estimate at 40-45%. There is mild eccentric left ventricular hypertrophy. Wall motion is abnormal. The left ventricular cavity size is normal. There is mild increased septal and mildly increased posterior left ventricular wall thickness. Spectral Doppler shows a Grade I (impaired relaxation pattern) of left ventricular diastolic filling with normal left atrial filling pressure. Global hypokinesis with severe apical hypokinesis.  Left Atrium: The left atrial size is normal.  Right Ventricle: The right ventricle is normal in size. There is normal right ventricular global systolic function.  Right Atrium: The right atrial size is normal.  Aortic Valve: The aortic valve appears structurally normal. There is no evidence of aortic valve regurgitation. The peak instantaneous gradient of the aortic valve is 5 mmHg.  Mitral Valve: The mitral valve is normal in structure. There is no evidence of mitral valve regurgitation.  Tricuspid Valve: The tricuspid valve is structurally normal. There is mild tricuspid regurgitation. The Doppler estimated RVSP is slightly elevated right ventricular systolic pressure at 32.6 mmHg.  Pulmonic Valve: The pulmonic valve is structurally normal. There is no indication of pulmonic valve regurgitation.  Pericardium: No pericardial effusion noted.  Aorta: The aortic root is normal.      CONCLUSIONS:  1. Left ventricular ejection fraction is mildly decreased, by visual estimate at 40-45%.  2. Abnormal wall motion.  3. Spectral Doppler shows a Grade I (impaired relaxation pattern) of left ventricular diastolic filling with normal left atrial filling pressure.  4. There is normal right ventricular global systolic function.  5. Slightly elevated right ventricular systolic pressure.    QUANTITATIVE DATA  SUMMARY:    2D MEASUREMENTS:             Normal Ranges:  LAs:             3.36 cm     (2.7-4.0cm)  IVSd:            1.03 cm     (0.6-1.1cm)  LVPWd:           0.97 cm     (0.6-1.1cm)  LVIDd:           4.83 cm     (3.9-5.9cm)  LVIDs:           3.54 cm  LV Mass Index:   109.4 g/m2  LVEDV Index:     75.57 ml/m2  LV % FS          26.6 %      LEFT ATRIUM:                 Normal Ranges:  LA Vol A4C:       52.2 ml    (22+/-6mL/m2)  LA Vol A2C:       50.1 ml  LA Vol BP:        51.3 ml  LA Vol Index A4C: 33.4 ml/m2  LA Vol Index A2C: 32.0 ml/m2  LA Vol Index BP:  32.8 ml/m2  LA Volume Index:  33.0 ml/m2  LA Vol A4C:       50.2 ml  LA Vol A2C:       47.1 ml  LA Vol Index BSA: 31.1 ml/m2      RIGHT ATRIUM:          Normal Ranges:  RA Area A4C:  12.0 cm2      M-MODE MEASUREMENTS:         Normal Ranges:  LAs:                 3.27 cm (2.7-4.0cm)      LV SYSTOLIC FUNCTION:  Normal Ranges:  EF-A4C View:    38 % (>=55%)  EF-A2C View:    51 %  EF-Biplane:     46 %  EF-Visual:      43 %  LV EF Reported: 43 %      LV DIASTOLIC FUNCTION:             Normal Ranges:  MV Peak E:             0.72 m/s    (0.7-1.2 m/s)  MV Peak A:             0.61 m/s    (0.42-0.7 m/s)  E/A Ratio:             1.18        (1.0-2.2)  MV e'                  0.064 m/s   (>8.0)  MV lateral e'          0.07 m/s  MV medial e'           0.06 m/s  MV A Dur:              117.03 msec  E/e' Ratio:            11.24       (<8.0)  PulmV Sys Damien:         42.06 cm/s  PulmV Parker Damien:        37.37 cm/s  PulmV S/D Damien:         1.13  PulmV A Revs Damien:      25.98 cm/s  PulmV A Revs Dur:      145.58 msec      MITRAL VALVE:          Normal Ranges:  MV DT:        186 msec (150-240msec)      AORTIC VALVE:           Normal Ranges:  AoV Vmax:      1.12 m/s (<=1.7m/s)  AoV Peak P.0 mmHg (<20mmHg)  LVOT Max Damien:  0.90 m/s (<=1.1m/s)  LVOT VTI:      18.20 cm  LVOT Diameter: 1.88 cm  (1.8-2.4cm)  AoV Area,Vmax: 2.23 cm2 (2.5-4.5cm2)      RIGHT VENTRICLE:  TAPSE: 19.7 mm  RV s'  0.12  m/s      TRICUSPID VALVE/RVSP:          Normal Ranges:  Peak TR Velocity:     2.72 m/s  RV Syst Pressure:     33 mmHg  (< 30mmHg)      PULMONIC VALVE:          Normal Ranges:  PV Max Damien:     0.9 m/s  (0.6-0.9m/s)  PV Max P.9 mmHg  PV Mean P.4 mmHg  PV VTI:         19.88 cm      PULMONARY VEINS:  PulmV A Revs Dur: 145.58 msec  PulmV A Revs Damien: 25.98 cm/s  PulmV Parker Damien:   37.37 cm/s  PulmV S/D Damien:    1.13  PulmV Sys Damien:    42.06 cm/s      AORTA:  Asc Ao Diam 3.73 cm      [id number] Jordi Calvin SALGADO  Electronically signed on 2025 at 3:20:34 PM        ** Final **     Cath:  Stress Test:  Stress Results:  No results found for this or any previous visit from the past 365 days.     Cardiac Imaging:    Problem List Items Addressed This Visit       Mixed hyperlipidemia    Is not tolerating her current dose of atorvastatin 80 mg daily due to leg weakness and fatigue so I am reducing the dose to 40 mg.  She will take 1 week off statin therapy.  Lipid profile and metabolic profile in 3 months. Continue  low carbohydrate and low sugar Mediterranean/[place] dietary lifestyle.         Relevant Medications    atorvastatin (Lipitor) 40 mg tablet    Primary hypertension    Continue antihypertensive medication as well as low carbohydrate and low sugar Mediterranean/[place] dietary lifestyle.  Increase her losartan to 50 mg twice daily for better blood pressure control.  Begin cardiac rehab         Relevant Medications    losartan (Cozaar) 50 mg tablet    Other Relevant Orders    Transthoracic Echo Complete    NSTEMI (non-ST elevated myocardial infarction) (Multi)    Relevant Medications    losartan (Cozaar) 50 mg tablet    atorvastatin (Lipitor) 40 mg tablet    Coronary artery disease involving native coronary artery of native heart without angina pectoris - Primary    Stable status post Post CABG x 3 surgical revascularization 2025. Remains on aspirin, and clopidogrel.     Continue commitment to  daily walking/exercise and dual antiplatelet therapy for 1 year         Relevant Medications    losartan (Cozaar) 50 mg tablet    atorvastatin (Lipitor) 40 mg tablet    Other Relevant Orders    Transthoracic Echo Complete    Ischemic cardiomyopathy    On guideline directed medical therapy with metoprolol, losartan, and her Jardiance was discontinued.  No signs of volume overload    Echocardiogram in 3 months and then office follow-up to review lab work and echo results         Relevant Orders    Transthoracic Echo Complete      Follow-up with me in 6 weeks to reassess.  She wants to wait until after September 1 to go back to work when she has more strength.      Pt. care time is spent includes review of diagnostic tests, labs, radiographs, EKGs, old echoes, cardiac work-up and coordination of care. Assessment, impression and plans are reflected in the note above as well as the orders.    Jordi Simpson DO  [facility] [place]  [facility]

## 2025-07-17 ENCOUNTER — HOME CARE VISIT (OUTPATIENT)
Dept: HOME HEALTH SERVICES | Facility: HOME HEALTH | Age: 70
End: 2025-07-17
Payer: COMMERCIAL

## 2025-07-17 VITALS
HEART RATE: 64 BPM | BODY MASS INDEX: 22.46 KG/M2 | TEMPERATURE: 97.2 F | RESPIRATION RATE: 18 BRPM | OXYGEN SATURATION: 99 % | DIASTOLIC BLOOD PRESSURE: 86 MMHG | WEIGHT: 115 LBS | SYSTOLIC BLOOD PRESSURE: 132 MMHG

## 2025-07-17 PROCEDURE — G0299 HHS/HOSPICE OF RN EA 15 MIN: HCPCS

## 2025-07-17 ASSESSMENT — ACTIVITIES OF DAILY LIVING (ADL)
AMBULATION ASSISTANCE: 1
OASIS_M1830: 00
AMBULATION ASSISTANCE: INDEPENDENT
HOME_HEALTH_OASIS: 00

## 2025-07-17 ASSESSMENT — ENCOUNTER SYMPTOMS
DENIES PAIN: 1
PERSON REPORTING PAIN: PATIENT

## 2025-07-21 ENCOUNTER — TELEPHONE (OUTPATIENT)
Dept: PRIMARY CARE | Facility: CLINIC | Age: 70
End: 2025-07-21
Payer: COMMERCIAL

## 2025-07-21 NOTE — TELEPHONE ENCOUNTER
Patient had heart surgery 06-03-25. She stated she has lost 10 lbs since the surgery and she's vomiting and can't keep food down due to having nausea. Patient doesn't know if it's from the medication changes.  Patient stated her Cardiologist recommended that Patient have an appointment with her PCP. Is it OK to schedule same day slots?    Please advise    Patient can be reached at [phone]

## 2025-07-23 ENCOUNTER — OFFICE VISIT (OUTPATIENT)
Dept: PRIMARY CARE | Facility: CLINIC | Age: 70
End: 2025-07-23
Payer: COMMERCIAL

## 2025-07-23 VITALS
OXYGEN SATURATION: 98 % | WEIGHT: 120 LBS | BODY MASS INDEX: 23.44 KG/M2 | HEART RATE: 76 BPM | SYSTOLIC BLOOD PRESSURE: 128 MMHG | RESPIRATION RATE: 18 BRPM | DIASTOLIC BLOOD PRESSURE: 80 MMHG

## 2025-07-23 DIAGNOSIS — I25.10 CORONARY ARTERY DISEASE INVOLVING NATIVE CORONARY ARTERY OF NATIVE HEART WITHOUT ANGINA PECTORIS: ICD-10-CM

## 2025-07-23 DIAGNOSIS — K29.50 CHRONIC GASTRITIS WITHOUT BLEEDING, UNSPECIFIED GASTRITIS TYPE: ICD-10-CM

## 2025-07-23 DIAGNOSIS — I10 PRIMARY HYPERTENSION: ICD-10-CM

## 2025-07-23 DIAGNOSIS — E03.9 ACQUIRED HYPOTHYROIDISM: ICD-10-CM

## 2025-07-23 DIAGNOSIS — E78.2 MIXED HYPERLIPIDEMIA: ICD-10-CM

## 2025-07-23 DIAGNOSIS — E11.9 TYPE 2 DIABETES MELLITUS WITHOUT COMPLICATION, WITHOUT LONG-TERM CURRENT USE OF INSULIN: Primary | ICD-10-CM

## 2025-07-23 DIAGNOSIS — K29.00 ACUTE GASTRITIS WITHOUT HEMORRHAGE, UNSPECIFIED GASTRITIS TYPE: ICD-10-CM

## 2025-07-23 LAB — POC HEMOGLOBIN A1C: 5.5 % (ref 4.2–6.5)

## 2025-07-23 PROCEDURE — 1160F RVW MEDS BY RX/DR IN RCRD: CPT | Performed by: FAMILY MEDICINE

## 2025-07-23 PROCEDURE — 1126F AMNT PAIN NOTED NONE PRSNT: CPT | Performed by: FAMILY MEDICINE

## 2025-07-23 PROCEDURE — 3074F SYST BP LT 130 MM HG: CPT | Performed by: FAMILY MEDICINE

## 2025-07-23 PROCEDURE — 3079F DIAST BP 80-89 MM HG: CPT | Performed by: FAMILY MEDICINE

## 2025-07-23 PROCEDURE — 3044F HG A1C LEVEL LT 7.0%: CPT | Performed by: FAMILY MEDICINE

## 2025-07-23 PROCEDURE — 99214 OFFICE O/P EST MOD 30 MIN: CPT | Performed by: FAMILY MEDICINE

## 2025-07-23 PROCEDURE — 1159F MED LIST DOCD IN RCRD: CPT | Performed by: FAMILY MEDICINE

## 2025-07-23 PROCEDURE — 83036 HEMOGLOBIN GLYCOSYLATED A1C: CPT | Performed by: FAMILY MEDICINE

## 2025-07-23 PROCEDURE — 4010F ACE/ARB THERAPY RXD/TAKEN: CPT | Performed by: FAMILY MEDICINE

## 2025-07-23 RX ORDER — OMEPRAZOLE 40 MG/1
40 CAPSULE, DELAYED RELEASE ORAL
Qty: 90 CAPSULE | Refills: 0 | Status: SHIPPED | OUTPATIENT
Start: 2025-07-23 | End: 2025-10-21

## 2025-07-23 ASSESSMENT — PATIENT HEALTH QUESTIONNAIRE - PHQ9
2. FEELING DOWN, DEPRESSED OR HOPELESS: NOT AT ALL
1. LITTLE INTEREST OR PLEASURE IN DOING THINGS: NOT AT ALL
SUM OF ALL RESPONSES TO PHQ9 QUESTIONS 1 AND 2: 0

## 2025-07-23 ASSESSMENT — COLUMBIA-SUICIDE SEVERITY RATING SCALE - C-SSRS: 1. IN THE PAST MONTH, HAVE YOU WISHED YOU WERE DEAD OR WISHED YOU COULD GO TO SLEEP AND NOT WAKE UP?: NO

## 2025-07-23 ASSESSMENT — PAIN SCALES - GENERAL: PAINLEVEL_OUTOF10: 0-NO PAIN

## 2025-07-23 NOTE — PATIENT INSTRUCTIONS
Start atorvastatin 40mg tonight.  Start omeprazole 40mg once a day in the morning on Thursday  Restart the Plavix once a day in the evening on Friday.  If it causes you to have upset stomach or vomiting again, contact Dr. Simpson's office.

## 2025-07-23 NOTE — PROGRESS NOTES
"Patient: Simona Betancourt \"Isabel\"  : 1955  PCP: Antonette Pereira MD  MRN: [id number]  Program: Transitional Care Management  Status: Enrolled  Effective Dates: 6/10/2025 - present  Responsible Staff: Lynnette Oshea LPN  Social Drivers to be Addressed: Depression, Physical Activity, Social Connections, Stress, Tobacco Use         Simona Betancourt \"Isabel\" is a 70 y.o. female presenting today for follow-up after being discharged from the hospital {Numbers; 1-14:00288} days ago. The main problem requiring admission was ***. The discharge summary and/or Transitional Care Management documentation was reviewed. Medication reconciliation was performed as indicated via the \"Juan Antonio as Reviewed\" timestamp.     Simona Betancourt \"Isabel\" was contacted by Transitional Care Management services two days after her discharge. This encounter and supporting documentation was reviewed.    Review of Systems    /80 (BP Location: Left arm, Patient Position: Sitting)   Pulse 76   Resp 18   Wt 54.4 kg (120 lb)   LMP 2011 (Approximate)   SpO2 98%   BMI 23.44 kg/m²     Physical Exam    The complexity of medical decision making for this patient's transitional care is {DESC; MODERATE/HIGH:18382}.    Assessment/Plan   {Assess/PlanSmartLinks:37175}    " Exam  Vitals and nursing note reviewed.   Constitutional:       Appearance: Normal appearance.     Eyes:      Extraocular Movements: Extraocular movements intact.      Conjunctiva/sclera: Conjunctivae normal.      Pupils: Pupils are equal, round, and reactive to light.       Cardiovascular:      Rate and Rhythm: Normal rate and regular rhythm.   Pulmonary:      Effort: Pulmonary effort is normal.      Breath sounds: Normal breath sounds.     Skin:     Findings: No rash.     Neurological:      General: No focal deficit present.      Mental Status: She is alert.     Psychiatric:         Mood and Affect: Mood normal.         Assessment/Plan   Assessment & Plan  Type 2 diabetes mellitus without complication, without long-term current use of insulin  Lab Results   Component Value Date    HGBA1C 5.5 07/23/2025    HGBA1C 5.5 05/13/2025    HGBA1C 5.5 05/11/2025   Well-controlled.  Will continue her metformin XR twice a day.  Mounjaro was stopped due to her surgery and her A1c being consistently 5.5.  She has had quite a bit of weight loss throughout the past 4 months and thus hopefully she can maintain good diabetic control only on metformin.  Orders:    POCT glycosylated hemoglobin (Hb A1C) manually resulted    CBC and Auto Differential; Future    Comprehensive Metabolic Panel; Future    Lipid Panel; Future    TSH with reflex to Free T4 if abnormal; Future    Chronic gastritis without bleeding, unspecified gastritis type  Continue with omeprazole 40 mg daily.  Avoid dietary triggers  Orders:    omeprazole (PriLOSEC) 40 mg DR capsule; Take 1 capsule (40 mg) by mouth once daily in the morning. Take before meals. Do not crush or chew.    Acquired hypothyroidism  Lab Results   Component Value Date    TSH 1.67 05/14/2025    TSH 1.88 05/11/2025   Euthyroid on current dose of levothyroxine. Continue to take on empty stomach.     Orders:    TSH with reflex to Free T4 if abnormal; Future    Coronary artery disease involving native  coronary artery of native heart without angina pectoris  Stable.  Strongly encouraged her to participate in cardiac rehab.  Commit to walking daily.  Follow-up with Dr. Simpson as scheduled  Orders:    Comprehensive Metabolic Panel; Future    Lipid Panel; Future    Primary hypertension  Controlled.  Continue current medication as prescribed.  DASH diet. Exercise at least 4 times per week.          Mixed hyperlipidemia  Lab Results   Component Value Date    LDLCALC 146 (H) 05/11/2025    LDLCALC 89 01/29/2025    LDLCALC 68 11/27/2023   Continue atorvastatin 40 mg nightly.  Diet discussed.  Plan to recheck her lipid panel in 3 months

## 2025-07-30 DIAGNOSIS — Z95.1 STATUS POST CORONARY ARTERY BYPASS GRAFT: ICD-10-CM

## 2025-07-30 DIAGNOSIS — I21.4 NON-ST ELEVATION MYOCARDIAL INFARCTION (NSTEMI) (MULTI): Primary | ICD-10-CM

## 2025-08-01 ENCOUNTER — TELEPHONE (OUTPATIENT)
Facility: CLINIC | Age: 70
End: 2025-08-01
Payer: COMMERCIAL

## 2025-08-04 PROBLEM — N17.9 ACUTE RENAL FAILURE: Status: RESOLVED | Noted: 2025-05-12 | Resolved: 2025-08-04

## 2025-08-04 PROBLEM — R10.10 PAIN OF UPPER ABDOMEN: Status: RESOLVED | Noted: 2025-05-11 | Resolved: 2025-08-04

## 2025-08-04 PROBLEM — R11.2 NAUSEA VOMITING AND DIARRHEA: Status: RESOLVED | Noted: 2023-04-10 | Resolved: 2025-08-04

## 2025-08-04 PROBLEM — R19.7 NAUSEA VOMITING AND DIARRHEA: Status: RESOLVED | Noted: 2023-04-10 | Resolved: 2025-08-04

## 2025-08-04 PROBLEM — E86.0 DEHYDRATION: Status: RESOLVED | Noted: 2025-05-11 | Resolved: 2025-08-04

## 2025-08-04 PROBLEM — R23.2 FACIAL FLUSHING: Status: RESOLVED | Noted: 2025-05-11 | Resolved: 2025-08-04

## 2025-08-04 NOTE — ASSESSMENT & PLAN NOTE
Lab Results   Component Value Date    TSH 1.67 05/14/2025    TSH 1.88 05/11/2025   Euthyroid on current dose of levothyroxine. Continue to take on empty stomach.     Orders:    TSH with reflex to Free T4 if abnormal; Future

## 2025-08-04 NOTE — ASSESSMENT & PLAN NOTE
Lab Results   Component Value Date    LDLCALC 146 (H) 05/11/2025    LDLCALC 89 01/29/2025    LDLCALC 68 11/27/2023   Continue atorvastatin 40 mg nightly.  Diet discussed.  Plan to recheck her lipid panel in 3 months

## 2025-08-04 NOTE — ASSESSMENT & PLAN NOTE
Stable.  Strongly encouraged her to participate in cardiac rehab.  Commit to walking daily.  Follow-up with Dr. Simpson as scheduled  Orders:    Comprehensive Metabolic Panel; Future    Lipid Panel; Future

## 2025-08-04 NOTE — ASSESSMENT & PLAN NOTE
Lab Results   Component Value Date    HGBA1C 5.5 07/23/2025    HGBA1C 5.5 05/13/2025    HGBA1C 5.5 05/11/2025   Well-controlled.  Will continue her metformin XR twice a day.  Mounjaro was stopped due to her surgery and her A1c being consistently 5.5.  She has had quite a bit of weight loss throughout the past 4 months and thus hopefully she can maintain good diabetic control only on metformin.  Orders:    POCT glycosylated hemoglobin (Hb A1C) manually resulted    CBC and Auto Differential; Future    Comprehensive Metabolic Panel; Future    Lipid Panel; Future    TSH with reflex to Free T4 if abnormal; Future

## 2025-08-07 ENCOUNTER — CLINICAL SUPPORT (OUTPATIENT)
Dept: CARDIAC REHAB | Facility: CLINIC | Age: 70
End: 2025-08-07
Payer: COMMERCIAL

## 2025-08-07 NOTE — PROGRESS NOTES
Cardiac Rehabilitation Individual Treatment Plan  Initial Treatment Plan      Today's Date:   8/7/2025                       Program Location: [address], [place]    Name: Simona Betancourt                            Medical Record Number: [id number]                          YOB: 1955    Referring Physician: Jordi Simpson DO  [id number]  [facility], [address]  [place] [address] [phone]  Primary Care Physician: Antonette Pereira MD    Referring Diagnosis / Date of Diagnosis  1. NSTEMI Date: 6/3/25  2. CABG Date: 6/3/25      AACVPR Risk Stratification:  High  Fall Risk Results:  medium    Learning Assessment:  Cardiac Knowledge Test:        Pre: na/15                           Post:        /15    Learning assessment/barriers: None   Preferred learning method: Auditory, Visual, and Reading handout      Education Classes: Schedule given with education manual    Psychosocial INITIAL ASSESSMENT    Psychosocial Assessment  Pt reported/currently experiencing stress : Yes, Stress and Depression.   Current Stress Level (1-10 scale):  6  History of anxiety/depression: Yes, Stress and Depression.  Currently seeing a mental health provider: No   Psychosocial Meds: Yes - Lexapro  Medication changes: No    Social Support: Yes, Whom:Spouse     Psychosocial Test:  PHQ-9    PHQ-9 score:       Pre:  N/A    Mid: N/A    Post: N/A    PHQ-9 Interpretation:    Needs to complete     Psychosocial Plan   Goal Status:   Initial assessment, goals not yet started   Psychosocial Goals:  Attend Art Therapy, Attend Chair Yoga, Improve stress level, Learn breathing techniques and stretches, Maximize coping skills, Stay compliant with medications, Participate in music therapy cooldown   Psychosocial Interventions/Education:  To be done in cardiac rehab      Nutrition INITIAL ASSESSMENT    Nutrition Assessment  Picture Your Plate Score:  Pre: na  Post:  Picture Your Plate Interpretation:    "given at evaluation to complete   Special Diet:   Low fat, Low sodium, and ADA   Dietary Goal:  Mediterranean Diet;  PYP >60     Weight Management:   Entry Weight: 125 lb  Current Weight:  125 lb   Height:  60 \"  Entry BMI:  24  Weighing Daily: Yes   Goal Weight:   increase strength    Heart Failure Assessment:   Last EF: 6/3/2025: 63%  Hx of Heart Failure: No    Heart Failure medications: No    Heart Failure medication changes: N/A     Comments: N/A    Nutrition Plan   Goal Status: Initial assessment, goals not yet started  Nutrition Goals:   follow low fat/low sodium diet, develop heart healthy diet, Mediterranean diet     RD Recommendations:        Nutrition Interventions/Education: To be done in cardiac rehab      Exercise INITIAL ASSESSMENT    Exercise Assessment       Initial MET level determined by the exercise physiologist from 1:1 evaluation and/or 6MWT, physician approved as documented.    Initial Intake: Starting MET Level 2.6     Current MET level 2.6      Discharge MET level n/a      6-Min Walk Test:     PRE:     Feet:    1237.5       METS:  2.7                 POST:  Feet:    n/a       METS:  n/a                    Current Home Exercise:   No   Mode: na  Frequency (days/week):   na  Duration(mins/day):    na    Exercise Plan  Exercise Prescription based on 6MWT and/or evaluation   Frequency:   2-3   days/week   Duration:   40  minutes    Intensity: RPE (0-10 Snow Scale):  2-4 (light to somewhat hard)    Target HR: Rest +30 bpm  Intensity: 152 age    SpO2 Range   >88%, interval training prn    O2 Flow Rate  N/A     Progression:   Aim to progress 0.2- 0.3 METs/week or as tolerated  Mode:  Aerobic exercise       Modality METS Load    Duration   1 Pre-Exercise/Rest      20:00   2 Treadmill 2.6 2 mph   na%   40:00   3 Sci RB 3.6 2.5 level   50-60RPM   40:00   4 NuStep 1.9 2 level   60-80SPM   40:00   5 Physiostepper 2 2 level   30-50SPM   40:00   6 Recumbent Bike 3.3 2 level   50-60RPM   40:00   7 Magnum " UBE N/a 40 load   40-50RPM   40:00   8 N/A      40:00   9 N/A      40:00   10 Final Cooldown      20:00     Resistance Training: no      Home Exercise Prescription/Self Report Log given: no     Goal Status: Initial assessment, goals not yet started  Exercise Goals: Safe & comfortable with exercise equipment, Uses RPE Snow Scale (0-10) & exercises at RPE of 2-4, Increase 10-20% in functional capacity by next reassessment, Demonstrate pulse taking and/or purchase pulse watch/oximeter, Increase ADLs  Exercise Interventions/Education: To be done in cardiac rehab    Other Core Components/Risk Factors INITIAL ASSESSMENT    Other Core Components Assessment  MEDICATION Adherence:              Taking medications as prescribed: yes   Uses pill box/organizer: Yes    Carries medication list: yes     HYPERTENSION  Management:  Hx of Hypertension: Yes   Resting BP: 132/58  Peak Exercise BP:   160/74  Current Medications: Yes - Losartan    Hypertension medication changes: N/A     TOBACCO/SMOKING Cessation:  Tobacco Use:    YES, cigarettes one pack per day  Date started:     Date quit: 5/11/25   Quit Date set:   Medications: Wellbutrin  Comment:  Remains smoke free    DIABETES Management:  Diabetes:  Yes Type 2 DM  Medication: Yes - Metformin  Medication changes: No  Hemoglobin A1C:   Lab Results   Component Value Date    HGBA1C 5.5 07/23/2025                 Pt monitors BS at home:  Yes  Comment:  Did not check this am.    HYPERLIPIDEMIA Management:      Lipids: see lab values below   Medications:  Yes - Atorvastatin  Medication changes:   No  Lab Results   Component Value Date    CHOL 209 (H) 05/11/2025    CHOL 159 01/29/2025     Lab Results   Component Value Date    HDL 49.2 05/11/2025    HDL 46 (L) 01/29/2025     Lab Results   Component Value Date    LDLCALC 146 (H) 05/11/2025    LDLCALC 89 01/29/2025     Lab Results   Component Value Date    TRIG 68 05/11/2025    TRIG 137 01/29/2025           Other Core Components Plan  Goal  Status: Initial assessment, goals not yet started  Other Core Component Goals:   Achieve & maintain a resting blood pressure less than 130/80  Gain knowledge of cardiac disease and lifestyle modifications by discharge  Compliant with medications & carries medication list  Cholesterol <180, HDL >45, LDL <70, Trig <150  Lifestyle modification for risk factor reduction, Following low-sodium diet, Self-monitoring of blood pressure, REMAIN tobacco free, Aware of s/sx of hypo/hyperglycemia, Self-monitoring of blood glucose    Other Core Component Interventions/Education: To be in done in cardiac rehab    Individual Patient Goals:  Increase strength & endurance  Aerobic exercise 3-6x per week  Strengthen heart.    General Comments:  Education class schedule given with manual by 1st session  Lecture handouts provided  Individual education & counseling    Rehab Staff Signature: Alice Kat RN

## 2025-08-12 ENCOUNTER — CLINICAL SUPPORT (OUTPATIENT)
Dept: CARDIAC REHAB | Facility: CLINIC | Age: 70
End: 2025-08-12
Payer: COMMERCIAL

## 2025-08-12 DIAGNOSIS — Z95.1 STATUS POST CORONARY ARTERY BYPASS GRAFT: ICD-10-CM

## 2025-08-12 DIAGNOSIS — I21.4 NON-ST ELEVATION MYOCARDIAL INFARCTION (NSTEMI) (MULTI): ICD-10-CM

## 2025-08-12 PROCEDURE — 93798 PHYS/QHP OP CAR RHAB W/ECG: CPT | Performed by: INTERNAL MEDICINE

## 2025-08-13 DIAGNOSIS — I25.110 ATHEROSCLEROSIS OF NATIVE CORONARY ARTERY OF NATIVE HEART WITH UNSTABLE ANGINA PECTORIS: ICD-10-CM

## 2025-08-13 DIAGNOSIS — I21.4 NSTEMI (NON-ST ELEVATED MYOCARDIAL INFARCTION) (MULTI): ICD-10-CM

## 2025-08-14 ENCOUNTER — CLINICAL SUPPORT (OUTPATIENT)
Dept: CARDIAC REHAB | Facility: CLINIC | Age: 70
End: 2025-08-14
Payer: COMMERCIAL

## 2025-08-14 DIAGNOSIS — I21.4 NON-ST ELEVATION MYOCARDIAL INFARCTION (NSTEMI) (MULTI): ICD-10-CM

## 2025-08-14 DIAGNOSIS — Z95.1 STATUS POST CORONARY ARTERY BYPASS GRAFT: ICD-10-CM

## 2025-08-14 LAB
ALBUMIN SERPL-MCNC: 3.9 G/DL (ref 3.6–5.1)
ALP SERPL-CCNC: 83 U/L (ref 37–153)
ALT SERPL-CCNC: 12 U/L (ref 6–29)
ANION GAP SERPL CALCULATED.4IONS-SCNC: 12 MMOL/L (CALC) (ref 7–17)
AST SERPL-CCNC: 11 U/L (ref 10–35)
BASOPHILS # BLD AUTO: 52 CELLS/UL (ref 0–200)
BASOPHILS NFR BLD AUTO: 0.4 %
BILIRUB SERPL-MCNC: 0.4 MG/DL (ref 0.2–1.2)
BUN SERPL-MCNC: 18 MG/DL (ref 7–25)
CALCIUM SERPL-MCNC: 9.5 MG/DL (ref 8.6–10.4)
CHLORIDE SERPL-SCNC: 105 MMOL/L (ref 98–110)
CHOLEST SERPL-MCNC: 136 MG/DL
CHOLEST/HDLC SERPL: 2.7 (CALC)
CO2 SERPL-SCNC: 27 MMOL/L (ref 20–32)
CREAT SERPL-MCNC: 0.86 MG/DL (ref 0.5–1.05)
EGFRCR SERPLBLD CKD-EPI 2021: 74 ML/MIN/1.73M2
EOSINOPHIL # BLD AUTO: 104 CELLS/UL (ref 15–500)
EOSINOPHIL NFR BLD AUTO: 0.8 %
ERYTHROCYTE [DISTWIDTH] IN BLOOD BY AUTOMATED COUNT: 13 % (ref 11–15)
GLUCOSE SERPL-MCNC: 114 MG/DL (ref 65–99)
HCT VFR BLD AUTO: 38.7 % (ref 35–45)
HDLC SERPL-MCNC: 50 MG/DL
HGB BLD-MCNC: 12.2 G/DL (ref 11.7–15.5)
LDLC SERPL CALC-MCNC: 68 MG/DL (CALC)
LYMPHOCYTES # BLD AUTO: 1521 CELLS/UL (ref 850–3900)
LYMPHOCYTES NFR BLD AUTO: 11.7 %
MCH RBC QN AUTO: 29.8 PG (ref 27–33)
MCHC RBC AUTO-ENTMCNC: 31.5 G/DL (ref 32–36)
MCV RBC AUTO: 94.4 FL (ref 80–100)
MONOCYTES # BLD AUTO: 871 CELLS/UL (ref 200–950)
MONOCYTES NFR BLD AUTO: 6.7 %
NEUTROPHILS # BLD AUTO: ABNORMAL CELLS/UL (ref 1500–7800)
NEUTROPHILS NFR BLD AUTO: 80.4 %
NONHDLC SERPL-MCNC: 86 MG/DL (CALC)
PLATELET # BLD AUTO: 378 THOUSAND/UL (ref 140–400)
PMV BLD REES-ECKER: 10 FL (ref 7.5–12.5)
POTASSIUM SERPL-SCNC: 4.3 MMOL/L (ref 3.5–5.3)
PROT SERPL-MCNC: 6.7 G/DL (ref 6.1–8.1)
RBC # BLD AUTO: 4.1 MILLION/UL (ref 3.8–5.1)
SODIUM SERPL-SCNC: 144 MMOL/L (ref 135–146)
T4 FREE SERPL-MCNC: 1.7 NG/DL (ref 0.8–1.8)
TRIGL SERPL-MCNC: 99 MG/DL
TSH SERPL-ACNC: 0.09 MIU/L (ref 0.4–4.5)
WBC # BLD AUTO: 13 THOUSAND/UL (ref 3.8–10.8)

## 2025-08-14 PROCEDURE — 93798 PHYS/QHP OP CAR RHAB W/ECG: CPT | Performed by: INTERNAL MEDICINE

## 2025-08-14 RX ORDER — CLOPIDOGREL BISULFATE 75 MG/1
75 TABLET ORAL DAILY
Qty: 30 TABLET | Refills: 0 | Status: SHIPPED | OUTPATIENT
Start: 2025-08-14

## 2025-08-14 RX ORDER — METOPROLOL TARTRATE 25 MG/1
25 TABLET, FILM COATED ORAL 2 TIMES DAILY
Qty: 60 TABLET | Refills: 0 | Status: SHIPPED | OUTPATIENT
Start: 2025-08-14

## 2025-08-15 ENCOUNTER — TELEPHONE (OUTPATIENT)
Dept: PRIMARY CARE | Facility: CLINIC | Age: 70
End: 2025-08-15

## 2025-08-15 ENCOUNTER — APPOINTMENT (OUTPATIENT)
Dept: PRIMARY CARE | Facility: CLINIC | Age: 70
End: 2025-08-15
Payer: COMMERCIAL

## 2025-08-15 VITALS
OXYGEN SATURATION: 95 % | DIASTOLIC BLOOD PRESSURE: 82 MMHG | HEIGHT: 60 IN | SYSTOLIC BLOOD PRESSURE: 136 MMHG | WEIGHT: 132 LBS | BODY MASS INDEX: 25.91 KG/M2 | HEART RATE: 70 BPM

## 2025-08-15 DIAGNOSIS — F33.41 RECURRENT MAJOR DEPRESSIVE DISORDER, IN PARTIAL REMISSION: ICD-10-CM

## 2025-08-15 DIAGNOSIS — I10 PRIMARY HYPERTENSION: Primary | ICD-10-CM

## 2025-08-15 DIAGNOSIS — E03.9 ACQUIRED HYPOTHYROIDISM: ICD-10-CM

## 2025-08-15 DIAGNOSIS — I10 PRIMARY HYPERTENSION: ICD-10-CM

## 2025-08-15 DIAGNOSIS — E78.2 MIXED HYPERLIPIDEMIA: ICD-10-CM

## 2025-08-15 DIAGNOSIS — E11.9 TYPE 2 DIABETES MELLITUS WITHOUT COMPLICATION, WITHOUT LONG-TERM CURRENT USE OF INSULIN: ICD-10-CM

## 2025-08-15 DIAGNOSIS — I25.10 CORONARY ARTERY DISEASE INVOLVING NATIVE CORONARY ARTERY OF NATIVE HEART WITHOUT ANGINA PECTORIS: ICD-10-CM

## 2025-08-15 LAB
POC APPEARANCE, URINE: CLEAR
POC BILIRUBIN, URINE: NEGATIVE
POC BLOOD, URINE: ABNORMAL
POC COLOR, URINE: YELLOW
POC GLUCOSE, URINE: NEGATIVE MG/DL
POC KETONES, URINE: NEGATIVE MG/DL
POC LEUKOCYTES, URINE: ABNORMAL
POC NITRITE,URINE: NEGATIVE
POC PH, URINE: 8 PH
POC PROTEIN, URINE: NEGATIVE MG/DL
POC SPECIFIC GRAVITY, URINE: 1.01
POC UROBILINOGEN, URINE: 0.2 EU/DL

## 2025-08-15 PROCEDURE — 99214 OFFICE O/P EST MOD 30 MIN: CPT | Performed by: FAMILY MEDICINE

## 2025-08-15 PROCEDURE — 3075F SYST BP GE 130 - 139MM HG: CPT | Performed by: FAMILY MEDICINE

## 2025-08-15 PROCEDURE — 1160F RVW MEDS BY RX/DR IN RCRD: CPT | Performed by: FAMILY MEDICINE

## 2025-08-15 PROCEDURE — 4010F ACE/ARB THERAPY RXD/TAKEN: CPT | Performed by: FAMILY MEDICINE

## 2025-08-15 PROCEDURE — 1125F AMNT PAIN NOTED PAIN PRSNT: CPT | Performed by: FAMILY MEDICINE

## 2025-08-15 PROCEDURE — 3008F BODY MASS INDEX DOCD: CPT | Performed by: FAMILY MEDICINE

## 2025-08-15 PROCEDURE — 1159F MED LIST DOCD IN RCRD: CPT | Performed by: FAMILY MEDICINE

## 2025-08-15 PROCEDURE — 81003 URINALYSIS AUTO W/O SCOPE: CPT | Performed by: FAMILY MEDICINE

## 2025-08-15 PROCEDURE — 3044F HG A1C LEVEL LT 7.0%: CPT | Performed by: FAMILY MEDICINE

## 2025-08-15 PROCEDURE — 3079F DIAST BP 80-89 MM HG: CPT | Performed by: FAMILY MEDICINE

## 2025-08-15 ASSESSMENT — PATIENT HEALTH QUESTIONNAIRE - PHQ9
4. FEELING TIRED OR HAVING LITTLE ENERGY: NEARLY EVERY DAY
7. TROUBLE CONCENTRATING ON THINGS, SUCH AS READING THE NEWSPAPER OR WATCHING TELEVISION: MORE THAN HALF THE DAYS
6. FEELING BAD ABOUT YOURSELF - OR THAT YOU ARE A FAILURE OR HAVE LET YOURSELF OR YOUR FAMILY DOWN: SEVERAL DAYS
3. TROUBLE FALLING OR STAYING ASLEEP OR SLEEPING TOO MUCH: MORE THAN HALF THE DAYS
5. POOR APPETITE OR OVEREATING: NOT AT ALL
2. FEELING DOWN, DEPRESSED OR HOPELESS: NEARLY EVERY DAY
9. THOUGHTS THAT YOU WOULD BE BETTER OFF DEAD, OR OF HURTING YOURSELF: NOT AT ALL
8. MOVING OR SPEAKING SO SLOWLY THAT OTHER PEOPLE COULD HAVE NOTICED. OR THE OPPOSITE, BEING SO FIGETY OR RESTLESS THAT YOU HAVE BEEN MOVING AROUND A LOT MORE THAN USUAL: SEVERAL DAYS
SUM OF ALL RESPONSES TO PHQ9 QUESTIONS 1 AND 2: 6
SUM OF ALL RESPONSES TO PHQ QUESTIONS 1-9: 15
1. LITTLE INTEREST OR PLEASURE IN DOING THINGS: NEARLY EVERY DAY

## 2025-08-15 ASSESSMENT — PAIN SCALES - GENERAL: PAINLEVEL_OUTOF10: 4

## 2025-08-15 ASSESSMENT — ENCOUNTER SYMPTOMS
PALPITATIONS: 0
SHORTNESS OF BREATH: 0
POLYPHAGIA: 0
ABDOMINAL PAIN: 0
COUGH: 0
UNEXPECTED WEIGHT CHANGE: 0
FEVER: 0
POLYDIPSIA: 0

## 2025-08-15 ASSESSMENT — COLUMBIA-SUICIDE SEVERITY RATING SCALE - C-SSRS: 1. IN THE PAST MONTH, HAVE YOU WISHED YOU WERE DEAD OR WISHED YOU COULD GO TO SLEEP AND NOT WAKE UP?: NO

## 2025-08-19 ENCOUNTER — CLINICAL SUPPORT (OUTPATIENT)
Dept: CARDIAC REHAB | Facility: CLINIC | Age: 70
End: 2025-08-19
Payer: COMMERCIAL

## 2025-08-19 DIAGNOSIS — Z95.1 STATUS POST CORONARY ARTERY BYPASS GRAFT: ICD-10-CM

## 2025-08-19 DIAGNOSIS — I21.4 NON-ST ELEVATION MYOCARDIAL INFARCTION (NSTEMI) (MULTI): ICD-10-CM

## 2025-08-19 PROCEDURE — 93798 PHYS/QHP OP CAR RHAB W/ECG: CPT | Performed by: INTERNAL MEDICINE

## 2025-08-21 ENCOUNTER — CLINICAL SUPPORT (OUTPATIENT)
Dept: CARDIAC REHAB | Facility: CLINIC | Age: 70
End: 2025-08-21
Payer: COMMERCIAL

## 2025-08-21 DIAGNOSIS — I21.4 NON-ST ELEVATION MYOCARDIAL INFARCTION (NSTEMI) (MULTI): ICD-10-CM

## 2025-08-21 DIAGNOSIS — Z95.1 STATUS POST CORONARY ARTERY BYPASS GRAFT: ICD-10-CM

## 2025-08-21 PROCEDURE — 93798 PHYS/QHP OP CAR RHAB W/ECG: CPT | Performed by: INTERNAL MEDICINE

## 2025-08-25 ENCOUNTER — HOSPITAL ENCOUNTER (OUTPATIENT)
Dept: CARDIOLOGY | Facility: CLINIC | Age: 70
Discharge: HOME | End: 2025-08-25
Payer: COMMERCIAL

## 2025-08-25 DIAGNOSIS — I25.5 ISCHEMIC CARDIOMYOPATHY: ICD-10-CM

## 2025-08-25 DIAGNOSIS — I25.10 CORONARY ARTERY DISEASE INVOLVING NATIVE CORONARY ARTERY OF NATIVE HEART WITHOUT ANGINA PECTORIS: ICD-10-CM

## 2025-08-25 DIAGNOSIS — I10 PRIMARY HYPERTENSION: ICD-10-CM

## 2025-08-25 PROCEDURE — 93306 TTE W/DOPPLER COMPLETE: CPT | Performed by: STUDENT IN AN ORGANIZED HEALTH CARE EDUCATION/TRAINING PROGRAM

## 2025-08-25 PROCEDURE — 93306 TTE W/DOPPLER COMPLETE: CPT

## 2025-08-26 ENCOUNTER — CLINICAL SUPPORT (OUTPATIENT)
Dept: CARDIAC REHAB | Facility: CLINIC | Age: 70
End: 2025-08-26
Payer: COMMERCIAL

## 2025-08-26 DIAGNOSIS — I21.4 NON-ST ELEVATION MYOCARDIAL INFARCTION (NSTEMI) (MULTI): ICD-10-CM

## 2025-08-26 DIAGNOSIS — Z95.1 STATUS POST CORONARY ARTERY BYPASS GRAFT: ICD-10-CM

## 2025-08-26 LAB
AORTIC VALVE MEAN GRADIENT: 5 MMHG
AORTIC VALVE PEAK VELOCITY: 1.57 M/S
AV PEAK GRADIENT: 10 MMHG
AVA (PEAK VEL): 2.68 CM2
AVA (VTI): 2.36 CM2
EJECTION FRACTION APICAL 4 CHAMBER: 53.5
EJECTION FRACTION: 54 %
LEFT ATRIUM VOLUME AREA LENGTH INDEX BSA: 49.8 ML/M2
LEFT VENTRICLE INTERNAL DIMENSION DIASTOLE: 4.54 CM (ref 3.5–6)
LEFT VENTRICULAR OUTFLOW TRACT DIAMETER: 1.93 CM
MITRAL VALVE E/A RATIO: 1.41
MITRAL VALVE E/E' RATIO: 12.43
RIGHT VENTRICLE FREE WALL PEAK S': 9.63 CM/S
RIGHT VENTRICLE PEAK SYSTOLIC PRESSURE: 36 MMHG
TRICUSPID ANNULAR PLANE SYSTOLIC EXCURSION: 1.4 CM

## 2025-08-26 PROCEDURE — 93798 PHYS/QHP OP CAR RHAB W/ECG: CPT | Performed by: INTERNAL MEDICINE

## 2025-08-27 ENCOUNTER — PATIENT OUTREACH (OUTPATIENT)
Dept: PRIMARY CARE | Facility: CLINIC | Age: 70
End: 2025-08-27
Payer: COMMERCIAL

## 2025-08-28 ENCOUNTER — CLINICAL SUPPORT (OUTPATIENT)
Dept: CARDIAC REHAB | Facility: CLINIC | Age: 70
End: 2025-08-28
Payer: COMMERCIAL

## 2025-08-28 DIAGNOSIS — Z95.1 STATUS POST CORONARY ARTERY BYPASS GRAFT: ICD-10-CM

## 2025-08-28 DIAGNOSIS — I21.4 NON-ST ELEVATION MYOCARDIAL INFARCTION (NSTEMI) (MULTI): ICD-10-CM

## 2025-08-28 PROCEDURE — 93798 PHYS/QHP OP CAR RHAB W/ECG: CPT | Performed by: INTERNAL MEDICINE

## 2025-09-02 ENCOUNTER — CLINICAL SUPPORT (OUTPATIENT)
Dept: CARDIAC REHAB | Facility: CLINIC | Age: 70
End: 2025-09-02
Payer: COMMERCIAL

## 2025-09-02 ENCOUNTER — OFFICE VISIT (OUTPATIENT)
Dept: CARDIOLOGY | Facility: CLINIC | Age: 70
End: 2025-09-02
Payer: COMMERCIAL

## 2025-09-02 VITALS
WEIGHT: 132 LBS | HEART RATE: 59 BPM | OXYGEN SATURATION: 98 % | SYSTOLIC BLOOD PRESSURE: 158 MMHG | DIASTOLIC BLOOD PRESSURE: 84 MMHG | BODY MASS INDEX: 25.78 KG/M2

## 2025-09-02 DIAGNOSIS — I21.4 NSTEMI (NON-ST ELEVATED MYOCARDIAL INFARCTION) (MULTI): ICD-10-CM

## 2025-09-02 DIAGNOSIS — E78.2 MIXED HYPERLIPIDEMIA: ICD-10-CM

## 2025-09-02 DIAGNOSIS — I10 PRIMARY HYPERTENSION: ICD-10-CM

## 2025-09-02 DIAGNOSIS — E66.09 OBESITY DUE TO EXCESS CALORIES WITHOUT SERIOUS COMORBIDITY, UNSPECIFIED CLASS: ICD-10-CM

## 2025-09-02 DIAGNOSIS — Z95.1 STATUS POST CORONARY ARTERY BYPASS GRAFT: ICD-10-CM

## 2025-09-02 DIAGNOSIS — I25.110 ATHEROSCLEROSIS OF NATIVE CORONARY ARTERY OF NATIVE HEART WITH UNSTABLE ANGINA PECTORIS: ICD-10-CM

## 2025-09-02 DIAGNOSIS — I25.5 ISCHEMIC CARDIOMYOPATHY: Primary | ICD-10-CM

## 2025-09-02 DIAGNOSIS — I21.4 NON-ST ELEVATION MYOCARDIAL INFARCTION (NSTEMI) (MULTI): ICD-10-CM

## 2025-09-02 DIAGNOSIS — I25.5 ISCHEMIC CARDIOMYOPATHY: ICD-10-CM

## 2025-09-02 PROCEDURE — 4010F ACE/ARB THERAPY RXD/TAKEN: CPT | Performed by: INTERNAL MEDICINE

## 2025-09-02 PROCEDURE — 99214 OFFICE O/P EST MOD 30 MIN: CPT | Performed by: INTERNAL MEDICINE

## 2025-09-02 PROCEDURE — 3044F HG A1C LEVEL LT 7.0%: CPT | Performed by: INTERNAL MEDICINE

## 2025-09-02 PROCEDURE — 93798 PHYS/QHP OP CAR RHAB W/ECG: CPT | Performed by: INTERNAL MEDICINE

## 2025-09-02 PROCEDURE — 1125F AMNT PAIN NOTED PAIN PRSNT: CPT | Performed by: INTERNAL MEDICINE

## 2025-09-02 PROCEDURE — 3050F LDL-C >= 130 MG/DL: CPT | Performed by: INTERNAL MEDICINE

## 2025-09-02 PROCEDURE — 99212 OFFICE O/P EST SF 10 MIN: CPT | Mod: 25

## 2025-09-02 PROCEDURE — 3077F SYST BP >= 140 MM HG: CPT | Performed by: INTERNAL MEDICINE

## 2025-09-02 PROCEDURE — 3079F DIAST BP 80-89 MM HG: CPT | Performed by: INTERNAL MEDICINE

## 2025-09-02 RX ORDER — METOPROLOL TARTRATE 25 MG/1
25 TABLET, FILM COATED ORAL 2 TIMES DAILY
Qty: 60 TABLET | Refills: 0 | Status: SHIPPED | OUTPATIENT
Start: 2025-09-02

## 2025-09-02 RX ORDER — ATORVASTATIN CALCIUM 40 MG/1
40 TABLET, FILM COATED ORAL NIGHTLY
Qty: 90 TABLET | Refills: 3 | Status: SHIPPED | OUTPATIENT
Start: 2025-09-02 | End: 2025-09-02 | Stop reason: SDUPTHER

## 2025-09-02 RX ORDER — CLOPIDOGREL BISULFATE 75 MG/1
75 TABLET ORAL DAILY
Qty: 30 TABLET | Refills: 0 | Status: SHIPPED | OUTPATIENT
Start: 2025-09-02

## 2025-09-02 RX ORDER — METOPROLOL TARTRATE 25 MG/1
25 TABLET, FILM COATED ORAL 2 TIMES DAILY
Qty: 60 TABLET | Refills: 0 | Status: SHIPPED | OUTPATIENT
Start: 2025-09-02 | End: 2025-09-02 | Stop reason: SDUPTHER

## 2025-09-02 RX ORDER — LOSARTAN POTASSIUM 50 MG/1
50 TABLET ORAL 2 TIMES DAILY
Qty: 180 TABLET | Refills: 3 | Status: SHIPPED | OUTPATIENT
Start: 2025-09-02 | End: 2026-09-02

## 2025-09-02 RX ORDER — ATORVASTATIN CALCIUM 40 MG/1
40 TABLET, FILM COATED ORAL NIGHTLY
Qty: 90 TABLET | Refills: 3 | Status: SHIPPED | OUTPATIENT
Start: 2025-09-02 | End: 2026-09-02

## 2025-09-02 RX ORDER — CLOPIDOGREL BISULFATE 75 MG/1
75 TABLET ORAL DAILY
Qty: 30 TABLET | Refills: 0 | Status: SHIPPED | OUTPATIENT
Start: 2025-09-02 | End: 2025-09-02 | Stop reason: SDUPTHER

## 2025-09-02 RX ORDER — LOSARTAN POTASSIUM 50 MG/1
50 TABLET ORAL 2 TIMES DAILY
Qty: 180 TABLET | Refills: 3 | Status: SHIPPED | OUTPATIENT
Start: 2025-09-02 | End: 2025-09-02 | Stop reason: SDUPTHER

## 2025-09-02 ASSESSMENT — ENCOUNTER SYMPTOMS
OCCASIONAL FEELINGS OF UNSTEADINESS: 1
LOSS OF SENSATION IN FEET: 0
DEPRESSION: 1

## 2025-09-02 ASSESSMENT — PATIENT HEALTH QUESTIONNAIRE - PHQ9
1. LITTLE INTEREST OR PLEASURE IN DOING THINGS: MORE THAN HALF THE DAYS
2. FEELING DOWN, DEPRESSED OR HOPELESS: MORE THAN HALF THE DAYS
SUM OF ALL RESPONSES TO PHQ9 QUESTIONS 1 AND 2: 4

## 2025-09-02 ASSESSMENT — PAIN SCALES - GENERAL: PAINLEVEL_OUTOF10: 6

## 2025-09-04 ENCOUNTER — APPOINTMENT (OUTPATIENT)
Dept: OPHTHALMOLOGY | Facility: CLINIC | Age: 70
End: 2025-09-04
Payer: COMMERCIAL

## 2025-09-04 ENCOUNTER — APPOINTMENT (OUTPATIENT)
Dept: CARDIAC REHAB | Facility: CLINIC | Age: 70
End: 2025-09-04
Payer: COMMERCIAL

## 2025-09-04 PROBLEM — E11.3393 MODERATE NONPROLIFERATIVE DIABETIC RETINOPATHY OF BOTH EYES WITHOUT MACULAR EDEMA ASSOCIATED WITH TYPE 2 DIABETES MELLITUS: Status: ACTIVE | Noted: 2025-09-04

## 2025-09-04 ASSESSMENT — TONOMETRY
IOP_METHOD: GOLDMANN APPLANATION
OD_IOP_MMHG: 19
OS_IOP_MMHG: 14

## 2025-09-04 ASSESSMENT — EXTERNAL EXAM - RIGHT EYE: OD_EXAM: NORMAL

## 2025-09-04 ASSESSMENT — VISUAL ACUITY
OS_SC: 20/60
OD_SC: 20/50+1
METHOD: SNELLEN - LINEAR
OD_PH_SC: 20/30+2
OS_PH_SC: 20/25-1

## 2025-09-04 ASSESSMENT — SLIT LAMP EXAM - LIDS
COMMENTS: NORMAL
COMMENTS: NORMAL

## 2025-09-04 ASSESSMENT — EXTERNAL EXAM - LEFT EYE: OS_EXAM: NORMAL

## 2025-09-15 ENCOUNTER — APPOINTMENT (OUTPATIENT)
Dept: ENDOCRINOLOGY | Facility: CLINIC | Age: 70
End: 2025-09-15
Payer: COMMERCIAL

## 2025-11-12 ENCOUNTER — APPOINTMENT (OUTPATIENT)
Dept: CARDIAC REHAB | Facility: CLINIC | Age: 70
End: 2025-11-12
Payer: COMMERCIAL

## 2026-02-06 ENCOUNTER — APPOINTMENT (OUTPATIENT)
Dept: PRIMARY CARE | Facility: CLINIC | Age: 71
End: 2026-02-06
Payer: COMMERCIAL

## 2026-03-05 ENCOUNTER — APPOINTMENT (OUTPATIENT)
Dept: OPHTHALMOLOGY | Facility: CLINIC | Age: 71
End: 2026-03-05
Payer: COMMERCIAL

## 2026-03-10 ENCOUNTER — APPOINTMENT (OUTPATIENT)
Age: 71
End: 2026-03-10
Payer: COMMERCIAL

## (undated) DEVICE — KNIFE, MICROSURGICAL STRAIGHT 15 DEGREE

## (undated) DEVICE — PUNCH, AORTIC, ROTATING, 4.0MM, STERILE

## (undated) DEVICE — SHEATH, PINNACLE, W/.038 GUIDEWIRE, 10 CM,  6FR INTRODUCER, 6FR DIA, 2.5 CM DIALATOR

## (undated) DEVICE — CANNULA, RETROGRADE, 14FR X 32CM

## (undated) DEVICE — SOLUTION, INJECTION, 0.9% SODIUM CHL, USP LIFECARE 1000 MI

## (undated) DEVICE — CANNULA, VENOUS, 2-STAGE, 32/40 ROUND

## (undated) DEVICE — SOLUTION, INJECTION, DEXTROSE 5%, LIFECARE, LIMITED

## (undated) DEVICE — COLLECTION UNIT, DRAINAGE, THORACIC, SINGLE TUBE, DRY SUCTION, ATS COMPATIBLE, OASIS 3600, LF

## (undated) DEVICE — LEAD, PACING, MYOCARDIAL, BIPOLAR, TEMPORARY

## (undated) DEVICE — SUTURE, ETHIBOND, 0, V-34, 30 IN GREEN

## (undated) DEVICE — SYRINGE, 20 CC, LUER LOCK

## (undated) DEVICE — TRAY, SURESTEP, URINE METER, 14FR, SILICONE

## (undated) DEVICE — ADHESIVE, SKIN, DERMABOND ADVANCED, 15CM, PEN-STYLE

## (undated) DEVICE — RESERVOIRE, ATR 120 COLLECTION

## (undated) DEVICE — CATHETER, ANGIO, IMPULSE, FR4, 6 FR X 100 CM

## (undated) DEVICE — KIT, NAMIC STANDARD LEFT HEART, CUSTOM, LWMC

## (undated) DEVICE — INSERT, CLAMP, FOGARTY, SAFEJAW, 61MM

## (undated) DEVICE — ADAPTER, CARDIOPLEGIA, VENTING, Y, 19.1 CM

## (undated) DEVICE — BLOOD SET, 10 DROP, Y-TYPE, 170 MICRON FILTER, CLAVE, ROTATING LUER, 84IN

## (undated) DEVICE — SUTURE, PROLENE, 8-0, 24 IN, BV 175-6, BLUE

## (undated) DEVICE — SOLUTION, IRRIGATION, X RX SODIUM CHL 0.9%, 1000ML BTL

## (undated) DEVICE — Device

## (undated) DEVICE — TUBING, PRESSURE, W/CONNECTOR, MALE/FEMALE, 48 IN

## (undated) DEVICE — BANDAGE, ELASTIC, ACE, ACE, DOUBLE LENGTH, 6 X 550 IN, LF

## (undated) DEVICE — CATHETER KIT, ARTERY, W/SPRING-WIRE GUIDE, RADIOPAQUE, 20 G X 12.7 CM, FEP

## (undated) DEVICE — SOLUTION, INJECTION, SODIUM CHLORIDE, 0.9%, 250ML, USP, LIMITED

## (undated) DEVICE — GUIDEWIRE, J TIP, 3 MM, 0.035 IN X 180 CM, PTFE

## (undated) DEVICE — PAD, DEFIB, ECG, ADULT/CHILD, 10KG

## (undated) DEVICE — CLIP, LIGATING, W/ADHESIVE PAD, MEDIUM, TITANIUM

## (undated) DEVICE — GAUZE, X-RAY, RF DETECTABLE, 16 PLY, 4 X 4IN, STERILE

## (undated) DEVICE — DRAPE, CARDIOVASCULAR INCISE, 42X88IN, STERILE

## (undated) DEVICE — SPONGE, HEMOSTATIC, CELLULOSE, SURGICEL, 4 X 8 IN

## (undated) DEVICE — GOWN, SURGICAL, SIRUS, NON REINFORCED, LARGE

## (undated) DEVICE — GOWN, ASTOUND, XL

## (undated) DEVICE — CATHETER, ANGIO, IMPULSE, FL4, 6 FR X 100 CM

## (undated) DEVICE — TUBE, PRIMARY SET, IV , PIGGYBACK, LIFESHEILD, 99IN

## (undated) DEVICE — APPLICATOR, CHLORAPREP, W/ORANGE TINT, 26ML

## (undated) DEVICE — CANNULA, AORTIC, ROOT, STANDARD, FLANGE, RADIOPAQUE TIP, W/FLOW-GUARD, 7 FR X 14 CM

## (undated) DEVICE — SOLUTION, INJECTION, SODIUM CHLORIDE 9%, 500ML

## (undated) DEVICE — BLOWER MISTER KIT, CLEARVIEW, MALLEABLE SHAFT, W/TUBING, 16.5 CM

## (undated) DEVICE — MASK, RESUSCITATION, MANUEL, ADULT

## (undated) DEVICE — CLEANER, ELECTROSURGICAL, TIP

## (undated) DEVICE — MICROINTRODUCER KIT, VSI, 4FR X 40CM, STIFFEN

## (undated) DEVICE — INTRODUCER SET, PERCUTANEOUS, SHEATH, W/VALVE & SIDEPORT, 7.5FR

## (undated) DEVICE — CLIP, LIGATING, W/ADHESIVE, WIDE SLOT, SMALL, TITANIUM

## (undated) DEVICE — SUTURE, PROLENE, 7-0, 30 IN, BV1, DA, BLUE

## (undated) DEVICE — PROTECTOR, TOOTH, ADULT

## (undated) DEVICE — ATS SUCTION LINE

## (undated) DEVICE — CATHETER, THORACIC, STRAIGHT, ADULT, 28 FR, PVC

## (undated) DEVICE — SUTURE, STEEL, 7, 18 IN, CCS, SILVER

## (undated) DEVICE — PACK, ANGIO P2, CUSTOM, LAKE

## (undated) DEVICE — TOWEL, OR XRAY, RF DETECT, WHITE, STERILE

## (undated) DEVICE — DEVICE, ENDOSCOPIC VESSEL HARVESTING, SAPHENA VENAPAX

## (undated) DEVICE — WATER STERILE, FOR IRRIGATION, 1000ML, W/HANGER

## (undated) DEVICE — SET, AUTOTRANSFUSION, AT1

## (undated) DEVICE — SOLUTION, INJECTION, USP, LACTATED RINGERS, LIFECARE, 1000ML

## (undated) DEVICE — EXCHANGER, HEAT/MOISTURE, W/ FILTER CIRCUIT GUARD

## (undated) DEVICE — PACING CABLE, EXTENSION, 6 FT BEIGE, DISPOSABLE

## (undated) DEVICE — CANNULA, EOPA 20F W/O GUIDEWIRE

## (undated) DEVICE — SUTURE, PROLENE, 6-0, 30 IN, RB-2, BLUE

## (undated) DEVICE — GEL, ULTRASOUND, AQUASONIC 100, 20 GM, STERILE

## (undated) DEVICE — STOPCOCK, 4 WAY, LARGE BORE, ROTATING, LUER LOCK, MALE

## (undated) DEVICE — SET, PRIMARY IV, 3 Y SITES, 2 SLIDE, W/ CONVERT PIN

## (undated) DEVICE — SUTURE, VICRYL, 0, 36 IN, CT-1, UNDYED

## (undated) DEVICE — CLOSURE DEVICE, VASCULAR, ANGIO-SEAL, VIP, 6FR, LF